# Patient Record
Sex: MALE | Race: OTHER | NOT HISPANIC OR LATINO | URBAN - METROPOLITAN AREA
[De-identification: names, ages, dates, MRNs, and addresses within clinical notes are randomized per-mention and may not be internally consistent; named-entity substitution may affect disease eponyms.]

---

## 2021-06-23 ENCOUNTER — OUTPATIENT (OUTPATIENT)
Dept: OUTPATIENT SERVICES | Facility: HOSPITAL | Age: 61
LOS: 1 days | Discharge: HOME | End: 2021-06-23
Payer: MEDICAID

## 2021-06-23 DIAGNOSIS — F17.200 NICOTINE DEPENDENCE, UNSPECIFIED, UNCOMPLICATED: ICD-10-CM

## 2021-06-23 PROCEDURE — 71271 CT THORAX LUNG CANCER SCR C-: CPT | Mod: 26

## 2022-07-09 ENCOUNTER — OUTPATIENT (OUTPATIENT)
Dept: OUTPATIENT SERVICES | Facility: HOSPITAL | Age: 62
LOS: 1 days | Discharge: HOME | End: 2022-07-09

## 2022-07-09 DIAGNOSIS — F17.211 NICOTINE DEPENDENCE, CIGARETTES, IN REMISSION: ICD-10-CM

## 2022-07-09 PROCEDURE — 71271 CT THORAX LUNG CANCER SCR C-: CPT | Mod: 26

## 2023-05-31 ENCOUNTER — OUTPATIENT (OUTPATIENT)
Dept: OUTPATIENT SERVICES | Facility: HOSPITAL | Age: 63
LOS: 1 days | End: 2023-05-31
Payer: MEDICAID

## 2023-05-31 DIAGNOSIS — R05.9 COUGH, UNSPECIFIED: ICD-10-CM

## 2023-05-31 PROCEDURE — 71046 X-RAY EXAM CHEST 2 VIEWS: CPT | Mod: 26

## 2023-05-31 PROCEDURE — 71046 X-RAY EXAM CHEST 2 VIEWS: CPT

## 2023-06-01 DIAGNOSIS — R05.9 COUGH, UNSPECIFIED: ICD-10-CM

## 2023-08-17 ENCOUNTER — OUTPATIENT (OUTPATIENT)
Dept: OUTPATIENT SERVICES | Facility: HOSPITAL | Age: 63
LOS: 1 days | End: 2023-08-17
Payer: MEDICAID

## 2023-08-17 DIAGNOSIS — Z00.8 ENCOUNTER FOR OTHER GENERAL EXAMINATION: ICD-10-CM

## 2023-08-17 DIAGNOSIS — F17.211 NICOTINE DEPENDENCE, CIGARETTES, IN REMISSION: ICD-10-CM

## 2023-08-17 PROCEDURE — 71271 CT THORAX LUNG CANCER SCR C-: CPT | Mod: 26

## 2023-08-17 PROCEDURE — 71271 CT THORAX LUNG CANCER SCR C-: CPT

## 2023-08-18 DIAGNOSIS — F17.211 NICOTINE DEPENDENCE, CIGARETTES, IN REMISSION: ICD-10-CM

## 2023-09-11 ENCOUNTER — APPOINTMENT (OUTPATIENT)
Dept: ORTHOPEDIC SURGERY | Facility: CLINIC | Age: 63
End: 2023-09-11
Payer: MEDICAID

## 2023-09-11 VITALS — BODY MASS INDEX: 23.78 KG/M2 | HEIGHT: 66 IN | WEIGHT: 148 LBS

## 2023-09-11 PROBLEM — Z00.00 ENCOUNTER FOR PREVENTIVE HEALTH EXAMINATION: Status: ACTIVE | Noted: 2023-09-11

## 2023-09-11 PROCEDURE — 73630 X-RAY EXAM OF FOOT: CPT | Mod: LT

## 2023-09-11 PROCEDURE — 73610 X-RAY EXAM OF ANKLE: CPT | Mod: LT

## 2023-09-11 PROCEDURE — 99203 OFFICE O/P NEW LOW 30 MIN: CPT

## 2023-09-21 ENCOUNTER — APPOINTMENT (OUTPATIENT)
Dept: MRI IMAGING | Facility: CLINIC | Age: 63
End: 2023-09-21

## 2023-09-25 ENCOUNTER — NON-APPOINTMENT (OUTPATIENT)
Age: 63
End: 2023-09-25

## 2023-09-25 ENCOUNTER — APPOINTMENT (OUTPATIENT)
Dept: ORTHOPEDIC SURGERY | Facility: CLINIC | Age: 63
End: 2023-09-25
Payer: MEDICAID

## 2023-09-25 PROCEDURE — 99213 OFFICE O/P EST LOW 20 MIN: CPT

## 2023-10-08 ENCOUNTER — RX RENEWAL (OUTPATIENT)
Age: 63
End: 2023-10-08

## 2023-10-08 RX ORDER — MELOXICAM 15 MG/1
15 TABLET ORAL
Qty: 30 | Refills: 0 | Status: ACTIVE | COMMUNITY
Start: 2023-09-11 | End: 1900-01-01

## 2023-10-20 ENCOUNTER — APPOINTMENT (OUTPATIENT)
Dept: ORTHOPEDIC SURGERY | Facility: CLINIC | Age: 63
End: 2023-10-20
Payer: MEDICAID

## 2023-10-20 PROCEDURE — 99213 OFFICE O/P EST LOW 20 MIN: CPT

## 2023-11-08 ENCOUNTER — APPOINTMENT (OUTPATIENT)
Dept: ORTHOPEDIC SURGERY | Facility: CLINIC | Age: 63
End: 2023-11-08
Payer: MEDICAID

## 2023-11-08 PROCEDURE — 99213 OFFICE O/P EST LOW 20 MIN: CPT

## 2023-11-09 ENCOUNTER — APPOINTMENT (OUTPATIENT)
Dept: MRI IMAGING | Facility: CLINIC | Age: 63
End: 2023-11-09

## 2023-11-20 ENCOUNTER — APPOINTMENT (OUTPATIENT)
Dept: MRI IMAGING | Facility: CLINIC | Age: 63
End: 2023-11-20
Payer: MEDICAID

## 2023-11-20 PROCEDURE — 73721 MRI JNT OF LWR EXTRE W/O DYE: CPT | Mod: LT

## 2023-11-27 ENCOUNTER — APPOINTMENT (OUTPATIENT)
Dept: ORTHOPEDIC SURGERY | Facility: CLINIC | Age: 63
End: 2023-11-27
Payer: MEDICAID

## 2023-11-27 PROCEDURE — 99214 OFFICE O/P EST MOD 30 MIN: CPT

## 2023-12-04 ENCOUNTER — APPOINTMENT (OUTPATIENT)
Dept: ORTHOPEDIC SURGERY | Facility: CLINIC | Age: 63
End: 2023-12-04
Payer: MEDICAID

## 2023-12-04 DIAGNOSIS — M65.9 SYNOVITIS AND TENOSYNOVITIS, UNSPECIFIED: ICD-10-CM

## 2023-12-04 PROCEDURE — 20605 DRAIN/INJ JOINT/BURSA W/O US: CPT | Mod: LT

## 2023-12-04 PROCEDURE — 99213 OFFICE O/P EST LOW 20 MIN: CPT | Mod: 25

## 2024-02-28 ENCOUNTER — INPATIENT (INPATIENT)
Facility: HOSPITAL | Age: 64
LOS: 14 days | Discharge: HOME | DRG: 951 | End: 2024-03-14
Attending: INTERNAL MEDICINE | Admitting: STUDENT IN AN ORGANIZED HEALTH CARE EDUCATION/TRAINING PROGRAM
Payer: MEDICAID

## 2024-02-28 VITALS
TEMPERATURE: 98 F | OXYGEN SATURATION: 94 % | SYSTOLIC BLOOD PRESSURE: 145 MMHG | RESPIRATION RATE: 24 BRPM | DIASTOLIC BLOOD PRESSURE: 90 MMHG | HEART RATE: 138 BPM

## 2024-02-28 LAB
ALBUMIN SERPL ELPH-MCNC: 3.5 G/DL — SIGNIFICANT CHANGE UP (ref 3.5–5.2)
ALP SERPL-CCNC: 144 U/L — HIGH (ref 30–115)
ALT FLD-CCNC: 85 U/L — HIGH (ref 0–41)
ANION GAP SERPL CALC-SCNC: 18 MMOL/L — HIGH (ref 7–14)
AST SERPL-CCNC: 195 U/L — HIGH (ref 0–41)
B-OH-BUTYR SERPL-SCNC: 0.2 MMOL/L — SIGNIFICANT CHANGE UP
BASE EXCESS BLDV CALC-SCNC: 1.1 MMOL/L — SIGNIFICANT CHANGE UP (ref -2–3)
BASOPHILS # BLD AUTO: 0.03 K/UL — SIGNIFICANT CHANGE UP (ref 0–0.2)
BASOPHILS NFR BLD AUTO: 0.5 % — SIGNIFICANT CHANGE UP (ref 0–1)
BILIRUB SERPL-MCNC: 1.2 MG/DL — SIGNIFICANT CHANGE UP (ref 0.2–1.2)
BUN SERPL-MCNC: 7 MG/DL — LOW (ref 10–20)
CA-I SERPL-SCNC: 1.07 MMOL/L — LOW (ref 1.15–1.33)
CALCIUM SERPL-MCNC: 8.3 MG/DL — LOW (ref 8.4–10.5)
CHLORIDE SERPL-SCNC: 100 MMOL/L — SIGNIFICANT CHANGE UP (ref 98–110)
CO2 SERPL-SCNC: 22 MMOL/L — SIGNIFICANT CHANGE UP (ref 17–32)
CREAT SERPL-MCNC: 0.7 MG/DL — SIGNIFICANT CHANGE UP (ref 0.7–1.5)
EGFR: 104 ML/MIN/1.73M2 — SIGNIFICANT CHANGE UP
EOSINOPHIL # BLD AUTO: 0.05 K/UL — SIGNIFICANT CHANGE UP (ref 0–0.7)
EOSINOPHIL NFR BLD AUTO: 0.8 % — SIGNIFICANT CHANGE UP (ref 0–8)
ETHANOL SERPL-MCNC: 202 MG/DL — HIGH
FLUAV AG NPH QL: SIGNIFICANT CHANGE UP
FLUBV AG NPH QL: SIGNIFICANT CHANGE UP
GAS PNL BLDV: 136 MMOL/L — SIGNIFICANT CHANGE UP (ref 136–145)
GAS PNL BLDV: SIGNIFICANT CHANGE UP
GAS PNL BLDV: SIGNIFICANT CHANGE UP
GLUCOSE SERPL-MCNC: 207 MG/DL — HIGH (ref 70–99)
HCO3 BLDV-SCNC: 24 MMOL/L — SIGNIFICANT CHANGE UP (ref 22–29)
HCT VFR BLD CALC: 45.5 % — SIGNIFICANT CHANGE UP (ref 42–52)
HCT VFR BLDA CALC: 50 % — SIGNIFICANT CHANGE UP (ref 39–51)
HGB BLD CALC-MCNC: 16.5 G/DL — SIGNIFICANT CHANGE UP (ref 12.6–17.4)
HGB BLD-MCNC: 16.3 G/DL — SIGNIFICANT CHANGE UP (ref 14–18)
IMM GRANULOCYTES NFR BLD AUTO: 0.6 % — HIGH (ref 0.1–0.3)
LACTATE BLDV-MCNC: 4 MMOL/L — CRITICAL HIGH (ref 0.5–2)
LYMPHOCYTES # BLD AUTO: 1.58 K/UL — SIGNIFICANT CHANGE UP (ref 1.2–3.4)
LYMPHOCYTES # BLD AUTO: 25 % — SIGNIFICANT CHANGE UP (ref 20.5–51.1)
MAGNESIUM SERPL-MCNC: 1.5 MG/DL — LOW (ref 1.8–2.4)
MCHC RBC-ENTMCNC: 35.1 PG — HIGH (ref 27–31)
MCHC RBC-ENTMCNC: 35.8 G/DL — SIGNIFICANT CHANGE UP (ref 32–37)
MCV RBC AUTO: 97.8 FL — HIGH (ref 80–94)
MONOCYTES # BLD AUTO: 0.55 K/UL — SIGNIFICANT CHANGE UP (ref 0.1–0.6)
MONOCYTES NFR BLD AUTO: 8.7 % — SIGNIFICANT CHANGE UP (ref 1.7–9.3)
NEUTROPHILS # BLD AUTO: 4.06 K/UL — SIGNIFICANT CHANGE UP (ref 1.4–6.5)
NEUTROPHILS NFR BLD AUTO: 64.4 % — SIGNIFICANT CHANGE UP (ref 42.2–75.2)
NRBC # BLD: 0 /100 WBCS — SIGNIFICANT CHANGE UP (ref 0–0)
NT-PROBNP SERPL-SCNC: 46 PG/ML — SIGNIFICANT CHANGE UP (ref 0–300)
PCO2 BLDV: 33 MMHG — LOW (ref 42–55)
PH BLDV: 7.47 — HIGH (ref 7.32–7.43)
PLATELET # BLD AUTO: 178 K/UL — SIGNIFICANT CHANGE UP (ref 130–400)
PMV BLD: 10.2 FL — SIGNIFICANT CHANGE UP (ref 7.4–10.4)
PO2 BLDV: 53 MMHG — HIGH (ref 25–45)
POTASSIUM BLDV-SCNC: 3.1 MMOL/L — LOW (ref 3.5–5.1)
POTASSIUM SERPL-MCNC: 3.5 MMOL/L — SIGNIFICANT CHANGE UP (ref 3.5–5)
POTASSIUM SERPL-SCNC: 3.5 MMOL/L — SIGNIFICANT CHANGE UP (ref 3.5–5)
PROT SERPL-MCNC: 6.4 G/DL — SIGNIFICANT CHANGE UP (ref 6–8)
RBC # BLD: 4.65 M/UL — LOW (ref 4.7–6.1)
RBC # FLD: 11.6 % — SIGNIFICANT CHANGE UP (ref 11.5–14.5)
RSV RNA NPH QL NAA+NON-PROBE: SIGNIFICANT CHANGE UP
SAO2 % BLDV: 86.6 % — SIGNIFICANT CHANGE UP (ref 67–88)
SARS-COV-2 RNA SPEC QL NAA+PROBE: SIGNIFICANT CHANGE UP
SODIUM SERPL-SCNC: 140 MMOL/L — SIGNIFICANT CHANGE UP (ref 135–146)
TROPONIN T, HIGH SENSITIVITY RESULT: 11 NG/L — SIGNIFICANT CHANGE UP (ref 6–21)
WBC # BLD: 6.31 K/UL — SIGNIFICANT CHANGE UP (ref 4.8–10.8)
WBC # FLD AUTO: 6.31 K/UL — SIGNIFICANT CHANGE UP (ref 4.8–10.8)

## 2024-02-28 PROCEDURE — 71045 X-RAY EXAM CHEST 1 VIEW: CPT | Mod: 26

## 2024-02-28 PROCEDURE — 93010 ELECTROCARDIOGRAM REPORT: CPT

## 2024-02-28 PROCEDURE — 99291 CRITICAL CARE FIRST HOUR: CPT

## 2024-02-28 RX ORDER — FAMOTIDINE 10 MG/ML
20 INJECTION INTRAVENOUS ONCE
Refills: 0 | Status: COMPLETED | OUTPATIENT
Start: 2024-02-28 | End: 2024-02-28

## 2024-02-28 RX ORDER — DIAZEPAM 5 MG
10 TABLET ORAL ONCE
Refills: 0 | Status: DISCONTINUED | OUTPATIENT
Start: 2024-02-28 | End: 2024-02-28

## 2024-02-28 RX ORDER — SODIUM CHLORIDE 9 MG/ML
1000 INJECTION INTRAMUSCULAR; INTRAVENOUS; SUBCUTANEOUS ONCE
Refills: 0 | Status: COMPLETED | OUTPATIENT
Start: 2024-02-28 | End: 2024-02-28

## 2024-02-28 RX ORDER — THIAMINE MONONITRATE (VIT B1) 100 MG
100 TABLET ORAL ONCE
Refills: 0 | Status: COMPLETED | OUTPATIENT
Start: 2024-02-28 | End: 2024-02-28

## 2024-02-28 RX ORDER — MAGNESIUM SULFATE 500 MG/ML
2 VIAL (ML) INJECTION ONCE
Refills: 0 | Status: COMPLETED | OUTPATIENT
Start: 2024-02-28 | End: 2024-02-28

## 2024-02-28 RX ORDER — ONDANSETRON 8 MG/1
4 TABLET, FILM COATED ORAL ONCE
Refills: 0 | Status: COMPLETED | OUTPATIENT
Start: 2024-02-28 | End: 2024-02-28

## 2024-02-28 RX ORDER — POTASSIUM CHLORIDE 20 MEQ
40 PACKET (EA) ORAL ONCE
Refills: 0 | Status: COMPLETED | OUTPATIENT
Start: 2024-02-28 | End: 2024-02-28

## 2024-02-28 RX ORDER — DIAZEPAM 5 MG
20 TABLET ORAL ONCE
Refills: 0 | Status: DISCONTINUED | OUTPATIENT
Start: 2024-02-28 | End: 2024-02-28

## 2024-02-28 RX ORDER — NICOTINE POLACRILEX 2 MG
1 GUM BUCCAL ONCE
Refills: 0 | Status: COMPLETED | OUTPATIENT
Start: 2024-02-28 | End: 2024-02-28

## 2024-02-28 RX ORDER — SODIUM CHLORIDE 9 MG/ML
1000 INJECTION, SOLUTION INTRAVENOUS
Refills: 0 | Status: DISCONTINUED | OUTPATIENT
Start: 2024-02-28 | End: 2024-03-01

## 2024-02-28 RX ADMIN — FAMOTIDINE 20 MILLIGRAM(S): 10 INJECTION INTRAVENOUS at 21:33

## 2024-02-28 RX ADMIN — Medication 2 MILLIGRAM(S): at 23:09

## 2024-02-28 RX ADMIN — Medication 1 PATCH: at 23:59

## 2024-02-28 RX ADMIN — SODIUM CHLORIDE 150 MILLILITER(S): 9 INJECTION, SOLUTION INTRAVENOUS at 23:09

## 2024-02-28 RX ADMIN — Medication 100 GRAM(S): at 23:17

## 2024-02-28 RX ADMIN — Medication 20 MILLIGRAM(S): at 23:47

## 2024-02-28 RX ADMIN — Medication 40 MILLIEQUIVALENT(S): at 23:09

## 2024-02-28 RX ADMIN — Medication 100 MILLIGRAM(S): at 23:09

## 2024-02-28 RX ADMIN — SODIUM CHLORIDE 1000 MILLILITER(S): 9 INJECTION INTRAMUSCULAR; INTRAVENOUS; SUBCUTANEOUS at 21:34

## 2024-02-28 RX ADMIN — Medication 10 MILLIGRAM(S): at 21:33

## 2024-02-28 NOTE — ED PROVIDER NOTE - ATTENDING APP SHARED VISIT CONTRIBUTION OF CARE
63-year-old male with past medical history of frequent alcohol use for years, drinks daily, last drink around 5 PM, recently diagnosed possible cardiomyopathy due to hyperthyroidism presents with worsening shortness of breath and dyspnea on exertion over the last month that is progressive and occasional chest tightness with exertion.  No fever.  Has been following with cardiology as an outpatient who told him to go to the ED today.  On exam slightly tremulous but overall nontoxic, normal work of breathing, lungs clear bilaterally, no wheezes, rales or rhonchi, heart tachycardic and regular but no murmurs.  Abdomen benign, no peripheral edema.  Labs notable for mild hyperglycemia and transaminitis, lactic acidosis.  Initially tachycardic but improving with IV fluids and benzos.  Clinically has mild alcohol withdrawal and also consider hypothyroidism causing cardiomyopathy.  Chest x-ray with no focal consolidation on my interpretation.

## 2024-02-28 NOTE — ED ADULT NURSE NOTE - CHIEF COMPLAINT QUOTE
shortness of breath, chest pain heart palpitations started today. pt. pcp increased pt. metoprolol to 75 mg 2times a day with no improvement. pt. drinks on a  daily basis last drink was 4 hours ago

## 2024-02-28 NOTE — ED PROVIDER NOTE - OBJECTIVE STATEMENT
63 years old male history of alcohol abuse, hypertension, recently diagnosed with hyperthyroidism few weeks ago and started on methimazole 5 mg (TSH - 0.1 Free T4 1.4) by PMD presents with family complaint of palpitations, fast heart rate and exertional shortness of breath over the past few weeks.  As per family, patient was seen by his cardiologist Dr. Johnson who increased that his metoprolol to 75 mg twice a day which did not improve his heart rate.  Patient had outpatient echocardiogram and only a 2-day and noted to be tachycardic so he was recommended to come to ED for evaluation.  Patient admits to daily alcohol use (15-20 drinks of liquor daily).  Last drink was earlier this evening.  Patient also reports he does want to stop drinking and try to cut down his alcohol use.  Also reports dry heaving, nausea, tremors and anxiety.  Otherwise denies fever, chills, recent illness, coughing, chest pain, abdominal pain, diarrhea or urinary symptoms.  Denies drug use.

## 2024-02-28 NOTE — ED PROVIDER NOTE - NS ED ATTENDING STATEMENT MOD
This was a shared visit with the HITESH. I reviewed and verified the documentation. I have personally provided the amount of critical care time documented below excluding time spent on separate procedures.

## 2024-02-28 NOTE — ED PROVIDER NOTE - CRITICAL CARE ATTENDING CONTRIBUTION TO CARE
This was a shared visit with the ACP and I contributed to the medical decision making.    Attending Statement: I have personally provided the amount of critical care time documented below excluding time spent on separate procedures.     Critical Care Time Spent (min) Must be 30 or more minutes to qualify: 35.

## 2024-02-28 NOTE — ED PROVIDER NOTE - CLINICAL SUMMARY MEDICAL DECISION MAKING FREE TEXT BOX
63-year-old male with past medical history of frequent alcohol use for years, drinks daily, last drink around 5 PM, recently diagnosed possible cardiomyopathy due to hyperthyroidism presents with worsening shortness of breath and dyspnea on exertion over the last month that is progressive and occasional chest tightness with exertion.  No fever.  Has been following with cardiology as an outpatient who told him to go to the ED today.  On exam slightly tremulous but overall nontoxic, normal work of breathing, lungs clear bilaterally, no wheezes, rales or rhonchi, heart tachycardic and regular but no murmurs.  Abdomen benign, no peripheral edema.  Labs notable for mild hyperglycemia and transaminitis, lactic acidosis.  Initially tachycardic but improving with IV fluids and benzos.  Clinically has mild alcohol withdrawal and also consider hypothyroidism causing cardiomyopathy.  Chest x-ray with no focal consolidation on my interpretation. admitted to telemetry after discussion with ICU fellow.

## 2024-02-28 NOTE — ED PROVIDER NOTE - PROGRESS NOTE DETAILS
spoke with ICU fellow Dr Lancaster and stable for the floor sxs and HR improved after IV 10mg Valium but still Tremulous and anxious.  Will treat with higher dose of  Valium and admit for further management.

## 2024-02-28 NOTE — ED ADULT NURSE NOTE - OBJECTIVE STATEMENT
62 y/o male presented to ed c/o chest pain and palpitations associated with cough and SOB. Pt endorses drinks daily bottle of scotch. Last drink around 5 pm.

## 2024-02-28 NOTE — ED PROVIDER NOTE - PHYSICAL EXAMINATION
CONSTITUTIONAL: Well-appearing; in no apparent distress.   EYES: PERRL; EOM intact.   CARDIOVASCULAR: + tachycardia. Normal S1, S2; no murmurs, rubs, or gallops.   RESPIRATORY: Normal chest excursion with respiration; breath sounds clear and equal bilaterally; no wheezes, rhonchi, or rales.  GI/: Normal bowel sounds; non-distended; non-tender; no palpable organomegaly.   MS: No calf swelling and tenderness.  SKIN: Normal for age and race; warm; dry; good turgor; no apparent lesions or exudate.   NEURO/PSYCH: A & O x 4; grossly unremarkable. + anxious mood + tremors + tongue Fasciculation CIWA ~ 17

## 2024-02-29 DIAGNOSIS — F10.939 ALCOHOL USE, UNSPECIFIED WITH WITHDRAWAL, UNSPECIFIED: ICD-10-CM

## 2024-02-29 LAB
ALBUMIN SERPL ELPH-MCNC: 3.4 G/DL — LOW (ref 3.5–5.2)
ALP SERPL-CCNC: 136 U/L — HIGH (ref 30–115)
ALT FLD-CCNC: 79 U/L — HIGH (ref 0–41)
ANION GAP SERPL CALC-SCNC: 11 MMOL/L — SIGNIFICANT CHANGE UP (ref 7–14)
AST SERPL-CCNC: 179 U/L — HIGH (ref 0–41)
BILIRUB SERPL-MCNC: 2.3 MG/DL — HIGH (ref 0.2–1.2)
BUN SERPL-MCNC: 8 MG/DL — LOW (ref 10–20)
CALCIUM SERPL-MCNC: 7.9 MG/DL — LOW (ref 8.4–10.5)
CHLORIDE SERPL-SCNC: 104 MMOL/L — SIGNIFICANT CHANGE UP (ref 98–110)
CO2 SERPL-SCNC: 24 MMOL/L — SIGNIFICANT CHANGE UP (ref 17–32)
CREAT SERPL-MCNC: 0.7 MG/DL — SIGNIFICANT CHANGE UP (ref 0.7–1.5)
EGFR: 104 ML/MIN/1.73M2 — SIGNIFICANT CHANGE UP
GLUCOSE BLDC GLUCOMTR-MCNC: 134 MG/DL — HIGH (ref 70–99)
GLUCOSE SERPL-MCNC: 166 MG/DL — HIGH (ref 70–99)
HCV AB S/CO SERPL IA: 0.04 COI — SIGNIFICANT CHANGE UP
HCV AB SERPL-IMP: SIGNIFICANT CHANGE UP
LACTATE SERPL-SCNC: 1.4 MMOL/L — SIGNIFICANT CHANGE UP (ref 0.7–2)
MAGNESIUM SERPL-MCNC: 1.7 MG/DL — LOW (ref 1.8–2.4)
POTASSIUM SERPL-MCNC: 4 MMOL/L — SIGNIFICANT CHANGE UP (ref 3.5–5)
POTASSIUM SERPL-SCNC: 4 MMOL/L — SIGNIFICANT CHANGE UP (ref 3.5–5)
PROT SERPL-MCNC: 5.8 G/DL — LOW (ref 6–8)
SODIUM SERPL-SCNC: 139 MMOL/L — SIGNIFICANT CHANGE UP (ref 135–146)
T4 FREE SERPL-MCNC: 1.8 NG/DL — SIGNIFICANT CHANGE UP (ref 0.9–1.8)
TSH SERPL-MCNC: 0.01 UIU/ML — LOW (ref 0.27–4.2)

## 2024-02-29 PROCEDURE — 84100 ASSAY OF PHOSPHORUS: CPT

## 2024-02-29 PROCEDURE — 83735 ASSAY OF MAGNESIUM: CPT

## 2024-02-29 PROCEDURE — 85025 COMPLETE CBC W/AUTO DIFF WBC: CPT

## 2024-02-29 PROCEDURE — 93306 TTE W/DOPPLER COMPLETE: CPT

## 2024-02-29 PROCEDURE — 97116 GAIT TRAINING THERAPY: CPT | Mod: GP

## 2024-02-29 PROCEDURE — 82140 ASSAY OF AMMONIA: CPT

## 2024-02-29 PROCEDURE — 94002 VENT MGMT INPAT INIT DAY: CPT

## 2024-02-29 PROCEDURE — 83605 ASSAY OF LACTIC ACID: CPT

## 2024-02-29 PROCEDURE — 92612 ENDOSCOPY SWALLOW (FEES) VID: CPT | Mod: GN

## 2024-02-29 PROCEDURE — 82330 ASSAY OF CALCIUM: CPT

## 2024-02-29 PROCEDURE — 94660 CPAP INITIATION&MGMT: CPT

## 2024-02-29 PROCEDURE — 87040 BLOOD CULTURE FOR BACTERIA: CPT

## 2024-02-29 PROCEDURE — 36415 COLL VENOUS BLD VENIPUNCTURE: CPT

## 2024-02-29 PROCEDURE — 84443 ASSAY THYROID STIM HORMONE: CPT

## 2024-02-29 PROCEDURE — 82746 ASSAY OF FOLIC ACID SERUM: CPT

## 2024-02-29 PROCEDURE — 74018 RADEX ABDOMEN 1 VIEW: CPT

## 2024-02-29 PROCEDURE — 80048 BASIC METABOLIC PNL TOTAL CA: CPT

## 2024-02-29 PROCEDURE — 92610 EVALUATE SWALLOWING FUNCTION: CPT | Mod: GN

## 2024-02-29 PROCEDURE — 71045 X-RAY EXAM CHEST 1 VIEW: CPT

## 2024-02-29 PROCEDURE — 84145 PROCALCITONIN (PCT): CPT

## 2024-02-29 PROCEDURE — 85018 HEMOGLOBIN: CPT

## 2024-02-29 PROCEDURE — 93010 ELECTROCARDIOGRAM REPORT: CPT

## 2024-02-29 PROCEDURE — 81003 URINALYSIS AUTO W/O SCOPE: CPT

## 2024-02-29 PROCEDURE — 86803 HEPATITIS C AB TEST: CPT

## 2024-02-29 PROCEDURE — 82803 BLOOD GASES ANY COMBINATION: CPT

## 2024-02-29 PROCEDURE — 0225U NFCT DS DNA&RNA 21 SARSCOV2: CPT

## 2024-02-29 PROCEDURE — 71275 CT ANGIOGRAPHY CHEST: CPT | Mod: MC

## 2024-02-29 PROCEDURE — 94003 VENT MGMT INPAT SUBQ DAY: CPT

## 2024-02-29 PROCEDURE — 82962 GLUCOSE BLOOD TEST: CPT

## 2024-02-29 PROCEDURE — 85730 THROMBOPLASTIN TIME PARTIAL: CPT

## 2024-02-29 PROCEDURE — 87640 STAPH A DNA AMP PROBE: CPT

## 2024-02-29 PROCEDURE — 82607 VITAMIN B-12: CPT

## 2024-02-29 PROCEDURE — 70450 CT HEAD/BRAIN W/O DYE: CPT | Mod: MC

## 2024-02-29 PROCEDURE — 84439 ASSAY OF FREE THYROXINE: CPT

## 2024-02-29 PROCEDURE — 85014 HEMATOCRIT: CPT

## 2024-02-29 PROCEDURE — 85379 FIBRIN DEGRADATION QUANT: CPT

## 2024-02-29 PROCEDURE — 82248 BILIRUBIN DIRECT: CPT

## 2024-02-29 PROCEDURE — 87070 CULTURE OTHR SPECIMN AEROBIC: CPT

## 2024-02-29 PROCEDURE — 97162 PT EVAL MOD COMPLEX 30 MIN: CPT | Mod: GP

## 2024-02-29 PROCEDURE — 93005 ELECTROCARDIOGRAM TRACING: CPT

## 2024-02-29 PROCEDURE — 99223 1ST HOSP IP/OBS HIGH 75: CPT

## 2024-02-29 PROCEDURE — 80076 HEPATIC FUNCTION PANEL: CPT

## 2024-02-29 PROCEDURE — 94640 AIRWAY INHALATION TREATMENT: CPT

## 2024-02-29 PROCEDURE — 80053 COMPREHEN METABOLIC PANEL: CPT

## 2024-02-29 PROCEDURE — 85027 COMPLETE CBC AUTOMATED: CPT

## 2024-02-29 PROCEDURE — 81001 URINALYSIS AUTO W/SCOPE: CPT

## 2024-02-29 PROCEDURE — 84445 ASSAY OF TSI GLOBULIN: CPT

## 2024-02-29 PROCEDURE — 95819 EEG AWAKE AND ASLEEP: CPT

## 2024-02-29 PROCEDURE — 84480 ASSAY TRIIODOTHYRONINE (T3): CPT

## 2024-02-29 PROCEDURE — 84132 ASSAY OF SERUM POTASSIUM: CPT

## 2024-02-29 PROCEDURE — 84295 ASSAY OF SERUM SODIUM: CPT

## 2024-02-29 PROCEDURE — 74176 CT ABD & PELVIS W/O CONTRAST: CPT | Mod: MC

## 2024-02-29 PROCEDURE — 87641 MR-STAPH DNA AMP PROBE: CPT

## 2024-02-29 RX ORDER — MAGNESIUM SULFATE 500 MG/ML
2 VIAL (ML) INJECTION ONCE
Refills: 0 | Status: COMPLETED | OUTPATIENT
Start: 2024-02-29 | End: 2024-02-29

## 2024-02-29 RX ORDER — HALOPERIDOL DECANOATE 100 MG/ML
2.5 INJECTION INTRAMUSCULAR ONCE
Refills: 0 | Status: COMPLETED | OUTPATIENT
Start: 2024-02-29 | End: 2024-02-29

## 2024-02-29 RX ORDER — METOPROLOL TARTRATE 50 MG
75 TABLET ORAL
Refills: 0 | Status: DISCONTINUED | OUTPATIENT
Start: 2024-02-29 | End: 2024-03-04

## 2024-02-29 RX ORDER — FOLIC ACID 0.8 MG
1 TABLET ORAL DAILY
Refills: 0 | Status: DISCONTINUED | OUTPATIENT
Start: 2024-02-29 | End: 2024-03-14

## 2024-02-29 RX ORDER — ENOXAPARIN SODIUM 100 MG/ML
40 INJECTION SUBCUTANEOUS EVERY 24 HOURS
Refills: 0 | Status: DISCONTINUED | OUTPATIENT
Start: 2024-02-29 | End: 2024-03-01

## 2024-02-29 RX ORDER — IPRATROPIUM/ALBUTEROL SULFATE 18-103MCG
3 AEROSOL WITH ADAPTER (GRAM) INHALATION EVERY 6 HOURS
Refills: 0 | Status: DISCONTINUED | OUTPATIENT
Start: 2024-02-29 | End: 2024-03-08

## 2024-02-29 RX ORDER — THIAMINE MONONITRATE (VIT B1) 100 MG
100 TABLET ORAL DAILY
Refills: 0 | Status: DISCONTINUED | OUTPATIENT
Start: 2024-02-29 | End: 2024-03-01

## 2024-02-29 RX ORDER — DEXMEDETOMIDINE HYDROCHLORIDE IN 0.9% SODIUM CHLORIDE 4 UG/ML
0.05 INJECTION INTRAVENOUS
Qty: 200 | Refills: 0 | Status: DISCONTINUED | OUTPATIENT
Start: 2024-02-29 | End: 2024-03-01

## 2024-02-29 RX ADMIN — Medication 1 MILLIGRAM(S): at 11:46

## 2024-02-29 RX ADMIN — Medication 1 TABLET(S): at 11:46

## 2024-02-29 RX ADMIN — SODIUM CHLORIDE 150 MILLILITER(S): 9 INJECTION, SOLUTION INTRAVENOUS at 12:53

## 2024-02-29 RX ADMIN — Medication 2 MILLIGRAM(S): at 09:36

## 2024-02-29 RX ADMIN — Medication 2 MILLIGRAM(S): at 20:14

## 2024-02-29 RX ADMIN — Medication 3 MILLILITER(S): at 12:00

## 2024-02-29 RX ADMIN — Medication 40 MILLIGRAM(S): at 06:03

## 2024-02-29 RX ADMIN — Medication 100 MILLIGRAM(S): at 11:46

## 2024-02-29 RX ADMIN — Medication 2 MILLIGRAM(S): at 11:57

## 2024-02-29 RX ADMIN — Medication 75 MILLIGRAM(S): at 17:52

## 2024-02-29 RX ADMIN — DEXMEDETOMIDINE HYDROCHLORIDE IN 0.9% SODIUM CHLORIDE 0.99 MICROGRAM(S)/KG/HR: 4 INJECTION INTRAVENOUS at 22:43

## 2024-02-29 RX ADMIN — Medication 2 MILLIGRAM(S): at 23:14

## 2024-02-29 RX ADMIN — Medication 2 MILLIGRAM(S): at 18:58

## 2024-02-29 RX ADMIN — Medication 3 MILLILITER(S): at 19:19

## 2024-02-29 RX ADMIN — Medication 1 MILLIGRAM(S): at 08:38

## 2024-02-29 RX ADMIN — Medication 2 MILLIGRAM(S): at 21:20

## 2024-02-29 RX ADMIN — HALOPERIDOL DECANOATE 2.5 MILLIGRAM(S): 100 INJECTION INTRAMUSCULAR at 23:37

## 2024-02-29 RX ADMIN — Medication 25 GRAM(S): at 17:50

## 2024-02-29 RX ADMIN — Medication 2 MILLIGRAM(S): at 04:35

## 2024-02-29 RX ADMIN — Medication 4 MILLIGRAM(S): at 23:39

## 2024-02-29 NOTE — ED ADULT NURSE REASSESSMENT NOTE - NS ED NURSE REASSESS COMMENT FT1
Received pt from previous RN, pt assessed, pt restless-pt medicated from previous RN,  V/S stable at this time   family by bedside

## 2024-02-29 NOTE — H&P ADULT - NSHPPHYSICALEXAM_GEN_ALL_CORE
LOS: 1d    VITALS:   T(C): 36.8 (02-28-24 @ 20:33), Max: 36.8 (02-28-24 @ 20:33)  HR: 91 (02-28-24 @ 21:59) (91 - 138)  BP: 145/90 (02-28-24 @ 20:33) (145/90 - 145/90)  RR: 24 (02-28-24 @ 20:33) (24 - 24)  SpO2: 94% (02-28-24 @ 20:33) (94% - 94%)    GENERAL: NAD, lying in bed comfortably  HEAD:  Atraumatic, Normocephalic  EYES: EOMI, PERRLA, conjunctiva and sclera clear  ENT: Moist mucous membranes  NECK: Supple, No JVD  CHEST/LUNG: Clear to auscultation bilaterally; No rales, rhonchi, wheezing, or rubs. Unlabored respirations  HEART: Regular rate and rhythm; No murmurs, rubs, or gallops  ABDOMEN: BSx4; Soft, nontender, nondistended  EXTREMITIES:  2+ Peripheral Pulses, brisk capillary refill. No clubbing, cyanosis, or edema  NERVOUS SYSTEM:  A&Ox3, 5/5 motor bilateral upper and lower extremity, no sensory deficit   SKIN: No rashes or lesions

## 2024-02-29 NOTE — ED ADULT NURSE REASSESSMENT NOTE - NS ED NURSE REASSESS COMMENT FT1
Pt received as upgrade from ED hold at 23:30. Pt agitated, pulling lines, aggressive. Pt is confused at this time, unable to answer questions. Pt on level 1 restraints as per MD order. Precedex drip initiated as per MD order. RASS +3 at this time. No s/s of respiratory distress. VSS. Pt is admitted to ICU service. Pending bed status. Safety and Fall precautions in place as per hospital policy. Care ongoing.

## 2024-02-29 NOTE — H&P ADULT - ATTENDING COMMENTS
63 years old male history of alcohol abuse, hypertension, COPD? ( heavy smoker. acc to pt had PFTs done and uses inhalers at home ), recently diagnosed with hyperthyroidism few weeks ago and started on methimazole 5 mg  by PMD presents complaining of palpitations and exertional shortness of breath over the past few weeks.     #suspected alcohol withdrawal with tactile disturbances   #hx of hyperthyroids  #Lactic acidosis   #alcoholic liver injury   #toxic metabolic encephalopathy   - CIWA on my exam 25   - increase ativan to standing q2 and prn  - spoke with pulm fellow- if not improving upgrade to ICU   - continue bb and steroids   - IV fluids   - CIWA protocol with ativan taper   - CATCH team eval   - thiamine and folate   - replete Mg and K as needed        #DVT ppx: lovenox   #GI ppx: protonix   #Diet: DASH   #Activity: as tolerated   #Dispo: possible upgrade to ICU

## 2024-02-29 NOTE — H&P ADULT - ASSESSMENT
63 years old male history of alcohol abuse, hypertension, COPD? ( heavy smoker. acc to pt had PFTs done and uses inhalers at home ), recently diagnosed with hyperthyroidism few weeks ago and started on methimazole 5 mg  by PMD presents complaining of palpitations and exertional shortness of breath over the past few weeks.     #Palpitations   #Alcohol intoxication   #Recently diagnose hyperthyroidism?   - Vitals: T 98.2, , /90, spo2 94% on RA   - K 3.5, Mg 1.5  - Lactate 4 on VBGs   - trop 11, pro-BNP 46  - Alcohol level 202   - CXR clear   - EKG showed sinus tachycardia   - s/p valium and ativan in ED  - s/p 1 L NS, magnesium   - HR improved from 130 to 90s   - sxs may be due to alcohol intoxication vs hyperthyroidism   - monitor on tele for now   - check TSH, fT4   - pt had TTE done as OP yesterday, please get records from Dr Johnson   - will hold on BB for now given improved HR   - CIWA protocol with ativan taper   - CATCH team eval, pt willing to stop   - thiamine and folate   - replete Mg and K as needed     #Transaminitis   - , ALT 85,   - 2/2 to alcohol use     #HTN  - not on meds   - monitor BP     #COPD?   - on RA   - wheezing on PE  - duonebs PRN   - prednisone 40 mg OD for 5 days     #DVT ppx: lovenox   #GI ppx: protonix   #Diet: DASH   #Activity: as tolerated   #Dispo: tele   Please confirm medrec with family in AM.    63 years old male history of alcohol abuse, hypertension, COPD? ( heavy smoker. acc to pt had PFTs done and uses inhalers at home ), recently diagnosed with hyperthyroidism few weeks ago and started on methimazole 5 mg  by PMD presents complaining of palpitations and exertional shortness of breath over the past few weeks.     #Palpitations   #Alcohol intoxication   #Recently diagnose hyperthyroidism?   - Vitals: T 98.2, , /90, spo2 94% on RA   - K 3.5, Mg 1.5  - Lactate 4 on VBGs   - trop 11, pro-BNP 46  - Alcohol level 202   - CXR clear   - EKG showed sinus tachycardia   - s/p valium and ativan in ED  - s/p 1 L NS, magnesium   - HR improved from 130 to 90s   - sxs may be due to alcohol intoxication vs hyperthyroidism   - monitor on tele for now   - check TSH, fT4   - pt had TTE done as OP yesterday, please get records from Dr Johnson   - will hold on BB for now given improved HR   - CIWA protocol with ativan taper   - CATCH team eval, pt willing to stop   - thiamine and folate   - replete Mg and K as needed     #Lactic acidosis   - no hypotension or hypoxia   - c/w LR 75 cc/hr   - check repeat     #Transaminitis   - , ALT 85,   - 2/2 to alcohol use     #HTN  - not on meds   - monitor BP     #COPD?   - on RA   - wheezing on PE  - duonebs PRN   - prednisone 40 mg OD for 5 days     #DVT ppx: lovenox   #GI ppx: protonix   #Diet: DASH   #Activity: as tolerated   #Dispo: tele   Please confirm medrec with family in AM.

## 2024-02-29 NOTE — H&P ADULT - HISTORY OF PRESENT ILLNESS
63 years old male history of alcohol abuse, hypertension, recently diagnosed with hyperthyroidism few weeks ago and started on methimazole 5 mg (TSH - 0.1 Free T4 1.4) by PMD presents with family complaint of palpitations, fast heart rate and exertional shortness of breath over the past few weeks.  As per family, patient was seen by his cardiologist Dr. Johnson who increased that his metoprolol to 75 mg twice a day which did not improve his heart rate.  Patient had outpatient echocardiogram and only a 2-day and noted to be tachycardic so he was recommended to come to ED for evaluation.  Patient admits to daily alcohol use (15-20 drinks of liquor daily).  Last drink was earlier this evening.  Patient also reports he does want to stop drinking and try to cut down his alcohol use.  Also reports dry heaving, nausea, tremors and anxiety.  Otherwise denies fever, chills, recent illness, coughing, chest pain, abdominal pain, diarrhea or urinary symptoms.  Denies drug use. 63 years old male history of alcohol abuse, hypertension, COPD? ( heavy smoker. acc to pt had PFTs done and uses inhalers at home ), recently diagnosed with hyperthyroidism few weeks ago and started on methimazole 5 mg by PMD presents complaining of palpitations and exertional shortness of breath over the past few weeks.   At my encounter family not at bedside, acc to pt his only complaint is palpitaiosn, His HR was ranging 120-130 at home. No chest pain. He has cough and mild SOB which he attributes to smoking 1 pack daily. Otherwise he describes himself as very healthy. Pt is tremelous and restless. He admitted to drinking alcohol  (15-20 drinks of liquor daily). Last drink was earlier this evening.  Patient also reports he does want to stop drinking and try to cut down his alcohol use.    As per family, patient was seen by his cardiologist Dr. Johnson who increased that his metoprolol to 75 mg twice a day which did not improve his heart rate.  Patient had outpatient echocardiogram yesterday and noted to be tachycardic so he was recommended to come to ED for evaluation.  Otherwise denies fever, chills, recent illness, coughing, chest pain, abdominal pain, diarrhea or urinary symptoms.  Denies drug use.    Vitals: T 98.2, , /90, spo2 94% on RA   Labs remarkable for elevated ALT AST, Mg 1.5  Lactate 4 on VBGs   Alcohol level 202   CXR clear   EKG showed sinus tachycardia     s/p valium and ativan in ED  s/p 1 L NS, magnesium   HR improved from 130 to 90s

## 2024-03-01 LAB
ALBUMIN SERPL ELPH-MCNC: 3.1 G/DL — LOW (ref 3.5–5.2)
ALBUMIN SERPL ELPH-MCNC: 3.3 G/DL — LOW (ref 3.5–5.2)
ALP SERPL-CCNC: 114 U/L — SIGNIFICANT CHANGE UP (ref 30–115)
ALP SERPL-CCNC: 119 U/L — HIGH (ref 30–115)
ALT FLD-CCNC: 63 U/L — HIGH (ref 0–41)
ALT FLD-CCNC: 65 U/L — HIGH (ref 0–41)
ANION GAP SERPL CALC-SCNC: 14 MMOL/L — SIGNIFICANT CHANGE UP (ref 7–14)
ANION GAP SERPL CALC-SCNC: 16 MMOL/L — HIGH (ref 7–14)
APTT BLD: 49.4 SEC — HIGH (ref 27–39.2)
AST SERPL-CCNC: 112 U/L — HIGH (ref 0–41)
AST SERPL-CCNC: 99 U/L — HIGH (ref 0–41)
BASOPHILS # BLD AUTO: 0.03 K/UL — SIGNIFICANT CHANGE UP (ref 0–0.2)
BASOPHILS NFR BLD AUTO: 0.4 % — SIGNIFICANT CHANGE UP (ref 0–1)
BILIRUB DIRECT SERPL-MCNC: 1.8 MG/DL — HIGH (ref 0–0.3)
BILIRUB INDIRECT FLD-MCNC: 1 MG/DL — SIGNIFICANT CHANGE UP (ref 0.2–1.2)
BILIRUB SERPL-MCNC: 2.7 MG/DL — HIGH (ref 0.2–1.2)
BILIRUB SERPL-MCNC: 2.8 MG/DL — HIGH (ref 0.2–1.2)
BUN SERPL-MCNC: 10 MG/DL — SIGNIFICANT CHANGE UP (ref 10–20)
BUN SERPL-MCNC: 9 MG/DL — LOW (ref 10–20)
CALCIUM SERPL-MCNC: 7.9 MG/DL — LOW (ref 8.4–10.5)
CALCIUM SERPL-MCNC: 8.1 MG/DL — LOW (ref 8.4–10.5)
CHLORIDE SERPL-SCNC: 107 MMOL/L — SIGNIFICANT CHANGE UP (ref 98–110)
CHLORIDE SERPL-SCNC: 108 MMOL/L — SIGNIFICANT CHANGE UP (ref 98–110)
CO2 SERPL-SCNC: 18 MMOL/L — SIGNIFICANT CHANGE UP (ref 17–32)
CO2 SERPL-SCNC: 21 MMOL/L — SIGNIFICANT CHANGE UP (ref 17–32)
CREAT SERPL-MCNC: 0.6 MG/DL — LOW (ref 0.7–1.5)
CREAT SERPL-MCNC: 0.7 MG/DL — SIGNIFICANT CHANGE UP (ref 0.7–1.5)
EGFR: 104 ML/MIN/1.73M2 — SIGNIFICANT CHANGE UP
EGFR: 108 ML/MIN/1.73M2 — SIGNIFICANT CHANGE UP
EOSINOPHIL # BLD AUTO: 0.14 K/UL — SIGNIFICANT CHANGE UP (ref 0–0.7)
EOSINOPHIL NFR BLD AUTO: 2 % — SIGNIFICANT CHANGE UP (ref 0–8)
GAS PNL BLDA: SIGNIFICANT CHANGE UP
GAS PNL BLDA: SIGNIFICANT CHANGE UP
GLUCOSE BLDC GLUCOMTR-MCNC: 146 MG/DL — HIGH (ref 70–99)
GLUCOSE SERPL-MCNC: 148 MG/DL — HIGH (ref 70–99)
GLUCOSE SERPL-MCNC: 150 MG/DL — HIGH (ref 70–99)
HCT VFR BLD CALC: 42.7 % — SIGNIFICANT CHANGE UP (ref 42–52)
HGB BLD-MCNC: 15.5 G/DL — SIGNIFICANT CHANGE UP (ref 14–18)
IMM GRANULOCYTES NFR BLD AUTO: 0.4 % — HIGH (ref 0.1–0.3)
LYMPHOCYTES # BLD AUTO: 1.56 K/UL — SIGNIFICANT CHANGE UP (ref 1.2–3.4)
LYMPHOCYTES # BLD AUTO: 22.7 % — SIGNIFICANT CHANGE UP (ref 20.5–51.1)
MAGNESIUM SERPL-MCNC: 1.7 MG/DL — LOW (ref 1.8–2.4)
MAGNESIUM SERPL-MCNC: 2 MG/DL — SIGNIFICANT CHANGE UP (ref 1.8–2.4)
MCHC RBC-ENTMCNC: 36.3 G/DL — SIGNIFICANT CHANGE UP (ref 32–37)
MCHC RBC-ENTMCNC: 36.5 PG — HIGH (ref 27–31)
MCV RBC AUTO: 100.5 FL — HIGH (ref 80–94)
MONOCYTES # BLD AUTO: 0.49 K/UL — SIGNIFICANT CHANGE UP (ref 0.1–0.6)
MONOCYTES NFR BLD AUTO: 7.1 % — SIGNIFICANT CHANGE UP (ref 1.7–9.3)
NEUTROPHILS # BLD AUTO: 4.62 K/UL — SIGNIFICANT CHANGE UP (ref 1.4–6.5)
NEUTROPHILS NFR BLD AUTO: 67.4 % — SIGNIFICANT CHANGE UP (ref 42.2–75.2)
NRBC # BLD: 0 /100 WBCS — SIGNIFICANT CHANGE UP (ref 0–0)
PHOSPHATE SERPL-MCNC: 2.3 MG/DL — SIGNIFICANT CHANGE UP (ref 2.1–4.9)
PHOSPHATE SERPL-MCNC: 2.6 MG/DL — SIGNIFICANT CHANGE UP (ref 2.1–4.9)
PLATELET # BLD AUTO: 114 K/UL — LOW (ref 130–400)
PMV BLD: 11.5 FL — HIGH (ref 7.4–10.4)
POTASSIUM SERPL-MCNC: 3.7 MMOL/L — SIGNIFICANT CHANGE UP (ref 3.5–5)
POTASSIUM SERPL-MCNC: 4.8 MMOL/L — SIGNIFICANT CHANGE UP (ref 3.5–5)
POTASSIUM SERPL-SCNC: 3.7 MMOL/L — SIGNIFICANT CHANGE UP (ref 3.5–5)
POTASSIUM SERPL-SCNC: 4.8 MMOL/L — SIGNIFICANT CHANGE UP (ref 3.5–5)
PROT SERPL-MCNC: 5.6 G/DL — LOW (ref 6–8)
PROT SERPL-MCNC: 5.7 G/DL — LOW (ref 6–8)
RBC # BLD: 4.25 M/UL — LOW (ref 4.7–6.1)
RBC # FLD: 11.4 % — LOW (ref 11.5–14.5)
SODIUM SERPL-SCNC: 142 MMOL/L — SIGNIFICANT CHANGE UP (ref 135–146)
SODIUM SERPL-SCNC: 142 MMOL/L — SIGNIFICANT CHANGE UP (ref 135–146)
WBC # BLD: 6.87 K/UL — SIGNIFICANT CHANGE UP (ref 4.8–10.8)
WBC # FLD AUTO: 6.87 K/UL — SIGNIFICANT CHANGE UP (ref 4.8–10.8)

## 2024-03-01 PROCEDURE — 71045 X-RAY EXAM CHEST 1 VIEW: CPT | Mod: 26,77

## 2024-03-01 PROCEDURE — 93010 ELECTROCARDIOGRAM REPORT: CPT

## 2024-03-01 PROCEDURE — 71045 X-RAY EXAM CHEST 1 VIEW: CPT | Mod: 26

## 2024-03-01 PROCEDURE — 99291 CRITICAL CARE FIRST HOUR: CPT | Mod: GC

## 2024-03-01 PROCEDURE — 71275 CT ANGIOGRAPHY CHEST: CPT | Mod: 26

## 2024-03-01 RX ORDER — DEXMEDETOMIDINE HYDROCHLORIDE IN 0.9% SODIUM CHLORIDE 4 UG/ML
0.05 INJECTION INTRAVENOUS
Qty: 400 | Refills: 0 | Status: DISCONTINUED | OUTPATIENT
Start: 2024-03-01 | End: 2024-03-02

## 2024-03-01 RX ORDER — MAGNESIUM SULFATE 500 MG/ML
2 VIAL (ML) INJECTION ONCE
Refills: 0 | Status: COMPLETED | OUTPATIENT
Start: 2024-03-01 | End: 2024-03-01

## 2024-03-01 RX ORDER — THIAMINE MONONITRATE (VIT B1) 100 MG
500 TABLET ORAL EVERY 8 HOURS
Refills: 0 | Status: COMPLETED | OUTPATIENT
Start: 2024-03-01 | End: 2024-03-04

## 2024-03-01 RX ORDER — DEXMEDETOMIDINE HYDROCHLORIDE IN 0.9% SODIUM CHLORIDE 4 UG/ML
0.2 INJECTION INTRAVENOUS
Qty: 200 | Refills: 0 | Status: DISCONTINUED | OUTPATIENT
Start: 2024-03-01 | End: 2024-03-01

## 2024-03-01 RX ORDER — FENTANYL CITRATE 50 UG/ML
0.5 INJECTION INTRAVENOUS
Qty: 5000 | Refills: 0 | Status: DISCONTINUED | OUTPATIENT
Start: 2024-03-01 | End: 2024-03-01

## 2024-03-01 RX ORDER — HEPARIN SODIUM 5000 [USP'U]/ML
INJECTION INTRAVENOUS; SUBCUTANEOUS
Qty: 25000 | Refills: 0 | Status: DISCONTINUED | OUTPATIENT
Start: 2024-03-01 | End: 2024-03-02

## 2024-03-01 RX ORDER — DILTIAZEM HCL 120 MG
10 CAPSULE, EXT RELEASE 24 HR ORAL ONCE
Refills: 0 | Status: COMPLETED | OUTPATIENT
Start: 2024-03-01 | End: 2024-03-01

## 2024-03-01 RX ORDER — HALOPERIDOL DECANOATE 100 MG/ML
5 INJECTION INTRAMUSCULAR ONCE
Refills: 0 | Status: COMPLETED | OUTPATIENT
Start: 2024-03-01 | End: 2024-03-01

## 2024-03-01 RX ORDER — HEPARIN SODIUM 5000 [USP'U]/ML
4700 INJECTION INTRAVENOUS; SUBCUTANEOUS EVERY 6 HOURS
Refills: 0 | Status: DISCONTINUED | OUTPATIENT
Start: 2024-03-01 | End: 2024-03-04

## 2024-03-01 RX ORDER — PROPOFOL 10 MG/ML
10 INJECTION, EMULSION INTRAVENOUS
Qty: 1000 | Refills: 0 | Status: DISCONTINUED | OUTPATIENT
Start: 2024-03-01 | End: 2024-03-02

## 2024-03-01 RX ORDER — HALOPERIDOL DECANOATE 100 MG/ML
5 INJECTION INTRAMUSCULAR EVERY 6 HOURS
Refills: 0 | Status: DISCONTINUED | OUTPATIENT
Start: 2024-03-01 | End: 2024-03-07

## 2024-03-01 RX ORDER — DILTIAZEM HCL 120 MG
5 CAPSULE, EXT RELEASE 24 HR ORAL
Qty: 125 | Refills: 0 | Status: DISCONTINUED | OUTPATIENT
Start: 2024-03-01 | End: 2024-03-01

## 2024-03-01 RX ORDER — SODIUM CHLORIDE 9 MG/ML
1000 INJECTION, SOLUTION INTRAVENOUS
Refills: 0 | Status: DISCONTINUED | OUTPATIENT
Start: 2024-03-01 | End: 2024-03-04

## 2024-03-01 RX ORDER — FENTANYL CITRATE 50 UG/ML
0.5 INJECTION INTRAVENOUS
Qty: 2500 | Refills: 0 | Status: DISCONTINUED | OUTPATIENT
Start: 2024-03-01 | End: 2024-03-02

## 2024-03-01 RX ORDER — DILTIAZEM HCL 120 MG
10 CAPSULE, EXT RELEASE 24 HR ORAL ONCE
Refills: 0 | Status: DISCONTINUED | OUTPATIENT
Start: 2024-03-01 | End: 2024-03-01

## 2024-03-01 RX ADMIN — Medication 2 MILLIGRAM(S): at 12:55

## 2024-03-01 RX ADMIN — HEPARIN SODIUM 1100 UNIT(S)/HR: 5000 INJECTION INTRAVENOUS; SUBCUTANEOUS at 20:57

## 2024-03-01 RX ADMIN — Medication 75 MILLIGRAM(S): at 21:46

## 2024-03-01 RX ADMIN — HALOPERIDOL DECANOATE 5 MILLIGRAM(S): 100 INJECTION INTRAMUSCULAR at 13:58

## 2024-03-01 RX ADMIN — Medication 3 MILLILITER(S): at 12:55

## 2024-03-01 RX ADMIN — SODIUM CHLORIDE 75 MILLILITER(S): 9 INJECTION, SOLUTION INTRAVENOUS at 19:09

## 2024-03-01 RX ADMIN — Medication 25 GRAM(S): at 20:02

## 2024-03-01 RX ADMIN — DEXMEDETOMIDINE HYDROCHLORIDE IN 0.9% SODIUM CHLORIDE 3.94 MICROGRAM(S)/KG/HR: 4 INJECTION INTRAVENOUS at 19:09

## 2024-03-01 RX ADMIN — Medication 5 MG/HR: at 15:08

## 2024-03-01 RX ADMIN — PROPOFOL 4.73 MICROGRAM(S)/KG/MIN: 10 INJECTION, EMULSION INTRAVENOUS at 19:09

## 2024-03-01 RX ADMIN — HALOPERIDOL DECANOATE 5 MILLIGRAM(S): 100 INJECTION INTRAMUSCULAR at 16:20

## 2024-03-01 RX ADMIN — FENTANYL CITRATE 3.94 MICROGRAM(S)/KG/HR: 50 INJECTION INTRAVENOUS at 19:09

## 2024-03-01 RX ADMIN — Medication 2 MILLIGRAM(S): at 21:47

## 2024-03-01 RX ADMIN — Medication 10 MILLIGRAM(S): at 15:01

## 2024-03-01 RX ADMIN — Medication 105 MILLIGRAM(S): at 18:36

## 2024-03-01 RX ADMIN — Medication 3 MILLILITER(S): at 21:01

## 2024-03-01 RX ADMIN — Medication 3 MILLILITER(S): at 09:21

## 2024-03-01 RX ADMIN — SODIUM CHLORIDE 75 MILLILITER(S): 9 INJECTION, SOLUTION INTRAVENOUS at 01:48

## 2024-03-01 RX ADMIN — HEPARIN SODIUM 950 UNIT(S)/HR: 5000 INJECTION INTRAVENOUS; SUBCUTANEOUS at 15:10

## 2024-03-01 RX ADMIN — Medication 2 MILLIGRAM(S): at 18:11

## 2024-03-01 RX ADMIN — Medication 3 MILLILITER(S): at 03:07

## 2024-03-01 RX ADMIN — Medication 105 MILLIGRAM(S): at 11:13

## 2024-03-01 RX ADMIN — DEXMEDETOMIDINE HYDROCHLORIDE IN 0.9% SODIUM CHLORIDE 0.99 MICROGRAM(S)/KG/HR: 4 INJECTION INTRAVENOUS at 10:35

## 2024-03-01 RX ADMIN — Medication 2 MILLIGRAM(S): at 10:35

## 2024-03-01 NOTE — CONSULT NOTE ADULT - SUBJECTIVE AND OBJECTIVE BOX
Patient is a 63y old  Male who presents with a chief complaint of chest pain and SOB (01 Mar 2024 12:36)      HPI:  63 years old male history of alcohol abuse, hypertension, COPD? ( heavy smoker. acc to pt had PFTs done and uses inhalers at home ), recently diagnosed with hyperthyroidism few weeks ago and started on methimazole 5 mg by PMD presents complaining of palpitations and exertional shortness of breath over the past few weeks.   At my encounter family not at bedside, acc to pt his only complaint is palpitaiosn, His HR was ranging 120-130 at home. No chest pain. He has cough and mild SOB which he attributes to smoking 1 pack daily. Otherwise he describes himself as very healthy. Pt is tremelous and restless. He admitted to drinking alcohol  (15-20 drinks of liquor daily). Last drink was earlier this evening.  Patient also reports he does want to stop drinking and try to cut down his alcohol use.    As per family, patient was seen by his cardiologist Dr. Johnson who increased that his metoprolol to 75 mg twice a day which did not improve his heart rate.  Patient had outpatient echocardiogram yesterday and noted to be tachycardic so he was recommended to come to ED for evaluation.  Otherwise denies fever, chills, recent illness, coughing, chest pain, abdominal pain, diarrhea or urinary symptoms.  Denies drug use.    Vitals: T 98.2, , /90, spo2 94% on RA   Labs remarkable for elevated ALT AST, Mg 1.5  Lactate 4 on VBGs   Alcohol level 202   CXR clear   EKG showed sinus tachycardia     s/p valium and ativan in ED  s/p 1 L NS, magnesium   HR improved from 130 to 90s  (29 Feb 2024 01:57)      PAST MEDICAL & SURGICAL HISTORY:  HTN (hypertension)      Alcohol abuse      Hyperthyroidism                          PREVIOUS DIAGNOSTIC TESTING:      ECHO  FINDINGS:    STRESS  FINDINGS:    CATHETERIZATION  FINDINGS:    MEDICATIONS  (STANDING):  albuterol/ipratropium for Nebulization 3 milliLiter(s) Nebulizer every 6 hours  dexMEDEtomidine Infusion 0.05 MICROgram(s)/kG/Hr (0.99 mL/Hr) IV Continuous <Continuous>  fentaNYL   Infusion. 0.5 MICROgram(s)/kG/Hr (3.94 mL/Hr) IV Continuous <Continuous>  folic acid 1 milliGRAM(s) Oral daily  heparin  Infusion.  Unit(s)/Hr (9.5 mL/Hr) IV Continuous <Continuous>  lactated ringers. 1000 milliLiter(s) (75 mL/Hr) IV Continuous <Continuous>  LORazepam   Injectable 2 milliGRAM(s) IV Push every 4 hours  LORazepam   Injectable   IV Push   methimazole 5 milliGRAM(s) Oral daily  metoprolol tartrate 75 milliGRAM(s) Oral two times a day  multivitamin 1 Tablet(s) Oral daily  predniSONE   Tablet 40 milliGRAM(s) Oral daily  propofol Infusion 10 MICROgram(s)/kG/Min (4.73 mL/Hr) IV Continuous <Continuous>  thiamine IVPB 500 milliGRAM(s) IV Intermittent every 8 hours    MEDICATIONS  (PRN):  haloperidol    Injectable 5 milliGRAM(s) IntraMuscular every 6 hours PRN Agitation  heparin   Injectable 4700 Unit(s) IV Push every 6 hours PRN For aPTT less than 40  LORazepam   Injectable 2 milliGRAM(s) IV Push every 2 hours PRN Symptom-triggered: each CIWA -Ar score 8 or GREATER      FAMILY HISTORY:      SOCIAL HISTORY:    CIGARETTES:    ALCOHOL:        Vital Signs Last 24 Hrs  T(C): 37.4 (01 Mar 2024 20:00), Max: 37.4 (01 Mar 2024 20:00)  T(F): 99.3 (01 Mar 2024 20:00), Max: 99.3 (01 Mar 2024 20:00)  HR: 63 (01 Mar 2024 22:00) (50 - 153)  BP: 94/58 (01 Mar 2024 22:00) (94/58 - 159/90)  BP(mean): 70 (01 Mar 2024 22:00) (70 - 87)  RR: 18 (01 Mar 2024 22:00) (14 - 40)  SpO2: 100% (01 Mar 2024 22:00) (92% - 100%)    Parameters below as of 01 Mar 2024 22:00  Patient On (Oxygen Delivery Method): ventilator  O2 Flow (L/min): 80              INTERPRETATION OF TELEMETRY:    ECG:    I&O's Detail    01 Mar 2024 07:01  -  01 Mar 2024 22:28  --------------------------------------------------------  IN:    Dexmedetomidine: 54.9 mL    FentaNYL: 76.8 mL    Heparin Infusion: 28.5 mL    IV PiggyBack: 100 mL    Lactated Ringers: 225 mL    Propofol: 52.5 mL  Total IN: 537.7 mL    OUT:    Indwelling Catheter - Urethral (mL): 115 mL  Total OUT: 115 mL    Total NET: 422.7 mL          LABS:                        15.5   6.87  )-----------( 114      ( 01 Mar 2024 06:19 )             42.7     03-01    142  |  108  |  10  ----------------------------<  150<H>  4.8   |  18  |  0.7    Ca    8.1<L>      01 Mar 2024 16:31  Phos  2.6     03-01  Mg     1.7     03-01    TPro  5.7<L>  /  Alb  3.1<L>  /  TBili  2.8<H>  /  DBili  1.8<H>  /  AST  99<H>  /  ALT  63<H>  /  AlkPhos  114  03-01        PTT - ( 01 Mar 2024 20:20 )  PTT:49.4 sec  Urinalysis Basic - ( 01 Mar 2024 16:31 )    Color: x / Appearance: x / SG: x / pH: x  Gluc: 150 mg/dL / Ketone: x  / Bili: x / Urobili: x   Blood: x / Protein: x / Nitrite: x   Leuk Esterase: x / RBC: x / WBC x   Sq Epi: x / Non Sq Epi: x / Bacteria: x      I&O's Summary    01 Mar 2024 07:01  -  01 Mar 2024 22:28  --------------------------------------------------------  IN: 537.7 mL / OUT: 115 mL / NET: 422.7 mL      BNP  RADIOLOGY & ADDITIONAL STUDIES:

## 2024-03-01 NOTE — CHART NOTE - NSCHARTNOTEFT_GEN_A_CORE
patient with labored breathing agitated, ABG hypoxic. Anesthesia called to intubate. ICU fellow aware. family agreeable for intubation. patient with labored breathing agitated, ABG hypoxic. Anesthesia called to intubate. ICU fellow aware. family agreeable for intubation.    patient is in sinus tach with PACS, on cardizem and heparin drip for an episode of afib with RVR? needs to be confirmed    post propofol patient's BP became soft. HR 70s. --> cardizem held and levophed low dose started.

## 2024-03-01 NOTE — CHART NOTE - NSCHARTNOTEFT_GEN_A_CORE
TRANSFER FROM FLOOR (ED) TO ICU FOR ALCOHOL WITHDRAWAL ON PRECEDEX  ------------------------------------------------------------------------------------------------    Patient is having severe alcohol withdrawal.   He was receiving lorazepam every hour without improvement in the agitation so he was started on precedex drip.         63 years old male history of alcohol abuse, hypertension, COPD? ( heavy smoker. acc to pt had PFTs done and uses inhalers at home ), recently diagnosed with hyperthyroidism few weeks ago and started on methimazole 5 mg by PMD presents complaining of palpitations and exertional shortness of breath over the past few weeks.   At my encounter family not at bedside, acc to pt his only complaint is palpitaiosn, His HR was ranging 120-130 at home. No chest pain. He has cough and mild SOB which he attributes to smoking 1 pack daily. Otherwise he describes himself as very healthy. Pt is tremelous and restless. He admitted to drinking alcohol  (15-20 drinks of liquor daily). Last drink was earlier this evening.  Patient also reports he does want to stop drinking and try to cut down his alcohol use.    As per family, patient was seen by his cardiologist Dr. Johnson who increased that his metoprolol to 75 mg twice a day which did not improve his heart rate.  Patient had outpatient echocardiogram yesterday and noted to be tachycardic so he was recommended to come to ED for evaluation.  Otherwise denies fever, chills, recent illness, coughing, chest pain, abdominal pain, diarrhea or urinary symptoms.  Denies drug use.    Vitals: T 98.2, , /90, spo2 94% on RA   Labs remarkable for elevated ALT AST, Mg 1.5  Lactate 4 on VBGs   Alcohol level 202   CXR clear   EKG showed sinus tachycardia     s/p valium and ativan in ED  s/p 1 L NS, magnesium   HR improved from 130 to 90s         #Alcohol withdrawal   #Alcohol intoxication (alcohol level in blood >200)   #Recently diagnose hyperthyroidism?   - K 3.5, Mg 1.5  - Lactate 4 on VBGs -> repeat 1.4 (after fluids)  - trop 11, pro-BNP 46  - Alcohol level 202   - CXR clear   - EKG showed sinus tachycardia   - s/p valium and ativan in ED  - s/p 1 L NS, magnesium   - HR improved from 130 to 90s   - sxs may be due to alcohol intoxication vs hyperthyroidism   - monitor on tele for now   - check TSH, fT4   - pt had TTE done as OP yesterday, please get records from Dr Johnson   - CATCH team eval, pt willing to stop   - thiamine and folate   - replete Mg and K as needed   - now on precedex drip and lorazepam as needed    #Lactic acidosis -> resolved  - no hypotension or hypoxia   - c/w LR 75 cc/hr   - check repeat     #Transaminitis   - , ALT 85,   - 2/2 to alcohol use     #HTN  - not on meds   -now on metoprolol  - monitor BP     #COPD?   - on RA   - wheezing on PE  - duonebs PRN   - prednisone 40 mg OD for 5 days     #DVT ppx: lovenox   #GI ppx: protonix   #Diet: DASH   #Activity: as tolerated       Please confirm medrec with family in AM.

## 2024-03-01 NOTE — CONSULT NOTE ADULT - ASSESSMENT
IMPRESSION:    Severe alcohol withdrawal   Alcohol abuse  palpitations  HO COPD  Hyperthyroidism     PLAN:    CNS:  Thiamine  x3days, folate, CIWA, CATCH team, wean precedex, ativan taper.    HEENT: Oral care    PULMONARY:  HOB @ 45 degrees. Prednisone x5 days PO, Nebs q4hrs and PRN, O2 supplemental target Spo2 88-92%.    CARDIOVASCULAR: LR at 75 cc/hr. cw BB, f/u with cardiologist    GI: GI prophylaxis.  Feeding as tolerated.  Bowel regimen     RENAL:  Follow up lytes.  Correct as needed. Trend lactate.    INFECTIOUS DISEASE: Follow up cultures. monitor off abx    HEMATOLOGICAL:  DVT prophylaxis.    ENDOCRINE:  Follow up FS.  Insulin protocol if needed. CW methimazole    MUSCULOSKELETAL: Bed in chair position    MICU for now         IMPRESSION:    Severe alcohol withdrawal   Alcohol abuse  COPD excarbation  Hyperthyroidism     PLAN:    CNS:  Thiamine IV [id number] then switch to PO, folate, CIWA, CATCH team, wean precedex, ativan taper.    HEENT: Oral care    PULMONARY:  HOB @ 45 degrees. Prednisone x5 days PO, Nebs q4hrs and PRN, O2 supplemental target Spo2 88-92%. RVP negative.     CARDIOVASCULAR: LR at 75 cc/hr. cw BB, f/u with cardiologist    GI: GI prophylaxis.  Feeding as tolerated.  Bowel regimen     RENAL:  Follow up lytes.  Correct as needed. Trend lactate.    INFECTIOUS DISEASE: Follow up cultures. Give azithromycin x 5 days.    HEMATOLOGICAL:  DVT prophylaxis. Dimer and LE duplex    ENDOCRINE:  Follow up FS.  Insulin protocol if needed. CW methimazole.    MUSCULOSKELETAL: Bed in chair position    MICU for now

## 2024-03-01 NOTE — CONSULT NOTE ADULT - ASSESSMENT
pt w/ hx of rapid hr. pt w/ abnl thyroid. pt was placed on metoprolol and thyroid meds with initial improved hr. pt w/ echo w/nl lv fn. pt w/ tremulousness and elevated hr and sent to er. pt w/ etoh abuse with possible dts.pt placed on precedex with sedation and improved hr. continue metoprolol as needed for hr control. plans as per icu.

## 2024-03-01 NOTE — CONSULT NOTE ADULT - SUBJECTIVE AND OBJECTIVE BOX
Patient is a 63y old  Male who presents with a chief complaint of chest pain and SOB (29 Feb 2024 01:57)      HPI:  63 years old male history of alcohol abuse, hypertension, COPD? ( heavy smoker. acc to pt had PFTs done and uses inhalers at home ), recently diagnosed with hyperthyroidism few weeks ago and started on methimazole 5 mg by PMD presents complaining of palpitations and exertional shortness of breath over the past few weeks.   At my encounter family not at bedside, acc to pt his only complaint is palpitaiosn, His HR was ranging 120-130 at home. No chest pain. He has cough and mild SOB which he attributes to smoking 1 pack daily. Otherwise he describes himself as very healthy. Pt is tremelous and restless. He admitted to drinking alcohol  (15-20 drinks of liquor daily). Last drink was earlier this evening.  Patient also reports he does want to stop drinking and try to cut down his alcohol use.    As per family, patient was seen by his cardiologist Dr. Johnson who increased that his metoprolol to 75 mg twice a day which did not improve his heart rate.  Patient had outpatient echocardiogram yesterday and noted to be tachycardic so he was recommended to come to ED for evaluation.  Otherwise denies fever, chills, recent illness, coughing, chest pain, abdominal pain, diarrhea or urinary symptoms.  Denies drug use.    Vitals: T 98.2, , /90, spo2 94% on RA   Labs remarkable for elevated ALT AST, Mg 1.5  Lactate 4 on VBGs   Alcohol level 202   CXR clear   EKG showed sinus tachycardia     s/p valium and ativan in ED  s/p 1 L NS, magnesium   HR improved from 130 to 90s  (29 Feb 2024 01:57)      Patient then exhibited high CIWA, auditory hallucinations, CIWA was 25 and patient was requiring q1 hrs ativan so ICU was consulted for evaluating and managing severe alcohol withdrawal.    PAST MEDICAL & SURGICAL HISTORY:  HTN (hypertension)      Alcohol abuse      Hyperthyroidism          SOCIAL HX:   Smoking                         ETOH                            Other    FAMILY HISTORY:  :  No known cardiovacular family hisotry     Review Of Systems:     All ROS are negative except per HPI       Allergies    No Known Allergies    Intolerances          PHYSICAL EXAM    ICU Vital Signs Last 24 Hrs  T(C): 36.7 (29 Feb 2024 23:30), Max: 37 (29 Feb 2024 16:41)  T(F): 98 (29 Feb 2024 23:30), Max: 98.6 (29 Feb 2024 16:41)  HR: 68 (01 Mar 2024 11:00) (50 - 100)  BP: 134/86 (01 Mar 2024 11:00) (113/70 - 165/84)  BP(mean): --  ABP: --  ABP(mean): --  RR: 16 (01 Mar 2024 11:00) (14 - 24)  SpO2: 97% (01 Mar 2024 11:00) (92% - 98%)    O2 Parameters below as of 01 Mar 2024 11:00  Patient On (Oxygen Delivery Method): nasal cannula  O2 Flow (L/min): 2          CONSTITUTIONAL:  In NAD. Ill appearing    ENT:   Airway patent,   Mouth with normal mucosa.   No thrush      CARDIAC:   Normal rate,   Regular rhythm.    No edema      Vascular:   normal systolic impulse  no bruits    RESPIRATORY:   No wheezing  Bilateral BS   Not tachypneic,  No use of accessory muscles    GASTROINTESTINAL:  Abdomen soft,   Non-tender,   No guarding,   + BS      NEUROLOGICAL:   Lethargic. Arousable          LABS:                          15.5   6.87  )-----------( 114      ( 01 Mar 2024 06:19 )             42.7                                               03-01    142  |  107  |  9<L>  ----------------------------<  148<H>  3.7   |  21  |  0.6<L>    Ca    7.9<L>      01 Mar 2024 06:19  Phos  2.3     03-01  Mg     2.0     03-01    TPro  5.6<L>  /  Alb  3.3<L>  /  TBili  2.7<H>  /  DBili  x   /  AST  112<H>  /  ALT  65<H>  /  AlkPhos  119<H>  03-01                                             Urinalysis Basic - ( 01 Mar 2024 06:19 )    Color: x / Appearance: x / SG: x / pH: x  Gluc: 148 mg/dL / Ketone: x  / Bili: x / Urobili: x   Blood: x / Protein: x / Nitrite: x   Leuk Esterase: x / RBC: x / WBC x   Sq Epi: x / Non Sq Epi: x / Bacteria: x                                                  LIVER FUNCTIONS - ( 01 Mar 2024 06:19 )  Alb: 3.3 g/dL / Pro: 5.6 g/dL / ALK PHOS: 119 U/L / ALT: 65 U/L / AST: 112 U/L / GGT: x                                                                                                                                       X-Rays reviewed                                                                                     ECHO    CXR interpreted by me     MEDICATIONS  (STANDING):  albuterol/ipratropium for Nebulization 3 milliLiter(s) Nebulizer every 6 hours  dexMEDEtomidine Infusion 0.05 MICROgram(s)/kG/Hr (0.99 mL/Hr) IV Continuous <Continuous>  enoxaparin Injectable 40 milliGRAM(s) SubCutaneous every 24 hours  folic acid 1 milliGRAM(s) Oral daily  lactated ringers. 1000 milliLiter(s) (75 mL/Hr) IV Continuous <Continuous>  LORazepam   Injectable 2 milliGRAM(s) IV Push every 4 hours  LORazepam   Injectable   IV Push   methimazole 5 milliGRAM(s) Oral daily  metoprolol tartrate 75 milliGRAM(s) Oral two times a day  multivitamin 1 Tablet(s) Oral daily  predniSONE   Tablet 40 milliGRAM(s) Oral daily  thiamine IVPB 500 milliGRAM(s) IV Intermittent every 8 hours    MEDICATIONS  (PRN):  LORazepam   Injectable 2 milliGRAM(s) IV Push every 2 hours PRN Symptom-triggered: each CIWA -Ar score 8 or GREATER

## 2024-03-01 NOTE — ED ADULT NURSE REASSESSMENT NOTE - NS ED NURSE REASSESS COMMENT FT1
pt very agitated despite medications given.  pt upgraded to ICU and moved to crit @2245 to maintain pts safety.  endorsed to LUCIANA Cedillo

## 2024-03-01 NOTE — PATIENT PROFILE ADULT - FALL HARM RISK - UNIVERSAL INTERVENTIONS
Bed in lowest position, wheels locked, appropriate side rails in place/Call bell, personal items and telephone in reach/Instruct patient to call for assistance before getting out of bed or chair/Non-slip footwear when patient is out of bed/[employer] to call system/Physically safe environment - no spills, clutter or unnecessary equipment/Purposeful Proactive Rounding/Room/bathroom lighting operational, light cord in reach

## 2024-03-02 LAB
ALBUMIN SERPL ELPH-MCNC: 2.6 G/DL — LOW (ref 3.5–5.2)
ALP SERPL-CCNC: 90 U/L — SIGNIFICANT CHANGE UP (ref 30–115)
ALT FLD-CCNC: 42 U/L — HIGH (ref 0–41)
ANION GAP SERPL CALC-SCNC: 13 MMOL/L — SIGNIFICANT CHANGE UP (ref 7–14)
APTT BLD: 100.9 SEC — CRITICAL HIGH (ref 27–39.2)
APTT BLD: 82.7 SEC — CRITICAL HIGH (ref 27–39.2)
APTT BLD: 89.9 SEC — CRITICAL HIGH (ref 27–39.2)
AST SERPL-CCNC: 62 U/L — HIGH (ref 0–41)
BASE EXCESS BLDA CALC-SCNC: -0.2 MMOL/L — SIGNIFICANT CHANGE UP (ref -2–3)
BASOPHILS # BLD AUTO: 0.03 K/UL — SIGNIFICANT CHANGE UP (ref 0–0.2)
BASOPHILS NFR BLD AUTO: 0.2 % — SIGNIFICANT CHANGE UP (ref 0–1)
BILIRUB DIRECT SERPL-MCNC: 1.4 MG/DL — HIGH (ref 0–0.3)
BILIRUB SERPL-MCNC: 2.1 MG/DL — HIGH (ref 0.2–1.2)
BUN SERPL-MCNC: 13 MG/DL — SIGNIFICANT CHANGE UP (ref 10–20)
CALCIUM SERPL-MCNC: 7.5 MG/DL — LOW (ref 8.4–10.5)
CHLORIDE SERPL-SCNC: 104 MMOL/L — SIGNIFICANT CHANGE UP (ref 98–110)
CO2 SERPL-SCNC: 21 MMOL/L — SIGNIFICANT CHANGE UP (ref 17–32)
CREAT SERPL-MCNC: 0.8 MG/DL — SIGNIFICANT CHANGE UP (ref 0.7–1.5)
EGFR: 99 ML/MIN/1.73M2 — SIGNIFICANT CHANGE UP
EOSINOPHIL # BLD AUTO: 0.03 K/UL — SIGNIFICANT CHANGE UP (ref 0–0.7)
EOSINOPHIL NFR BLD AUTO: 0.2 % — SIGNIFICANT CHANGE UP (ref 0–8)
GLUCOSE SERPL-MCNC: 210 MG/DL — HIGH (ref 70–99)
HCO3 BLDA-SCNC: 24 MMOL/L — SIGNIFICANT CHANGE UP (ref 21–28)
HCT VFR BLD CALC: 40.8 % — LOW (ref 42–52)
HGB BLD-MCNC: 14.1 G/DL — SIGNIFICANT CHANGE UP (ref 14–18)
IMM GRANULOCYTES NFR BLD AUTO: 0.8 % — HIGH (ref 0.1–0.3)
LYMPHOCYTES # BLD AUTO: 1.42 K/UL — SIGNIFICANT CHANGE UP (ref 1.2–3.4)
LYMPHOCYTES # BLD AUTO: 9 % — LOW (ref 20.5–51.1)
MAGNESIUM SERPL-MCNC: 2.3 MG/DL — SIGNIFICANT CHANGE UP (ref 1.8–2.4)
MAGNESIUM SERPL-MCNC: 2.5 MG/DL — HIGH (ref 1.8–2.4)
MCHC RBC-ENTMCNC: 34.6 G/DL — SIGNIFICANT CHANGE UP (ref 32–37)
MCHC RBC-ENTMCNC: 35.1 PG — HIGH (ref 27–31)
MCV RBC AUTO: 101.5 FL — HIGH (ref 80–94)
MONOCYTES # BLD AUTO: 0.66 K/UL — HIGH (ref 0.1–0.6)
MONOCYTES NFR BLD AUTO: 4.2 % — SIGNIFICANT CHANGE UP (ref 1.7–9.3)
MRSA PCR RESULT.: NEGATIVE — SIGNIFICANT CHANGE UP
NEUTROPHILS # BLD AUTO: 13.46 K/UL — HIGH (ref 1.4–6.5)
NEUTROPHILS NFR BLD AUTO: 85.6 % — HIGH (ref 42.2–75.2)
NRBC # BLD: 0 /100 WBCS — SIGNIFICANT CHANGE UP (ref 0–0)
PCO2 BLDA: 37 MMHG — SIGNIFICANT CHANGE UP (ref 35–48)
PH BLDA: 7.42 — SIGNIFICANT CHANGE UP (ref 7.35–7.45)
PLATELET # BLD AUTO: 122 K/UL — LOW (ref 130–400)
PMV BLD: 11.6 FL — HIGH (ref 7.4–10.4)
PO2 BLDA: 98 MMHG — SIGNIFICANT CHANGE UP (ref 83–108)
POTASSIUM SERPL-MCNC: 4.3 MMOL/L — SIGNIFICANT CHANGE UP (ref 3.5–5)
POTASSIUM SERPL-SCNC: 4.3 MMOL/L — SIGNIFICANT CHANGE UP (ref 3.5–5)
PROT SERPL-MCNC: 4.9 G/DL — LOW (ref 6–8)
RBC # BLD: 4.02 M/UL — LOW (ref 4.7–6.1)
RBC # FLD: 11.7 % — SIGNIFICANT CHANGE UP (ref 11.5–14.5)
SAO2 % BLDA: 99.3 % — HIGH (ref 94–98)
SODIUM SERPL-SCNC: 138 MMOL/L — SIGNIFICANT CHANGE UP (ref 135–146)
WBC # BLD: 15.73 K/UL — HIGH (ref 4.8–10.8)
WBC # FLD AUTO: 15.73 K/UL — HIGH (ref 4.8–10.8)

## 2024-03-02 PROCEDURE — 71045 X-RAY EXAM CHEST 1 VIEW: CPT | Mod: 26

## 2024-03-02 PROCEDURE — 93010 ELECTROCARDIOGRAM REPORT: CPT

## 2024-03-02 PROCEDURE — 99291 CRITICAL CARE FIRST HOUR: CPT

## 2024-03-02 RX ORDER — INFLUENZA VIRUS VACCINE 15; 15; 15; 15 UG/.5ML; UG/.5ML; UG/.5ML; UG/.5ML
0.5 SUSPENSION INTRAMUSCULAR ONCE
Refills: 0 | Status: DISCONTINUED | OUTPATIENT
Start: 2024-03-02 | End: 2024-03-14

## 2024-03-02 RX ORDER — CHLORHEXIDINE GLUCONATE 213 G/1000ML
15 SOLUTION TOPICAL
Refills: 0 | Status: DISCONTINUED | OUTPATIENT
Start: 2024-03-02 | End: 2024-03-07

## 2024-03-02 RX ORDER — DEXMEDETOMIDINE HYDROCHLORIDE IN 0.9% SODIUM CHLORIDE 4 UG/ML
0.05 INJECTION INTRAVENOUS
Qty: 400 | Refills: 0 | Status: DISCONTINUED | OUTPATIENT
Start: 2024-03-02 | End: 2024-03-07

## 2024-03-02 RX ORDER — HEPARIN SODIUM 5000 [USP'U]/ML
1000 INJECTION INTRAVENOUS; SUBCUTANEOUS
Qty: 25000 | Refills: 0 | Status: DISCONTINUED | OUTPATIENT
Start: 2024-03-02 | End: 2024-03-03

## 2024-03-02 RX ORDER — CEFTRIAXONE 500 MG/1
1000 INJECTION, POWDER, FOR SOLUTION INTRAMUSCULAR; INTRAVENOUS EVERY 24 HOURS
Refills: 0 | Status: DISCONTINUED | OUTPATIENT
Start: 2024-03-03 | End: 2024-03-05

## 2024-03-02 RX ORDER — NYSTATIN 500MM UNIT
500000 POWDER (EA) MISCELLANEOUS
Refills: 0 | Status: DISCONTINUED | OUTPATIENT
Start: 2024-03-02 | End: 2024-03-14

## 2024-03-02 RX ORDER — SODIUM CHLORIDE 9 MG/ML
1000 INJECTION, SOLUTION INTRAVENOUS ONCE
Refills: 0 | Status: COMPLETED | OUTPATIENT
Start: 2024-03-02 | End: 2024-03-02

## 2024-03-02 RX ORDER — CHLORHEXIDINE GLUCONATE 213 G/1000ML
1 SOLUTION TOPICAL DAILY
Refills: 0 | Status: DISCONTINUED | OUTPATIENT
Start: 2024-03-02 | End: 2024-03-14

## 2024-03-02 RX ORDER — SODIUM CHLORIDE 9 MG/ML
500 INJECTION, SOLUTION INTRAVENOUS ONCE
Refills: 0 | Status: COMPLETED | OUTPATIENT
Start: 2024-03-02 | End: 2024-03-02

## 2024-03-02 RX ORDER — PROPOFOL 10 MG/ML
10.78 INJECTION, EMULSION INTRAVENOUS
Qty: 1000 | Refills: 0 | Status: DISCONTINUED | OUTPATIENT
Start: 2024-03-02 | End: 2024-03-04

## 2024-03-02 RX ORDER — SODIUM CHLORIDE 9 MG/ML
1000 INJECTION, SOLUTION INTRAVENOUS ONCE
Refills: 0 | Status: DISCONTINUED | OUTPATIENT
Start: 2024-03-02 | End: 2024-03-04

## 2024-03-02 RX ORDER — AZITHROMYCIN 500 MG/1
TABLET, FILM COATED ORAL
Refills: 0 | Status: DISCONTINUED | OUTPATIENT
Start: 2024-03-02 | End: 2024-03-02

## 2024-03-02 RX ORDER — CEFTRIAXONE 500 MG/1
1000 INJECTION, POWDER, FOR SOLUTION INTRAMUSCULAR; INTRAVENOUS ONCE
Refills: 0 | Status: COMPLETED | OUTPATIENT
Start: 2024-03-02 | End: 2024-03-02

## 2024-03-02 RX ORDER — AZITHROMYCIN 500 MG/1
500 TABLET, FILM COATED ORAL ONCE
Refills: 0 | Status: COMPLETED | OUTPATIENT
Start: 2024-03-02 | End: 2024-03-02

## 2024-03-02 RX ORDER — SENNA PLUS 8.6 MG/1
2 TABLET ORAL AT BEDTIME
Refills: 0 | Status: DISCONTINUED | OUTPATIENT
Start: 2024-03-02 | End: 2024-03-14

## 2024-03-02 RX ORDER — CHLORHEXIDINE GLUCONATE 213 G/1000ML
5 SOLUTION TOPICAL EVERY 12 HOURS
Refills: 0 | Status: DISCONTINUED | OUTPATIENT
Start: 2024-03-02 | End: 2024-03-02

## 2024-03-02 RX ORDER — CEFTRIAXONE 500 MG/1
INJECTION, POWDER, FOR SOLUTION INTRAMUSCULAR; INTRAVENOUS
Refills: 0 | Status: DISCONTINUED | OUTPATIENT
Start: 2024-03-02 | End: 2024-03-05

## 2024-03-02 RX ORDER — FENTANYL CITRATE 50 UG/ML
0.54 INJECTION INTRAVENOUS
Qty: 2500 | Refills: 0 | Status: DISCONTINUED | OUTPATIENT
Start: 2024-03-02 | End: 2024-03-06

## 2024-03-02 RX ADMIN — HEPARIN SODIUM 1000 UNIT(S)/HR: 5000 INJECTION INTRAVENOUS; SUBCUTANEOUS at 04:13

## 2024-03-02 RX ADMIN — AZITHROMYCIN 255 MILLIGRAM(S): 500 TABLET, FILM COATED ORAL at 06:23

## 2024-03-02 RX ADMIN — Medication 40 MILLIGRAM(S): at 05:32

## 2024-03-02 RX ADMIN — PROPOFOL 4.73 MICROGRAM(S)/KG/MIN: 10 INJECTION, EMULSION INTRAVENOUS at 05:33

## 2024-03-02 RX ADMIN — SODIUM CHLORIDE 1000 MILLILITER(S): 9 INJECTION, SOLUTION INTRAVENOUS at 04:40

## 2024-03-02 RX ADMIN — Medication 75 MILLIGRAM(S): at 17:52

## 2024-03-02 RX ADMIN — HEPARIN SODIUM 800 UNIT(S)/HR: 5000 INJECTION INTRAVENOUS; SUBCUTANEOUS at 19:14

## 2024-03-02 RX ADMIN — Medication 1.5 MILLIGRAM(S): at 11:54

## 2024-03-02 RX ADMIN — Medication 1.5 MILLIGRAM(S): at 13:55

## 2024-03-02 RX ADMIN — Medication 105 MILLIGRAM(S): at 05:32

## 2024-03-02 RX ADMIN — Medication 1.5 MILLIGRAM(S): at 17:52

## 2024-03-02 RX ADMIN — Medication 2 MILLIGRAM(S): at 20:05

## 2024-03-02 RX ADMIN — Medication 105 MILLIGRAM(S): at 13:55

## 2024-03-02 RX ADMIN — Medication 1 MILLIGRAM(S): at 11:55

## 2024-03-02 RX ADMIN — FENTANYL CITRATE 3.94 MICROGRAM(S)/KG/HR: 50 INJECTION INTRAVENOUS at 22:04

## 2024-03-02 RX ADMIN — CHLORHEXIDINE GLUCONATE 1 APPLICATION(S): 213 SOLUTION TOPICAL at 11:55

## 2024-03-02 RX ADMIN — SODIUM CHLORIDE 75 MILLILITER(S): 9 INJECTION, SOLUTION INTRAVENOUS at 05:32

## 2024-03-02 RX ADMIN — DEXMEDETOMIDINE HYDROCHLORIDE IN 0.9% SODIUM CHLORIDE 0.99 MICROGRAM(S)/KG/HR: 4 INJECTION INTRAVENOUS at 05:33

## 2024-03-02 RX ADMIN — DEXMEDETOMIDINE HYDROCHLORIDE IN 0.9% SODIUM CHLORIDE 0.99 MICROGRAM(S)/KG/HR: 4 INJECTION INTRAVENOUS at 18:19

## 2024-03-02 RX ADMIN — HEPARIN SODIUM 1000 UNIT(S)/HR: 5000 INJECTION INTRAVENOUS; SUBCUTANEOUS at 12:01

## 2024-03-02 RX ADMIN — Medication 1 TABLET(S): at 11:55

## 2024-03-02 RX ADMIN — Medication 2 MILLIGRAM(S): at 23:43

## 2024-03-02 RX ADMIN — Medication 500000 UNIT(S): at 17:49

## 2024-03-02 RX ADMIN — SODIUM CHLORIDE 1000 MILLILITER(S): 9 INJECTION, SOLUTION INTRAVENOUS at 06:17

## 2024-03-02 RX ADMIN — Medication 1.5 MILLIGRAM(S): at 21:30

## 2024-03-02 RX ADMIN — Medication 105 MILLIGRAM(S): at 00:39

## 2024-03-02 RX ADMIN — FENTANYL CITRATE 3.94 MICROGRAM(S)/KG/HR: 50 INJECTION INTRAVENOUS at 05:32

## 2024-03-02 RX ADMIN — SENNA PLUS 2 TABLET(S): 8.6 TABLET ORAL at 21:31

## 2024-03-02 RX ADMIN — CEFTRIAXONE 100 MILLIGRAM(S): 500 INJECTION, POWDER, FOR SOLUTION INTRAMUSCULAR; INTRAVENOUS at 11:55

## 2024-03-02 RX ADMIN — CHLORHEXIDINE GLUCONATE 15 MILLILITER(S): 213 SOLUTION TOPICAL at 17:52

## 2024-03-02 RX ADMIN — SODIUM CHLORIDE 75 MILLILITER(S): 9 INJECTION, SOLUTION INTRAVENOUS at 21:31

## 2024-03-02 RX ADMIN — Medication 105 MILLIGRAM(S): at 21:30

## 2024-03-02 NOTE — PROGRESS NOTE ADULT - ASSESSMENT
IMPRESSION:  Acute respiratory failure  Possible aspiration right lower lobe  Severe alcohol withdrawal   Alcohol abuse  COPD exacerbation  Hyperthyroidism     PLAN:    CNS:  Thiamine IV [id number] then switch to PO, folate, CIWA, CATCH team, wean precedex, ativan taper.  Keep sedated    HEENT: Oral care    PULMONARY:  HOB @ 45 degrees. No change in vent  Prednisone x5 days PO, Nebs q4hrs and PRN,   O2 supplemental target Spo2 88-92%.     CARDIOVASCULAR: LR at 75 cc/hr. cw BB, f/u with cardiologist    GI: GI prophylaxis.  Feeding as tolerated.  Bowel regimen     RENAL:  Follow up lytes.  Correct as needed. Trend lactate.    INFECTIOUS DISEASE: Follow up cultures. Give Ceftriaxone x 5 days.    HEMATOLOGICAL:  DVT prophylaxis. Dimer and LE duplex    ENDOCRINE:  Follow up FS.  Insulin protocol if needed. CW methimazole.    MUSCULOSKELETAL: Bed in chair position    MICU

## 2024-03-02 NOTE — DIETITIAN INITIAL EVALUATION ADULT - OTHER CALCULATIONS
Energy: 1825-2190kcal/day (using 25-30kcal/day as no ht data available)   Protein: 88-110g/day (1.2-1.5g/kg)   Fluid: per CCU team

## 2024-03-02 NOTE — DIETITIAN INITIAL EVALUATION ADULT - ADD RECOMMEND
1. When ready to initiate EN, please start continuous feeds of Vital 1.2 @20cc/h, increase rate by 20cc q4h until goal rate of 80cc over 18h is reached -- provides: 1728kcal, 106.8g PRO, 1166.4mL free H2O.   2. Monitor GI s/s and bowel movement   3. maintain all aspiration precautions   4. hold feeds of MAP <65

## 2024-03-02 NOTE — DIETITIAN INITIAL EVALUATION ADULT - ENERGY INTAKE
Pt is currently NPO. No active TF order. Currently on Propofol running at 4.4mL/h -- Provides: 105.6kcal/day.   Ve: 7.0  Tmax: 36.1  MAP: >65

## 2024-03-02 NOTE — PROGRESS NOTE ADULT - SUBJECTIVE AND OBJECTIVE BOX
Patient is a 63y old  Male who presents with a chief complaint of chest pain and SOB (02 Mar 2024 05:06)    HPI:  63 years old male history of alcohol abuse, hypertension, COPD? ( heavy smoker. acc to pt had PFTs done and uses inhalers at home ), recently diagnosed with hyperthyroidism few weeks ago and started on methimazole 5 mg by PMD presents complaining of palpitations and exertional shortness of breath over the past few weeks.   At my encounter family not at bedside, acc to pt his only complaint is palpitaiosn, His HR was ranging 120-130 at home. No chest pain. He has cough and mild SOB which he attributes to smoking 1 pack daily. Otherwise he describes himself as very healthy. Pt is tremelous and restless. He admitted to drinking alcohol  (15-20 drinks of liquor daily). Last drink was earlier this evening.  Patient also reports he does want to stop drinking and try to cut down his alcohol use.    As per family, patient was seen by his cardiologist Dr. Johnson who increased that his metoprolol to 75 mg twice a day which did not improve his heart rate.  Patient had outpatient echocardiogram yesterday and noted to be tachycardic so he was recommended to come to ED for evaluation.  Otherwise denies fever, chills, recent illness, coughing, chest pain, abdominal pain, diarrhea or urinary symptoms.  Denies drug use.    Vitals: T 98.2, , /90, spo2 94% on RA   Labs remarkable for elevated ALT AST, Mg 1.5  Lactate 4 on VBGs   Alcohol level 202   CXR clear   EKG showed sinus tachycardia     s/p valium and ativan in ED  s/p 1 L NS, magnesium   HR improved from 130 to 90s  (29 Feb 2024 01:57)       INTERVAL HPI/OVERNIGHT EVENTS:   No overnight events   Afebrile, hemodynamically stable     Subjective:    ICU Vital Signs Last 24 Hrs  T(C): 36.7 (02 Mar 2024 00:00), Max: 37.4 (01 Mar 2024 20:00)  T(F): 98 (02 Mar 2024 00:00), Max: 99.3 (01 Mar 2024 20:00)  HR: 63 (02 Mar 2024 04:00) (50 - 153)  BP: 86/59 (02 Mar 2024 04:00) (73/53 - 142/88)  BP(mean): 68 (02 Mar 2024 04:00) (58 - 87)  ABP: --  ABP(mean): --  RR: 16 (02 Mar 2024 04:00) (14 - 40)  SpO2: 99% (02 Mar 2024 04:00) (92% - 100%)    O2 Parameters below as of 02 Mar 2024 05:00  Patient On (Oxygen Delivery Method): ventilator  O2 Flow (L/min): 80        I&O's Summary    01 Mar 2024 07:01  -  02 Mar 2024 05:40  --------------------------------------------------------  IN: 1309.5 mL / OUT: 390 mL / NET: 919.5 mL      Mode: AC/ CMV (Assist Control/ Continuous Mandatory Ventilation)  RR (machine): 16  TV (machine): 16  FiO2: 80  PEEP: 8  ITime: 1  MAP: 10  PIP: 15      Daily     Daily     Adult Advanced Hemodynamics Last 24 Hrs  CVP(mm Hg): --  CVP(cm H2O): --  CO: --  CI: --  PA: --  PA(mean): --  PCWP: --  SVR: --  SVRI: --  PVR: --  PVRI: --    EKG/Telemetry Events:    MEDICATIONS  (STANDING):  albuterol/ipratropium for Nebulization 3 milliLiter(s) Nebulizer every 6 hours  chlorhexidine 2% Cloths 1 Application(s) Topical daily  dexMEDEtomidine Infusion 0.05 MICROgram(s)/kG/Hr (0.99 mL/Hr) IV Continuous <Continuous>  fentaNYL   Infusion. 0.5 MICROgram(s)/kG/Hr (3.94 mL/Hr) IV Continuous <Continuous>  folic acid 1 milliGRAM(s) Oral daily  heparin  Infusion. 1000 Unit(s)/Hr (10 mL/Hr) IV Continuous <Continuous>  lactated ringers. 1000 milliLiter(s) (75 mL/Hr) IV Continuous <Continuous>  LORazepam   Injectable   IV Push   LORazepam   Injectable 1.5 milliGRAM(s) IV Push every 4 hours  methimazole 5 milliGRAM(s) Oral daily  metoprolol tartrate 75 milliGRAM(s) Oral two times a day  multivitamin 1 Tablet(s) Oral daily  predniSONE   Tablet 40 milliGRAM(s) Oral daily  propofol Infusion 10 MICROgram(s)/kG/Min (4.73 mL/Hr) IV Continuous <Continuous>  thiamine IVPB 500 milliGRAM(s) IV Intermittent every 8 hours    MEDICATIONS  (PRN):  haloperidol    Injectable 5 milliGRAM(s) IntraMuscular every 6 hours PRN Agitation  heparin   Injectable 4700 Unit(s) IV Push every 6 hours PRN For aPTT less than 40  LORazepam   Injectable 2 milliGRAM(s) IV Push every 2 hours PRN Symptom-triggered: each CIWA -Ar score 8 or GREATER      PHYSICAL EXAM:  GENERAL:   HEAD:  Atraumatic, Normocephalic  EYES: EOMI, PERRLA, conjunctiva and sclera clear  NECK: Supple, No JVD, Normal thyroid, no enlarged nodes  NERVOUS SYSTEM:  Alert & Awake.   CHEST/LUNG: B/L good air entry; No rales, rhonchi, or wheezing  HEART: S1S2 normal, no S3, Regular rate and rhythm; No murmurs  ABDOMEN: Soft, Nontender, Nondistended; Bowel sounds present  EXTREMITIES:  2+ Peripheral Pulses, No clubbing, cyanosis, or edema  LYMPH: No lymphadenopathy noted  SKIN: No rashes or lesions    LABS:                        15.5   6.87  )-----------( 114      ( 01 Mar 2024 06:19 )             42.7     03-01    142  |  108  |  10  ----------------------------<  150<H>  4.8   |  18  |  0.7    Ca    8.1<L>      01 Mar 2024 16:31  Phos  2.6     03-01  Mg     1.7     03-01    TPro  5.7<L>  /  Alb  3.1<L>  /  TBili  2.8<H>  /  DBili  1.8<H>  /  AST  99<H>  /  ALT  63<H>  /  AlkPhos  114  03-01    LIVER FUNCTIONS - ( 01 Mar 2024 16:31 )  Alb: 3.1 g/dL / Pro: 5.7 g/dL / ALK PHOS: 114 U/L / ALT: 63 U/L / AST: 99 U/L / GGT: x           PTT - ( 02 Mar 2024 02:30 )  PTT:82.7 sec  CAPILLARY BLOOD GLUCOSE      POCT Blood Glucose.: 146 mg/dL (01 Mar 2024 17:34)    ABG - ( 02 Mar 2024 03:42 )  pH, Arterial: 7.42  pH, Blood: x     /  pCO2: 37    /  pO2: 98    / HCO3: 24    / Base Excess: -0.2  /  SaO2: 99.3                    Urinalysis Basic - ( 01 Mar 2024 16:31 )    Color: x / Appearance: x / SG: x / pH: x  Gluc: 150 mg/dL / Ketone: x  / Bili: x / Urobili: x   Blood: x / Protein: x / Nitrite: x   Leuk Esterase: x / RBC: x / WBC x   Sq Epi: x / Non Sq Epi: x / Bacteria: x          RADIOLOGY & ADDITIONAL TESTS:  CXR:        Care Discussed with Consultants/Other Providers [ x] YES  [ ] NO

## 2024-03-02 NOTE — PROGRESS NOTE ADULT - SUBJECTIVE AND OBJECTIVE BOX
Patient is a 63y old  Male who presents with a chief complaint of chest pain and SOB (01 Mar 2024 12:36)    HPI:  63 years old male history of alcohol abuse, hypertension, COPD? ( heavy smoker. acc to pt had PFTs done and uses inhalers at home ), recently diagnosed with hyperthyroidism few weeks ago and started on methimazole 5 mg by PMD presents complaining of palpitations and exertional shortness of breath over the past few weeks.   At my encounter family not at bedside, acc to pt his only complaint is palpitaiosn, His HR was ranging 120-130 at home. No chest pain. He has cough and mild SOB which he attributes to smoking 1 pack daily. Otherwise he describes himself as very healthy. Pt is tremelous and restless. He admitted to drinking alcohol  (15-20 drinks of liquor daily). Last drink was earlier this evening.  Patient also reports he does want to stop drinking and try to cut down his alcohol use.    As per family, patient was seen by his cardiologist Dr. Johnson who increased that his metoprolol to 75 mg twice a day which did not improve his heart rate.  Patient had outpatient echocardiogram yesterday and noted to be tachycardic so he was recommended to come to ED for evaluation.  Otherwise denies fever, chills, recent illness, coughing, chest pain, abdominal pain, diarrhea or urinary symptoms.  Denies drug use.    Vitals: T 98.2, , /90, spo2 94% on RA   Labs remarkable for elevated ALT AST, Mg 1.5  Lactate 4 on VBGs   Alcohol level 202   CXR clear   EKG showed sinus tachycardia     s/p valium and ativan in ED  s/p 1 L NS, magnesium   HR improved from 130 to 90s  (29 Feb 2024 01:57)       INTERVAL HPI/OVERNIGHT EVENTS:   No overnight events: hypotensive with MAP of 61. gave 500cc bolus  Afebrile,     Subjective:    ICU Vital Signs Last 24 Hrs  T(C): 36.7 (02 Mar 2024 00:00), Max: 37.4 (01 Mar 2024 20:00)  T(F): 98 (02 Mar 2024 00:00), Max: 99.3 (01 Mar 2024 20:00)  HR: 63 (02 Mar 2024 04:00) (50 - 153)  BP: 86/59 (02 Mar 2024 04:00) (73/53 - 156/87)  BP(mean): 68 (02 Mar 2024 04:00) (58 - 87)  ABP: --  ABP(mean): --  RR: 16 (02 Mar 2024 04:00) (14 - 40)  SpO2: 99% (02 Mar 2024 04:00) (92% - 100%)    O2 Parameters below as of 02 Mar 2024 05:00  Patient On (Oxygen Delivery Method): ventilator  O2 Flow (L/min): 80        I&O's Summary    01 Mar 2024 07:01  -  02 Mar 2024 05:07  --------------------------------------------------------  IN: 1309.5 mL / OUT: 390 mL / NET: 919.5 mL      Mode: AC/ CMV (Assist Control/ Continuous Mandatory Ventilation)  RR (machine): 16  TV (machine): 16  FiO2: 80  PEEP: 8  ITime: 1  MAP: 10  PIP: 15      Daily     Daily     Adult Advanced Hemodynamics Last 24 Hrs  CVP(mm Hg): --  CVP(cm H2O): --  CO: --  CI: --  PA: --  PA(mean): --  PCWP: --  SVR: --  SVRI: --  PVR: --  PVRI: --    EKG/Telemetry Events:    MEDICATIONS  (STANDING):  albuterol/ipratropium for Nebulization 3 milliLiter(s) Nebulizer every 6 hours  chlorhexidine 2% Cloths 1 Application(s) Topical daily  dexMEDEtomidine Infusion 0.05 MICROgram(s)/kG/Hr (0.99 mL/Hr) IV Continuous <Continuous>  fentaNYL   Infusion. 0.5 MICROgram(s)/kG/Hr (3.94 mL/Hr) IV Continuous <Continuous>  folic acid 1 milliGRAM(s) Oral daily  heparin  Infusion. 1000 Unit(s)/Hr (10 mL/Hr) IV Continuous <Continuous>  lactated ringers Bolus 500 milliLiter(s) IV Bolus once  lactated ringers. 1000 milliLiter(s) (75 mL/Hr) IV Continuous <Continuous>  LORazepam   Injectable 1.5 milliGRAM(s) IV Push every 4 hours  LORazepam   Injectable 2 milliGRAM(s) IV Push every 4 hours  LORazepam   Injectable   IV Push   methimazole 5 milliGRAM(s) Oral daily  metoprolol tartrate 75 milliGRAM(s) Oral two times a day  multivitamin 1 Tablet(s) Oral daily  predniSONE   Tablet 40 milliGRAM(s) Oral daily  propofol Infusion 10 MICROgram(s)/kG/Min (4.73 mL/Hr) IV Continuous <Continuous>  thiamine IVPB 500 milliGRAM(s) IV Intermittent every 8 hours    MEDICATIONS  (PRN):  haloperidol    Injectable 5 milliGRAM(s) IntraMuscular every 6 hours PRN Agitation  heparin   Injectable 4700 Unit(s) IV Push every 6 hours PRN For aPTT less than 40  LORazepam   Injectable 2 milliGRAM(s) IV Push every 2 hours PRN Symptom-triggered: each CIWA -Ar score 8 or GREATER      PHYSICAL EXAM:  GENERAL:   HEAD:  Atraumatic, Normocephalic  EYES: EOMI, PERRLA, conjunctiva and sclera clear  NECK: Supple, No JVD, Normal thyroid, no enlarged nodes  NERVOUS SYSTEM:  Alert & Awake.   CHEST/LUNG: B/L good air entry; No rales, rhonchi, or wheezing  HEART: S1S2 normal, no S3, Regular rate and rhythm; No murmurs  ABDOMEN: Soft, Nontender, Nondistended; Bowel sounds present  EXTREMITIES:  2+ Peripheral Pulses, No clubbing, cyanosis, or edema  LYMPH: No lymphadenopathy noted  SKIN: No rashes or lesions    LABS:                        15.5   6.87  )-----------( 114      ( 01 Mar 2024 06:19 )             42.7     03-01    142  |  108  |  10  ----------------------------<  150<H>  4.8   |  18  |  0.7    Ca    8.1<L>      01 Mar 2024 16:31  Phos  2.6     03-01  Mg     1.7     03-01    TPro  5.7<L>  /  Alb  3.1<L>  /  TBili  2.8<H>  /  DBili  1.8<H>  /  AST  99<H>  /  ALT  63<H>  /  AlkPhos  114  03-01    LIVER FUNCTIONS - ( 01 Mar 2024 16:31 )  Alb: 3.1 g/dL / Pro: 5.7 g/dL / ALK PHOS: 114 U/L / ALT: 63 U/L / AST: 99 U/L / GGT: x           PTT - ( 02 Mar 2024 02:30 )  PTT:82.7 sec  CAPILLARY BLOOD GLUCOSE      POCT Blood Glucose.: 146 mg/dL (01 Mar 2024 17:34)    ABG - ( 02 Mar 2024 03:42 )  pH, Arterial: 7.42  pH, Blood: x     /  pCO2: 37    /  pO2: 98    / HCO3: 24    / Base Excess: -0.2  /  SaO2: 99.3                    Urinalysis Basic - ( 01 Mar 2024 16:31 )    Color: x / Appearance: x / SG: x / pH: x  Gluc: 150 mg/dL / Ketone: x  / Bili: x / Urobili: x   Blood: x / Protein: x / Nitrite: x   Leuk Esterase: x / RBC: x / WBC x   Sq Epi: x / Non Sq Epi: x / Bacteria: x          RADIOLOGY & ADDITIONAL TESTS:  CXR:        Care Discussed with Consultants/Other Providers [ x] YES  [ ] NO

## 2024-03-02 NOTE — PROGRESS NOTE ADULT - ASSESSMENT
Severe alcohol withdrawal   Alcohol abuse  COPD excarbation  Hyperthyroidism ?    PLAN:    CNS:  Thiamine IV [id number] then switch to PO, folate, CIWA, CATCH team, wean precedex, ativan taper.    HEENT: Oral care    PULMONARY:  HOB @ 45 degrees. Prednisone x5 days PO, Nebs q4hrs and PRN, O2 supplemental target Spo2 88-92%. RVP negative.     CARDIOVASCULAR: LR at 75 cc/hr. BB as needed per cardiology, pt's HR currently 60's    GI: GI prophylaxis.  Feeding as tolerated.  Bowel regimen     RENAL:  Follow up lytes.  Correct as needed. Trend lactate.    INFECTIOUS DISEASE: Follow up cultures. Give azithromycin x 5 days.    HEMATOLOGICAL:  DVT prophylaxis. Dimer and LE duplex    ENDOCRINE:  Follow up FS.  Insulin protocol if needed. CW methimazole.    MUSCULOSKELETAL: Bed in chair position

## 2024-03-02 NOTE — CHART NOTE - NSCHARTNOTEFT_GEN_A_CORE
Patient is started on heparin drip for possible afib episode. Chart reviewed. EKG and tele reviewed. Patient was not found to have any afib rhythm. Spoke to Dr Johnson via phone call , recommends EP consult.

## 2024-03-02 NOTE — DIETITIAN INITIAL EVALUATION ADULT - PERTINENT LABORATORY DATA
03-02    138  |  104  |  13  ----------------------------<  210<H>  4.3   |  21  |  0.8    Ca    7.5<L>      02 Mar 2024 05:13  Phos  2.6     03-01  Mg     2.3     03-02    TPro  4.9<L>  /  Alb  2.6<L>  /  TBili  2.1<H>  /  DBili  x   /  AST  62<H>  /  ALT  42<H>  /  AlkPhos  90  03-02  POCT Blood Glucose.: 146 mg/dL (03-01-24 @ 17:34)

## 2024-03-02 NOTE — PROGRESS NOTE ADULT - ATTENDING COMMENTS
Mr. Nelson was seen and examined at his stretcher in the emergency department with multiple family members present.  Patient has a history of alcohol abuse and presented with palpitations and shortness of breath.  He was being treated for alcohol withdrawal and hypothyroidism when his symptoms began to worsen, and critical care was engaged earlier today due to severe agitation and worsening alcohol withdrawal requiring high doses of benzodiazepines and initiation of Precedex infusion.  Recommend increasing dose of taper of Ativan, therapeutic dose IV thiamine, and initiating IM Zyprexa as needed for severe agitation.  Patient will be admitted to MICU for close monitoring of ongoing Precedex drip as needed for agitation.  I agree with the fellow note, with the exceptions listed in my attestation above.  The remainder of impression and plan per fellow note.

## 2024-03-02 NOTE — DIETITIAN INITIAL EVALUATION ADULT - OTHER INFO
--63 years old male presents complaining of palpitations and exertional shortness of breath over the past few weeks.   #Alcohol withdrawal   #Alcohol intoxication (alcohol level in blood >200)   - thiamine and folate   - replete Mg and K as needed

## 2024-03-02 NOTE — PROGRESS NOTE ADULT - ASSESSMENT
Impression  #Alcohol withdrawal   #Alcohol intoxication (alcohol level in blood >200)   - K 3.5, Mg 1.5  - Lactate 4 on VBGs -> repeat 1.4 (after fluids)  - trop 11, pro-BNP 46  - Alcohol level 202   - CXR clear   - EKG showed sinus tachycardia   - s/p valium and ativan in ED  - s/p 1 L NS, magnesium   - HR improved from 130 to 90s   - sxs may be due to alcohol intoxication  - monitor on tele for now   - TSH 0.01 low, fT4 1.8  - pt had TTE done as OP yesterday, please get records from Dr Johnson   - CATCH team eval, pt willing to stop   - thiamine and folate   - replete Mg and K as needed   - now on precedex drip, prop, fentanyl and lorazepam as needed    #Lactic acidosis -> resolved  - no hypotension or hypoxia   - c/w LR 75 cc/hr   -lactate normalized aftr fluids    #Transaminitis   - , ALT 85,   - 2/2 to alcohol use     #HTN  - not on meds   -now on metoprolol  - monitor BP     #COPD?   - on RA   - wheezing on PE  - duonebs PRN   - prednisone 40 mg OD for 5 days     #DVT ppx: lovenox   #GI ppx: protonix   #Diet: DASH   #Activity: as tolerated      Impression  #Alcohol withdrawal   #Alcohol intoxication (alcohol level in blood >200)   - K 3.5, Mg 1.5  - Lactate 4 on VBGs -> repeat 1.4 (after fluids)  - trop 11, pro-BNP 46  - Alcohol level 202   - CXR clear   - EKG showed sinus tachycardia   - s/p valium and ativan in ED  - s/p 1 L NS, magnesium   - HR improved from 130 to 90s   - sxs may be due to alcohol intoxication  - monitor on tele for now   - TSH 0.01 low, fT4 1.8  - pt had TTE done as OP yesterday, please get records from Dr Johnson   - CATCH team eval, pt willing to stop   - thiamine and folate   - replete Mg and K as needed   - now on precedex drip, prop, fentanyl and lorazepam as needed    # New onset Afib   -on  heparin  -HR controlled    #Transaminitis   - , ALT 85,   - 2/2 to alcohol use     #HTN  - not on meds   -c/w bb prn per cardiology, HR curently in 60s  - monitor BP     #COPD?   - on RA   - wheezing on PE  - duonebs PRN   - prednisone 40 mg OD for 5 days     #DVT ppx: lovenox   #GI ppx: protonix   #Diet: DASH   #Activity: as tolerated      Impression  #Alcohol withdrawal   #Alcohol intoxication (alcohol level in blood >200)   - K 3.5, Mg 1.5  - Lactate 4 on VBGs -> repeat 1.4 (after fluids)  - trop 11, pro-BNP 46   - EKG showed sinus tachycardia   - s/p valium and ativan in ED  - s/p 1 L NS, magnesium   - HR improved from 130 to 90s   - sxs may be due to alcohol intoxication  - monitor on tele for now   - TSH 0.01 low, fT4 1.8  - pt had TTE done as OP yesterday, please get records from Dr Johnson   - CATCH team eval, pt willing to stop   - thiamine and folate   - replete Mg and K as needed   - now on precedex drip, prop, fentanyl and lorazepam as needed    Pna  -CXR right LLO  -start rocephin,     # New onset Afib?  -on heparin drip  -f/u cardio, check chadsvasc    #Transaminitis   - , ALT 85,   - 2/2 to alcohol use     #HTN  - not on meds   -hold bb for now, HR curently in 60s  - monitor BP     #COPD?   - on RA   - wheezing on PE  - duonebs PRN   - prednisone 40 mg OD for 5 days     #DVT ppx: lovenox   #GI ppx: protonix   #Diet: feeds via OGT  #Activity: as tolerated     Pending: cardio f/u, check IVC

## 2024-03-02 NOTE — PROGRESS NOTE ADULT - SUBJECTIVE AND OBJECTIVE BOX
Patient is a 63y old  Male who presents with a chief complaint of chest pain and SOB (02 Mar 2024 05:06)        HPI:  63 years old male history of alcohol abuse, hypertension, COPD? ( heavy smoker. acc to pt had PFTs done and uses inhalers at home ), recently diagnosed with hyperthyroidism few weeks ago and started on methimazole 5 mg by PMD presents complaining of palpitations and exertional shortness of breath over the past few weeks.   At my encounter family not at bedside, acc to pt his only complaint is palpitaiosn, His HR was ranging 120-130 at home. No chest pain. He has cough and mild SOB which he attributes to smoking 1 pack daily. Otherwise he describes himself as very healthy. Pt is tremelous and restless. He admitted to drinking alcohol  (15-20 drinks of liquor daily). Last drink was earlier this evening.  Patient also reports he does want to stop drinking and try to cut down his alcohol use.    As per family, patient was seen by his cardiologist Dr. Johnson who increased that his metoprolol to 75 mg twice a day which did not improve his heart rate.  Patient had outpatient echocardiogram yesterday and noted to be tachycardic so he was recommended to come to ED for evaluation.  Otherwise denies fever, chills, recent illness, coughing, chest pain, abdominal pain, diarrhea or urinary symptoms.  Denies drug use.    s/p valium and ativan in ED  s/p 1 L NS, magnesium   HR improved from 130 to 90s  (29 Feb 2024 01:57)      Pt evaluated on rounds.  I reviewed the radiology tests and hospital record.    I reviewed previous notes on this patient.    Interval Events: No overnight events.      CAM:+    SAT:y    SBT:n      REVIEW OF SYSTEMS:   see HPI      OBJECTIVE:  ICU Vital Signs Last 24 Hrs  T(C): 36.1 (02 Mar 2024 04:00), Max: 37.4 (01 Mar 2024 20:00)  T(F): 97 (02 Mar 2024 04:00), Max: 99.3 (01 Mar 2024 20:00)  HR: 63 (02 Mar 2024 10:00) (61 - 153)  BP: 109/57 (02 Mar 2024 10:00) (73/53 - 136/87)  BP(mean): 77 (02 Mar 2024 10:00) (58 - 87)  RR: 17 (02 Mar 2024 10:00) (15 - 40)  SpO2: 100% (02 Mar 2024 10:00) (93% - 100%)    O2 Parameters below as of 02 Mar 2024 10:00  Patient On (Oxygen Delivery Method): ventilator    O2 Concentration (%): 80      Mode: AC/ CMV (Assist Control/ Continuous Mandatory Ventilation), RR (machine): 16, TV (machine): 370, FiO2: 80, PEEP: 8, ITime: 1, MAP: 12, PIP: 21    03-01 @ 07:01 - 03-02 @ 07:00  --------------------------------------------------------  IN: 2550.2 mL / OUT: 490 mL / NET: 2060.2 mL    03-02 @ 07:01 - 03-02 @ 10:27  --------------------------------------------------------  IN: 333.9 mL / OUT: 75 mL / NET: 258.9 mL      CAPILLARY BLOOD GLUCOSE      POCT Blood Glucose.: 146 mg/dL (01 Mar 2024 17:34)        PHYSICAL EXAM:       · ENMT:   Airway patent,   Nasal mucosa clear.  Mouth with normal mucosa.   No thrush    · EYES:   Clear bilaterally,   pupils equal,   round and reactive to light.    · CARDIAC:   Normal rate,   regular rhythm.    Heart sounds S1, S2.   No murmurs, no rubs or gallops on auscultation  no edema        CAROTID:   normal systolic impulse  no bruits    · RESPIRATORY:   rales  normal chest expansion  no retractions or use of accessory muscles  percussion of chest demonstrates no hyperresonance or dullness    · GASTROINTESTINAL:  Abdomen soft,   non-tender,   + BS  liver/spleen not palpable    · MUSCULOSKELETAL:   no clubbing, cyanosis      · SKIN:   Skin normal color for race,   warm, dry   No evidence of rash.        · HEME LYMPH:   no splenomegaly.  No cervical  lymphadenopathy.  no inguinal lymphadenopathy    HOSPITAL MEDICATIONS:  MEDICATIONS  (STANDING):  albuterol/ipratropium for Nebulization 3 milliLiter(s) Nebulizer every 6 hours  cefTRIAXone   IVPB      cefTRIAXone   IVPB 1000 milliGRAM(s) IV Intermittent once  chlorhexidine 0.12% Liquid 15 milliLiter(s) Oral Mucosa two times a day  chlorhexidine 2% Cloths 1 Application(s) Topical daily  dexMEDEtomidine Infusion 0.05 MICROgram(s)/kG/Hr (0.99 mL/Hr) IV Continuous <Continuous>  fentaNYL   Infusion. 0.5 MICROgram(s)/kG/Hr (3.94 mL/Hr) IV Continuous <Continuous>  folic acid 1 milliGRAM(s) Oral daily  heparin  Infusion. 1000 Unit(s)/Hr (10 mL/Hr) IV Continuous <Continuous>  lactated ringers. 1000 milliLiter(s) (75 mL/Hr) IV Continuous <Continuous>  LORazepam   Injectable   IV Push   LORazepam   Injectable 1.5 milliGRAM(s) IV Push every 4 hours  methimazole 5 milliGRAM(s) Oral daily  metoprolol tartrate 75 milliGRAM(s) Oral two times a day  multivitamin 1 Tablet(s) Oral daily  predniSONE   Tablet 40 milliGRAM(s) Oral daily  propofol Infusion 10 MICROgram(s)/kG/Min (4.73 mL/Hr) IV Continuous <Continuous>  senna 2 Tablet(s) Oral at bedtime  thiamine IVPB 500 milliGRAM(s) IV Intermittent every 8 hours    MEDICATIONS  (PRN):  haloperidol    Injectable 5 milliGRAM(s) IntraMuscular every 6 hours PRN Agitation  heparin   Injectable 4700 Unit(s) IV Push every 6 hours PRN For aPTT less than 40  LORazepam   Injectable 2 milliGRAM(s) IV Push every 2 hours PRN Symptom-triggered: each CIWA -Ar score 8 or GREATER    lactated ringers Bolus:   1000 milliLiter(s), IV Bolus, once, infuse over 60 Minute(s), Stop After 1 Doses  lactated ringers Bolus:   500 milliLiter(s), IV Bolus, once, infuse over 30 Minute(s), Stop After 1 Doses  lactated ringers.: Solution, 1000 milliLiter(s) infuse at 75 mL/Hr  lactated ringers.: Solution, 1000 milliLiter(s) infuse at 150 mL/Hr  Provider's Contact #: [phone]  sodium chloride 0.9% Bolus:   1000 milliLiter(s), IV Bolus, once, infuse over 60 Minute(s), Stop After 1 Doses  Provider's Contact #: [phone]      LABS:                        14.1   15.73 )-----------( 122      ( 02 Mar 2024 05:13 )             40.8     03-02    138  |  104  |  13  ----------------------------<  210<H>  4.3   |  21  |  0.8    Ca    7.5<L>      02 Mar 2024 05:13  Phos  2.6     03-01  Mg     2.3     03-02    TPro  4.9<L>  /  Alb  2.6<L>  /  TBili  2.1<H>  /  DBili  x   /  AST  62<H>  /  ALT  42<H>  /  AlkPhos  90  03-02    PTT - ( 02 Mar 2024 02:30 )  PTT:82.7 sec  Urinalysis Basic - ( 02 Mar 2024 05:13 )    Color: x / Appearance: x / SG: x / pH: x  Gluc: 210 mg/dL / Ketone: x  / Bili: x / Urobili: x   Blood: x / Protein: x / Nitrite: x   Leuk Esterase: x / RBC: x / WBC x   Sq Epi: x / Non Sq Epi: x / Bacteria: x      Arterial Blood Gas:  03-02 @ 03:42  7.42/37/98/24/99.3/-0.2  ABG lactate: --  Arterial Blood Gas:  03-01 @ 15:38  7.48/29/57/22/91.5/-0.6  ABG lactate: --      Mode: AC/ CMV (Assist Control/ Continuous Mandatory Ventilation), RR (machine): 16, TV (machine): 370, FiO2: 80, PEEP: 8, ITime: 1, MAP: 12, PIP: 21        Mode: AC/ CMV (Assist Control/ Continuous Mandatory Ventilation)  RR (machine): 16  TV (machine): 370  FiO2: 80  PEEP: 8  ITime: 1  MAP: 12  PIP: 21      ABG - ( 02 Mar 2024 03:42 )  pH, Arterial: 7.42  pH, Blood: x     /  pCO2: 37    /  pO2: 98    / HCO3: 24    / Base Excess: -0.2  /  SaO2: 99.3        RADIOLOGY: Today I personally interpreted the latest CXR and other pertinent films.

## 2024-03-02 NOTE — DIETITIAN INITIAL EVALUATION ADULT - ENTERAL
Nutrition Intervention: EN   Nutrition Monitoring: body composition, energy intake, diet order, NFPF, glucose profile

## 2024-03-03 ENCOUNTER — RESULT REVIEW (OUTPATIENT)
Age: 64
End: 2024-03-03

## 2024-03-03 LAB
ALBUMIN SERPL ELPH-MCNC: 2.7 G/DL — LOW (ref 3.5–5.2)
ALBUMIN SERPL ELPH-MCNC: 2.8 G/DL — LOW (ref 3.5–5.2)
ALP SERPL-CCNC: 91 U/L — SIGNIFICANT CHANGE UP (ref 30–115)
ALP SERPL-CCNC: 97 U/L — SIGNIFICANT CHANGE UP (ref 30–115)
ALT FLD-CCNC: 39 U/L — SIGNIFICANT CHANGE UP (ref 0–41)
ALT FLD-CCNC: 40 U/L — SIGNIFICANT CHANGE UP (ref 0–41)
AMMONIA BLD-MCNC: 42 UMOL/L — SIGNIFICANT CHANGE UP (ref 11–55)
ANION GAP SERPL CALC-SCNC: 8 MMOL/L — SIGNIFICANT CHANGE UP (ref 7–14)
ANION GAP SERPL CALC-SCNC: 8 MMOL/L — SIGNIFICANT CHANGE UP (ref 7–14)
APTT BLD: >200 SEC — CRITICAL HIGH (ref 27–39.2)
AST SERPL-CCNC: 57 U/L — HIGH (ref 0–41)
AST SERPL-CCNC: 59 U/L — HIGH (ref 0–41)
BASOPHILS # BLD AUTO: 0.03 K/UL — SIGNIFICANT CHANGE UP (ref 0–0.2)
BASOPHILS NFR BLD AUTO: 0.2 % — SIGNIFICANT CHANGE UP (ref 0–1)
BILIRUB SERPL-MCNC: 1.8 MG/DL — HIGH (ref 0.2–1.2)
BILIRUB SERPL-MCNC: 1.8 MG/DL — HIGH (ref 0.2–1.2)
BUN SERPL-MCNC: 18 MG/DL — SIGNIFICANT CHANGE UP (ref 10–20)
BUN SERPL-MCNC: 19 MG/DL — SIGNIFICANT CHANGE UP (ref 10–20)
CALCIUM SERPL-MCNC: 8.1 MG/DL — LOW (ref 8.4–10.5)
CALCIUM SERPL-MCNC: 8.1 MG/DL — LOW (ref 8.4–10.5)
CHLORIDE SERPL-SCNC: 104 MMOL/L — SIGNIFICANT CHANGE UP (ref 98–110)
CHLORIDE SERPL-SCNC: 108 MMOL/L — SIGNIFICANT CHANGE UP (ref 98–110)
CO2 SERPL-SCNC: 24 MMOL/L — SIGNIFICANT CHANGE UP (ref 17–32)
CO2 SERPL-SCNC: 24 MMOL/L — SIGNIFICANT CHANGE UP (ref 17–32)
CREAT SERPL-MCNC: 0.7 MG/DL — SIGNIFICANT CHANGE UP (ref 0.7–1.5)
CREAT SERPL-MCNC: 0.7 MG/DL — SIGNIFICANT CHANGE UP (ref 0.7–1.5)
EGFR: 104 ML/MIN/1.73M2 — SIGNIFICANT CHANGE UP
EGFR: 104 ML/MIN/1.73M2 — SIGNIFICANT CHANGE UP
EOSINOPHIL # BLD AUTO: 0.08 K/UL — SIGNIFICANT CHANGE UP (ref 0–0.7)
EOSINOPHIL NFR BLD AUTO: 0.5 % — SIGNIFICANT CHANGE UP (ref 0–8)
GAS PNL BLDA: SIGNIFICANT CHANGE UP
GAS PNL BLDA: SIGNIFICANT CHANGE UP
GLUCOSE SERPL-MCNC: 176 MG/DL — HIGH (ref 70–99)
GLUCOSE SERPL-MCNC: 97 MG/DL — SIGNIFICANT CHANGE UP (ref 70–99)
HCT VFR BLD CALC: 38.2 % — LOW (ref 42–52)
HCT VFR BLD CALC: 40.7 % — LOW (ref 42–52)
HGB BLD-MCNC: 13.3 G/DL — LOW (ref 14–18)
HGB BLD-MCNC: 13.8 G/DL — LOW (ref 14–18)
IMM GRANULOCYTES NFR BLD AUTO: 0.8 % — HIGH (ref 0.1–0.3)
LACTATE SERPL-SCNC: 1.6 MMOL/L — SIGNIFICANT CHANGE UP (ref 0.7–2)
LYMPHOCYTES # BLD AUTO: 1.76 K/UL — SIGNIFICANT CHANGE UP (ref 1.2–3.4)
LYMPHOCYTES # BLD AUTO: 11.6 % — LOW (ref 20.5–51.1)
MAGNESIUM SERPL-MCNC: 2.2 MG/DL — SIGNIFICANT CHANGE UP (ref 1.8–2.4)
MAGNESIUM SERPL-MCNC: 2.3 MG/DL — SIGNIFICANT CHANGE UP (ref 1.8–2.4)
MCHC RBC-ENTMCNC: 33.9 G/DL — SIGNIFICANT CHANGE UP (ref 32–37)
MCHC RBC-ENTMCNC: 34.8 G/DL — SIGNIFICANT CHANGE UP (ref 32–37)
MCHC RBC-ENTMCNC: 35.1 PG — HIGH (ref 27–31)
MCHC RBC-ENTMCNC: 36.1 PG — HIGH (ref 27–31)
MCV RBC AUTO: 103.6 FL — HIGH (ref 80–94)
MCV RBC AUTO: 103.8 FL — HIGH (ref 80–94)
MONOCYTES # BLD AUTO: 0.97 K/UL — HIGH (ref 0.1–0.6)
MONOCYTES NFR BLD AUTO: 6.4 % — SIGNIFICANT CHANGE UP (ref 1.7–9.3)
NEUTROPHILS # BLD AUTO: 12.17 K/UL — HIGH (ref 1.4–6.5)
NEUTROPHILS NFR BLD AUTO: 80.5 % — HIGH (ref 42.2–75.2)
NRBC # BLD: 0 /100 WBCS — SIGNIFICANT CHANGE UP (ref 0–0)
NRBC # BLD: 0 /100 WBCS — SIGNIFICANT CHANGE UP (ref 0–0)
PLATELET # BLD AUTO: 120 K/UL — LOW (ref 130–400)
PLATELET # BLD AUTO: 131 K/UL — SIGNIFICANT CHANGE UP (ref 130–400)
PMV BLD: 11.9 FL — HIGH (ref 7.4–10.4)
PMV BLD: 12.5 FL — HIGH (ref 7.4–10.4)
POTASSIUM SERPL-MCNC: 4.2 MMOL/L — SIGNIFICANT CHANGE UP (ref 3.5–5)
POTASSIUM SERPL-MCNC: 4.3 MMOL/L — SIGNIFICANT CHANGE UP (ref 3.5–5)
POTASSIUM SERPL-SCNC: 4.2 MMOL/L — SIGNIFICANT CHANGE UP (ref 3.5–5)
POTASSIUM SERPL-SCNC: 4.3 MMOL/L — SIGNIFICANT CHANGE UP (ref 3.5–5)
PROT SERPL-MCNC: 5.3 G/DL — LOW (ref 6–8)
PROT SERPL-MCNC: 5.8 G/DL — LOW (ref 6–8)
RBC # BLD: 3.68 M/UL — LOW (ref 4.7–6.1)
RBC # BLD: 3.93 M/UL — LOW (ref 4.7–6.1)
RBC # FLD: 11.9 % — SIGNIFICANT CHANGE UP (ref 11.5–14.5)
RBC # FLD: 11.9 % — SIGNIFICANT CHANGE UP (ref 11.5–14.5)
SODIUM SERPL-SCNC: 136 MMOL/L — SIGNIFICANT CHANGE UP (ref 135–146)
SODIUM SERPL-SCNC: 140 MMOL/L — SIGNIFICANT CHANGE UP (ref 135–146)
WBC # BLD: 15.13 K/UL — HIGH (ref 4.8–10.8)
WBC # BLD: 15.22 K/UL — HIGH (ref 4.8–10.8)
WBC # FLD AUTO: 15.13 K/UL — HIGH (ref 4.8–10.8)
WBC # FLD AUTO: 15.22 K/UL — HIGH (ref 4.8–10.8)

## 2024-03-03 PROCEDURE — 95816 EEG AWAKE AND DROWSY: CPT | Mod: 26,1L

## 2024-03-03 PROCEDURE — 70450 CT HEAD/BRAIN W/O DYE: CPT | Mod: 26

## 2024-03-03 PROCEDURE — 71045 X-RAY EXAM CHEST 1 VIEW: CPT | Mod: 26

## 2024-03-03 PROCEDURE — 99222 1ST HOSP IP/OBS MODERATE 55: CPT

## 2024-03-03 PROCEDURE — 99291 CRITICAL CARE FIRST HOUR: CPT

## 2024-03-03 PROCEDURE — 99223 1ST HOSP IP/OBS HIGH 75: CPT

## 2024-03-03 PROCEDURE — 93306 TTE W/DOPPLER COMPLETE: CPT | Mod: 26

## 2024-03-03 RX ORDER — POLYETHYLENE GLYCOL 3350 17 G/17G
17 POWDER, FOR SOLUTION ORAL DAILY
Refills: 0 | Status: DISCONTINUED | OUTPATIENT
Start: 2024-03-03 | End: 2024-03-05

## 2024-03-03 RX ORDER — HEPARIN SODIUM 5000 [USP'U]/ML
5000 INJECTION INTRAVENOUS; SUBCUTANEOUS EVERY 12 HOURS
Refills: 0 | Status: DISCONTINUED | OUTPATIENT
Start: 2024-03-03 | End: 2024-03-08

## 2024-03-03 RX ORDER — ACETAMINOPHEN 500 MG
650 TABLET ORAL EVERY 6 HOURS
Refills: 0 | Status: DISCONTINUED | OUTPATIENT
Start: 2024-03-03 | End: 2024-03-04

## 2024-03-03 RX ADMIN — CHLORHEXIDINE GLUCONATE 15 MILLILITER(S): 213 SOLUTION TOPICAL at 05:06

## 2024-03-03 RX ADMIN — Medication 650 MILLIGRAM(S): at 12:29

## 2024-03-03 RX ADMIN — CEFTRIAXONE 100 MILLIGRAM(S): 500 INJECTION, POWDER, FOR SOLUTION INTRAMUSCULAR; INTRAVENOUS at 12:08

## 2024-03-03 RX ADMIN — Medication 650 MILLIGRAM(S): at 23:45

## 2024-03-03 RX ADMIN — Medication 500000 UNIT(S): at 18:09

## 2024-03-03 RX ADMIN — Medication 105 MILLIGRAM(S): at 18:11

## 2024-03-03 RX ADMIN — Medication 500000 UNIT(S): at 23:07

## 2024-03-03 RX ADMIN — Medication 105 MILLIGRAM(S): at 21:04

## 2024-03-03 RX ADMIN — CHLORHEXIDINE GLUCONATE 1 APPLICATION(S): 213 SOLUTION TOPICAL at 18:10

## 2024-03-03 RX ADMIN — Medication 1 MILLIGRAM(S): at 12:09

## 2024-03-03 RX ADMIN — Medication 1 MILLIGRAM(S): at 21:03

## 2024-03-03 RX ADMIN — Medication 1 MILLIGRAM(S): at 10:20

## 2024-03-03 RX ADMIN — HEPARIN SODIUM 600 UNIT(S)/HR: 5000 INJECTION INTRAVENOUS; SUBCUTANEOUS at 03:02

## 2024-03-03 RX ADMIN — Medication 75 MILLIGRAM(S): at 05:01

## 2024-03-03 RX ADMIN — Medication 1.5 MILLIGRAM(S): at 01:19

## 2024-03-03 RX ADMIN — Medication 500000 UNIT(S): at 05:01

## 2024-03-03 RX ADMIN — HEPARIN SODIUM 5000 UNIT(S): 5000 INJECTION INTRAVENOUS; SUBCUTANEOUS at 18:06

## 2024-03-03 RX ADMIN — Medication 500000 UNIT(S): at 00:48

## 2024-03-03 RX ADMIN — Medication 75 MILLIGRAM(S): at 18:09

## 2024-03-03 RX ADMIN — POLYETHYLENE GLYCOL 3350 17 GRAM(S): 17 POWDER, FOR SOLUTION ORAL at 12:11

## 2024-03-03 RX ADMIN — Medication 1 TABLET(S): at 12:09

## 2024-03-03 RX ADMIN — Medication 650 MILLIGRAM(S): at 00:00

## 2024-03-03 RX ADMIN — CHLORHEXIDINE GLUCONATE 15 MILLILITER(S): 213 SOLUTION TOPICAL at 18:07

## 2024-03-03 RX ADMIN — Medication 1.5 MILLIGRAM(S): at 05:01

## 2024-03-03 RX ADMIN — Medication 105 MILLIGRAM(S): at 05:02

## 2024-03-03 RX ADMIN — Medication 650 MILLIGRAM(S): at 06:19

## 2024-03-03 RX ADMIN — Medication 500000 UNIT(S): at 12:12

## 2024-03-03 RX ADMIN — Medication 650 MILLIGRAM(S): at 18:41

## 2024-03-03 RX ADMIN — Medication 40 MILLIGRAM(S): at 05:01

## 2024-03-03 RX ADMIN — Medication 1 MILLIGRAM(S): at 14:30

## 2024-03-03 RX ADMIN — Medication 1 MILLIGRAM(S): at 18:06

## 2024-03-03 RX ADMIN — SENNA PLUS 2 TABLET(S): 8.6 TABLET ORAL at 21:03

## 2024-03-03 RX ADMIN — Medication 650 MILLIGRAM(S): at 06:45

## 2024-03-03 RX ADMIN — Medication 650 MILLIGRAM(S): at 18:08

## 2024-03-03 RX ADMIN — HEPARIN SODIUM 0 UNIT(S)/HR: 5000 INJECTION INTRAVENOUS; SUBCUTANEOUS at 01:58

## 2024-03-03 NOTE — EEG REPORT - NS EEG TEXT BOX
[facility]   COMPREHENSIVE EPILEPSY CENTER   REPORT OF ROUTINE VIDEO EEG     [facility]: [address] [place] [address] , [address], [place] [address], Ph#: [phone]  [facility]: [id number] [place] [address], Ph#: [phone]  Office: [address] [place] [address], [facility] [address], [place] [address] Ph#: [phone]    Patient Name: MATT MACDONALD  Age and : 63y (60)  MRN #: [id number]  Location: [address]  Referring Physician: Andrea Britton    Study Date: 24    _____________________________________________________________  TECHNICAL INFORMATION    Placement and Labeling of Electrodes:  The EEG was performed utilizing 20 channels referential EEG connections (coronal over temporal over parasagittal montage) using all standard 10-20 electrode placements with EKG.  Recording was at a sampling rate of 256 samples per second per channel.  Time synchronized digital video recording was done simultaneously with EEG recording.  A low light infrared camera was used for low light recording.  Tomasz and seizure detection algorithms were utilized.    _____________________________________________________________  HISTORY    Patient is a 63y old  Male who presents with a chief complaint of chest pain and SOB (03 Mar 2024 10:49)      PERTINENT MEDICATION:  MEDICATIONS  (STANDING):  albuterol/ipratropium for Nebulization 3 milliLiter(s) Nebulizer every 6 hours  cefTRIAXone   IVPB      cefTRIAXone   IVPB 1000 milliGRAM(s) IV Intermittent every 24 hours  chlorhexidine 0.12% Liquid 15 milliLiter(s) Oral Mucosa two times a day  chlorhexidine 2% Cloths 1 Application(s) Topical daily  dexMEDEtomidine Infusion 0.054 MICROgram(s)/kG/Hr (0.99 mL/Hr) IV Continuous <Continuous>  fentaNYL   Infusion. 0.539 MICROgram(s)/kG/Hr (3.94 mL/Hr) IV Continuous <Continuous>  folic acid 1 milliGRAM(s) Oral daily  heparin   Injectable 5000 Unit(s) SubCutaneous every 12 hours  influenza   Vaccine 0.5 milliLiter(s) IntraMuscular once  lactated ringers Bolus 1000 milliLiter(s) IV Bolus once  lactated ringers. 1000 milliLiter(s) (75 mL/Hr) IV Continuous <Continuous>  LORazepam   Injectable 1 milliGRAM(s) IV Push every 4 hours  LORazepam   Injectable   IV Push   methimazole 5 milliGRAM(s) Oral daily  metoprolol tartrate 75 milliGRAM(s) Oral two times a day  multivitamin 1 Tablet(s) Oral daily  nystatin    Suspension 826256 Unit(s) Oral four times a day  polyethylene glycol 3350 17 Gram(s) Oral daily  predniSONE   Tablet 40 milliGRAM(s) Oral daily  propofol Infusion 10.784 MICROgram(s)/kG/Min (4.73 mL/Hr) IV Continuous <Continuous>  senna 2 Tablet(s) Oral at bedtime  thiamine IVPB 500 milliGRAM(s) IV Intermittent every 8 hours    _____________________________________________________________  STUDY INTERPRETATION    Findings: The background was continuous, spontaneously variable and non-reactive. The posterior dominant rhythm was not seen.      Background Slowing:  -Continuous diffuse polymorphic delta slowing.    Focal Slowing:   None was present.    Sleep Background:  Stage II sleep transients were not recorded.  Drowsiness and stage II sleep transients were not recorded.    Other Non-Epileptiform Findings:  None were present.    Interictal Epileptiform Activity:   None were present.    Events:  Clinical events: None recorded.  Seizures: None recorded.    Artifacts:  Intermittent myogenic and movement artifacts were noted.    ECG:  The heart rate on single channel ECG was predominantly between 60-80 BPM.    _____________________________________________________________  EEG SUMMARY/CLASSIFICATION    Abnormal EEG in an encephalopathic patient.  -Continuous slowing, generalized, severe    _____________________________________________________________  EEG IMPRESSION/CLINICAL CORRELATE    Abnormal EEG study.  1. Severe nonspecific diffuse or multifocal cerebral dysfunction.   2. No epileptiform abnormalities were recorded.    Tony Franco MD  Neurology Attending Physician

## 2024-03-03 NOTE — PROGRESS NOTE ADULT - SUBJECTIVE AND OBJECTIVE BOX
Patient is a 63y old  Male who presents with a chief complaint of Alcohol use with withdrawal     (02 Mar 2024 11:31)    HPI:  63 years old male history of alcohol abuse, hypertension, COPD? ( heavy smoker. acc to pt had PFTs done and uses inhalers at home ), recently diagnosed with hyperthyroidism few weeks ago and started on methimazole 5 mg by PMD presents complaining of palpitations and exertional shortness of breath over the past few weeks.   At my encounter family not at bedside, acc to pt his only complaint is palpitaiosn, His HR was ranging 120-130 at home. No chest pain. He has cough and mild SOB which he attributes to smoking 1 pack daily. Otherwise he describes himself as very healthy. Pt is tremelous and restless. He admitted to drinking alcohol  (15-20 drinks of liquor daily). Last drink was earlier this evening.  Patient also reports he does want to stop drinking and try to cut down his alcohol use.    As per family, patient was seen by his cardiologist Dr. Johnson who increased that his metoprolol to 75 mg twice a day which did not improve his heart rate.  Patient had outpatient echocardiogram yesterday and noted to be tachycardic so he was recommended to come to ED for evaluation.  Otherwise denies fever, chills, recent illness, coughing, chest pain, abdominal pain, diarrhea or urinary symptoms.  Denies drug use.    Vitals: T 98.2, , /90, spo2 94% on RA   Labs remarkable for elevated ALT AST, Mg 1.5  Lactate 4 on VBGs   Alcohol level 202   CXR clear   EKG showed sinus tachycardia     s/p valium and ativan in ED  s/p 1 L NS, magnesium   HR improved from 130 to 90s  (2024 01:57)       INTERVAL HPI/OVERNIGHT EVENTS:   Patient with seizure? overnight, ordered EEG, consulted neuro, stat labs   Afebrile, hemodynamically stable     ICU Vital Signs Last 24 Hrs  T(C): 37.1 (02 Mar 2024 20:00), Max: 37.1 (02 Mar 2024 20:00)  T(F): 98.7 (02 Mar 2024 20:00), Max: 98.7 (02 Mar 2024 20:00)  HR: 58 (02 Mar 2024 22:00) (58 - 74)  BP: 104/57 (02 Mar 2024 22:00) (73/53 - 115/65)  BP(mean): 75 (02 Mar 2024 22:00) (58 - 84)  ABP: --  ABP(mean): --  RR: 16 (02 Mar 2024 22:00) (15 - 34)  SpO2: 99% (02 Mar 2024 22:00) (98% - 100%)    O2 Parameters below as of 02 Mar 2024 23:00  Patient On (Oxygen Delivery Method): ventilator    O2 Concentration (%): 80      I&O's Summary    01 Mar 2024 07:01  -  02 Mar 2024 07:00  --------------------------------------------------------  IN: 2550.2 mL / OUT: 490 mL / NET: 2060.2 mL    02 Mar 2024 07:01  -  03 Mar 2024 00:34  --------------------------------------------------------  IN: 3126 mL / OUT: 575 mL / NET: 2551 mL      Mode: AC/ CMV (Assist Control/ Continuous Mandatory Ventilation)  RR (machine): 16  TV (machine): 370  FiO2: 80  PEEP: 8  ITime: 1  MAP: 14  PIP: 35      Daily     Daily Weight in k (02 Mar 2024 06:00)    Adult Advanced Hemodynamics Last 24 Hrs  CVP(mm Hg): --  CVP(cm H2O): --  CO: --  CI: --  PA: --  PA(mean): --  PCWP: --  SVR: --  SVRI: --  PVR: --  PVRI: --    EKG/Telemetry Events:    MEDICATIONS  (STANDING):  albuterol/ipratropium for Nebulization 3 milliLiter(s) Nebulizer every 6 hours  cefTRIAXone   IVPB      cefTRIAXone   IVPB 1000 milliGRAM(s) IV Intermittent every 24 hours  chlorhexidine 0.12% Liquid 15 milliLiter(s) Oral Mucosa two times a day  chlorhexidine 2% Cloths 1 Application(s) Topical daily  dexMEDEtomidine Infusion 0.054 MICROgram(s)/kG/Hr (0.99 mL/Hr) IV Continuous <Continuous>  fentaNYL   Infusion. 0.539 MICROgram(s)/kG/Hr (3.94 mL/Hr) IV Continuous <Continuous>  folic acid 1 milliGRAM(s) Oral daily  heparin  Infusion. 1000 Unit(s)/Hr (10 mL/Hr) IV Continuous <Continuous>  influenza   Vaccine 0.5 milliLiter(s) IntraMuscular once  lactated ringers Bolus 1000 milliLiter(s) IV Bolus once  lactated ringers. 1000 milliLiter(s) (75 mL/Hr) IV Continuous <Continuous>  LORazepam   Injectable 1.5 milliGRAM(s) IV Push every 4 hours  LORazepam   Injectable 1 milliGRAM(s) IV Push every 4 hours  LORazepam   Injectable   IV Push   methimazole 5 milliGRAM(s) Oral daily  metoprolol tartrate 75 milliGRAM(s) Oral two times a day  multivitamin 1 Tablet(s) Oral daily  nystatin    Suspension 690358 Unit(s) Oral four times a day  predniSONE   Tablet 40 milliGRAM(s) Oral daily  propofol Infusion 10.784 MICROgram(s)/kG/Min (4.73 mL/Hr) IV Continuous <Continuous>  senna 2 Tablet(s) Oral at bedtime  thiamine IVPB 500 milliGRAM(s) IV Intermittent every 8 hours    MEDICATIONS  (PRN):  acetaminophen     Tablet .. 650 milliGRAM(s) Oral every 6 hours PRN Temp greater or equal to 38C (100.4F), Mild Pain (1 - 3)  haloperidol    Injectable 5 milliGRAM(s) IntraMuscular every 6 hours PRN Agitation  heparin   Injectable 4700 Unit(s) IV Push every 6 hours PRN For aPTT less than 40  LORazepam   Injectable 2 milliGRAM(s) IV Push every 2 hours PRN Symptom-triggered: each CIWA -Ar score 8 or GREATER      PHYSICAL EXAM:  GENERAL: no acute distress, sedated  HEAD:  Atraumatic, Normocephalic  NECK: Supple, No JVD, Normal thyroid, no enlarged nodes  NERVOUS SYSTEM:  sedated  CHEST/LUNG: mechanical breathing sounds  HEART: S1S2 normal, no S3, Regular rate and rhythm; No murmurs  ABDOMEN: Soft, Nontender, Nondistended  EXTREMITIES:  No clubbing, cyanosis, or edema  LYMPH: No lymphadenopathy noted  SKIN: No rashes or lesions    LABS:                        14.1   15.73 )-----------( 122      ( 02 Mar 2024 05:13 )             40.8     03-02    138  |  104  |  13  ----------------------------<  210<H>  4.3   |  21  |  0.8    Ca    7.5<L>      02 Mar 2024 05:13  Phos  2.6     03-  Mg     2.3     03-    TPro  4.9<L>  /  Alb  2.6<L>  /  TBili  2.1<H>  /  DBili  x   /  AST  62<H>  /  ALT  42<H>  /  AlkPhos  90  03-02    LIVER FUNCTIONS - ( 02 Mar 2024 05:13 )  Alb: 2.6 g/dL / Pro: 4.9 g/dL / ALK PHOS: 90 U/L / ALT: 42 U/L / AST: 62 U/L / GGT: x           PTT - ( 02 Mar 2024 18:10 )  PTT:100.9 sec  CAPILLARY BLOOD GLUCOSE        ABG - ( 02 Mar 2024 03:42 )  pH, Arterial: 7.42  pH, Blood: x     /  pCO2: 37    /  pO2: 98    / HCO3: 24    / Base Excess: -0.2  /  SaO2: 99.3                    Urinalysis Basic - ( 02 Mar 2024 05:13 )    Color: x / Appearance: x / SG: x / pH: x  Gluc: 210 mg/dL / Ketone: x  / Bili: x / Urobili: x   Blood: x / Protein: x / Nitrite: x   Leuk Esterase: x / RBC: x / WBC x   Sq Epi: x / Non Sq Epi: x / Bacteria: x          RADIOLOGY & ADDITIONAL TESTS:  CXR:        Care Discussed with Consultants/Other Providers [ x] YES  [ ] NO

## 2024-03-03 NOTE — PROGRESS NOTE ADULT - ASSESSMENT
IMPRESSION:  Acute respiratory failure  Possible aspiration right lower lobe  possible seizures   Severe alcohol withdrawal   Alcohol abuse  COPD exacerbation  Hyperthyroidism     PLAN:    CNS:  Thiamine IV [id number] then switch to PO, folate, CIWA, CATCH team, wean precedex, ativan taper.  Keep sedated  f/u EEG    HEENT: Oral care    PULMONARY:  HOB @ 45 degrees.   No change in vent   Nebs q4hrs and PRN,   O2 supplemental target Spo2 88-92%.     CARDIOVASCULAR: LR at 75 cc/hr. cw BB,     GI: GI prophylaxis.  Feeding as tolerated.  Bowel regimen     RENAL:  Follow up lytes.  Correct as needed. Trend lactate.    INFECTIOUS DISEASE: Follow up cultures. Give Ceftriaxone x 5 days.  check procal    HEMATOLOGICAL:  DVT prophylaxis. Dimer and LE duplex    ENDOCRINE:  Follow up FS.  Insulin protocol if needed. CW methimazole.    MUSCULOSKELETAL: Bed in chair position    MICU     The patient is critically ill.    I spoke with the patient's son over the phone this a.m.

## 2024-03-03 NOTE — CONSULT NOTE ADULT - ASSESSMENT
This is a 62yo M w/ Alcohol use/abuse and hyperthyroidism admitted for Sinus Tachycardia now intubated and upgraded to CCU for labored breathing and hypoxia on ABG. Neurology consulted for seizure like activity x2 s/p Lorazepam 2mg IVP while Pt was intubated, sedated w/ Fentanyl and Precedex in addition to standing Lorazepam for CIWA. On exam Pt is comatose, not following commands or moving extremity. During examination he had right sided trigger tremulous activity including head w/out electrographic correlation. No signs of negative myoclonus.     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

## 2024-03-03 NOTE — PROGRESS NOTE ADULT - ASSESSMENT
Impression  #Alcohol withdrawal   #Alcohol intoxication (alcohol level in blood >200)   #Seizure?  - K 3.5, Mg 1.5  - Lactate 4 on VBGs -> repeat 1.4 (after fluids)  - trop 11, pro-BNP 46   - EKG showed sinus tachycardia   - s/p valium and ativan in ED  - s/p 1 L NS, magnesium   - HR improved from 130 to 90s   - sxs may be due to alcohol intoxication  - monitor on tele for now   - TSH 0.01 low, fT4 1.8  - pt had TTE done as OP yesterday, please get records from Dr Johnson   - CATCH team eval, pt willing to stop   - thiamine and folate   - replete Mg and K as needed   - now on precedex drip, prop, fentanyl and lorazepam as needed  - possible seizure overnight, given ativan  - f/u neuro consult  - f/u EEG  - F/u CTH report    Pna  -CXR right LLO  -start rocephin,     # New onset Afib?  -on heparin drip  -f/u cardio, check chadsvasc    #Transaminitis   - , ALT 85,   - 2/2 to alcohol use     #HTN  - not on meds   -hold bb for now, HR curently in 60s  - monitor BP     #COPD?   - on RA   - wheezing on PE  - duonebs PRN   - prednisone 40 mg OD for 5 days     #DVT ppx: lovenox   #GI ppx: protonix   #Diet: feeds via OGT  #Activity: as tolerated

## 2024-03-03 NOTE — CONSULT NOTE ADULT - NS ATTEND AMEND GEN_ALL_CORE FT
Cardiologist: Dr. Johnson    64 yo M with h/o alcohol abuse, hypertension, COPD? ( heavy smoker. acc to pt had PFTs done and uses inhalers at home ), recently diagnosed with hyperthyroidism (2 weeks ago started on methimazole 5 mg by PMD) presenting with palpitations and dyspnea admitted for ETOH withdrawal.  In ED concern for narrow complex tach (differential SVT vs AF)     TSH: 0.01          Rec  - Monitor on tele. No recurrent arrhythmias seen on tele in CCU  - Hold off on OAC at this time as it is difficult to say this is definitively AF. CHADS VASc 1 (HTN)  - Okay to cont BB to mitigate tach from hyperthyroidism  - Check 2D echo  - Long term monitoring with loop implant prior to discharge if no recurrent arrhythmias seen in house.   - Maintain k>4 and mag >2    Will follow

## 2024-03-03 NOTE — CONSULT NOTE ADULT - ASSESSMENT
Cardiology: Alex    63 years old male history of alcohol abuse, hypertension, COPD? ( heavy smoker. acc to pt had PFTs done and uses inhalers at home ), recently diagnosed with hyperthyroidism few weeks ago and started on methimazole 5 mg by PMD presents complaining of palpitations and exertional shortness of breath over the past few weeks. Admitted for ETOH withdrawal.      TSH: 0.01          Impression:  ETOH withdrawal  Narrow complex tachycardia: differential AF vs salvos SVT  Transaminitis, improving  Hx HTN  Hx ?COPD    Plan:  - Difficult to discern rhythm, at that time pt in ED, only 1 lead available to review  - Would hold off on AC for now given CHADSVASC of 1  - Cont to monitor while admitted  - If no further episodes, recommend loop implant prior to discharge  - TTE  - Care per primary team  - Cont metoprolol for rate control   - Maintain k>4 and mag >2  - Re-call EPS if further events noted or prior to discharge if loop is required    Discussed with son at bedside Cardiology: Alex    63 years old male history of alcohol abuse, hypertension, COPD? ( heavy smoker. acc to pt had PFTs done and uses inhalers at home ), recently diagnosed with hyperthyroidism few weeks ago and started on methimazole 5 mg by PMD presents complaining of palpitations and exertional shortness of breath over the past few weeks. Admitted for ETOH withdrawal.      TSH: 0.01          Impression:  ETOH withdrawal  Narrow complex tachycardia: differential AF vs salvos SVT  Transaminitis, improving  Hx HTN  Hx ?COPD    Plan:  - Difficult to discern rhythm, at that time pt in ED, only 1 lead available to review  - Would hold off on AC for now given CHADSVASC of 1  - Cont to monitor while admitted  - If no further episodes, recommend loop implant prior to discharge  - TTE  - Care per primary team  - Cont metoprolol for rate control   - Maintain k>4 and mag >2       Discussed with son at bedside

## 2024-03-03 NOTE — PROGRESS NOTE ADULT - SUBJECTIVE AND OBJECTIVE BOX
Patient is a 63y old  Male who presents with a chief complaint of chest pain and SOB (03 Mar 2024 02:53)        HPI:  63 years old male history of alcohol abuse, hypertension, COPD? ( heavy smoker. acc to pt had PFTs done and uses inhalers at home ), recently diagnosed with hyperthyroidism few weeks ago and started on methimazole 5 mg by PMD presents complaining of palpitations and exertional shortness of breath over the past few weeks.   At my encounter family not at bedside, acc to pt his only complaint is palpitaiosn, His HR was ranging 120-130 at home. No chest pain. He has cough and mild SOB which he attributes to smoking 1 pack daily. Otherwise he describes himself as very healthy. Pt is tremelous and restless. He admitted to drinking alcohol  (15-20 drinks of liquor daily). Last drink was earlier this evening.  Patient also reports he does want to stop drinking and try to cut down his alcohol use.    As per family, patient was seen by his cardiologist Dr. Johnson who increased that his metoprolol to 75 mg twice a day which did not improve his heart rate.  Patient had outpatient echocardiogram yesterday and noted to be tachycardic so he was recommended to come to ED for evaluation.  Otherwise denies fever, chills, recent illness, coughing, chest pain, abdominal pain, diarrhea or urinary symptoms.  Denies drug use.    Vitals: T 98.2, , /90, spo2 94% on RA   Labs remarkable for elevated ALT AST, Mg 1.5  Lactate 4 on VBGs   Alcohol level 202   CXR clear   EKG showed sinus tachycardia     s/p valium and ativan in ED  s/p 1 L NS, magnesium   HR improved from 130 to 90s  (29 Feb 2024 01:57)      Pt evaluated on rounds.  I reviewed the radiology tests and hospital record.    I reviewed previous notes on this patient.    Interval Events: No overnight events.      CAM:    SAT:    SBT:      REVIEW OF SYSTEMS:   see HPI      OBJECTIVE:  ICU Vital Signs Last 24 Hrs  T(C): 38.1 (03 Mar 2024 06:00), Max: 38.1 (03 Mar 2024 06:00)  T(F): 100.6 (03 Mar 2024 06:00), Max: 100.6 (03 Mar 2024 06:00)  HR: 61 (03 Mar 2024 08:37) (57 - 74)  BP: 124/71 (03 Mar 2024 08:00) (96/54 - 124/71)  BP(mean): 91 (03 Mar 2024 08:00) (70 - 91)  ABP: --  ABP(mean): --  RR: 15 (03 Mar 2024 08:00) (15 - 54)  SpO2: 99% (03 Mar 2024 08:37) (96% - 100%)    O2 Parameters below as of 03 Mar 2024 08:00  Patient On (Oxygen Delivery Method): ventilator    O2 Concentration (%): 80      Mode: AC/ CMV (Assist Control/ Continuous Mandatory Ventilation), RR (machine): 16, TV (machine): 400, FiO2: 80, PEEP: 8, ITime: 1, MAP: 13, PIP: 33    03-02 @ 07:01  -  03-03 @ 07:00  --------------------------------------------------------  IN: 4035.2 mL / OUT: 875 mL / NET: 3160.2 mL    03-03 @ 07:01 - 03-03 @ 10:02  --------------------------------------------------------  IN: 113.9 mL / OUT: 0 mL / NET: 113.9 mL      CAPILLARY BLOOD GLUCOSE      POCT Blood Glucose.: 146 mg/dL (01 Mar 2024 17:34)        PHYSICAL EXAM:       · ENMT:   Airway patent,   Nasal mucosa clear.  Mouth with normal mucosa.   No thrush    · EYES:   Clear bilaterally,   pupils equal,   round and reactive to light.    · CARDIAC:   Normal rate,   regular rhythm.    Heart sounds S1, S2.   No murmurs, no rubs or gallops on auscultation  no edema        CAROTID:   normal systolic impulse  no bruits    · RESPIRATORY:   rales  normal chest expansion  no retractions or use of accessory muscles  percussion of chest demonstrates no hyperresonance or dullness    · GASTROINTESTINAL:  Abdomen soft,   non-tender,   + BS  liver/spleen not palpable    · MUSCULOSKELETAL:   no clubbing, cyanosis      · SKIN:   Skin normal color for race,   warm, dry   No evidence of rash.        · HEME LYMPH:   no splenomegaly.  No cervical  lymphadenopathy.  no inguinal lymphadenopathy    HOSPITAL MEDICATIONS:  MEDICATIONS  (STANDING):  albuterol/ipratropium for Nebulization 3 milliLiter(s) Nebulizer every 6 hours  cefTRIAXone   IVPB      cefTRIAXone   IVPB 1000 milliGRAM(s) IV Intermittent every 24 hours  chlorhexidine 0.12% Liquid 15 milliLiter(s) Oral Mucosa two times a day  chlorhexidine 2% Cloths 1 Application(s) Topical daily  dexMEDEtomidine Infusion 0.054 MICROgram(s)/kG/Hr (0.99 mL/Hr) IV Continuous <Continuous>  fentaNYL   Infusion. 0.539 MICROgram(s)/kG/Hr (3.94 mL/Hr) IV Continuous <Continuous>  folic acid 1 milliGRAM(s) Oral daily  heparin  Infusion. 1000 Unit(s)/Hr (10 mL/Hr) IV Continuous <Continuous>  influenza   Vaccine 0.5 milliLiter(s) IntraMuscular once  lactated ringers Bolus 1000 milliLiter(s) IV Bolus once  lactated ringers. 1000 milliLiter(s) (75 mL/Hr) IV Continuous <Continuous>  LORazepam   Injectable 1 milliGRAM(s) IV Push every 4 hours  LORazepam   Injectable   IV Push   methimazole 5 milliGRAM(s) Oral daily  metoprolol tartrate 75 milliGRAM(s) Oral two times a day  multivitamin 1 Tablet(s) Oral daily  nystatin    Suspension 497262 Unit(s) Oral four times a day  polyethylene glycol 3350 17 Gram(s) Oral daily  predniSONE   Tablet 40 milliGRAM(s) Oral daily  propofol Infusion 10.784 MICROgram(s)/kG/Min (4.73 mL/Hr) IV Continuous <Continuous>  senna 2 Tablet(s) Oral at bedtime  thiamine IVPB 500 milliGRAM(s) IV Intermittent every 8 hours    MEDICATIONS  (PRN):  acetaminophen     Tablet .. 650 milliGRAM(s) Oral every 6 hours PRN Temp greater or equal to 38C (100.4F), Mild Pain (1 - 3)  haloperidol    Injectable 5 milliGRAM(s) IntraMuscular every 6 hours PRN Agitation  heparin   Injectable 4700 Unit(s) IV Push every 6 hours PRN For aPTT less than 40  LORazepam   Injectable 2 milliGRAM(s) IV Push every 2 hours PRN Symptom-triggered: each CIWA -Ar score 8 or GREATER    lactated ringers Bolus:   1000 milliLiter(s), IV Bolus, once, infuse over 60 Minute(s), Stop After 1 Doses  lactated ringers Bolus:   1000 milliLiter(s), IV Bolus, once, infuse over 60 Minute(s), Stop After 1 Doses  lactated ringers Bolus:   500 milliLiter(s), IV Bolus, once, infuse over 30 Minute(s), Stop After 1 Doses  lactated ringers.: Solution, 1000 milliLiter(s) infuse at 75 mL/Hr  lactated ringers.: Solution, 1000 milliLiter(s) infuse at 150 mL/Hr  Provider's Contact #: [phone]  sodium chloride 0.9% Bolus:   1000 milliLiter(s), IV Bolus, once, infuse over 60 Minute(s), Stop After 1 Doses  Provider's Contact #: [phone]      LABS:                        13.8   15.13 )-----------( 120      ( 03 Mar 2024 04:41 )             40.7     03-03    140  |  108  |  19  ----------------------------<  97  4.3   |  24  |  0.7    Ca    8.1<L>      03 Mar 2024 04:41  Phos  2.6     03-01  Mg     2.3     03-03    TPro  5.3<L>  /  Alb  2.7<L>  /  TBili  1.8<H>  /  DBili  x   /  AST  57<H>  /  ALT  40  /  AlkPhos  97  03-03    PTT - ( 03 Mar 2024 00:45 )  PTT:>200.0 sec  Urinalysis Basic - ( 03 Mar 2024 04:41 )    Color: x / Appearance: x / SG: x / pH: x  Gluc: 97 mg/dL / Ketone: x  / Bili: x / Urobili: x   Blood: x / Protein: x / Nitrite: x   Leuk Esterase: x / RBC: x / WBC x   Sq Epi: x / Non Sq Epi: x / Bacteria: x      Arterial Blood Gas:  03-03 @ 03:55  7.35/45/88/25/98.5/-1.1  ABG lactate: --  Arterial Blood Gas:  03-02 @ 03:42  7.42/37/98/24/99.3/-0.2  ABG lactate: --  Arterial Blood Gas:  03-01 @ 15:38  7.48/29/57/22/91.5/-0.6  ABG lactate: --      Mode: AC/ CMV (Assist Control/ Continuous Mandatory Ventilation), RR (machine): 16, TV (machine): 400, FiO2: 80, PEEP: 8, ITime: 1, MAP: 13, PIP: 33        Mode: AC/ CMV (Assist Control/ Continuous Mandatory Ventilation)  RR (machine): 16  TV (machine): 400  FiO2: 80  PEEP: 8  ITime: 1  MAP: 13  PIP: 33      ABG - ( 03 Mar 2024 03:55 )  pH, Arterial: 7.35  pH, Blood: x     /  pCO2: 45    /  pO2: 88    / HCO3: 25    / Base Excess: -1.1  /  SaO2: 98.5                RADIOLOGY: Today I personally interpreted the latest CXR and other pertinent films.

## 2024-03-04 LAB
ALBUMIN SERPL ELPH-MCNC: 2.7 G/DL — LOW (ref 3.5–5.2)
ALP SERPL-CCNC: 125 U/L — HIGH (ref 30–115)
ALT FLD-CCNC: 47 U/L — HIGH (ref 0–41)
ANION GAP SERPL CALC-SCNC: 11 MMOL/L — SIGNIFICANT CHANGE UP (ref 7–14)
APPEARANCE UR: ABNORMAL
AST SERPL-CCNC: 77 U/L — HIGH (ref 0–41)
BASOPHILS # BLD AUTO: 0.03 K/UL — SIGNIFICANT CHANGE UP (ref 0–0.2)
BASOPHILS NFR BLD AUTO: 0.2 % — SIGNIFICANT CHANGE UP (ref 0–1)
BILIRUB SERPL-MCNC: 1.1 MG/DL — SIGNIFICANT CHANGE UP (ref 0.2–1.2)
BILIRUB UR-MCNC: ABNORMAL
BUN SERPL-MCNC: 18 MG/DL — SIGNIFICANT CHANGE UP (ref 10–20)
CALCIUM SERPL-MCNC: 8.5 MG/DL — SIGNIFICANT CHANGE UP (ref 8.4–10.4)
CHLORIDE SERPL-SCNC: 106 MMOL/L — SIGNIFICANT CHANGE UP (ref 98–110)
CO2 SERPL-SCNC: 23 MMOL/L — SIGNIFICANT CHANGE UP (ref 17–32)
COLOR SPEC: SIGNIFICANT CHANGE UP
CREAT SERPL-MCNC: 0.6 MG/DL — LOW (ref 0.7–1.5)
DIFF PNL FLD: ABNORMAL
EGFR: 108 ML/MIN/1.73M2 — SIGNIFICANT CHANGE UP
EOSINOPHIL # BLD AUTO: 0.1 K/UL — SIGNIFICANT CHANGE UP (ref 0–0.7)
EOSINOPHIL NFR BLD AUTO: 0.7 % — SIGNIFICANT CHANGE UP (ref 0–8)
GLUCOSE SERPL-MCNC: 167 MG/DL — HIGH (ref 70–99)
GLUCOSE UR QL: NEGATIVE MG/DL — SIGNIFICANT CHANGE UP
GRAM STN FLD: ABNORMAL
HCT VFR BLD CALC: 41.5 % — LOW (ref 42–52)
HGB BLD-MCNC: 14.1 G/DL — SIGNIFICANT CHANGE UP (ref 14–18)
IMM GRANULOCYTES NFR BLD AUTO: 0.7 % — HIGH (ref 0.1–0.3)
KETONES UR-MCNC: NEGATIVE MG/DL — SIGNIFICANT CHANGE UP
LEUKOCYTE ESTERASE UR-ACNC: NEGATIVE — SIGNIFICANT CHANGE UP
LYMPHOCYTES # BLD AUTO: 1.64 K/UL — SIGNIFICANT CHANGE UP (ref 1.2–3.4)
LYMPHOCYTES # BLD AUTO: 11.6 % — LOW (ref 20.5–51.1)
MAGNESIUM SERPL-MCNC: 2 MG/DL — SIGNIFICANT CHANGE UP (ref 1.8–2.4)
MCHC RBC-ENTMCNC: 34 G/DL — SIGNIFICANT CHANGE UP (ref 32–37)
MCHC RBC-ENTMCNC: 35.8 PG — HIGH (ref 27–31)
MCV RBC AUTO: 105.3 FL — HIGH (ref 80–94)
MONOCYTES # BLD AUTO: 1.19 K/UL — HIGH (ref 0.1–0.6)
MONOCYTES NFR BLD AUTO: 8.4 % — SIGNIFICANT CHANGE UP (ref 1.7–9.3)
NEUTROPHILS # BLD AUTO: 11.1 K/UL — HIGH (ref 1.4–6.5)
NEUTROPHILS NFR BLD AUTO: 78.4 % — HIGH (ref 42.2–75.2)
NITRITE UR-MCNC: NEGATIVE — SIGNIFICANT CHANGE UP
NRBC # BLD: 0 /100 WBCS — SIGNIFICANT CHANGE UP (ref 0–0)
PH UR: 6 — SIGNIFICANT CHANGE UP (ref 5–8)
PLATELET # BLD AUTO: 129 K/UL — LOW (ref 130–400)
PMV BLD: 11.9 FL — HIGH (ref 7.4–10.4)
POTASSIUM SERPL-MCNC: 4.5 MMOL/L — SIGNIFICANT CHANGE UP (ref 3.5–5)
POTASSIUM SERPL-SCNC: 4.5 MMOL/L — SIGNIFICANT CHANGE UP (ref 3.5–5)
PROCALCITONIN SERPL-MCNC: 0.18 NG/ML — HIGH (ref 0.02–0.1)
PROT SERPL-MCNC: 5.3 G/DL — LOW (ref 6–8)
PROT UR-MCNC: SIGNIFICANT CHANGE UP MG/DL
RBC # BLD: 3.94 M/UL — LOW (ref 4.7–6.1)
RBC # FLD: 11.8 % — SIGNIFICANT CHANGE UP (ref 11.5–14.5)
SODIUM SERPL-SCNC: 140 MMOL/L — SIGNIFICANT CHANGE UP (ref 135–146)
SP GR SPEC: >1.03 — HIGH (ref 1–1.03)
SPECIMEN SOURCE: SIGNIFICANT CHANGE UP
UROBILINOGEN FLD QL: 2 MG/DL (ref 0.2–1)
WBC # BLD: 14.16 K/UL — HIGH (ref 4.8–10.8)
WBC # FLD AUTO: 14.16 K/UL — HIGH (ref 4.8–10.8)

## 2024-03-04 PROCEDURE — 99291 CRITICAL CARE FIRST HOUR: CPT | Mod: GC

## 2024-03-04 PROCEDURE — 71045 X-RAY EXAM CHEST 1 VIEW: CPT | Mod: 26

## 2024-03-04 RX ORDER — PANTOPRAZOLE SODIUM 20 MG/1
40 TABLET, DELAYED RELEASE ORAL
Refills: 0 | Status: DISCONTINUED | OUTPATIENT
Start: 2024-03-04 | End: 2024-03-05

## 2024-03-04 RX ORDER — ASCORBIC ACID 60 MG
500 TABLET,CHEWABLE ORAL DAILY
Refills: 0 | Status: DISCONTINUED | OUTPATIENT
Start: 2024-03-04 | End: 2024-03-14

## 2024-03-04 RX ORDER — METOPROLOL TARTRATE 50 MG
50 TABLET ORAL
Refills: 0 | Status: DISCONTINUED | OUTPATIENT
Start: 2024-03-04 | End: 2024-03-05

## 2024-03-04 RX ORDER — LACTULOSE 10 G/15ML
10 SOLUTION ORAL
Refills: 0 | Status: DISCONTINUED | OUTPATIENT
Start: 2024-03-04 | End: 2024-03-05

## 2024-03-04 RX ORDER — QUETIAPINE FUMARATE 200 MG/1
25 TABLET, FILM COATED ORAL AT BEDTIME
Refills: 0 | Status: DISCONTINUED | OUTPATIENT
Start: 2024-03-04 | End: 2024-03-08

## 2024-03-04 RX ADMIN — Medication 500 MILLIGRAM(S): at 13:02

## 2024-03-04 RX ADMIN — Medication 500000 UNIT(S): at 18:14

## 2024-03-04 RX ADMIN — CHLORHEXIDINE GLUCONATE 15 MILLILITER(S): 213 SOLUTION TOPICAL at 18:13

## 2024-03-04 RX ADMIN — SENNA PLUS 2 TABLET(S): 8.6 TABLET ORAL at 21:39

## 2024-03-04 RX ADMIN — Medication 105 MILLIGRAM(S): at 05:18

## 2024-03-04 RX ADMIN — POLYETHYLENE GLYCOL 3350 17 GRAM(S): 17 POWDER, FOR SOLUTION ORAL at 13:01

## 2024-03-04 RX ADMIN — LACTULOSE 10 GRAM(S): 10 SOLUTION ORAL at 18:13

## 2024-03-04 RX ADMIN — Medication 1 TABLET(S): at 13:02

## 2024-03-04 RX ADMIN — Medication 40 MILLIGRAM(S): at 05:16

## 2024-03-04 RX ADMIN — CHLORHEXIDINE GLUCONATE 1 APPLICATION(S): 213 SOLUTION TOPICAL at 13:02

## 2024-03-04 RX ADMIN — Medication 650 MILLIGRAM(S): at 00:00

## 2024-03-04 RX ADMIN — QUETIAPINE FUMARATE 25 MILLIGRAM(S): 200 TABLET, FILM COATED ORAL at 21:39

## 2024-03-04 RX ADMIN — Medication 1 MILLIGRAM(S): at 13:01

## 2024-03-04 RX ADMIN — Medication 0.5 MILLIGRAM(S): at 10:37

## 2024-03-04 RX ADMIN — CEFTRIAXONE 100 MILLIGRAM(S): 500 INJECTION, POWDER, FOR SOLUTION INTRAMUSCULAR; INTRAVENOUS at 13:01

## 2024-03-04 RX ADMIN — Medication 0.5 MILLIGRAM(S): at 18:13

## 2024-03-04 RX ADMIN — Medication 75 MILLIGRAM(S): at 05:17

## 2024-03-04 RX ADMIN — Medication 2 MILLIGRAM(S): at 20:42

## 2024-03-04 RX ADMIN — Medication 1 MILLIGRAM(S): at 02:07

## 2024-03-04 RX ADMIN — HEPARIN SODIUM 5000 UNIT(S): 5000 INJECTION INTRAVENOUS; SUBCUTANEOUS at 18:14

## 2024-03-04 RX ADMIN — HEPARIN SODIUM 5000 UNIT(S): 5000 INJECTION INTRAVENOUS; SUBCUTANEOUS at 05:16

## 2024-03-04 RX ADMIN — Medication 0.5 MILLIGRAM(S): at 14:30

## 2024-03-04 RX ADMIN — Medication 500000 UNIT(S): at 05:17

## 2024-03-04 RX ADMIN — Medication 500000 UNIT(S): at 13:01

## 2024-03-04 RX ADMIN — CHLORHEXIDINE GLUCONATE 15 MILLILITER(S): 213 SOLUTION TOPICAL at 05:17

## 2024-03-04 RX ADMIN — SODIUM CHLORIDE 75 MILLILITER(S): 9 INJECTION, SOLUTION INTRAVENOUS at 00:16

## 2024-03-04 RX ADMIN — Medication 1 MILLIGRAM(S): at 05:15

## 2024-03-04 RX ADMIN — Medication 0.5 MILLIGRAM(S): at 21:38

## 2024-03-04 NOTE — PROGRESS NOTE ADULT - SUBJECTIVE AND OBJECTIVE BOX
SUBJECTIVE / OVERNIGHT EVENTS  Patient was seen and examined. Patient intubated, has brambila. On fentanyl and precedex sedation. Day 4 of intubation    MEDICATIONS  albuterol/ipratropium for Nebulization 3 milliLiter(s) Nebulizer every 6 hours  ascorbic acid 500 milliGRAM(s) Oral daily  cefTRIAXone   IVPB 1000 milliGRAM(s) IV Intermittent every 24 hours  cefTRIAXone   IVPB      chlorhexidine 0.12% Liquid 15 milliLiter(s) Oral Mucosa two times a day  chlorhexidine 2% Cloths 1 Application(s) Topical daily  dexMEDEtomidine Infusion 0.054 MICROgram(s)/kG/Hr IV Continuous <Continuous>  fentaNYL   Infusion. 0.539 MICROgram(s)/kG/Hr IV Continuous <Continuous>  folic acid 1 milliGRAM(s) Oral daily  heparin   Injectable 5000 Unit(s) SubCutaneous every 12 hours  influenza   Vaccine 0.5 milliLiter(s) IntraMuscular once  LORazepam   Injectable   IV Push   LORazepam   Injectable 0.5 milliGRAM(s) IV Push every 4 hours  methimazole 5 milliGRAM(s) Oral daily  metoprolol tartrate 75 milliGRAM(s) Oral two times a day  multivitamin 1 Tablet(s) Oral daily  nystatin    Suspension 569057 Unit(s) Oral four times a day  pantoprazole    Tablet 40 milliGRAM(s) Oral before breakfast  polyethylene glycol 3350 17 Gram(s) Oral daily  senna 2 Tablet(s) Oral at bedtime    haloperidol    Injectable 5 milliGRAM(s) IntraMuscular every 6 hours PRN Agitation  LORazepam   Injectable 2 milliGRAM(s) IV Push every 2 hours PRN Symptom-triggered: each CIWA -Ar score 8 or GREATER    VITALS /  EXAM    T(C): 37.7 (03-04-24 @ 06:00), Max: 38.4 (03-03-24 @ 18:00)  HR: 125 (03-04-24 @ 10:40) (52 - 125)  BP: 122/69 (03-04-24 @ 07:00) (122/69 - 143/77)  RR: 27 (03-04-24 @ 07:00) (14 - 27)  SpO2: 95% (03-04-24 @ 10:40) (94% - 100%)    GENERAL: no acute distress, sedated  HEAD:  Atraumatic, Normocephalic  NECK: Supple, No JVD, Normal thyroid, no enlarged nodes  NERVOUS SYSTEM:  sedated  CHEST/LUNG: mechanical breathing sounds  HEART: S1S2 normal, no S3, Regular rate and rhythm; No murmurs  ABDOMEN: Soft, Nontender, Nondistended  EXTREMITIES:  No clubbing, cyanosis, or edema  LYMPH: No lymphadenopathy noted  SKIN: No rashes or lesions    I's & O's     03-03-24 @ 07:01  -  03-04-24 @ 07:00  --------------------------------------------------------  IN:    Dexmedetomidine: 439.2 mL    Enteral Tube Flush: 240 mL    FentaNYL: 350.4 mL    Heparin Infusion: 42 mL    IV PiggyBack: 150 mL    Jevity 1.2: 1040 mL    Lactated Ringers: 1800 mL  Total IN: 4061.6 mL    OUT:    Indwelling Catheter - Urethral (mL): 1065 mL    Propofol: 0 mL  Total OUT: 1065 mL    Total NET: 2996.6 mL      03-04-24 @ 07:01  -  03-04-24 @ 12:20  --------------------------------------------------------  IN:    Dexmedetomidine: 18.3 mL    FentaNYL: 14.6 mL    Lactated Ringers: 75 mL  Total IN: 107.9 mL    OUT:    Indwelling Catheter - Urethral (mL): 50 mL    Jevity 1.2: 0 mL  Total OUT: 50 mL    Total NET: 57.9 mL        LABS             14.1   14.16 )-----------( 129      ( 03-04-24 @ 04:55 )             41.5     140  |  106  |  18  -------------------------<  167   03-04-24 @ 04:55  4.5  |  23  |  0.6    Ca      8.5     03-04-24 @ 04:55  Mg     2.0     03-04-24 @ 04:55    TPro  5.3  /  Alb  2.7  /  TBili  1.1  /  DBili  x   /  AST  77  /  ALT  47  /  AlkPhos  125  /  GGT  x     03-04-24 @ 04:55    PTT - ( 03-03-24 @ 00:45 )  PTT:>200.0 sec                  Urinalysis Basic - ( 04 Mar 2024 04:55 )    Color: x / Appearance: x / SG: x / pH: x  Gluc: 167 mg/dL / Ketone: x  / Bili: x / Urobili: x   Blood: x / Protein: x / Nitrite: x   Leuk Esterase: x / RBC: x / WBC x   Sq Epi: x / Non Sq Epi: x / Bacteria: x      MICRO / IMAGING / CARDIOLOGY  Telemetry: Reviewed   EKG: Reviewed    CULTURES    Culture - Sputum (collected 03-03-24 @ 15:05)  Source: .Sputum Sputum  Gram Stain:    Numerous polymorphonuclear leukocytes per low power field    Rare Squamous epithelial cells per low power field    Rare Gram positive cocci in pairs per oil power field    Culture - Blood (collected 03-02-24 @ 12:14)  Source: .Blood None  Preliminary Report:    No growth at 24 hours      IMAGING  PACS Image:  (03-03-24 @ 06:20)  PACS Image:  (03-03-24 @ 00:43)    CARDIOLOGY

## 2024-03-04 NOTE — PROGRESS NOTE ADULT - ASSESSMENT
IMPRESSION:  Acute respiratory failure  Possible aspiration right lower lobe  possible seizures   Severe alcohol withdrawal   Alcohol abuse  COPD exacerbation  Hyperthyroidism     PLAN:    CNS:  Thiamine PO, folate, CIWA-driven , CATCH team, wean precedex, ativan taper.  EEG w/o s/s of seizure  Sedation: Fentanyl & Precedex, haldol PRN agitation  SAT daily    HEENT: Oral care.  ETT care.  ETT day #4    PULMONARY:  HOB @ 45 degrees.   No change in vent   Nebs q4hrs and PRN,   O2 supplemental target Spo2 88-92%.   SBT daily.    CARDIOVASCULAR: patient very net (+), d/c IVF. cw BB,   Appreciate EP recommendations    GI: GI prophylaxis.  Feeding as tolerated.  Bowel regimen to increase today.     RENAL:  Follow up lytes.  Correct as needed.    INFECTIOUS DISEASE: Follow up cultures. Give Ceftriaxone x 5 days.  check procal  Repeat cultures PRN fever    HEMATOLOGICAL:  DVT prophylaxis w/ heparin SQ.     ENDOCRINE:  Follow up FS.  Insulin protocol if needed. CW methimazole.    MUSCULOSKELETAL: Bed in chair position, PT/OT    DISPO: MICU   LINES: PIV  CODE: FULL

## 2024-03-04 NOTE — PROGRESS NOTE ADULT - ASSESSMENT
63 year old man with a pmh of COPD, alcohol use disorder and hyperthyroidism presented for palpitation. Upgraded for hypoxemic respiratory failure    #Possible Alcohol withdrawal   #Alcohol intoxication (alcohol level in blood >200)   - K 3.5, Mg 1.5  - Lactate 4 on VBGs -> repeat 1.4 (after fluids)  - trop 11, pro-BNP 46   - EKG showed sinus tachycardia   - s/p valium and ativan in ED  - s/p 1 L NS, magnesium  - On 70 cc/hour LR  - HR improved from 130 to 90s   - sxs may be due to alcohol intoxication  - monitor on tele for now   - TSH 0.01 low, fT4 1.8  - CATCH team eval, pt willing to stop   - On thiamine and folate   - now on Precedex drip,fentanyl and lorazepam as needed  -  CT head : No evidence of acute transcortical infarct, hydrocephalus, acute   intracranial hemorrhage, or mass effect.  -EEG: No epileptiform abnormalities were recorded.  -- replete Mg and K as needed   - Stop fluids as patient has net positive balance. Continue only with feeds  - AM folate and Vitamin B12  - SAT today    # Right lower lobe consolidation- possible pneumonia  -CXR right LLO  - On Rocephin 1 g q 24  - c.w abx and monitor daily cbcs    # New onset Afib?  - CHADSVASC : 1 . Hold AC for now as Rythem might be SVT  - EP following    #Transaminitis   - , ALT 85,   - Secondary to alcoholoe use    #HTN  - not on meds   -hold bb for now  - monitor BP     #COPD?   - on RA   - wheezing on PE  - duonebs PRN   - prednisone 40 mg OD for 5 days     #DVT ppx: lovenox   #GI ppx: protonix   #Diet: feeds via OGT  #Activity: as tolerated   #Full code   63 year old man with a pmh of COPD, alcohol use disorder and hyperthyroidism presented for palpitation. Upgraded for hypoxemic respiratory failure    #Possible Alcohol withdrawal   #Alcohol intoxication (alcohol level in blood >200)   - K 3.5, Mg 1.5  - Lactate 4 on VBGs -> repeat 1.4 (after fluids)  - trop 11, pro-BNP 46   - EKG showed sinus tachycardia   - s/p valium and ativan in ED  - s/p 1 L NS, magnesium  - On 70 cc/hour LR  - HR improved from 130 to 90s   - sxs may be due to alcohol intoxication  - monitor on tele for now   - TSH 0.01 low, fT4 1.8  - CATCH team eval, pt willing to stop   - On thiamine and folate   - now on Precedex drip,fentanyl and lorazepam as needed  -  CT head : No evidence of acute transcortical infarct, hydrocephalus, acute   intracranial hemorrhage, or mass effect.  -EEG: No epileptiform abnormalities were recorded.  -- replete Mg and K as needed   - Stop fluids as patient has net positive balance. Continue only with feeds  - AM folate and Vitamin B12  - SAT today    # Right lower lobe consolidation- possible pneumonia  -CXR right LLO  - On Rocephin 1 g q 24  - c.w abx and monitor daily cbcs  - Patient had fever yesterday, blood cultures taken  - RVPs taken  - IF patient becomes febrile again, Take trach cultures and broaden abx    # New onset Afib?  - CHADSVASC : 1 . Hold AC for now as Rythem might be SVT  - EP following    #Transaminitis   - , ALT 85,   - Secondary to alcoholoe use    #HTN  - not on meds   -hold bb for now  - monitor BP     #COPD?   - on RA   - wheezing on PE  - duonebs PRN   - prednisone 40 mg OD for 5 days     #DVT ppx: lovenox   #GI ppx: protonix   #Diet: feeds via OGT  #Activity: as tolerated   #Full code   63 year old man with a pmh of COPD, alcohol use disorder and hyperthyroidism presented for palpitation. Upgraded for hypoxemic respiratory failure    #Possible Alcohol withdrawal   #Alcohol intoxication (alcohol level in blood >200)   - K 3.5, Mg 1.5  - Lactate 4 on VBGs -> repeat 1.4 (after fluids)  - trop 11, pro-BNP 46   - EKG showed sinus tachycardia   - s/p valium and ativan in ED  - s/p 1 L NS, magnesium  - On 70 cc/hour LR  - HR improved from 130 to 90s   - sxs may be due to alcohol intoxication  - monitor on tele for now   - TSH 0.01 low, fT4 1.8  - CATCH team eval, pt willing to stop   - On thiamine and folate   - now on Precedex drip,fentanyl and lorazepam as needed  -  CT head : No evidence of acute transcortical infarct, hydrocephalus, acute   intracranial hemorrhage, or mass effect.  -EEG: No epileptiform abnormalities were recorded.  -- replete Mg and K as needed   - Stop fluids as patient has net positive balance. Continue only with feeds  - AM folate and Vitamin B12  - SAT today    # Right lower lobe consolidation- possible pneumonia  -CXR right LLO  - On Rocephin 1 g q 24  - c.w abx and monitor daily cbcs  - Patient had fever yesterday, blood cultures taken  - RVPs taken  - IF patient becomes febrile again, Take trach cultures and broaden abx    # New onset Afib?  - CHADSVASC : 1 . Hold AC for now as Rythem might be SVT  - EP following    #Transaminitis   - , ALT 85,   - Secondary to alcoholoe use    #HTN  - not on meds   -hold bb for now  - monitor BP       #DVT ppx: lovenox   #GI ppx: protonix   #Diet: feeds via OGT  #Activity: as tolerated   #Full code

## 2024-03-04 NOTE — PROGRESS NOTE ADULT - SUBJECTIVE AND OBJECTIVE BOX
Patient is a 63y old  Male who presents with a chief complaint of chest pain and SOB (03 Mar 2024 10:49)        Over Night Events:    febrile yesterday  Net positive 3L yesterday    ROS:       Unable to assess ROS: patient intubated.        PHYSICAL EXAM    ICU Vital Signs Last 24 Hrs  T(C): 37.7 (04 Mar 2024 06:00), Max: 38.4 (03 Mar 2024 12:00)  T(F): 99.8 (04 Mar 2024 06:00), Max: 101.1 (03 Mar 2024 12:00)  HR: 52 (04 Mar 2024 07:00) (52 - 62)  BP: 122/69 (04 Mar 2024 07:00) (122/69 - 143/77)  BP(mean): 87 (04 Mar 2024 07:00) (87 - 104)  ABP: --  ABP(mean): --  RR: 27 (04 Mar 2024 07:00) (11 - 27)  SpO2: 100% (04 Mar 2024 07:00) (96% - 100%)    O2 Parameters below as of 04 Mar 2024 08:00  Patient On (Oxygen Delivery Method): ventilator    O2 Concentration (%): 60    Constitutional: no acute distress, well nourished well developed  Neuro: moving all 4 limbs spontaneously.  Sedated.  HEENT: NCAT, anicteric, ETT in place  Neck: no visible lymphadenopathy or goiter  Pulm: synchronous with ventilator, coarse bilateral breath sounds anteriorly  Cardiac: extremities appear pink and well-perfused.  regular rhythm and rate, no murmur detected  Abdomen: non-distended  Extremities: trace dependent edema  Skin: no visible rashes or lesions        03-03-24 @ 07:01  -  03-04-24 @ 07:00  --------------------------------------------------------  IN:    Dexmedetomidine: 439.2 mL    Enteral Tube Flush: 240 mL    FentaNYL: 350.4 mL    Heparin Infusion: 42 mL    IV PiggyBack: 150 mL    Jevity 1.2: 1040 mL    Lactated Ringers: 1800 mL  Total IN: 4061.6 mL    OUT:    Indwelling Catheter - Urethral (mL): 1065 mL    Propofol: 0 mL  Total OUT: 1065 mL    Total NET: 2996.6 mL      03-04-24 @ 07:01  -  03-04-24 @ 09:32  --------------------------------------------------------  IN:    Dexmedetomidine: 18.3 mL    FentaNYL: 14.6 mL    Lactated Ringers: 75 mL  Total IN: 107.9 mL    OUT:    Indwelling Catheter - Urethral (mL): 50 mL    Jevity 1.2: 0 mL  Total OUT: 50 mL    Total NET: 57.9 mL          LABS:                            14.1   14.16 )-----------( 129      ( 04 Mar 2024 04:55 )             41.5                                               03-04    140  |  106  |  18  ----------------------------<  167<H>  4.5   |  23  |  0.6<L>    Ca    8.5      04 Mar 2024 04:55  Mg     2.0     03-04    TPro  5.3<L>  /  Alb  2.7<L>  /  TBili  1.1  /  DBili  x   /  AST  77<H>  /  ALT  47<H>  /  AlkPhos  125<H>  03-04      PTT - ( 03 Mar 2024 00:45 )  PTT:>200.0 sec                                       Urinalysis Basic - ( 04 Mar 2024 04:55 )    Color: x / Appearance: x / SG: x / pH: x  Gluc: 167 mg/dL / Ketone: x  / Bili: x / Urobili: x   Blood: x / Protein: x / Nitrite: x   Leuk Esterase: x / RBC: x / WBC x   Sq Epi: x / Non Sq Epi: x / Bacteria: x                                                  LIVER FUNCTIONS - ( 04 Mar 2024 04:55 )  Alb: 2.7 g/dL / Pro: 5.3 g/dL / ALK PHOS: 125 U/L / ALT: 47 U/L / AST: 77 U/L / GGT: x                                                  Culture - Sputum (collected 03 Mar 2024 15:05)  Source: .Sputum Sputum  Gram Stain (04 Mar 2024 05:45):    Numerous polymorphonuclear leukocytes per low power field    Rare Squamous epithelial cells per low power field    Rare Gram positive cocci in pairs per oil power field    Culture - Blood (collected 02 Mar 2024 12:14)  Source: .Blood None  Preliminary Report (03 Mar 2024 21:01):    No growth at 24 hours                                                   Mode: AC/ CMV (Assist Control/ Continuous Mandatory Ventilation)  RR (machine): 16  TV (machine): 400  FiO2: 60  PEEP: 8  ITime: 1  MAP: 13  PIP: 32                                      ABG - ( 03 Mar 2024 16:35 )  pH, Arterial: 7.45  pH, Blood: x     /  pCO2: 35    /  pO2: 145   / HCO3: 24    / Base Excess: 0.7   /  SaO2: 100.0               MEDICATIONS  (STANDING):  albuterol/ipratropium for Nebulization 3 milliLiter(s) Nebulizer every 6 hours  cefTRIAXone   IVPB      cefTRIAXone   IVPB 1000 milliGRAM(s) IV Intermittent every 24 hours  chlorhexidine 0.12% Liquid 15 milliLiter(s) Oral Mucosa two times a day  chlorhexidine 2% Cloths 1 Application(s) Topical daily  dexMEDEtomidine Infusion 0.054 MICROgram(s)/kG/Hr (0.99 mL/Hr) IV Continuous <Continuous>  fentaNYL   Infusion. 0.539 MICROgram(s)/kG/Hr (3.94 mL/Hr) IV Continuous <Continuous>  folic acid 1 milliGRAM(s) Oral daily  heparin   Injectable 5000 Unit(s) SubCutaneous every 12 hours  influenza   Vaccine 0.5 milliLiter(s) IntraMuscular once  lactated ringers Bolus 1000 milliLiter(s) IV Bolus once  lactated ringers. 1000 milliLiter(s) (75 mL/Hr) IV Continuous <Continuous>  LORazepam   Injectable   IV Push   LORazepam   Injectable 0.5 milliGRAM(s) IV Push every 4 hours  methimazole 5 milliGRAM(s) Oral daily  metoprolol tartrate 75 milliGRAM(s) Oral two times a day  multivitamin 1 Tablet(s) Oral daily  nystatin    Suspension 131390 Unit(s) Oral four times a day  polyethylene glycol 3350 17 Gram(s) Oral daily  propofol Infusion 10.784 MICROgram(s)/kG/Min (4.73 mL/Hr) IV Continuous <Continuous>  senna 2 Tablet(s) Oral at bedtime    MEDICATIONS  (PRN):  acetaminophen     Tablet .. 650 milliGRAM(s) Oral every 6 hours PRN Temp greater or equal to 38C (100.4F), Mild Pain (1 - 3)  haloperidol    Injectable 5 milliGRAM(s) IntraMuscular every 6 hours PRN Agitation  heparin   Injectable 4700 Unit(s) IV Push every 6 hours PRN For aPTT less than 40  LORazepam   Injectable 2 milliGRAM(s) IV Push every 2 hours PRN Symptom-triggered: each CIWA -Ar score 8 or GREATER      New X-rays reviewed:       ECHO reviewed    CXR interpreted by me:    3/4  images and radiologist read reviewed, by my read demonstrating stable LLL opacity

## 2024-03-04 NOTE — PROGRESS NOTE ADULT - SUBJECTIVE AND OBJECTIVE BOX
SUBJ: Pt intubated.      MEDICATIONS  (STANDING):  albuterol/ipratropium for Nebulization 3 milliLiter(s) Nebulizer every 6 hours  ascorbic acid 500 milliGRAM(s) Oral daily  cefTRIAXone   IVPB      cefTRIAXone   IVPB 1000 milliGRAM(s) IV Intermittent every 24 hours  chlorhexidine 0.12% Liquid 15 milliLiter(s) Oral Mucosa two times a day  chlorhexidine 2% Cloths 1 Application(s) Topical daily  dexMEDEtomidine Infusion 0.054 MICROgram(s)/kG/Hr (0.99 mL/Hr) IV Continuous <Continuous>  fentaNYL   Infusion. 0.539 MICROgram(s)/kG/Hr (3.94 mL/Hr) IV Continuous <Continuous>  folic acid 1 milliGRAM(s) Oral daily  heparin   Injectable 5000 Unit(s) SubCutaneous every 12 hours  influenza   Vaccine 0.5 milliLiter(s) IntraMuscular once  lactulose Syrup 10 Gram(s) Oral two times a day  LORazepam   Injectable   IV Push   LORazepam   Injectable 0.5 milliGRAM(s) IV Push every 4 hours  methimazole 5 milliGRAM(s) Oral daily  metoprolol tartrate 50 milliGRAM(s) Oral two times a day  multivitamin 1 Tablet(s) Oral daily  nystatin    Suspension 998119 Unit(s) Oral four times a day  pantoprazole    Tablet 40 milliGRAM(s) Oral before breakfast  polyethylene glycol 3350 17 Gram(s) Oral daily  QUEtiapine 25 milliGRAM(s) Oral at bedtime  senna 2 Tablet(s) Oral at bedtime    MEDICATIONS  (PRN):  haloperidol    Injectable 5 milliGRAM(s) IntraMuscular every 6 hours PRN Agitation  LORazepam   Injectable 2 milliGRAM(s) IV Push every 2 hours PRN Symptom-triggered: each CIWA -Ar score 8 or GREATER            Vital Signs Last 24 Hrs  T(C): 36.5 (04 Mar 2024 16:00), Max: 38.3 (03 Mar 2024 21:00)  T(F): 97.7 (04 Mar 2024 16:00), Max: 101 (03 Mar 2024 23:00)  HR: 53 (04 Mar 2024 19:00) (49 - 125)  BP: 101/63 (04 Mar 2024 19:00) (99/63 - 147/78)  BP(mean): 77 (04 Mar 2024 19:00) (76 - 107)  RR: 16 (04 Mar 2024 19:00) (13 - 29)  SpO2: 99% (04 Mar 2024 19:00) (94% - 100%)    Parameters below as of 04 Mar 2024 19:00  Patient On (Oxygen Delivery Method): ventilator    O2 Concentration (%): 40      ECG:NML    TTE:    LABS:                        14.1   14.16 )-----------( 129      ( 04 Mar 2024 04:55 )             41.5     03-04    140  |  106  |  18  ----------------------------<  167<H>  4.5   |  23  |  0.6<L>    Ca    8.5      04 Mar 2024 04:55  Mg     2.0     03-04    TPro  5.3<L>  /  Alb  2.7<L>  /  TBili  1.1  /  DBili  x   /  AST  77<H>  /  ALT  47<H>  /  AlkPhos  125<H>  03-04        PTT - ( 03 Mar 2024 00:45 )  PTT:>200.0 sec    I&O's Summary    03 Mar 2024 07:01  -  04 Mar 2024 07:00  --------------------------------------------------------  IN: 4061.6 mL / OUT: 1065 mL / NET: 2996.6 mL    04 Mar 2024 07:01  -  04 Mar 2024 20:05  --------------------------------------------------------  IN: 1438.7 mL / OUT: 620 mL / NET: 818.7 mL      BNP

## 2024-03-05 LAB
ALBUMIN SERPL ELPH-MCNC: 2.6 G/DL — LOW (ref 3.5–5.2)
ALP SERPL-CCNC: 123 U/L — HIGH (ref 30–115)
ALT FLD-CCNC: 56 U/L — HIGH (ref 0–41)
ANION GAP SERPL CALC-SCNC: 10 MMOL/L — SIGNIFICANT CHANGE UP (ref 7–14)
APPEARANCE UR: ABNORMAL
AST SERPL-CCNC: 78 U/L — HIGH (ref 0–41)
BACTERIA # UR AUTO: NEGATIVE /HPF — SIGNIFICANT CHANGE UP
BASOPHILS # BLD AUTO: 0.03 K/UL — SIGNIFICANT CHANGE UP (ref 0–0.2)
BASOPHILS NFR BLD AUTO: 0.3 % — SIGNIFICANT CHANGE UP (ref 0–1)
BILIRUB SERPL-MCNC: 0.9 MG/DL — SIGNIFICANT CHANGE UP (ref 0.2–1.2)
BILIRUB UR-MCNC: ABNORMAL
BUN SERPL-MCNC: 18 MG/DL — SIGNIFICANT CHANGE UP (ref 10–20)
CALCIUM SERPL-MCNC: 8.5 MG/DL — SIGNIFICANT CHANGE UP (ref 8.4–10.4)
CAST: 30 /LPF — HIGH (ref 0–4)
CHLORIDE SERPL-SCNC: 104 MMOL/L — SIGNIFICANT CHANGE UP (ref 98–110)
CO2 SERPL-SCNC: 26 MMOL/L — SIGNIFICANT CHANGE UP (ref 17–32)
COLOR SPEC: ABNORMAL
CREAT SERPL-MCNC: 0.5 MG/DL — LOW (ref 0.7–1.5)
CULTURE RESULTS: NO GROWTH
DIFF PNL FLD: ABNORMAL
EGFR: 115 ML/MIN/1.73M2 — SIGNIFICANT CHANGE UP
EOSINOPHIL # BLD AUTO: 0.15 K/UL — SIGNIFICANT CHANGE UP (ref 0–0.7)
EOSINOPHIL NFR BLD AUTO: 1.4 % — SIGNIFICANT CHANGE UP (ref 0–8)
FOLATE SERPL-MCNC: 14.2 NG/ML — SIGNIFICANT CHANGE UP
GAS PNL BLDA: SIGNIFICANT CHANGE UP
GAS PNL BLDA: SIGNIFICANT CHANGE UP
GLUCOSE SERPL-MCNC: 117 MG/DL — HIGH (ref 70–99)
GLUCOSE UR QL: NEGATIVE MG/DL — SIGNIFICANT CHANGE UP
HCT VFR BLD CALC: 40 % — LOW (ref 42–52)
HGB BLD-MCNC: 13.6 G/DL — LOW (ref 14–18)
IMM GRANULOCYTES NFR BLD AUTO: 1.7 % — HIGH (ref 0.1–0.3)
KETONES UR-MCNC: ABNORMAL MG/DL
LEUKOCYTE ESTERASE UR-ACNC: ABNORMAL
LYMPHOCYTES # BLD AUTO: 1.58 K/UL — SIGNIFICANT CHANGE UP (ref 1.2–3.4)
LYMPHOCYTES # BLD AUTO: 14.9 % — LOW (ref 20.5–51.1)
MAGNESIUM SERPL-MCNC: 1.7 MG/DL — LOW (ref 1.8–2.4)
MCHC RBC-ENTMCNC: 34 G/DL — SIGNIFICANT CHANGE UP (ref 32–37)
MCHC RBC-ENTMCNC: 35.6 PG — HIGH (ref 27–31)
MCV RBC AUTO: 104.7 FL — HIGH (ref 80–94)
MONOCYTES # BLD AUTO: 1.32 K/UL — HIGH (ref 0.1–0.6)
MONOCYTES NFR BLD AUTO: 12.5 % — HIGH (ref 1.7–9.3)
NEUTROPHILS # BLD AUTO: 7.32 K/UL — HIGH (ref 1.4–6.5)
NEUTROPHILS NFR BLD AUTO: 69.2 % — SIGNIFICANT CHANGE UP (ref 42.2–75.2)
NITRITE UR-MCNC: POSITIVE
NRBC # BLD: 0 /100 WBCS — SIGNIFICANT CHANGE UP (ref 0–0)
PH UR: 6 — SIGNIFICANT CHANGE UP (ref 5–8)
PLATELET # BLD AUTO: 135 K/UL — SIGNIFICANT CHANGE UP (ref 130–400)
PMV BLD: 11.8 FL — HIGH (ref 7.4–10.4)
POTASSIUM SERPL-MCNC: 4.2 MMOL/L — SIGNIFICANT CHANGE UP (ref 3.5–5)
POTASSIUM SERPL-SCNC: 4.2 MMOL/L — SIGNIFICANT CHANGE UP (ref 3.5–5)
PROT SERPL-MCNC: 5 G/DL — LOW (ref 6–8)
PROT UR-MCNC: 30 MG/DL
RBC # BLD: 3.82 M/UL — LOW (ref 4.7–6.1)
RBC # FLD: 11.4 % — LOW (ref 11.5–14.5)
RBC CASTS # UR COMP ASSIST: 102 /HPF — HIGH (ref 0–4)
SODIUM SERPL-SCNC: 140 MMOL/L — SIGNIFICANT CHANGE UP (ref 135–146)
SP GR SPEC: >1.03 — HIGH (ref 1–1.03)
SPECIMEN SOURCE: SIGNIFICANT CHANGE UP
SQUAMOUS # UR AUTO: 3 /HPF — SIGNIFICANT CHANGE UP (ref 0–5)
UROBILINOGEN FLD QL: 1 MG/DL — SIGNIFICANT CHANGE UP (ref 0.2–1)
VIT B12 SERPL-MCNC: 1281 PG/ML — HIGH (ref 232–1245)
WBC # BLD: 10.58 K/UL — SIGNIFICANT CHANGE UP (ref 4.8–10.8)
WBC # FLD AUTO: 10.58 K/UL — SIGNIFICANT CHANGE UP (ref 4.8–10.8)
WBC UR QL: 2 /HPF — SIGNIFICANT CHANGE UP (ref 0–5)

## 2024-03-05 PROCEDURE — 74018 RADEX ABDOMEN 1 VIEW: CPT | Mod: 26

## 2024-03-05 PROCEDURE — 71045 X-RAY EXAM CHEST 1 VIEW: CPT | Mod: 26

## 2024-03-05 PROCEDURE — 99291 CRITICAL CARE FIRST HOUR: CPT | Mod: GC

## 2024-03-05 PROCEDURE — 74176 CT ABD & PELVIS W/O CONTRAST: CPT | Mod: 26

## 2024-03-05 RX ORDER — METOPROLOL TARTRATE 50 MG
25 TABLET ORAL
Refills: 0 | Status: DISCONTINUED | OUTPATIENT
Start: 2024-03-05 | End: 2024-03-06

## 2024-03-05 RX ORDER — VANCOMYCIN HCL 1 G
1000 VIAL (EA) INTRAVENOUS ONCE
Refills: 0 | Status: COMPLETED | OUTPATIENT
Start: 2024-03-05 | End: 2024-03-05

## 2024-03-05 RX ORDER — ACETAMINOPHEN 500 MG
650 TABLET ORAL ONCE
Refills: 0 | Status: COMPLETED | OUTPATIENT
Start: 2024-03-05 | End: 2024-03-05

## 2024-03-05 RX ORDER — METOPROLOL TARTRATE 50 MG
25 TABLET ORAL
Refills: 0 | Status: DISCONTINUED | OUTPATIENT
Start: 2024-03-05 | End: 2024-03-05

## 2024-03-05 RX ORDER — PROPOFOL 10 MG/ML
5 INJECTION, EMULSION INTRAVENOUS
Qty: 1000 | Refills: 0 | Status: DISCONTINUED | OUTPATIENT
Start: 2024-03-05 | End: 2024-03-05

## 2024-03-05 RX ORDER — SODIUM CHLORIDE 9 MG/ML
1000 INJECTION, SOLUTION INTRAVENOUS
Refills: 0 | Status: DISCONTINUED | OUTPATIENT
Start: 2024-03-05 | End: 2024-03-06

## 2024-03-05 RX ORDER — PROPOFOL 10 MG/ML
20 INJECTION, EMULSION INTRAVENOUS ONCE
Refills: 0 | Status: COMPLETED | OUTPATIENT
Start: 2024-03-05 | End: 2024-03-05

## 2024-03-05 RX ORDER — PANTOPRAZOLE SODIUM 20 MG/1
40 TABLET, DELAYED RELEASE ORAL
Refills: 0 | Status: DISCONTINUED | OUTPATIENT
Start: 2024-03-05 | End: 2024-03-07

## 2024-03-05 RX ORDER — IOHEXOL 300 MG/ML
30 INJECTION, SOLUTION INTRAVENOUS ONCE
Refills: 0 | Status: COMPLETED | OUTPATIENT
Start: 2024-03-05 | End: 2024-03-05

## 2024-03-05 RX ORDER — MAGNESIUM SULFATE 500 MG/ML
2 VIAL (ML) INJECTION ONCE
Refills: 0 | Status: COMPLETED | OUTPATIENT
Start: 2024-03-05 | End: 2024-03-05

## 2024-03-05 RX ORDER — METOCLOPRAMIDE HCL 10 MG
10 TABLET ORAL ONCE
Refills: 0 | Status: COMPLETED | OUTPATIENT
Start: 2024-03-05 | End: 2024-03-05

## 2024-03-05 RX ORDER — POLYETHYLENE GLYCOL 3350 17 G/17G
17 POWDER, FOR SOLUTION ORAL
Refills: 0 | Status: DISCONTINUED | OUTPATIENT
Start: 2024-03-05 | End: 2024-03-14

## 2024-03-05 RX ORDER — PROPOFOL 10 MG/ML
5 INJECTION, EMULSION INTRAVENOUS
Qty: 500 | Refills: 0 | Status: DISCONTINUED | OUTPATIENT
Start: 2024-03-05 | End: 2024-03-05

## 2024-03-05 RX ORDER — LACTULOSE 10 G/15ML
10 SOLUTION ORAL
Refills: 0 | Status: DISCONTINUED | OUTPATIENT
Start: 2024-03-05 | End: 2024-03-07

## 2024-03-05 RX ORDER — SODIUM CHLORIDE 9 MG/ML
500 INJECTION, SOLUTION INTRAVENOUS ONCE
Refills: 0 | Status: COMPLETED | OUTPATIENT
Start: 2024-03-05 | End: 2024-03-05

## 2024-03-05 RX ORDER — CEFEPIME 1 G/1
2000 INJECTION, POWDER, FOR SOLUTION INTRAMUSCULAR; INTRAVENOUS EVERY 8 HOURS
Refills: 0 | Status: DISCONTINUED | OUTPATIENT
Start: 2024-03-05 | End: 2024-03-13

## 2024-03-05 RX ORDER — PROPOFOL 10 MG/ML
10 INJECTION, EMULSION INTRAVENOUS
Qty: 1000 | Refills: 0 | Status: DISCONTINUED | OUTPATIENT
Start: 2024-03-05 | End: 2024-03-06

## 2024-03-05 RX ADMIN — PROPOFOL 20 MILLIGRAM(S): 10 INJECTION, EMULSION INTRAVENOUS at 12:11

## 2024-03-05 RX ADMIN — LACTULOSE 10 GRAM(S): 10 SOLUTION ORAL at 16:00

## 2024-03-05 RX ADMIN — LACTULOSE 10 GRAM(S): 10 SOLUTION ORAL at 14:36

## 2024-03-05 RX ADMIN — Medication 500000 UNIT(S): at 00:16

## 2024-03-05 RX ADMIN — Medication 250 MILLIGRAM(S): at 17:01

## 2024-03-05 RX ADMIN — DEXMEDETOMIDINE HYDROCHLORIDE IN 0.9% SODIUM CHLORIDE 0.99 MICROGRAM(S)/KG/HR: 4 INJECTION INTRAVENOUS at 10:28

## 2024-03-05 RX ADMIN — Medication 500000 UNIT(S): at 19:06

## 2024-03-05 RX ADMIN — HEPARIN SODIUM 5000 UNIT(S): 5000 INJECTION INTRAVENOUS; SUBCUTANEOUS at 06:12

## 2024-03-05 RX ADMIN — PANTOPRAZOLE SODIUM 40 MILLIGRAM(S): 20 TABLET, DELAYED RELEASE ORAL at 06:12

## 2024-03-05 RX ADMIN — Medication 500000 UNIT(S): at 06:12

## 2024-03-05 RX ADMIN — LACTULOSE 10 GRAM(S): 10 SOLUTION ORAL at 17:59

## 2024-03-05 RX ADMIN — CHLORHEXIDINE GLUCONATE 1 APPLICATION(S): 213 SOLUTION TOPICAL at 12:06

## 2024-03-05 RX ADMIN — Medication 500000 UNIT(S): at 12:05

## 2024-03-05 RX ADMIN — CEFTRIAXONE 100 MILLIGRAM(S): 500 INJECTION, POWDER, FOR SOLUTION INTRAMUSCULAR; INTRAVENOUS at 10:27

## 2024-03-05 RX ADMIN — Medication 650 MILLIGRAM(S): at 16:21

## 2024-03-05 RX ADMIN — Medication 0.5 MILLIGRAM(S): at 06:11

## 2024-03-05 RX ADMIN — Medication 1 MILLIGRAM(S): at 12:05

## 2024-03-05 RX ADMIN — Medication 500 MILLIGRAM(S): at 12:06

## 2024-03-05 RX ADMIN — LACTULOSE 10 GRAM(S): 10 SOLUTION ORAL at 06:12

## 2024-03-05 RX ADMIN — HALOPERIDOL DECANOATE 5 MILLIGRAM(S): 100 INJECTION INTRAMUSCULAR at 04:56

## 2024-03-05 RX ADMIN — CHLORHEXIDINE GLUCONATE 15 MILLILITER(S): 213 SOLUTION TOPICAL at 17:58

## 2024-03-05 RX ADMIN — Medication 25 MILLIGRAM(S): at 10:58

## 2024-03-05 RX ADMIN — Medication 650 MILLIGRAM(S): at 14:35

## 2024-03-05 RX ADMIN — POLYETHYLENE GLYCOL 3350 17 GRAM(S): 17 POWDER, FOR SOLUTION ORAL at 12:11

## 2024-03-05 RX ADMIN — Medication 25 GRAM(S): at 07:26

## 2024-03-05 RX ADMIN — Medication 2 MILLIGRAM(S): at 04:15

## 2024-03-05 RX ADMIN — Medication 1 TABLET(S): at 12:05

## 2024-03-05 RX ADMIN — Medication 10 MILLIGRAM(S): at 14:35

## 2024-03-05 RX ADMIN — PROPOFOL 4.39 MICROGRAM(S)/KG/MIN: 10 INJECTION, EMULSION INTRAVENOUS at 19:23

## 2024-03-05 RX ADMIN — HEPARIN SODIUM 5000 UNIT(S): 5000 INJECTION INTRAVENOUS; SUBCUTANEOUS at 17:58

## 2024-03-05 RX ADMIN — LACTULOSE 10 GRAM(S): 10 SOLUTION ORAL at 19:06

## 2024-03-05 RX ADMIN — IOHEXOL 30 MILLILITER(S): 300 INJECTION, SOLUTION INTRAVENOUS at 18:00

## 2024-03-05 RX ADMIN — CEFEPIME 100 MILLIGRAM(S): 1 INJECTION, POWDER, FOR SOLUTION INTRAMUSCULAR; INTRAVENOUS at 17:02

## 2024-03-05 RX ADMIN — Medication 25 MILLIGRAM(S): at 17:58

## 2024-03-05 RX ADMIN — Medication 0.5 MILLIGRAM(S): at 02:43

## 2024-03-05 RX ADMIN — DEXMEDETOMIDINE HYDROCHLORIDE IN 0.9% SODIUM CHLORIDE 0.99 MICROGRAM(S)/KG/HR: 4 INJECTION INTRAVENOUS at 00:22

## 2024-03-05 RX ADMIN — CHLORHEXIDINE GLUCONATE 15 MILLILITER(S): 213 SOLUTION TOPICAL at 06:12

## 2024-03-05 RX ADMIN — Medication 10 MILLIGRAM(S): at 17:58

## 2024-03-05 RX ADMIN — SODIUM CHLORIDE 500 MILLILITER(S): 9 INJECTION, SOLUTION INTRAVENOUS at 17:30

## 2024-03-05 RX ADMIN — Medication 1 MILLIGRAM(S): at 12:28

## 2024-03-05 NOTE — PROGRESS NOTE ADULT - SUBJECTIVE AND OBJECTIVE BOX
SUBJECTIVE / OVERNIGHT EVENTS  Patient was seen and examined. Patient is intubated and sedated. On Precedex and fentanyl sedation.  Has a Maria in place. No bowel movements overnight    MEDICATIONS  albuterol/ipratropium for Nebulization 3 milliLiter(s) Nebulizer every 6 hours  ascorbic acid 500 milliGRAM(s) Oral daily  cefTRIAXone   IVPB      cefTRIAXone   IVPB 1000 milliGRAM(s) IV Intermittent every 24 hours  chlorhexidine 0.12% Liquid 15 milliLiter(s) Oral Mucosa two times a day  chlorhexidine 2% Cloths 1 Application(s) Topical daily  dexMEDEtomidine Infusion 0.054 MICROgram(s)/kG/Hr IV Continuous <Continuous>  fentaNYL   Infusion. 0.539 MICROgram(s)/kG/Hr IV Continuous <Continuous>  folic acid 1 milliGRAM(s) Oral daily  heparin   Injectable 5000 Unit(s) SubCutaneous every 12 hours  influenza   Vaccine 0.5 milliLiter(s) IntraMuscular once  lactulose Syrup 10 Gram(s) Oral two times a day  LORazepam   Injectable 0.5 milliGRAM(s) IV Push every 12 hours  LORazepam   Injectable   IV Push   methimazole 5 milliGRAM(s) Oral daily  metoprolol tartrate 25 milliGRAM(s) Oral two times a day  multivitamin 1 Tablet(s) Oral daily  nystatin    Suspension 267270 Unit(s) Oral four times a day  pantoprazole   Suspension 40 milliGRAM(s) Oral before breakfast  polyethylene glycol 3350 17 Gram(s) Oral daily  QUEtiapine 25 milliGRAM(s) Oral at bedtime  senna 2 Tablet(s) Oral at bedtime    haloperidol    Injectable 5 milliGRAM(s) IntraMuscular every 6 hours PRN Agitation  LORazepam   Injectable 1 milliGRAM(s) IV Push every 2 hours PRN Symptom-triggered: each CIWA -Ar score 8 or GREATER    VITALS /  EXAM    T(C): 37.5 (03-05-24 @ 08:00), Max: 37.5 (03-05-24 @ 08:00)  HR: 119 (03-05-24 @ 11:00) (49 - 130)  BP: 91/57 (03-05-24 @ 08:00) (88/55 - 205/117)  RR: 16 (03-05-24 @ 11:00) (15 - 28)  SpO2: 99% (03-05-24 @ 11:00) (96% - 100%)    GENERAL: no acute distress, sedated  HEAD:  Atraumatic, Normocephalic  NECK: Supple, No JVD, Normal thyroid, no enlarged nodes  NERVOUS SYSTEM:  sedated  CHEST/LUNG: mechanical breathing sounds  HEART: S1S2 normal, no S3, Regular rate and rhythm; No murmurs  ABDOMEN: Soft, Nontender, Nondistended  EXTREMITIES:  No clubbing, cyanosis, or edema  LYMPH: No lymphadenopathy noted  SKIN: No rashes or lesions    I's & O's     03-04-24 @ 07:01  -  03-05-24 @ 07:00  --------------------------------------------------------  IN:    Dexmedetomidine: 283.6 mL    Enteral Tube Flush: 340 mL    FentaNYL: 306.3 mL    IV PiggyBack: 150 mL    Jevity 1.2: 1120 mL    Lactated Ringers: 150 mL    Vital High Protein: 400 mL  Total IN: 2749.9 mL    OUT:    Indwelling Catheter - Urethral (mL): 1190 mL    Propofol: 0 mL  Total OUT: 1190 mL    Total NET: 1559.9 mL      03-05-24 @ 07:01  -  03-05-24 @ 11:25  --------------------------------------------------------  IN:    Dexmedetomidine: 19.4 mL    FentaNYL: 3 mL    IV PiggyBack: 100 mL  Total IN: 122.4 mL    OUT:    Indwelling Catheter - Urethral (mL): 180 mL  Total OUT: 180 mL    Total NET: -57.6 mL        LABS             13.6   10.58 )-----------( 135      ( 03-05-24 @ 05:30 )             40.0     140  |  104  |  18  -------------------------<  117   03-05-24 @ 05:30  4.2  |  26  |  0.5    Ca      8.5     03-05-24 @ 05:30  Mg     1.7     03-05-24 @ 05:30    TPro  5.0  /  Alb  2.6  /  TBili  0.9  /  DBili  x   /  AST  78  /  ALT  56  /  AlkPhos  123  /  GGT  x     03-05-24 @ 05:30                    Urinalysis Basic - ( 05 Mar 2024 05:30 )    Color: x / Appearance: x / SG: x / pH: x  Gluc: 117 mg/dL / Ketone: x  / Bili: x / Urobili: x   Blood: x / Protein: x / Nitrite: x   Leuk Esterase: x / RBC: x / WBC x   Sq Epi: x / Non Sq Epi: x / Bacteria: x      MICRO / IMAGING / CARDIOLOGY  Telemetry: Reviewed   EKG: Reviewed    CULTURES    Culture - Blood (collected 03-04-24 @ 01:30)  Source: .Blood Blood  Preliminary Report:    No growth at 24 hours    Culture - Blood (collected 03-04-24 @ 01:17)  Source: .Blood Blood  Preliminary Report:    No growth at 24 hours    Culture - Sputum (collected 03-03-24 @ 15:05)  Source: .Sputum Sputum  Gram Stain:    Numerous polymorphonuclear leukocytes per low power field    Rare Squamous epithelial cells per low power field    Rare Gram positive cocci in pairs per oil power field  Final Report:    No growth    Culture - Blood (collected 03-02-24 @ 12:14)  Source: .Blood None  Preliminary Report:    No growth at 48 Hours      IMAGING  PACS Image:  (03-04-24 @ 09:15)    CARDIOLOGY

## 2024-03-05 NOTE — PROCEDURE NOTE - NSASSISTBY_GEN_A_CORE
Encounter for insurance to follow a visit from today 9/30/21.    ED Diagnosis   1. Syncope and collapse  ELECTROCARDIOGRAM 12-LEAD     P:  Orders Placed This Encounter   • Electrocardiogram 12-Lead     Please see visit through corporate account for today (9/30/21) for Plan.    MIGUEL Cantrell   Myself

## 2024-03-05 NOTE — PROGRESS NOTE ADULT - ASSESSMENT
IMPRESSION:  Acute respiratory failure  Possible aspiration right lower lobe  possible seizures   Severe alcohol withdrawal   Alcohol abuse  COPD exacerbation  Hyperthyroidism     PLAN:    CNS:  Thiamine, folate, CIWA-driven , CATCH team, wean precedex, ativan taper.  EEG w/o s/s of seizure  Sedation: Fentanyl & Precedex, haldol PRN agitation  SAT daily    HEENT: Oral care.  ETT care.  ETT day #5    PULMONARY:  HOB @ 45 degrees.   No change in vent   Nebs q4hrs and PRN,   O2 supplemental target Spo2 88-92%.   SBT daily.    CARDIOVASCULAR: patient very net (+), d/c IVF.   decrease BB for now, may need to increase again after d/c precedex,   Appreciate EP recommendations    GI: GI prophylaxis.  Feeding as tolerated.  Bowel regimen to increase today. KUB today    RENAL:  Follow up lytes.  Correct as needed.  TOV today.    INFECTIOUS DISEASE: Follow up cultures. Give Ceftriaxone x 5 days.  check procal  Repeat cultures PRN fever    HEMATOLOGICAL:  DVT prophylaxis w/ heparin SQ.     ENDOCRINE:  Follow up FS.  Insulin protocol if needed. CW methimazole.    MUSCULOSKELETAL: Bed in chair position, PT/OT    DISPO: MICU   LINES: PIV, brambila.  CODE: FULL

## 2024-03-05 NOTE — PROGRESS NOTE ADULT - SUBJECTIVE AND OBJECTIVE BOX
Patient is a 63y old  Male who presents with a chief complaint of chest pain and SOB (04 Mar 2024 20:03)        Over Night Events:    Required 2x doses of PRN ativan yesterday  Net positive 1.5L    ROS:     Unable to assess ROS: patient intubated.        PHYSICAL EXAM    ICU Vital Signs Last 24 Hrs  T(C): 36.4 (05 Mar 2024 04:00), Max: 37.1 (04 Mar 2024 12:00)  T(F): 97.5 (05 Mar 2024 04:00), Max: 98.8 (04 Mar 2024 12:00)  HR: 55 (05 Mar 2024 07:00) (49 - 130)  BP: 88/55 (05 Mar 2024 07:00) (88/55 - 205/117)  BP(mean): 65 (05 Mar 2024 07:00) (65 - 150)  ABP: --  ABP(mean): --  RR: 17 (05 Mar 2024 07:00) (13 - 29)  SpO2: 98% (05 Mar 2024 07:00) (94% - 100%)    O2 Parameters below as of 05 Mar 2024 07:00  Patient On (Oxygen Delivery Method): ventilator    O2 Concentration (%): 40      Constitutional: no acute distress, well nourished well developed  Neuro: moving all 4 limbs spontaneously.  Sedated.  HEENT: NCAT, anicteric, ETT in place  Neck: no visible lymphadenopathy or goiter  Pulm: synchronous with ventilator, coarse bilateral breath sounds anteriorly  Cardiac: extremities appear pink and well-perfused.  regular rhythm and rate, no murmur detected  Abdomen: non-distended  Extremities: trace dependent edema  Skin: no visible rashes or lesions      03-04-24 @ 07:01  -  03-05-24 @ 07:00  --------------------------------------------------------  IN:    Dexmedetomidine: 283.6 mL    Enteral Tube Flush: 340 mL    FentaNYL: 306.3 mL    IV PiggyBack: 150 mL    Jevity 1.2: 1120 mL    Lactated Ringers: 150 mL    Vital High Protein: 400 mL  Total IN: 2749.9 mL    OUT:    Indwelling Catheter - Urethral (mL): 1190 mL    Propofol: 0 mL  Total OUT: 1190 mL    Total NET: 1559.9 mL          LABS:                            13.6   10.58 )-----------( 135      ( 05 Mar 2024 05:30 )             40.0                                               03-05    140  |  104  |  18  ----------------------------<  117<H>  4.2   |  26  |  0.5<L>    Ca    8.5      05 Mar 2024 05:30  Mg     1.7     03-05    TPro  5.0<L>  /  Alb  2.6<L>  /  TBili  0.9  /  DBili  x   /  AST  78<H>  /  ALT  56<H>  /  AlkPhos  123<H>  03-05                                             Urinalysis Basic - ( 05 Mar 2024 05:30 )    Color: x / Appearance: x / SG: x / pH: x  Gluc: 117 mg/dL / Ketone: x  / Bili: x / Urobili: x   Blood: x / Protein: x / Nitrite: x   Leuk Esterase: x / RBC: x / WBC x   Sq Epi: x / Non Sq Epi: x / Bacteria: x                                                  LIVER FUNCTIONS - ( 05 Mar 2024 05:30 )  Alb: 2.6 g/dL / Pro: 5.0 g/dL / ALK PHOS: 123 U/L / ALT: 56 U/L / AST: 78 U/L / GGT: x                                                  Culture - Blood (collected 04 Mar 2024 01:30)  Source: .Blood Blood  Preliminary Report (05 Mar 2024 07:02):    No growth at 24 hours    Culture - Blood (collected 04 Mar 2024 01:17)  Source: .Blood Blood  Preliminary Report (05 Mar 2024 07:01):    No growth at 24 hours    Culture - Sputum (collected 03 Mar 2024 15:05)  Source: .Sputum Sputum  Gram Stain (04 Mar 2024 05:45):    Numerous polymorphonuclear leukocytes per low power field    Rare Squamous epithelial cells per low power field    Rare Gram positive cocci in pairs per oil power field  Preliminary Report (04 Mar 2024 16:48):    No growth to date.    Culture - Blood (collected 02 Mar 2024 12:14)  Source: .Blood None  Preliminary Report (04 Mar 2024 21:01):    No growth at 48 Hours                                                   Mode: AC/ CMV (Assist Control/ Continuous Mandatory Ventilation)  RR (machine): 16  TV (machine): 400  FiO2: 40  PEEP: 8  ITime: 1  MAP: 13  PIP: 32                                      ABG - ( 05 Mar 2024 04:08 )  pH, Arterial: 7.44  pH, Blood: x     /  pCO2: 43    /  pO2: 69    / HCO3: 29    / Base Excess: 4.4   /  SaO2: 96.3                MEDICATIONS  (STANDING):  albuterol/ipratropium for Nebulization 3 milliLiter(s) Nebulizer every 6 hours  ascorbic acid 500 milliGRAM(s) Oral daily  cefTRIAXone   IVPB      cefTRIAXone   IVPB 1000 milliGRAM(s) IV Intermittent every 24 hours  chlorhexidine 0.12% Liquid 15 milliLiter(s) Oral Mucosa two times a day  chlorhexidine 2% Cloths 1 Application(s) Topical daily  dexMEDEtomidine Infusion 0.054 MICROgram(s)/kG/Hr (0.99 mL/Hr) IV Continuous <Continuous>  fentaNYL   Infusion. 0.539 MICROgram(s)/kG/Hr (3.94 mL/Hr) IV Continuous <Continuous>  folic acid 1 milliGRAM(s) Oral daily  heparin   Injectable 5000 Unit(s) SubCutaneous every 12 hours  influenza   Vaccine 0.5 milliLiter(s) IntraMuscular once  lactulose Syrup 10 Gram(s) Oral two times a day  LORazepam   Injectable 0.5 milliGRAM(s) IV Push every 12 hours  LORazepam   Injectable   IV Push   methimazole 5 milliGRAM(s) Oral daily  metoprolol tartrate 50 milliGRAM(s) Oral two times a day  multivitamin 1 Tablet(s) Oral daily  nystatin    Suspension 484690 Unit(s) Oral four times a day  pantoprazole   Suspension 40 milliGRAM(s) Oral before breakfast  polyethylene glycol 3350 17 Gram(s) Oral daily  QUEtiapine 25 milliGRAM(s) Oral at bedtime  senna 2 Tablet(s) Oral at bedtime    MEDICATIONS  (PRN):  haloperidol    Injectable 5 milliGRAM(s) IntraMuscular every 6 hours PRN Agitation  LORazepam   Injectable 2 milliGRAM(s) IV Push every 2 hours PRN Symptom-triggered: each CIWA -Ar score 8 or GREATER      New X-rays reviewed:       ECHO reviewed    CXR interpreted by me:    3/5  images and radiologist read reviewed, by my read demonstrating interval resolution of LLL opacity

## 2024-03-05 NOTE — PROGRESS NOTE ADULT - ASSESSMENT
63 year old man with a pmh of COPD, alcohol use disorder and hyperthyroidism presented for palpitation. Upgraded for hypoxemic respiratory failure    #Possible Alcohol withdrawal   #Alcohol intoxication (alcohol level in blood >200)   - Lactate 4 on VBGs -> repeat 1.4 (after fluids)  - trop 11, pro-BNP 46   - EKG showed sinus tachycardia   - s/p valium and ativan in ED  - s/p 1 L NS, magnesium  - On 70 cc/hour LR  - HR improved from 130 to 90s   - sxs may be due to alcohol intoxication  - monitor on tele for now   - TSH 0.01 low, fT4 1.8  - CATCH team eval, pt willing to stop   - On thiamine and folate   - now on Precedex drip,fentanyl and lorazepam as needed  -  CT head : No evidence of acute transcortical infarct, hydrocephalus, acute   intracranial hemorrhage, or mass effect.  -EEG: No epileptiform abnormalities were recorded.  -- replete Mg and K as needed   - Stop fluids as patient has net positive balance. Continue only with feeds  - f/u AM folate and Vitamin B12  - SBT today    #Constipation  - Patient is not having bowel movement  - Was placed on Miralax BID, Senna and Lactulose 10 mg twice daily  - KUB showed possible ileus. Pending official read  - Give Metoclopramide once Ileus is confirmed. Do not give if patient has SOB    # Questionable A-fib. After review most likely sinus tachycardia  - Dr Johnson saw the patient. Continue rate control with metoprolol as per EP. Highly unlikely that patient has A-fib  - Patient takes metoprolol 75 mg as an outpatient  - Decrease metoprolol tartrate to 25 mg BID    # Right lower lobe consolidation- possible pneumonia  -CXR right LLO  - On Rocephin 1 g q 24. Today day 3  - c.w abx and monitor daily cbcs  - Patient had one episode of  fever on 03/04/2024 , blood cultures taken. f/u results  - RVPs ordered but still not taken. Collect and follow up results  - IF patient becomes febrile again, Take trach cultures and broaden abx    #Transaminitis   - , ALT 85,   - Secondary to alcoholoe use    #HTN  - not on meds   -hold bb for now  - monitor BP     #COPD  - duonebs PRN   - Received prednisone 40 mg OD for 5 days     #DVT ppx: lovenox   #GI ppx: protonix   #Diet: feeds via OGT  #Activity: as tolerated   #Full code     63 year old man with a pmh of COPD, alcohol use disorder and hyperthyroidism presented for palpitation. Upgraded for hypoxemic respiratory failure    #Possible Alcohol withdrawal   #Alcohol intoxication (alcohol level in blood >200)   - Lactate 4 on VBGs -> repeat 1.4 (after fluids)  - trop 11, pro-BNP 46   - EKG showed sinus tachycardia   - s/p valium and ativan in ED  - s/p 1 L NS, magnesium  - On 70 cc/hour LR  - HR improved from 130 to 90s   - sxs may be due to alcohol intoxication  - monitor on tele for now   - TSH 0.01 low, fT4 1.8  - CATCH team eval, pt willing to stop   - On thiamine and folate   - now on Precedex drip,fentanyl and lorazepam as needed  -  CT head : No evidence of acute transcortical infarct, hydrocephalus, acute   intracranial hemorrhage, or mass effect.  -EEG: No epileptiform abnormalities were recorded.  -- replete Mg and K as needed   - Stop fluids as patient has net positive balance. Continue only with feeds  - f/u AM folate and Vitamin B12  - SBT today    #Constipation  - Patient is not having bowel movement  - Was placed on Miralax BID, Senna and Lactulose 10 mg twice daily  - KUB showed possible ileus. Pending official read  - Give Metoclopramide once Ileus is confirmed. Do not give if patient has SOB    # Questionable A-fib. After review most likely sinus tachycardia  - Dr Johnson saw the patient. Continue rate control with metoprolol as per EP. Highly unlikely that patient has A-fib  - Patient takes metoprolol 75 mg as an outpatient  - Decrease metoprolol tartrate to 25 mg BID    # Right lower lobe consolidation- possible pneumonia  -CXR right LLO  - On Rocephin 1 g q 24. Today day 3  - c.w abx and monitor daily cbcs  - Patient had one episode of  fever on 03/04/2024 , blood cultures taken. f/u results  - RVPs ordered but still not taken. Collect and follow up results  - IF patient becomes febrile again, Take trach cultures and broaden abx    #Transaminitis   - , ALT 85,   - Secondary to alcoholoe use    #HTN  - not on meds   -hold bb for now  - monitor BP     #DVT ppx: lovenox   #GI ppx: protonix   #Diet: feeds via OGT  #Activity: as tolerated   #Full code     63 year old man with a pmh of COPD, alcohol use disorder and hyperthyroidism presented for palpitation. Upgraded for hypoxemic respiratory failure    #Possible Alcohol withdrawal   #Alcohol intoxication (alcohol level in blood >200)   - Lactate 4 on VBGs -> repeat 1.4 (after fluids)  - trop 11, pro-BNP 46   - EKG showed sinus tachycardia   -Patient is on Ativan 0.5 mg IV taper q12 and Ativan 2 mg q 2 Hours PRN (got 2 doses yesterday)  - On Seroquel 25 mg during the day and 50 mg during the night    - s/p 1 L NS, magnesium  - was on 70 cc/hour LR. Discontinued  - HR improved from 130 to 90s   - sxs may be due to alcohol intoxication  - TSH 0.01 low, fT4 1.8. Repeat TSH in 6 weeks   - CATCH team danyel pt willing to stop   - On thiamine and folate   - now on Precedex drip, fentanyl and lorazepam as needed  -  CT head : No evidence of acute transcortical infarct, hydrocephalus, acute   intracranial hemorrhage, or mass effect.  -EEG: No epileptiform abnormalities were recorded.  --replete Mg and K as needed   - Stop fluids as patient has net positive balance. Continue only with feeds  - f/u AM folate and Vitamin B12  - SBT today    #Constipation  - Patient is not having bowel movement  - Was placed on Miralax BID, Senna and Lactulose 10 mg twice daily  - KUB showed possible ileus. Pending official read  - Give Metoclopramide once Ileus is confirmed. Do not give if patient has SOB    # Questionable A-fib. After review most likely sinus tachycardia  - Dr Johnson saw the patient. Continue rate control with metoprolol as per EP. Highly unlikely that patient has A-fib  - Patient takes metoprolol 75 mg as an outpatient  - Decrease metoprolol tartrate to 25 mg BID    # Right lower lobe consolidation- possible pneumonia  -CXR right LLO  - On Rocephin 1 g q 24. Today day 3  - c.w abx and monitor daily cbcs  - Patient had one episode of  fever on 03/04/2024 , blood cultures taken. f/u results  - RVPs ordered but still not taken. Collect and follow up results  - IF patient becomes febrile again, Take trach cultures and broaden abx    #Transaminitis   - , ALT 85,   - Secondary to alcoholoe use    #HTN  - not on meds   -hold bb for now  - monitor BP     #DVT ppx: lovenox   #GI ppx: protonix   #Diet: feeds via OGT  #Activity: as tolerated   #Full code     63 year old man with a pmh of COPD, alcohol use disorder and hyperthyroidism presented for palpitation. Upgraded for hypoxemic respiratory failure    #Possible Alcohol withdrawal   #Alcohol intoxication (alcohol level in blood >200)   - Lactate 4 on VBGs -> repeat 1.4 (after fluids)  - trop 11, pro-BNP 46   - EKG showed sinus tachycardia   -Patient is on Ativan 0.5 mg IV taper q12 and Ativan 2 mg q 2 Hours PRN (got 2 doses yesterday)  - On Seroquel 25 mg during the day and 50 mg during the night for agitation  - On fentanyl and precedex for sedation  - was on 70 cc/hour LR. Discontinued on 03/04/2024  - TSH 0.01 low, fT4 1.8. Repeat TSH in 6 weeks   - CATCH team danyel pt willing to stop   - On thiamine and folate   -  CT head : No evidence of acute transcortical infarct, hydrocephalus, acute   intracranial hemorrhage, or mass effect.  -EEG: No epileptiform abnormalities were recorded.  --replete Mg and K as needed  - Reduce PRN Ativan to 1mg q2  - Wean off sedatives  - f/u AM folate and Vitamin B12  - SBT today    #Constipation  - Patient is not having bowel movement  - Was placed on Miralax BID, Senna and Lactulose 10 mg twice daily  - KUB showed possible ileus. Pending official read  - Consider Metoclopramide once Ileus confirmed and wean down on fentanyl    # Questionable A-fib. After review most likely sinus tachycardia  - Dr Johnson saw the patient. Continue rate control with metoprolol as per EP. Highly unlikely that patient has A-fib  - Patient takes metoprolol 75 mg as an outpatient  - Decrease metoprolol tartrate to 25 mg BID    # Right lower lobe consolidation- possible pneumonia  -CXR right LLO  - On Rocephin 1 g q 24. Today day 3  - c.w abx and monitor daily cbcs  - Patient had one episode of  fever on 03/04/2024 , blood cultures taken. f/u results  - RVPs ordered but still not taken. Collect and follow up results  - IF patient becomes febrile again, Take trach cultures and broaden abx    #Transaminitis   - , ALT 85,   - Secondary to alcoholoe use    #HTN  - not on meds   -hold bb for now  - monitor BP     #DVT ppx: lovenox   #GI ppx: protonix   #Diet: feeds via OGT  #Activity: as tolerated   #Full code

## 2024-03-06 LAB
ANION GAP SERPL CALC-SCNC: 8 MMOL/L — SIGNIFICANT CHANGE UP (ref 7–14)
BASOPHILS # BLD AUTO: 0.04 K/UL — SIGNIFICANT CHANGE UP (ref 0–0.2)
BASOPHILS NFR BLD AUTO: 0.4 % — SIGNIFICANT CHANGE UP (ref 0–1)
BUN SERPL-MCNC: 14 MG/DL — SIGNIFICANT CHANGE UP (ref 10–20)
CALCIUM SERPL-MCNC: 8 MG/DL — LOW (ref 8.4–10.5)
CHLORIDE SERPL-SCNC: 102 MMOL/L — SIGNIFICANT CHANGE UP (ref 98–110)
CO2 SERPL-SCNC: 29 MMOL/L — SIGNIFICANT CHANGE UP (ref 17–32)
CREAT SERPL-MCNC: 0.5 MG/DL — LOW (ref 0.7–1.5)
EGFR: 115 ML/MIN/1.73M2 — SIGNIFICANT CHANGE UP
EOSINOPHIL # BLD AUTO: 0.26 K/UL — SIGNIFICANT CHANGE UP (ref 0–0.7)
EOSINOPHIL NFR BLD AUTO: 2.4 % — SIGNIFICANT CHANGE UP (ref 0–8)
GAS PNL BLDA: SIGNIFICANT CHANGE UP
GAS PNL BLDA: SIGNIFICANT CHANGE UP
GLUCOSE SERPL-MCNC: 92 MG/DL — SIGNIFICANT CHANGE UP (ref 70–99)
HCT VFR BLD CALC: 39.4 % — LOW (ref 42–52)
HGB BLD-MCNC: 13.3 G/DL — LOW (ref 14–18)
IMM GRANULOCYTES NFR BLD AUTO: 1.2 % — HIGH (ref 0.1–0.3)
LYMPHOCYTES # BLD AUTO: 1.46 K/UL — SIGNIFICANT CHANGE UP (ref 1.2–3.4)
LYMPHOCYTES # BLD AUTO: 13.4 % — LOW (ref 20.5–51.1)
MAGNESIUM SERPL-MCNC: 1.7 MG/DL — LOW (ref 1.8–2.4)
MCHC RBC-ENTMCNC: 33.8 G/DL — SIGNIFICANT CHANGE UP (ref 32–37)
MCHC RBC-ENTMCNC: 35.2 PG — HIGH (ref 27–31)
MCV RBC AUTO: 104.2 FL — HIGH (ref 80–94)
MONOCYTES # BLD AUTO: 1.07 K/UL — HIGH (ref 0.1–0.6)
MONOCYTES NFR BLD AUTO: 9.8 % — HIGH (ref 1.7–9.3)
NEUTROPHILS # BLD AUTO: 7.95 K/UL — HIGH (ref 1.4–6.5)
NEUTROPHILS NFR BLD AUTO: 72.8 % — SIGNIFICANT CHANGE UP (ref 42.2–75.2)
NRBC # BLD: 0 /100 WBCS — SIGNIFICANT CHANGE UP (ref 0–0)
PHOSPHATE SERPL-MCNC: 2.9 MG/DL — SIGNIFICANT CHANGE UP (ref 2.1–4.9)
PLATELET # BLD AUTO: 108 K/UL — LOW (ref 130–400)
PMV BLD: 12.2 FL — HIGH (ref 7.4–10.4)
POTASSIUM SERPL-MCNC: 3.5 MMOL/L — SIGNIFICANT CHANGE UP (ref 3.5–5)
POTASSIUM SERPL-SCNC: 3.5 MMOL/L — SIGNIFICANT CHANGE UP (ref 3.5–5)
RAPID RVP RESULT: SIGNIFICANT CHANGE UP
RBC # BLD: 3.78 M/UL — LOW (ref 4.7–6.1)
RBC # FLD: 11.6 % — SIGNIFICANT CHANGE UP (ref 11.5–14.5)
SARS-COV-2 RNA SPEC QL NAA+PROBE: SIGNIFICANT CHANGE UP
SODIUM SERPL-SCNC: 139 MMOL/L — SIGNIFICANT CHANGE UP (ref 135–146)
WBC # BLD: 10.91 K/UL — HIGH (ref 4.8–10.8)
WBC # FLD AUTO: 10.91 K/UL — HIGH (ref 4.8–10.8)

## 2024-03-06 PROCEDURE — 71045 X-RAY EXAM CHEST 1 VIEW: CPT | Mod: 26

## 2024-03-06 PROCEDURE — 71045 X-RAY EXAM CHEST 1 VIEW: CPT | Mod: 26,77

## 2024-03-06 PROCEDURE — 99291 CRITICAL CARE FIRST HOUR: CPT | Mod: GC

## 2024-03-06 PROCEDURE — 74018 RADEX ABDOMEN 1 VIEW: CPT | Mod: 26

## 2024-03-06 RX ORDER — ACETAMINOPHEN 500 MG
1000 TABLET ORAL ONCE
Refills: 0 | Status: COMPLETED | OUTPATIENT
Start: 2024-03-06 | End: 2024-03-06

## 2024-03-06 RX ORDER — ACETAMINOPHEN 500 MG
650 TABLET ORAL ONCE
Refills: 0 | Status: COMPLETED | OUTPATIENT
Start: 2024-03-06 | End: 2024-03-06

## 2024-03-06 RX ORDER — ACETAMINOPHEN 500 MG
650 TABLET ORAL ONCE
Refills: 0 | Status: DISCONTINUED | OUTPATIENT
Start: 2024-03-06 | End: 2024-03-06

## 2024-03-06 RX ORDER — METOPROLOL TARTRATE 50 MG
5 TABLET ORAL ONCE
Refills: 0 | Status: DISCONTINUED | OUTPATIENT
Start: 2024-03-06 | End: 2024-03-06

## 2024-03-06 RX ORDER — METOPROLOL TARTRATE 50 MG
5 TABLET ORAL EVERY 6 HOURS
Refills: 0 | Status: DISCONTINUED | OUTPATIENT
Start: 2024-03-06 | End: 2024-03-07

## 2024-03-06 RX ORDER — METOPROLOL TARTRATE 50 MG
25 TABLET ORAL ONCE
Refills: 0 | Status: COMPLETED | OUTPATIENT
Start: 2024-03-06 | End: 2024-03-06

## 2024-03-06 RX ORDER — METOPROLOL TARTRATE 50 MG
75 TABLET ORAL
Refills: 0 | Status: DISCONTINUED | OUTPATIENT
Start: 2024-03-06 | End: 2024-03-07

## 2024-03-06 RX ORDER — METRONIDAZOLE 500 MG
500 TABLET ORAL EVERY 8 HOURS
Refills: 0 | Status: DISCONTINUED | OUTPATIENT
Start: 2024-03-06 | End: 2024-03-07

## 2024-03-06 RX ORDER — OLANZAPINE 15 MG/1
5 TABLET, FILM COATED ORAL ONCE
Refills: 0 | Status: COMPLETED | OUTPATIENT
Start: 2024-03-06 | End: 2024-03-06

## 2024-03-06 RX ORDER — MAGNESIUM SULFATE 500 MG/ML
2 VIAL (ML) INJECTION ONCE
Refills: 0 | Status: COMPLETED | OUTPATIENT
Start: 2024-03-06 | End: 2024-03-06

## 2024-03-06 RX ORDER — METOPROLOL TARTRATE 50 MG
50 TABLET ORAL
Refills: 0 | Status: DISCONTINUED | OUTPATIENT
Start: 2024-03-06 | End: 2024-03-06

## 2024-03-06 RX ORDER — DEXMEDETOMIDINE HYDROCHLORIDE IN 0.9% SODIUM CHLORIDE 4 UG/ML
0.5 INJECTION INTRAVENOUS
Qty: 400 | Refills: 0 | Status: DISCONTINUED | OUTPATIENT
Start: 2024-03-06 | End: 2024-03-08

## 2024-03-06 RX ADMIN — Medication 500000 UNIT(S): at 00:33

## 2024-03-06 RX ADMIN — SENNA PLUS 2 TABLET(S): 8.6 TABLET ORAL at 00:33

## 2024-03-06 RX ADMIN — Medication 3 MILLILITER(S): at 23:49

## 2024-03-06 RX ADMIN — HEPARIN SODIUM 5000 UNIT(S): 5000 INJECTION INTRAVENOUS; SUBCUTANEOUS at 05:52

## 2024-03-06 RX ADMIN — CEFEPIME 100 MILLIGRAM(S): 1 INJECTION, POWDER, FOR SOLUTION INTRAMUSCULAR; INTRAVENOUS at 22:25

## 2024-03-06 RX ADMIN — PROPOFOL 4.39 MICROGRAM(S)/KG/MIN: 10 INJECTION, EMULSION INTRAVENOUS at 04:00

## 2024-03-06 RX ADMIN — Medication 400 MILLIGRAM(S): at 14:02

## 2024-03-06 RX ADMIN — QUETIAPINE FUMARATE 25 MILLIGRAM(S): 200 TABLET, FILM COATED ORAL at 00:33

## 2024-03-06 RX ADMIN — CEFEPIME 100 MILLIGRAM(S): 1 INJECTION, POWDER, FOR SOLUTION INTRAMUSCULAR; INTRAVENOUS at 00:33

## 2024-03-06 RX ADMIN — Medication 500000 UNIT(S): at 17:12

## 2024-03-06 RX ADMIN — CHLORHEXIDINE GLUCONATE 15 MILLILITER(S): 213 SOLUTION TOPICAL at 17:13

## 2024-03-06 RX ADMIN — PROPOFOL 4.39 MICROGRAM(S)/KG/MIN: 10 INJECTION, EMULSION INTRAVENOUS at 00:34

## 2024-03-06 RX ADMIN — Medication 5 MILLIGRAM(S): at 16:30

## 2024-03-06 RX ADMIN — CEFEPIME 100 MILLIGRAM(S): 1 INJECTION, POWDER, FOR SOLUTION INTRAMUSCULAR; INTRAVENOUS at 14:02

## 2024-03-06 RX ADMIN — CEFEPIME 100 MILLIGRAM(S): 1 INJECTION, POWDER, FOR SOLUTION INTRAMUSCULAR; INTRAVENOUS at 05:52

## 2024-03-06 RX ADMIN — Medication 25 GRAM(S): at 08:24

## 2024-03-06 RX ADMIN — DEXMEDETOMIDINE HYDROCHLORIDE IN 0.9% SODIUM CHLORIDE 9.14 MICROGRAM(S)/KG/HR: 4 INJECTION INTRAVENOUS at 23:59

## 2024-03-06 RX ADMIN — Medication 100 MILLIGRAM(S): at 22:25

## 2024-03-06 RX ADMIN — Medication 500000 UNIT(S): at 12:00

## 2024-03-06 RX ADMIN — Medication 5 MILLIGRAM(S): at 23:32

## 2024-03-06 RX ADMIN — HEPARIN SODIUM 5000 UNIT(S): 5000 INJECTION INTRAVENOUS; SUBCUTANEOUS at 17:07

## 2024-03-06 RX ADMIN — QUETIAPINE FUMARATE 25 MILLIGRAM(S): 200 TABLET, FILM COATED ORAL at 23:59

## 2024-03-06 RX ADMIN — Medication 25 MILLIGRAM(S): at 05:50

## 2024-03-06 RX ADMIN — LACTULOSE 10 GRAM(S): 10 SOLUTION ORAL at 10:00

## 2024-03-06 RX ADMIN — Medication 650 MILLIGRAM(S): at 10:00

## 2024-03-06 RX ADMIN — Medication 400 MILLIGRAM(S): at 23:59

## 2024-03-06 RX ADMIN — DEXMEDETOMIDINE HYDROCHLORIDE IN 0.9% SODIUM CHLORIDE 0.99 MICROGRAM(S)/KG/HR: 4 INJECTION INTRAVENOUS at 14:02

## 2024-03-06 RX ADMIN — LACTULOSE 10 GRAM(S): 10 SOLUTION ORAL at 08:24

## 2024-03-06 RX ADMIN — LACTULOSE 10 GRAM(S): 10 SOLUTION ORAL at 12:00

## 2024-03-06 RX ADMIN — PANTOPRAZOLE SODIUM 40 MILLIGRAM(S): 20 TABLET, DELAYED RELEASE ORAL at 05:50

## 2024-03-06 RX ADMIN — Medication 100 MILLIGRAM(S): at 06:06

## 2024-03-06 RX ADMIN — CHLORHEXIDINE GLUCONATE 15 MILLILITER(S): 213 SOLUTION TOPICAL at 05:51

## 2024-03-06 RX ADMIN — Medication 100 MILLIGRAM(S): at 14:02

## 2024-03-06 RX ADMIN — Medication 500000 UNIT(S): at 05:51

## 2024-03-06 RX ADMIN — POLYETHYLENE GLYCOL 3350 17 GRAM(S): 17 POWDER, FOR SOLUTION ORAL at 06:06

## 2024-03-06 RX ADMIN — CHLORHEXIDINE GLUCONATE 1 APPLICATION(S): 213 SOLUTION TOPICAL at 12:00

## 2024-03-06 RX ADMIN — OLANZAPINE 5 MILLIGRAM(S): 15 TABLET, FILM COATED ORAL at 10:15

## 2024-03-06 NOTE — PROGRESS NOTE ADULT - SUBJECTIVE AND OBJECTIVE BOX
SUBJECTIVE / OVERNIGHT EVENTS  Patient was seen and examined. Patient is intubated and sedated on propofol. Had fever with a decrease in BP yesterday for which he received multiple boluses of fluids. Currently on 75cc/hr LR. Had 2 large bowel movements yesterday     MEDICATIONS  albuterol/ipratropium for Nebulization 3 milliLiter(s) Nebulizer every 6 hours  ascorbic acid 500 milliGRAM(s) Oral daily  cefepime   IVPB 2000 milliGRAM(s) IV Intermittent every 8 hours  chlorhexidine 0.12% Liquid 15 milliLiter(s) Oral Mucosa two times a day  chlorhexidine 2% Cloths 1 Application(s) Topical daily  dexMEDEtomidine Infusion 0.054 MICROgram(s)/kG/Hr IV Continuous <Continuous>  dextrose 5% + lactated ringers. 1000 milliLiter(s) IV Continuous <Continuous>  folic acid 1 milliGRAM(s) Oral daily  heparin   Injectable 5000 Unit(s) SubCutaneous every 12 hours  influenza   Vaccine 0.5 milliLiter(s) IntraMuscular once  lactulose Syrup 10 Gram(s) Oral every 2 hours  methimazole 5 milliGRAM(s) Oral daily  metoprolol tartrate 50 milliGRAM(s) Oral two times a day  metroNIDAZOLE  IVPB 500 milliGRAM(s) IV Intermittent every 8 hours  multivitamin 1 Tablet(s) Oral daily  nystatin    Suspension 688041 Unit(s) Oral four times a day  OLANZapine Injectable 5 milliGRAM(s) IntraMuscular once  pantoprazole   Suspension 40 milliGRAM(s) Oral before breakfast  polyethylene glycol 3350 17 Gram(s) Oral two times a day  propofol Infusion 10 MICROgram(s)/kG/Min IV Continuous <Continuous>  QUEtiapine 25 milliGRAM(s) Oral at bedtime  senna 2 Tablet(s) Oral at bedtime    haloperidol    Injectable 5 milliGRAM(s) IntraMuscular every 6 hours PRN Agitation  LORazepam   Injectable 1 milliGRAM(s) IV Push every 2 hours PRN Symptom-triggered: each CIWA -Ar score 8 or GREATER    VITALS /  EXAM    T(C): 37.9 (03-06-24 @ 04:00), Max: 38.7 (03-05-24 @ 14:00)  HR: 101 (03-06-24 @ 07:00) (56 - 164)  BP: 149/94 (03-06-24 @ 07:00) (78/53 - 187/113)  RR: 28 (03-06-24 @ 07:00) (16 - 37)  SpO2: 100% (03-06-24 @ 07:00) (91% - 100%)    Constitutional: no acute distress, well nourished well developed  Neuro: moving all 4 limbs spontaneously.  Sedated.  HEENT: NCAT, anicteric, ETT in place  Neck: no visible lymphadenopathy or goiter  Pulm: synchronous with ventilator, coarse bilateral breath sounds anteriorly  Cardiac: extremities appear pink and well-perfused.  regular rhythm and rate, no murmur detected  Abdomen: non-distended  Extremities: trace dependent edema  Skin: no visible rashes or lesions    I's & O's     03-05-24 @ 07:01  -  03-06-24 @ 07:00  --------------------------------------------------------  IN:    Dexmedetomidine: 123.3 mL    dextrose 5% + lactated ringers: 1050 mL    Enteral Tube Flush: 125 mL    FentaNYL: 17.6 mL    IV PiggyBack: 650 mL    Lactated Ringers Bolus: 500 mL    Propofol: 16 mL    Propofol: 240.9 mL  Total IN: 2722.8 mL    OUT:    Indwelling Catheter - Urethral (mL): 825 mL    Nasogastric/Oral tube (mL): 450 mL  Total OUT: 1275 mL    Total NET: 1447.8 mL        LABS             13.3   10.91 )-----------( 108      ( 03-06-24 @ 05:45 )             39.4     139  |  102  |  14  -------------------------<  92   03-06-24 @ 05:45  3.5  |  29  |  0.5    Ca      8.0     03-06-24 @ 05:45  Phos   2.9     03-06-24 @ 05:45  Mg     1.7     03-06-24 @ 05:45    TPro  5.0  /  Alb  2.6  /  TBili  0.9  /  DBili  x   /  AST  78  /  ALT  56  /  AlkPhos  123  /  GGT  x     03-05-24 @ 05:30                    Urinalysis Basic - ( 06 Mar 2024 05:45 )    Color: x / Appearance: x / SG: x / pH: x  Gluc: 92 mg/dL / Ketone: x  / Bili: x / Urobili: x   Blood: x / Protein: x / Nitrite: x   Leuk Esterase: x / RBC: x / WBC x   Sq Epi: x / Non Sq Epi: x / Bacteria: x      MICRO / IMAGING / CARDIOLOGY  Telemetry: Reviewed   EKG: Reviewed    CULTURES    Culture - Blood (collected 03-04-24 @ 01:30)  Source: .Blood Blood  Preliminary Report:    No growth at 48 Hours    Culture - Blood (collected 03-04-24 @ 01:17)  Source: .Blood Blood  Preliminary Report:    No growth at 48 Hours    Culture - Sputum (collected 03-03-24 @ 15:05)  Source: .Sputum Sputum  Gram Stain:    Numerous polymorphonuclear leukocytes per low power field    Rare Squamous epithelial cells per low power field    Rare Gram positive cocci in pairs per oil power field  Final Report:    No growth    Culture - Blood (collected 03-02-24 @ 12:14)  Source: .Blood None  Preliminary Report:    No growth at 72 Hours      IMAGING  PACS Image:  (03-06-24 @ 06:08)  PACS Image:  (03-05-24 @ 22:55)  PACS Image:  (03-05-24 @ 09:19)  PACS Image:  (03-05-24 @ 06:10)    CARDIOLOGY

## 2024-03-06 NOTE — PROGRESS NOTE ADULT - SUBJECTIVE AND OBJECTIVE BOX
Patient is a 63y old  Male who presents with a chief complaint of chest pain and SOB (05 Mar 2024 11:25)        Over Night Events:    CT a/p w/ distended colon and possible colitis  SAT failed yesterday for agitation  transitioned to propofol from precedex for possible drug fever  2x large BM    ROS:     Unable to assess ROS: patient intubated.        PHYSICAL EXAM    ICU Vital Signs Last 24 Hrs  T(C): 37.9 (06 Mar 2024 04:00), Max: 38.7 (05 Mar 2024 14:00)  T(F): 100.3 (06 Mar 2024 04:00), Max: 101.6 (05 Mar 2024 14:00)  HR: 101 (06 Mar 2024 07:00) (56 - 164)  BP: 149/94 (06 Mar 2024 07:00) (78/53 - 187/113)  BP(mean): 115 (06 Mar 2024 06:00) (61 - 140)  ABP: --  ABP(mean): --  RR: 28 (06 Mar 2024 07:00) (16 - 37)  SpO2: 100% (06 Mar 2024 07:00) (91% - 100%)    O2 Parameters below as of 06 Mar 2024 07:00  Patient On (Oxygen Delivery Method): ventilator    O2 Concentration (%): 40      Constitutional: no acute distress, well nourished well developed  Neuro: moving all 4 limbs spontaneously.  Sedated.  HEENT: NCAT, anicteric, ETT in place  Neck: no visible lymphadenopathy or goiter  Pulm: synchronous with ventilator, coarse bilateral breath sounds anteriorly  Cardiac: extremities appear pink and well-perfused.  regular rhythm and rate, no murmur detected  Abdomen: non-distended  Extremities: trace dependent edema  Skin: no visible rashes or lesions        03-05-24 @ 07:01  -  03-06-24 @ 07:00  --------------------------------------------------------  IN:    Dexmedetomidine: 123.3 mL    dextrose 5% + lactated ringers: 1050 mL    Enteral Tube Flush: 125 mL    FentaNYL: 17.6 mL    IV PiggyBack: 650 mL    Lactated Ringers Bolus: 500 mL    Propofol: 16 mL    Propofol: 240.9 mL  Total IN: 2722.8 mL    OUT:    Indwelling Catheter - Urethral (mL): 825 mL    Nasogastric/Oral tube (mL): 450 mL  Total OUT: 1275 mL    Total NET: 1447.8 mL          LABS:                            13.3   10.91 )-----------( 108      ( 06 Mar 2024 05:45 )             39.4                                               03-06    139  |  102  |  14  ----------------------------<  92  3.5   |  29  |  0.5<L>    Ca    8.0<L>      06 Mar 2024 05:45  Phos  2.9     03-06  Mg     1.7     03-06    TPro  5.0<L>  /  Alb  2.6<L>  /  TBili  0.9  /  DBili  x   /  AST  78<H>  /  ALT  56<H>  /  AlkPhos  123<H>  03-05                                             Urinalysis Basic - ( 06 Mar 2024 05:45 )    Color: x / Appearance: x / SG: x / pH: x  Gluc: 92 mg/dL / Ketone: x  / Bili: x / Urobili: x   Blood: x / Protein: x / Nitrite: x   Leuk Esterase: x / RBC: x / WBC x   Sq Epi: x / Non Sq Epi: x / Bacteria: x                                                  LIVER FUNCTIONS - ( 05 Mar 2024 05:30 )  Alb: 2.6 g/dL / Pro: 5.0 g/dL / ALK PHOS: 123 U/L / ALT: 56 U/L / AST: 78 U/L / GGT: x                                                  Culture - Blood (collected 04 Mar 2024 01:30)  Source: .Blood Blood  Preliminary Report (06 Mar 2024 07:02):    No growth at 48 Hours    Culture - Blood (collected 04 Mar 2024 01:17)  Source: .Blood Blood  Preliminary Report (06 Mar 2024 07:02):    No growth at 48 Hours    Culture - Sputum (collected 03 Mar 2024 15:05)  Source: .Sputum Sputum  Gram Stain (04 Mar 2024 05:45):    Numerous polymorphonuclear leukocytes per low power field    Rare Squamous epithelial cells per low power field    Rare Gram positive cocci in pairs per oil power field  Final Report (05 Mar 2024 11:06):    No growth                                                   Mode: AC/ CMV (Assist Control/ Continuous Mandatory Ventilation)  RR (machine): 16  TV (machine): 400  FiO2: 40  PEEP: 8  ITime: 1  MAP: 13  PIP: 27                                      ABG - ( 05 Mar 2024 23:32 )  pH, Arterial: 7.48  pH, Blood: x     /  pCO2: 40    /  pO2: 96    / HCO3: 30    / Base Excess: 5.8   /  SaO2: 99.0                MEDICATIONS  (STANDING):  albuterol/ipratropium for Nebulization 3 milliLiter(s) Nebulizer every 6 hours  ascorbic acid 500 milliGRAM(s) Oral daily  cefepime   IVPB 2000 milliGRAM(s) IV Intermittent every 8 hours  chlorhexidine 0.12% Liquid 15 milliLiter(s) Oral Mucosa two times a day  chlorhexidine 2% Cloths 1 Application(s) Topical daily  dexMEDEtomidine Infusion 0.054 MICROgram(s)/kG/Hr (0.99 mL/Hr) IV Continuous <Continuous>  dextrose 5% + lactated ringers. 1000 milliLiter(s) (75 mL/Hr) IV Continuous <Continuous>  fentaNYL   Infusion. 0.539 MICROgram(s)/kG/Hr (3.94 mL/Hr) IV Continuous <Continuous>  folic acid 1 milliGRAM(s) Oral daily  heparin   Injectable 5000 Unit(s) SubCutaneous every 12 hours  influenza   Vaccine 0.5 milliLiter(s) IntraMuscular once  lactulose Syrup 10 Gram(s) Oral every 2 hours  LORazepam   Injectable 0.5 milliGRAM(s) IV Push every 12 hours  LORazepam   Injectable   IV Push   methimazole 5 milliGRAM(s) Oral daily  metoprolol tartrate 25 milliGRAM(s) Oral two times a day  metroNIDAZOLE  IVPB 500 milliGRAM(s) IV Intermittent every 8 hours  multivitamin 1 Tablet(s) Oral daily  nystatin    Suspension 691372 Unit(s) Oral four times a day  pantoprazole   Suspension 40 milliGRAM(s) Oral before breakfast  polyethylene glycol 3350 17 Gram(s) Oral two times a day  propofol Infusion 10 MICROgram(s)/kG/Min (4.39 mL/Hr) IV Continuous <Continuous>  QUEtiapine 25 milliGRAM(s) Oral at bedtime  senna 2 Tablet(s) Oral at bedtime    MEDICATIONS  (PRN):  haloperidol    Injectable 5 milliGRAM(s) IntraMuscular every 6 hours PRN Agitation  LORazepam   Injectable 1 milliGRAM(s) IV Push every 2 hours PRN Symptom-triggered: each CIWA -Ar score 8 or GREATER      New X-rays reviewed:       ECHO reviewed    CXR interpreted by me:      3/6  images and radiologist read reviewed, by my read demonstrating clear lung fields bilaterally with dilated bowel loops visualized

## 2024-03-06 NOTE — PROGRESS NOTE ADULT - SUBJECTIVE AND OBJECTIVE BOX
SUBJ: pt extubated.      MEDICATIONS  (STANDING):  albuterol/ipratropium for Nebulization 3 milliLiter(s) Nebulizer every 6 hours  ascorbic acid 500 milliGRAM(s) Oral daily  cefepime   IVPB 2000 milliGRAM(s) IV Intermittent every 8 hours  chlorhexidine 0.12% Liquid 15 milliLiter(s) Oral Mucosa two times a day  chlorhexidine 2% Cloths 1 Application(s) Topical daily  dexMEDEtomidine Infusion 0.054 MICROgram(s)/kG/Hr (0.99 mL/Hr) IV Continuous <Continuous>  dexMEDEtomidine Infusion 0.5 MICROgram(s)/kG/Hr (9.14 mL/Hr) IV Continuous <Continuous>  dextrose 5% + lactated ringers. 1000 milliLiter(s) (75 mL/Hr) IV Continuous <Continuous>  folic acid 1 milliGRAM(s) Oral daily  heparin   Injectable 5000 Unit(s) SubCutaneous every 12 hours  influenza   Vaccine 0.5 milliLiter(s) IntraMuscular once  lactulose Syrup 10 Gram(s) Oral every 2 hours  methimazole 5 milliGRAM(s) Oral daily  metoprolol tartrate 75 milliGRAM(s) Oral two times a day  metroNIDAZOLE  IVPB 500 milliGRAM(s) IV Intermittent every 8 hours  multivitamin 1 Tablet(s) Oral daily  nystatin    Suspension 833195 Unit(s) Oral four times a day  pantoprazole   Suspension 40 milliGRAM(s) Oral before breakfast  polyethylene glycol 3350 17 Gram(s) Oral two times a day  propofol Infusion 10 MICROgram(s)/kG/Min (4.39 mL/Hr) IV Continuous <Continuous>  QUEtiapine 25 milliGRAM(s) Oral at bedtime  senna 2 Tablet(s) Oral at bedtime    MEDICATIONS  (PRN):  haloperidol    Injectable 5 milliGRAM(s) IntraMuscular every 6 hours PRN Agitation  LORazepam   Injectable 1 milliGRAM(s) IV Push every 2 hours PRN Symptom-triggered: each CIWA -Ar score 8 or GREATER  metoprolol tartrate Injectable 5 milliGRAM(s) IV Push every 6 hours PRN HR above 120            Vital Signs Last 24 Hrs  T(C): 37.8 (06 Mar 2024 16:00), Max: 38.6 (06 Mar 2024 08:00)  T(F): 100 (06 Mar 2024 16:00), Max: 101.5 (06 Mar 2024 12:00)  HR: 102 (06 Mar 2024 18:00) (81 - 143)  BP: 131/67 (06 Mar 2024 18:00) (96/60 - 157/94)  BP(mean): 93 (06 Mar 2024 18:00) (73 - 118)  RR: 34 (06 Mar 2024 18:00) (17 - 35)  SpO2: 99% (06 Mar 2024 18:00) (94% - 100%)    Parameters below as of 06 Mar 2024 07:00  Patient On (Oxygen Delivery Method): ventilator    O2 Concentration (%): 40      ECG:NML    TTE:    LABS:                        13.3   10.91 )-----------( 108      ( 06 Mar 2024 05:45 )             39.4     03-06    139  |  102  |  14  ----------------------------<  92  3.5   |  29  |  0.5<L>    Ca    8.0<L>      06 Mar 2024 05:45  Phos  2.9     03-06  Mg     1.7     03-06    TPro  5.0<L>  /  Alb  2.6<L>  /  TBili  0.9  /  DBili  x   /  AST  78<H>  /  ALT  56<H>  /  AlkPhos  123<H>  03-05            I&O's Summary    05 Mar 2024 07:01  -  06 Mar 2024 07:00  --------------------------------------------------------  IN: 2722.8 mL / OUT: 1325 mL / NET: 1397.8 mL    06 Mar 2024 07:01  -  06 Mar 2024 19:51  --------------------------------------------------------  IN: 817.5 mL / OUT: 60 mL / NET: 757.5 mL      BNP

## 2024-03-06 NOTE — PROGRESS NOTE ADULT - ASSESSMENT
IMPRESSION:  Acute respiratory failure  Possible aspiration right lower lobe  possible seizures   Severe alcohol withdrawal   Alcohol abuse  COPD exacerbation  Hyperthyroidism     PLAN:    CNS:  Thiamine, folate, CIWA-driven PRN benzo, CATCH team, wean precedex, ativan taper complete.  EEG w/o s/s of seizure  Sedation: Propofol, haldol PRN agitation  SAT daily, zyprexa 5mg IM prior to SAT    HEENT: Oral care.  ETT care.  ETT day #6    PULMONARY:  HOB @ 45 degrees.   No change in vent   Nebs q4hrs and PRN,   O2 supplemental target Spo2 88-92%.   SBT daily.    CARDIOVASCULAR: patient very net (+), d/c IVF.   Return to prior BB dose, after d/c precedex,   Appreciate EP recommendations    GI: GI prophylaxis.  Feeding as tolerated.  Bowel regimen to increase today.    Colitis on CT    RENAL:  Follow up lytes.  Correct as needed.  TOV today.    INFECTIOUS DISEASE: Follow up cultures. Give Cefepime/Flagyl for now.  check procal, consult ID  Repeat cultures PRN fever    HEMATOLOGICAL:  DVT prophylaxis w/ heparin SQ.     ENDOCRINE:  Follow up FS.  Insulin protocol if needed. CW methimazole.    MUSCULOSKELETAL: Bed in chair position, PT/OT    DISPO: MICU   LINES: PIV, brambila.  CODE: FULL

## 2024-03-06 NOTE — PROGRESS NOTE ADULT - ASSESSMENT
63 year old man with a pmh of COPD, alcohol use disorder and hyperthyroidism presented for palpitation. Upgraded for hypoxemic respiratory failure    #Possible Alcohol withdrawal   #Alcohol intoxication (alcohol level in blood >200)   - Lactate 4 on VBGs -> repeat 1.4 (after fluids)  - trop 11, pro-BNP 46   - EKG showed sinus tachycardia   -Patient is on Ativan 0.5 mg IV taper q12 and Ativan 2 mg q 2 Hours PRN (got 2 doses yesterday)  - On Seroquel 25 mg during the day and 50 mg during the night for agitation  - Propofol for sedation and Seroquel 25 mg for agitation    - On PRN Ativan to 1mg q2 and Ativan taper. Didn't require any yesterday. CIWA around 3  - RVP negative  - was on 70 cc/hour LR.   - TSH 0.01 low, fT4 1.8. Repeat TSH in 6 weeks   - CATCH team danyel, pt willing to stop   - On thiamine and folate   -  CT head : No evidence of acute transcortical infarct, hydrocephalus, acute   intracranial hemorrhage, or mass effect.  -EEG: No epileptiform abnormalities were recorded.  -replete Mg and K as needed  - Try to Wean off sedatives. Zyprexa 5 mg for agitation  - SAT/SBT today    #Constipation  #Fever   #Possible colitis   - Patient had fevers yesterday with a a drop in BP. Given multiple boluses of fluids  -Blood cultures taken. Patient placed on cefepime and metronidazole  - CT Abdomen/pelvis with oral contrast showed distended column with possible signs of colitis  - Patient had 2 large bowel movement  - Was placed on Miralax BID, Senna and Lactulose 10 mg twice daily for constipation. Holding  - cw Abx  - f/u cultures  - f/u ID regarding duration of Abx      # Questionable A-fib. After review most likely sinus tachycardia  - Dr Johnson saw the patient. Continue rate control with metoprolol as per EP. Highly unlikely that patient has A-fib  - Patient takes metoprolol 75 mg as an outpatient  - Decrease metoprolol tartrate to 25 mg BID    # Right lower lobe consolidation- resolved  -CXR right LLO  - Was on Rocephin 1 g q 24 for 3 days then switched to cefepime due to colitis  - RVP negative  - Sputum culture showed rare gram positive cocci in pairs  - blood cultures showed no growth so far    #Transaminitis   - , ALT 85,   - Secondary to alcohol use    #HTN  - not on meds   -hold bb for now  - monitor BP     #DVT ppx: lovenox   #GI ppx: protonix   #Diet: feeds via OGT  #Activity: as tolerated   #Full code

## 2024-03-07 LAB
ALBUMIN SERPL ELPH-MCNC: 2.4 G/DL — LOW (ref 3.5–5.2)
ALP SERPL-CCNC: 103 U/L — SIGNIFICANT CHANGE UP (ref 30–115)
ALT FLD-CCNC: 48 U/L — HIGH (ref 0–41)
ANION GAP SERPL CALC-SCNC: 9 MMOL/L — SIGNIFICANT CHANGE UP (ref 7–14)
AST SERPL-CCNC: 48 U/L — HIGH (ref 0–41)
BILIRUB SERPL-MCNC: 1.5 MG/DL — HIGH (ref 0.2–1.2)
BUN SERPL-MCNC: 9 MG/DL — LOW (ref 10–20)
CALCIUM SERPL-MCNC: 7.5 MG/DL — LOW (ref 8.4–10.5)
CHLORIDE SERPL-SCNC: 106 MMOL/L — SIGNIFICANT CHANGE UP (ref 98–110)
CO2 SERPL-SCNC: 25 MMOL/L — SIGNIFICANT CHANGE UP (ref 17–32)
CREAT SERPL-MCNC: 0.5 MG/DL — LOW (ref 0.7–1.5)
CULTURE RESULTS: SIGNIFICANT CHANGE UP
EGFR: 115 ML/MIN/1.73M2 — SIGNIFICANT CHANGE UP
GLUCOSE SERPL-MCNC: 119 MG/DL — HIGH (ref 70–99)
HCT VFR BLD CALC: 35 % — LOW (ref 42–52)
HGB BLD-MCNC: 12.3 G/DL — LOW (ref 14–18)
MAGNESIUM SERPL-MCNC: 1.8 MG/DL — SIGNIFICANT CHANGE UP (ref 1.8–2.4)
MCHC RBC-ENTMCNC: 35.1 G/DL — SIGNIFICANT CHANGE UP (ref 32–37)
MCHC RBC-ENTMCNC: 35.1 PG — HIGH (ref 27–31)
MCV RBC AUTO: 100 FL — HIGH (ref 80–94)
NRBC # BLD: 0 /100 WBCS — SIGNIFICANT CHANGE UP (ref 0–0)
PLATELET # BLD AUTO: 167 K/UL — SIGNIFICANT CHANGE UP (ref 130–400)
PMV BLD: 11.5 FL — HIGH (ref 7.4–10.4)
POTASSIUM SERPL-MCNC: 3.5 MMOL/L — SIGNIFICANT CHANGE UP (ref 3.5–5)
POTASSIUM SERPL-SCNC: 3.5 MMOL/L — SIGNIFICANT CHANGE UP (ref 3.5–5)
PROT SERPL-MCNC: 4.8 G/DL — LOW (ref 6–8)
RBC # BLD: 3.5 M/UL — LOW (ref 4.7–6.1)
RBC # FLD: 11.2 % — LOW (ref 11.5–14.5)
SODIUM SERPL-SCNC: 140 MMOL/L — SIGNIFICANT CHANGE UP (ref 135–146)
SPECIMEN SOURCE: SIGNIFICANT CHANGE UP
WBC # BLD: 9.81 K/UL — SIGNIFICANT CHANGE UP (ref 4.8–10.8)
WBC # FLD AUTO: 9.81 K/UL — SIGNIFICANT CHANGE UP (ref 4.8–10.8)

## 2024-03-07 PROCEDURE — 74018 RADEX ABDOMEN 1 VIEW: CPT | Mod: 26

## 2024-03-07 PROCEDURE — 99221 1ST HOSP IP/OBS SF/LOW 40: CPT

## 2024-03-07 PROCEDURE — 99291 CRITICAL CARE FIRST HOUR: CPT | Mod: GC

## 2024-03-07 PROCEDURE — 71045 X-RAY EXAM CHEST 1 VIEW: CPT | Mod: 26

## 2024-03-07 RX ORDER — SODIUM CHLORIDE 9 MG/ML
4 INJECTION INTRAMUSCULAR; INTRAVENOUS; SUBCUTANEOUS EVERY 12 HOURS
Refills: 0 | Status: DISCONTINUED | OUTPATIENT
Start: 2024-03-07 | End: 2024-03-14

## 2024-03-07 RX ORDER — POTASSIUM CHLORIDE 20 MEQ
20 PACKET (EA) ORAL ONCE
Refills: 0 | Status: COMPLETED | OUTPATIENT
Start: 2024-03-07 | End: 2024-03-07

## 2024-03-07 RX ORDER — METOPROLOL TARTRATE 50 MG
75 TABLET ORAL
Refills: 0 | Status: DISCONTINUED | OUTPATIENT
Start: 2024-03-07 | End: 2024-03-14

## 2024-03-07 RX ORDER — IPRATROPIUM/ALBUTEROL SULFATE 18-103MCG
3 AEROSOL WITH ADAPTER (GRAM) INHALATION EVERY 6 HOURS
Refills: 0 | Status: DISCONTINUED | OUTPATIENT
Start: 2024-03-07 | End: 2024-03-08

## 2024-03-07 RX ORDER — HALOPERIDOL DECANOATE 100 MG/ML
2.5 INJECTION INTRAMUSCULAR EVERY 8 HOURS
Refills: 0 | Status: DISCONTINUED | OUTPATIENT
Start: 2024-03-07 | End: 2024-03-08

## 2024-03-07 RX ORDER — METRONIDAZOLE 500 MG
500 TABLET ORAL EVERY 8 HOURS
Refills: 0 | Status: DISCONTINUED | OUTPATIENT
Start: 2024-03-07 | End: 2024-03-11

## 2024-03-07 RX ADMIN — Medication 600 MILLIGRAM(S): at 17:24

## 2024-03-07 RX ADMIN — Medication 100 MILLIGRAM(S): at 05:21

## 2024-03-07 RX ADMIN — Medication 500000 UNIT(S): at 13:10

## 2024-03-07 RX ADMIN — Medication 1 TABLET(S): at 13:10

## 2024-03-07 RX ADMIN — HEPARIN SODIUM 5000 UNIT(S): 5000 INJECTION INTRAVENOUS; SUBCUTANEOUS at 05:22

## 2024-03-07 RX ADMIN — Medication 100 MILLIGRAM(S): at 13:11

## 2024-03-07 RX ADMIN — HEPARIN SODIUM 5000 UNIT(S): 5000 INJECTION INTRAVENOUS; SUBCUTANEOUS at 17:23

## 2024-03-07 RX ADMIN — Medication 75 MILLIGRAM(S): at 15:01

## 2024-03-07 RX ADMIN — CEFEPIME 100 MILLIGRAM(S): 1 INJECTION, POWDER, FOR SOLUTION INTRAMUSCULAR; INTRAVENOUS at 23:02

## 2024-03-07 RX ADMIN — Medication 500 MILLIGRAM(S): at 13:11

## 2024-03-07 RX ADMIN — Medication 1000 MILLIGRAM(S): at 00:00

## 2024-03-07 RX ADMIN — HALOPERIDOL DECANOATE 2.5 MILLIGRAM(S): 100 INJECTION INTRAMUSCULAR at 13:07

## 2024-03-07 RX ADMIN — CEFEPIME 100 MILLIGRAM(S): 1 INJECTION, POWDER, FOR SOLUTION INTRAMUSCULAR; INTRAVENOUS at 13:07

## 2024-03-07 RX ADMIN — Medication 3 MILLILITER(S): at 08:22

## 2024-03-07 RX ADMIN — DEXMEDETOMIDINE HYDROCHLORIDE IN 0.9% SODIUM CHLORIDE 9.14 MICROGRAM(S)/KG/HR: 4 INJECTION INTRAVENOUS at 05:21

## 2024-03-07 RX ADMIN — Medication 1 MILLIGRAM(S): at 13:10

## 2024-03-07 RX ADMIN — Medication 3 MILLILITER(S): at 21:17

## 2024-03-07 RX ADMIN — CHLORHEXIDINE GLUCONATE 1 APPLICATION(S): 213 SOLUTION TOPICAL at 13:10

## 2024-03-07 RX ADMIN — Medication 500000 UNIT(S): at 17:25

## 2024-03-07 RX ADMIN — DEXMEDETOMIDINE HYDROCHLORIDE IN 0.9% SODIUM CHLORIDE 9.14 MICROGRAM(S)/KG/HR: 4 INJECTION INTRAVENOUS at 23:02

## 2024-03-07 RX ADMIN — PANTOPRAZOLE SODIUM 40 MILLIGRAM(S): 20 TABLET, DELAYED RELEASE ORAL at 05:23

## 2024-03-07 RX ADMIN — CEFEPIME 100 MILLIGRAM(S): 1 INJECTION, POWDER, FOR SOLUTION INTRAMUSCULAR; INTRAVENOUS at 05:21

## 2024-03-07 RX ADMIN — Medication 3 MILLILITER(S): at 13:57

## 2024-03-07 RX ADMIN — SODIUM CHLORIDE 4 MILLILITER(S): 9 INJECTION INTRAMUSCULAR; INTRAVENOUS; SUBCUTANEOUS at 22:21

## 2024-03-07 RX ADMIN — QUETIAPINE FUMARATE 25 MILLIGRAM(S): 200 TABLET, FILM COATED ORAL at 23:02

## 2024-03-07 RX ADMIN — Medication 50 MILLIEQUIVALENT(S): at 17:23

## 2024-03-07 RX ADMIN — Medication 500 MILLIGRAM(S): at 23:58

## 2024-03-07 NOTE — CONSULT NOTE ADULT - SUBJECTIVE AND OBJECTIVE BOX
MATT MACDONALD  63y, Male  Allergy: No Known Allergies      CHIEF COMPLAINT:   chest pain and SOB (07 Mar 2024 08:24)      LOS  7d    HPI  HPI:  63 years old male history of alcohol abuse, hypertension, COPD? ( heavy smoker. acc to pt had PFTs done and uses inhalers at home ), recently diagnosed with hyperthyroidism few weeks ago and started on methimazole 5 mg by PMD presents complaining of palpitations and exertional shortness of breath over the past few weeks.   At my encounter family not at bedside, acc to pt his only complaint is palpitaiosn, His HR was ranging 120-130 at home. No chest pain. He has cough and mild SOB which he attributes to smoking 1 pack daily. Otherwise he describes himself as very healthy. Pt is tremelous and restless. He admitted to drinking alcohol  (15-20 drinks of liquor daily). Last drink was earlier this evening.  Patient also reports he does want to stop drinking and try to cut down his alcohol use.    As per family, patient was seen by his cardiologist Dr. Johnson who increased that his metoprolol to 75 mg twice a day which did not improve his heart rate.  Patient had outpatient echocardiogram yesterday and noted to be tachycardic so he was recommended to come to ED for evaluation.  Otherwise denies fever, chills, recent illness, coughing, chest pain, abdominal pain, diarrhea or urinary symptoms.  Denies drug use.    Vitals: T 98.2, , /90, spo2 94% on RA   Labs remarkable for elevated ALT AST, Mg 1.5  Lactate 4 on VBGs   Alcohol level 202   CXR clear   EKG showed sinus tachycardia     s/p valium and ativan in ED  s/p 1 L NS, magnesium   HR improved from 130 to 90s  (29 Feb 2024 01:57)      INFECTIOUS DISEASE HISTORY:  Pt admitted on 2/29, prolonged, complicated hospital course. ID consulted on 3/7 for fever   Pt febrile 3/3 Tm 101.1, 3/4 Tm 100.9, Tm 3/6 101.5  Normo/Hypertensive during fevers    3/5 BCX NGTD   3/4 BCX NGTD   3/4 UA without significant pyuria 2  3/3 sputum NG  3/2 BCX NGTD   RVP NEGATIVE     Currently ordered for:  cefepime   IVPB 2000 milliGRAM(s) IV Intermittent every 8 hours  metroNIDAZOLE  IVPB 500 milliGRAM(s) IV Intermittent every 8 hours  nystatin    Suspension 209419 Unit(s) Oral four times a day      PMH  PAST MEDICAL & SURGICAL HISTORY:  HTN (hypertension)      Alcohol abuse      Hyperthyroidism          FAMILY HISTORY  unable to obtain history secondary to patient's mental status and/or sedation     SOCIAL HISTORY  Social History: smoker, ETOH         ROS  ***    VITALS:  T(F): 96.5, Max: 101.5 (03-06-24 @ 12:00)  HR: 65  BP: 158/66  RR: 31Vital Signs Last 24 Hrs  T(C): 35.8 (07 Mar 2024 08:00), Max: 38.6 (06 Mar 2024 12:00)  T(F): 96.5 (07 Mar 2024 08:00), Max: 101.5 (06 Mar 2024 12:00)  HR: 65 (07 Mar 2024 09:00) (49 - 143)  BP: 158/66 (07 Mar 2024 09:00) (96/60 - 161/85)  BP(mean): 99 (07 Mar 2024 09:00) (73 - 114)  RR: 31 (07 Mar 2024 09:00) (25 - 40)  SpO2: 97% (07 Mar 2024 09:00) (94% - 100%)    Parameters below as of 07 Mar 2024 09:00  Patient On (Oxygen Delivery Method): nasal cannula  O2 Flow (L/min): 8      PHYSICAL EXAM:  ***    TESTS & MEASUREMENTS:                        12.3   9.81  )-----------( 167      ( 07 Mar 2024 05:05 )             35.0     03-07    140  |  106  |  9<L>  ----------------------------<  119<H>  3.5   |  25  |  0.5<L>    Ca    7.5<L>      07 Mar 2024 05:05  Phos  2.9     03-06  Mg     1.8     03-07    TPro  4.8<L>  /  Alb  2.4<L>  /  TBili  1.5<H>  /  DBili  x   /  AST  48<H>  /  ALT  48<H>  /  AlkPhos  103  03-07      LIVER FUNCTIONS - ( 07 Mar 2024 05:05 )  Alb: 2.4 g/dL / Pro: 4.8 g/dL / ALK PHOS: 103 U/L / ALT: 48 U/L / AST: 48 U/L / GGT: x           Urinalysis Basic - ( 07 Mar 2024 05:05 )    Color: x / Appearance: x / SG: x / pH: x  Gluc: 119 mg/dL / Ketone: x  / Bili: x / Urobili: x   Blood: x / Protein: x / Nitrite: x   Leuk Esterase: x / RBC: x / WBC x   Sq Epi: x / Non Sq Epi: x / Bacteria: x        Culture - Blood (collected 03-05-24 @ 18:30)  Source: .Blood Blood  Preliminary Report (03-06-24 @ 23:02):    No growth at 24 hours    Culture - Blood (collected 03-05-24 @ 16:13)  Source: .Blood Blood  Preliminary Report (03-06-24 @ 23:02):    No growth at 24 hours    Culture - Blood (collected 03-04-24 @ 01:30)  Source: .Blood Blood  Preliminary Report (03-07-24 @ 07:01):    No growth at 72 Hours    Culture - Blood (collected 03-04-24 @ 01:17)  Source: .Blood Blood  Preliminary Report (03-07-24 @ 07:01):    No growth at 72 Hours    Culture - Sputum (collected 03-03-24 @ 15:05)  Source: .Sputum Sputum  Gram Stain (03-04-24 @ 05:45):    Numerous polymorphonuclear leukocytes per low power field    Rare Squamous epithelial cells per low power field    Rare Gram positive cocci in pairs per oil power field  Final Report (03-05-24 @ 11:06):    No growth    Culture - Blood (collected 03-02-24 @ 12:14)  Source: .Blood None  Preliminary Report (03-06-24 @ 21:00):    No growth at 4 days        Lactate, Blood: 1.6 mmol/L (03-03-24 @ 00:45)      INFECTIOUS DISEASES TESTING  Rapid RVP Result: NotDetec (03-05-24 @ 15:30)  Procalcitonin, Serum: 0.18 ng/mL (03-03-24 @ 10:40)  MRSA PCR Result.: Negative (03-02-24 @ 02:30)      INFLAMMATORY MARKERS      RADIOLOGY & ADDITIONAL TESTS:  I have personally reviewed the last Chest xray  CXR  Xray Chest 1 View- PORTABLE-Urgent:   ACC: [id number] EXAM:  XR CHEST PORTABLE URGENT 1V   ORDERED BY: NINA FERRER     PROCEDURE DATE:  03/06/2024          INTERPRETATION:  STUDY INDICATION: ngt    TECHNIQUE:  Portable frontal view of the chest obtained.    COMPARISON: 3/6/2024    FINDINGS/  IMPRESSION:    NG tube at level of diaphragm with coiling in the proximal esophagus.   Recommend readjustment.    No focal consolidation, pneumothorax or pleural effusion.    Stable cardiomediastinal silhouette.    Unchanged osseous structures.    --- End of Report ---            TALHA LORENZANA MD; Attending Radiologist  This document has been electronically signed. Mar  7 2024  3:23AM (03-06-24 @ 22:31)      CT      CARDIOLOGY TESTING  12 Lead ECG:   Ventricular Rate 65 BPM    Atrial Rate 65 BPM    P-R Interval 132 ms    QRS Duration 82 ms    Q-T Interval 444 ms    QTC Calculation(Bazett) 461 ms    P Axis -18 degrees    R Axis 33 degrees    T Axis 43 degrees    Diagnosis Line Normal sinus rhythm  Septal infarct , age undetermined  Possible Lateral infarct , age undetermined  Abnormal ECG    Confirmed by Mohit Estes ([id number]) on 3/2/2024 9:41:31 AM (03-02-24 @ 05:58)  12 Lead ECG:   Ventricular Rate 147 BPM    QRS Duration 76 ms    Q-T Interval 320 ms    QTC Calculation(Bazett) 500 ms    R Axis 23 degrees    T Axis 68 degrees    Diagnosis Line Atrial fibrillation with rapid ventricular response  Septal infarct , age undetermined  Abnormal ECG    Confirmed by Mohit Estes ([id number]) on 3/1/2024 5:28:07 PM (03-01-24 @ 14:20)      MEDICATIONS  albuterol/ipratropium for Nebulization 3 Nebulizer every 6 hours  ascorbic acid 500 Oral daily  cefepime   IVPB 2000 IV Intermittent every 8 hours  chlorhexidine 2% Cloths 1 Topical daily  dexMEDEtomidine Infusion 0.5 IV Continuous <Continuous>  folic acid 1 Oral daily  heparin   Injectable 5000 SubCutaneous every 12 hours  influenza   Vaccine 0.5 IntraMuscular once  methimazole 5 Oral daily  metoprolol tartrate 75 Oral two times a day  metroNIDAZOLE  IVPB 500 IV Intermittent every 8 hours  multivitamin 1 Oral daily  nystatin    Suspension 380550 Oral four times a day  polyethylene glycol 3350 17 Oral two times a day  QUEtiapine 25 Oral at bedtime  senna 2 Oral at bedtime        ANTIBIOTICS:  cefepime   IVPB 2000 milliGRAM(s) IV Intermittent every 8 hours  metroNIDAZOLE  IVPB 500 milliGRAM(s) IV Intermittent every 8 hours  nystatin    Suspension 308587 Unit(s) Oral four times a day      ALLERGIES:  No Known Allergies         MATT MACDONALD  63y, Male  Allergy: No Known Allergies      CHIEF COMPLAINT:   chest pain and SOB (07 Mar 2024 08:24)      LOS  7d    HPI  HPI:  63 years old male history of alcohol abuse, hypertension, COPD? ( heavy smoker. acc to pt had PFTs done and uses inhalers at home ), recently diagnosed with hyperthyroidism few weeks ago and started on methimazole 5 mg by PMD presents complaining of palpitations and exertional shortness of breath over the past few weeks.   At my encounter family not at bedside, acc to pt his only complaint is palpitaiosn, His HR was ranging 120-130 at home. No chest pain. He has cough and mild SOB which he attributes to smoking 1 pack daily. Otherwise he describes himself as very healthy. Pt is tremelous and restless. He admitted to drinking alcohol  (15-20 drinks of liquor daily). Last drink was earlier this evening.  Patient also reports he does want to stop drinking and try to cut down his alcohol use.    As per family, patient was seen by his cardiologist Dr. Johnson who increased that his metoprolol to 75 mg twice a day which did not improve his heart rate.  Patient had outpatient echocardiogram yesterday and noted to be tachycardic so he was recommended to come to ED for evaluation.  Otherwise denies fever, chills, recent illness, coughing, chest pain, abdominal pain, diarrhea or urinary symptoms.  Denies drug use.    Vitals: T 98.2, , /90, spo2 94% on RA   Labs remarkable for elevated ALT AST, Mg 1.5  Lactate 4 on VBGs   Alcohol level 202   CXR clear   EKG showed sinus tachycardia     s/p valium and ativan in ED  s/p 1 L NS, magnesium   HR improved from 130 to 90s  (29 Feb 2024 01:57)      INFECTIOUS DISEASE HISTORY:  Pt admitted on 2/29, prolonged, complicated hospital course. ID consulted on 3/7 for fever   [facility]  [id number]- patient could not participate  Pt febrile 3/3 Tm 101.1, 3/4 Tm 100.9, Tm 3/6 101.5  Normo/Hypertensive during fevers    3/5 BCX NGTD   3/4 BCX NGTD   3/4 UA without significant pyuria 2  3/3 sputum NG  3/2 BCX NGTD   RVP NEGATIVE     Currently ordered for:  cefepime   IVPB 2000 milliGRAM(s) IV Intermittent every 8 hours  metroNIDAZOLE  IVPB 500 milliGRAM(s) IV Intermittent every 8 hours  nystatin    Suspension 253374 Unit(s) Oral four times a day      PMH  PAST MEDICAL & SURGICAL HISTORY:  HTN (hypertension)      Alcohol abuse      Hyperthyroidism          FAMILY HISTORY  unable to obtain history secondary to patient's mental status and/or sedation     SOCIAL HISTORY  Social History: smoker, ETOH         ROS  unable to obtain history secondary to patient's mental status and/or sedation     VITALS:  T(F): 96.5, Max: 101.5 (03-06-24 @ 12:00)  HR: 65  BP: 158/66  RR: 31Vital Signs Last 24 Hrs  T(C): 35.8 (07 Mar 2024 08:00), Max: 38.6 (06 Mar 2024 12:00)  T(F): 96.5 (07 Mar 2024 08:00), Max: 101.5 (06 Mar 2024 12:00)  HR: 65 (07 Mar 2024 09:00) (49 - 143)  BP: 158/66 (07 Mar 2024 09:00) (96/60 - 161/85)  BP(mean): 99 (07 Mar 2024 09:00) (73 - 114)  RR: 31 (07 Mar 2024 09:00) (25 - 40)  SpO2: 97% (07 Mar 2024 09:00) (94% - 100%)    Parameters below as of 07 Mar 2024 09:00  Patient On (Oxygen Delivery Method): nasal cannula  O2 Flow (L/min): 8      PHYSICAL EXAM:  Gen: chronically ill appearing   HEENT: Normocephalic, atraumatic NGT  Neck: supple, no lymphadenopathy  CV: Regular rate & regular rhythm  Lungs: decreased BS at bases, no fremitus  Abdomen: Soft, BS present  Ext: Warm, well perfused  Neuro: non focal, answering some questions  Skin: no rash, no erythema  Lines: no phlebitis     TESTS & MEASUREMENTS:                        12.3   9.81  )-----------( 167      ( 07 Mar 2024 05:05 )             35.0     03-07    140  |  106  |  9<L>  ----------------------------<  119<H>  3.5   |  25  |  0.5<L>    Ca    7.5<L>      07 Mar 2024 05:05  Phos  2.9     03-06  Mg     1.8     03-07    TPro  4.8<L>  /  Alb  2.4<L>  /  TBili  1.5<H>  /  DBili  x   /  AST  48<H>  /  ALT  48<H>  /  AlkPhos  103  03-07      LIVER FUNCTIONS - ( 07 Mar 2024 05:05 )  Alb: 2.4 g/dL / Pro: 4.8 g/dL / ALK PHOS: 103 U/L / ALT: 48 U/L / AST: 48 U/L / GGT: x           Urinalysis Basic - ( 07 Mar 2024 05:05 )    Color: x / Appearance: x / SG: x / pH: x  Gluc: 119 mg/dL / Ketone: x  / Bili: x / Urobili: x   Blood: x / Protein: x / Nitrite: x   Leuk Esterase: x / RBC: x / WBC x   Sq Epi: x / Non Sq Epi: x / Bacteria: x        Culture - Blood (collected 03-05-24 @ 18:30)  Source: .Blood Blood  Preliminary Report (03-06-24 @ 23:02):    No growth at 24 hours    Culture - Blood (collected 03-05-24 @ 16:13)  Source: .Blood Blood  Preliminary Report (03-06-24 @ 23:02):    No growth at 24 hours    Culture - Blood (collected 03-04-24 @ 01:30)  Source: .Blood Blood  Preliminary Report (03-07-24 @ 07:01):    No growth at 72 Hours    Culture - Blood (collected 03-04-24 @ 01:17)  Source: .Blood Blood  Preliminary Report (03-07-24 @ 07:01):    No growth at 72 Hours    Culture - Sputum (collected 03-03-24 @ 15:05)  Source: .Sputum Sputum  Gram Stain (03-04-24 @ 05:45):    Numerous polymorphonuclear leukocytes per low power field    Rare Squamous epithelial cells per low power field    Rare Gram positive cocci in pairs per oil power field  Final Report (03-05-24 @ 11:06):    No growth    Culture - Blood (collected 03-02-24 @ 12:14)  Source: .Blood None  Preliminary Report (03-06-24 @ 21:00):    No growth at 4 days        Lactate, Blood: 1.6 mmol/L (03-03-24 @ 00:45)      INFECTIOUS DISEASES TESTING  Rapid RVP Result: NotDetec (03-05-24 @ 15:30)  Procalcitonin, Serum: 0.18 ng/mL (03-03-24 @ 10:40)  MRSA PCR Result.: Negative (03-02-24 @ 02:30)      INFLAMMATORY MARKERS      RADIOLOGY & ADDITIONAL TESTS:  I have personally reviewed the last Chest xray  CXR  Xray Chest 1 View- PORTABLE-Urgent:   ACC: [id number] EXAM:  XR CHEST PORTABLE URGENT 1V   ORDERED BY: NINA FERRER     PROCEDURE DATE:  03/06/2024          INTERPRETATION:  STUDY INDICATION: ngt    TECHNIQUE:  Portable frontal view of the chest obtained.    COMPARISON: 3/6/2024    FINDINGS/  IMPRESSION:    NG tube at level of diaphragm with coiling in the proximal esophagus.   Recommend readjustment.    No focal consolidation, pneumothorax or pleural effusion.    Stable cardiomediastinal silhouette.    Unchanged osseous structures.    --- End of Report ---            TALHA LORENZANA MD; Attending Radiologist  This document has been electronically signed. Mar  7 2024  3:23AM (03-06-24 @ 22:31)      CT      CARDIOLOGY TESTING  12 Lead ECG:   Ventricular Rate 65 BPM    Atrial Rate 65 BPM    P-R Interval 132 ms    QRS Duration 82 ms    Q-T Interval 444 ms    QTC Calculation(Bazett) 461 ms    P Axis -18 degrees    R Axis 33 degrees    T Axis 43 degrees    Diagnosis Line Normal sinus rhythm  Septal infarct , age undetermined  Possible Lateral infarct , age undetermined  Abnormal ECG    Confirmed by Mohit Estes ([id number]) on 3/2/2024 9:41:31 AM (03-02-24 @ 05:58)  12 Lead ECG:   Ventricular Rate 147 BPM    QRS Duration 76 ms    Q-T Interval 320 ms    QTC Calculation(Bazett) 500 ms    R Axis 23 degrees    T Axis 68 degrees    Diagnosis Line Atrial fibrillation with rapid ventricular response  Septal infarct , age undetermined  Abnormal ECG    Confirmed by Mohit Estes ([id number]) on 3/1/2024 5:28:07 PM (03-01-24 @ 14:20)      MEDICATIONS  albuterol/ipratropium for Nebulization 3 Nebulizer every 6 hours  ascorbic acid 500 Oral daily  cefepime   IVPB 2000 IV Intermittent every 8 hours  chlorhexidine 2% Cloths 1 Topical daily  dexMEDEtomidine Infusion 0.5 IV Continuous <Continuous>  folic acid 1 Oral daily  heparin   Injectable 5000 SubCutaneous every 12 hours  influenza   Vaccine 0.5 IntraMuscular once  methimazole 5 Oral daily  metoprolol tartrate 75 Oral two times a day  metroNIDAZOLE  IVPB 500 IV Intermittent every 8 hours  multivitamin 1 Oral daily  nystatin    Suspension 639143 Oral four times a day  polyethylene glycol 3350 17 Oral two times a day  QUEtiapine 25 Oral at bedtime  senna 2 Oral at bedtime        ANTIBIOTICS:  cefepime   IVPB 2000 milliGRAM(s) IV Intermittent every 8 hours  metroNIDAZOLE  IVPB 500 milliGRAM(s) IV Intermittent every 8 hours  nystatin    Suspension 305223 Unit(s) Oral four times a day      ALLERGIES:  No Known Allergies

## 2024-03-07 NOTE — PROGRESS NOTE ADULT - ASSESSMENT
IMPRESSION:  Acute respiratory failure  Possible aspiration right lower lobe  possible seizures   Severe alcohol withdrawal   Alcohol abuse  COPD exacerbation  Hyperthyroidism     PLAN:    CNS:  Thiamine, folate, CIWA-driven PRN benzo completed, CATCH team, d/c precedex.  EEG w/o s/o of seizure  Haldol 2.5mg IM PRN agitation    HEENT: Oral care.     PULMONARY:  HOB @ 45 degrees.    Nebs q4hrs and PRN,   O2 supplemental target Spo2 92-96%.   Wean FiO2 HFNC as tolerated, trial NC if able today    CARDIOVASCULAR: patient very net (+), d/c IVF.   Returned to prior BB dose, after d/c precedex,   Appreciate EP recommendations    GI: GI prophylaxis d/c.  Feeding as tolerated by NGT while altered.    Bowel regimen PRN.    Colitis on CT    RENAL:  Follow up lytes.  Correct as needed.  passed kosta DAVILA d/c'ed    INFECTIOUS DISEASE:   Follow up cultures. Give Cefepime/Flagyl for now.  check procal, consult ID  Repeat cultures PRN fever    HEMATOLOGICAL:  DVT prophylaxis w/ heparin SQ.     ENDOCRINE:  Follow up FS.  Insulin protocol if needed. CW methimazole.    Consult endocrine to clarify ?hyperthyroid    MUSCULOSKELETAL: Bed in chair position, PT/OT    DISPO: MICU   LINES: PIV  CODE: FULL

## 2024-03-07 NOTE — SWALLOW BEDSIDE ASSESSMENT ADULT - SWALLOW EVAL: STRUCTURAL ABNORMALITIES
Add Ability To Document Additional Intralesional Injection: No Detail Level: Detailed Number Of Freeze-Thaw Cycles: 3 freeze-thaw cycles Anesthesia Volume In Cc: 0 Render Post-Care In The Note: Yes Size Of Lesion In Cm: 1.2 Total Time In Minutes: 0.2 minutes Medication Injected: 5-Fluorouracil none present Bill As A Line Item Or As Units: Line Item Consent was obtained from the patient. The risks and benefits to therapy were discussed in detail. Specifically, the risks of infection, scarring, bleeding, prolonged wound healing, incomplete removal, allergy to anesthesia, nerve injury and recurrence were addressed. Alternatives to liquid nitrogen, such as ED&C, surgical removal, XRT were also discussed.  Prior to the procedure, the treatment site was clearly identified and confirmed by the patient. All components of Universal Protocol/PAUSE Rule completed. Total Volume (Ccs): 1 Post-Care Instructions: I reviewed with the patient in detail post-care instructions. Patient is to keep the area dry for 48 hours, and not to engage in any heavy lifting, exercise, or swimming for the next 14 days. Should the patient develop any fevers, chills, bleeding, severe pain patient will contact the office immediately. Additional Information: (Optional): The wound was cleaned, and a dressing was applied.  The patient received detailed post-op instructions.

## 2024-03-07 NOTE — PROGRESS NOTE ADULT - SUBJECTIVE AND OBJECTIVE BOX
SUBJ:No chest pain or shortness of breath      MEDICATIONS  (STANDING):  albuterol/ipratropium for Nebulization 3 milliLiter(s) Nebulizer every 6 hours  ascorbic acid 500 milliGRAM(s) Oral daily  cefepime   IVPB 2000 milliGRAM(s) IV Intermittent every 8 hours  chlorhexidine 2% Cloths 1 Application(s) Topical daily  dexMEDEtomidine Infusion 0.5 MICROgram(s)/kG/Hr (9.14 mL/Hr) IV Continuous <Continuous>  folic acid 1 milliGRAM(s) Oral daily  guaiFENesin  milliGRAM(s) Oral every 12 hours  heparin   Injectable 5000 Unit(s) SubCutaneous every 12 hours  influenza   Vaccine 0.5 milliLiter(s) IntraMuscular once  methimazole 5 milliGRAM(s) Oral daily  metoprolol tartrate 75 milliGRAM(s) Oral two times a day  metroNIDAZOLE    Tablet 500 milliGRAM(s) Oral every 8 hours  multivitamin 1 Tablet(s) Oral daily  nystatin    Suspension 563682 Unit(s) Oral four times a day  polyethylene glycol 3350 17 Gram(s) Oral two times a day  QUEtiapine 25 milliGRAM(s) Oral at bedtime  senna 2 Tablet(s) Oral at bedtime  sodium chloride 3%  Inhalation 4 milliLiter(s) Inhalation every 12 hours    MEDICATIONS  (PRN):  albuterol/ipratropium for Nebulization 3 milliLiter(s) Nebulizer every 6 hours PRN Shortness of Breath and/or Wheezing  haloperidol    Injectable 2.5 milliGRAM(s) IntraMuscular every 8 hours PRN agitation            Vital Signs Last 24 Hrs  T(C): 37.7 (07 Mar 2024 20:00), Max: 38.2 (06 Mar 2024 23:00)  T(F): 99.9 (07 Mar 2024 20:00), Max: 100.8 (06 Mar 2024 23:00)  HR: 109 (07 Mar 2024 21:00) (49 - 126)  BP: 144/72 (07 Mar 2024 21:00) (97/67 - 180/81)  BP(mean): 100 (07 Mar 2024 21:00) (78 - 121)  RR: 30 (07 Mar 2024 21:00) (25 - 45)  SpO2: 94% (07 Mar 2024 21:00) (94% - 100%)    Parameters below as of 07 Mar 2024 22:00  Patient On (Oxygen Delivery Method): room air          ECG:NML    TTE:    LABS:                        12.3   9.81  )-----------( 167      ( 07 Mar 2024 05:05 )             35.0     03-07    140  |  106  |  9<L>  ----------------------------<  119<H>  3.5   |  25  |  0.5<L>    Ca    7.5<L>      07 Mar 2024 05:05  Phos  2.9     03-06  Mg     1.8     03-07    TPro  4.8<L>  /  Alb  2.4<L>  /  TBili  1.5<H>  /  DBili  x   /  AST  48<H>  /  ALT  48<H>  /  AlkPhos  103  03-07            I&O's Summary    06 Mar 2024 07:01  -  07 Mar 2024 07:00  --------------------------------------------------------  IN: 1666.4 mL / OUT: 170 mL / NET: 1496.4 mL    07 Mar 2024 07:01  -  07 Mar 2024 21:48  --------------------------------------------------------  IN: 397 mL / OUT: 1650 mL / NET: -1253 mL      BNP

## 2024-03-07 NOTE — PROGRESS NOTE ADULT - SUBJECTIVE AND OBJECTIVE BOX
SUBJECTIVE / OVERNIGHT EVENTS  Patient was seen and examined. Extubated yesterday and on BiPAP On Precedex Developed an episode of fever overnight.    MEDICATIONS  albuterol/ipratropium for Nebulization 3 milliLiter(s) Nebulizer every 6 hours  ascorbic acid 500 milliGRAM(s) Oral daily  cefepime   IVPB 2000 milliGRAM(s) IV Intermittent every 8 hours  chlorhexidine 2% Cloths 1 Application(s) Topical daily  dexMEDEtomidine Infusion 0.5 MICROgram(s)/kG/Hr IV Continuous <Continuous>  folic acid 1 milliGRAM(s) Oral daily  heparin   Injectable 5000 Unit(s) SubCutaneous every 12 hours  influenza   Vaccine 0.5 milliLiter(s) IntraMuscular once  methimazole 5 milliGRAM(s) Oral daily  metoprolol tartrate 75 milliGRAM(s) Oral two times a day  metroNIDAZOLE  IVPB 500 milliGRAM(s) IV Intermittent every 8 hours  multivitamin 1 Tablet(s) Oral daily  nystatin    Suspension 937529 Unit(s) Oral four times a day  polyethylene glycol 3350 17 Gram(s) Oral two times a day  QUEtiapine 25 milliGRAM(s) Oral at bedtime  senna 2 Tablet(s) Oral at bedtime    haloperidol    Injectable 2.5 milliGRAM(s) IntraMuscular every 8 hours PRN agitation    VITALS /  EXAM    T(C): 35.8 (03-07-24 @ 08:00), Max: 38.2 (03-06-24 @ 23:00)  HR: 75 (03-07-24 @ 11:00) (49 - 141)  BP: 168/87 (03-07-24 @ 11:00) (96/60 - 168/87)  RR: 30 (03-07-24 @ 11:00) (25 - 40)  SpO2: 96% (03-07-24 @ 11:00) (94% - 100%)      Constitutional: no acute distress, well nourished well developed  Neuro: moving all 4 limbs spontaneously.  Sedated.  HEENT: NCAT, anicteric, ETT in place  Neck: no visible lymphadenopathy or goiter  Pulm: synchronous with ventilator, coarse bilateral breath sounds anteriorly  Cardiac: extremities appear pink and well-perfused.  regular rhythm and rate, no murmur detected  Abdomen: non-distended  Extremities: trace dependent edema  Skin: no visible rashes or lesions    I's & O's     03-06-24 @ 07:01  -  03-07-24 @ 07:00  --------------------------------------------------------  IN:    Dexmedetomidine: 84.9 mL    Dexmedetomidine: 114 mL    dextrose 5% + lactated ringers: 450 mL    Enteral Tube Flush: 250 mL    IV PiggyBack: 700 mL    Propofol: 67.5 mL  Total IN: 1666.4 mL    OUT:    Indwelling Catheter - Urethral (mL): 60 mL    Voided (mL): 110 mL  Total OUT: 170 mL    Total NET: 1496.4 mL      03-07-24 @ 07:01  -  03-07-24 @ 12:06  --------------------------------------------------------  IN:    Dexmedetomidine: 10 mL    Jevity 1.2: 40 mL  Total IN: 50 mL    OUT:    Voided (mL): 500 mL  Total OUT: 500 mL    Total NET: -450 mL        LABS             12.3   9.81  )-----------( 167      ( 03-07-24 @ 05:05 )             35.0     140  |  106  |  9   -------------------------<  119   03-07-24 @ 05:05  3.5  |  25  |  0.5    Ca      7.5     03-07-24 @ 05:05  Phos   2.9     03-06-24 @ 05:45  Mg     1.8     03-07-24 @ 05:05    TPro  4.8  /  Alb  2.4  /  TBili  1.5  /  DBili  x   /  AST  48  /  ALT  48  /  AlkPhos  103  /  GGT  x     03-07-24 @ 05:05                    Urinalysis Basic - ( 07 Mar 2024 05:05 )    Color: x / Appearance: x / SG: x / pH: x  Gluc: 119 mg/dL / Ketone: x  / Bili: x / Urobili: x   Blood: x / Protein: x / Nitrite: x   Leuk Esterase: x / RBC: x / WBC x   Sq Epi: x / Non Sq Epi: x / Bacteria: x      MICRO / IMAGING / CARDIOLOGY  Telemetry: Reviewed   EKG: Reviewed    CULTURES    Culture - Blood (collected 03-05-24 @ 18:30)  Source: .Blood Blood  Preliminary Report:    No growth at 24 hours    Culture - Blood (collected 03-05-24 @ 16:13)  Source: .Blood Blood  Preliminary Report:    No growth at 24 hours    Culture - Blood (collected 03-04-24 @ 01:30)  Source: .Blood Blood  Preliminary Report:    No growth at 72 Hours    Culture - Blood (collected 03-04-24 @ 01:17)  Source: .Blood Blood  Preliminary Report:    No growth at 72 Hours    Culture - Sputum (collected 03-03-24 @ 15:05)  Source: .Sputum Sputum  Gram Stain:    Numerous polymorphonuclear leukocytes per low power field    Rare Squamous epithelial cells per low power field    Rare Gram positive cocci in pairs per oil power field  Final Report:    No growth    Culture - Blood (collected 03-02-24 @ 12:14)  Source: .Blood None  Preliminary Report:    No growth at 4 days      IMAGING  PACS Image:  (03-07-24 @ 06:39)  PACS Image:  (03-07-24 @ 06:38)  PACS Image:  (03-06-24 @ 23:52)  PACS Image:  (03-06-24 @ 22:31)  PACS Image:  (03-06-24 @ 22:31)  PACS Image:  (03-06-24 @ 21:23)  PACS Image:  (03-06-24 @ 20:20)  PACS Image:  (03-06-24 @ 06:08)  PACS Image:  (03-06-24 @ 06:07)  PACS Image:  (03-05-24 @ 22:55)    CARDIOLOGY

## 2024-03-07 NOTE — CONSULT NOTE ADULT - SUBJECTIVE AND OBJECTIVE BOX
HPI:  63 years old male history of alcohol abuse, hypertension, COPD? ( heavy smoker. acc to pt had PFTs done and uses inhalers at home ), recently diagnosed with hyperthyroidism few weeks ago and started on methimazole 5 mg by PMD presents complaining of palpitations and exertional shortness of breath over the past few weeks.   At my encounter family not at bedside, acc to pt his only complaint is palpitaiosn, His HR was ranging 120-130 at home. No chest pain. He has cough and mild SOB which he attributes to smoking 1 pack daily. Otherwise he describes himself as very healthy. Pt is tremelous and restless. He admitted to drinking alcohol  (15-20 drinks of liquor daily). Last drink was earlier this evening.  Patient also reports he does want to stop drinking and try to cut down his alcohol use.    As per family, patient was seen by his cardiologist Dr. Johnson who increased that his metoprolol to 75 mg twice a day which did not improve his heart rate.  Patient had outpatient echocardiogram yesterday and noted to be tachycardic so he was recommended to come to ED for evaluation.  Otherwise denies fever, chills, recent illness, coughing, chest pain, abdominal pain, diarrhea or urinary symptoms.  Denies drug use.    Vitals: T 98.2, , /90, spo2 94% on RA   Labs remarkable for elevated ALT AST, Mg 1.5  Lactate 4 on VBGs   Alcohol level 202   CXR clear   EKG showed sinus tachycardia     s/p valium and ativan in ED  s/p 1 L NS, magnesium   HR improved from 130 to 90s  (29 Feb 2024 01:57)    =======  Endocrine consulted for hyperthyroidism. Currently on methimazole 5mg QD and metoprolol 75mg BID. Transferred to ICU service for acute respiratory failure (was intubated), and [facility]  TSH- 0.01. Free T4- 1.8      PAST MEDICAL & SURGICAL HISTORY  HTN (hypertension)    Alcohol abuse    Hyperthyroidism        FAMILY HISTORY:  FAMILY HISTORY:      SOCIAL HISTORY:  []smoker  []Alcohol  []Drug    ALLERGIES:  No Known Allergies      MEDICATIONS:  MEDICATIONS  (STANDING):  albuterol/ipratropium for Nebulization 3 milliLiter(s) Nebulizer every 6 hours  ascorbic acid 500 milliGRAM(s) Oral daily  cefepime   IVPB 2000 milliGRAM(s) IV Intermittent every 8 hours  chlorhexidine 2% Cloths 1 Application(s) Topical daily  dexMEDEtomidine Infusion 0.5 MICROgram(s)/kG/Hr (9.14 mL/Hr) IV Continuous <Continuous>  folic acid 1 milliGRAM(s) Oral daily  heparin   Injectable 5000 Unit(s) SubCutaneous every 12 hours  influenza   Vaccine 0.5 milliLiter(s) IntraMuscular once  methimazole 5 milliGRAM(s) Oral daily  metoprolol tartrate 75 milliGRAM(s) Oral two times a day  metroNIDAZOLE  IVPB 500 milliGRAM(s) IV Intermittent every 8 hours  multivitamin 1 Tablet(s) Oral daily  nystatin    Suspension 760163 Unit(s) Oral four times a day  polyethylene glycol 3350 17 Gram(s) Oral two times a day  QUEtiapine 25 milliGRAM(s) Oral at bedtime  senna 2 Tablet(s) Oral at bedtime    MEDICATIONS  (PRN):  haloperidol    Injectable 2.5 milliGRAM(s) IntraMuscular every 8 hours PRN agitation      HOME MEDICATIONS:  Home Medications:  methIMAzole 5 mg oral tablet: 1 tab(s) orally once a day (05 Mar 2024 10:05)  metoprolol tartrate 25 mg oral tablet: 1 tab(s) orally once a day (05 Mar 2024 10:05)  metoprolol tartrate 50 mg oral tablet: 1 tab(s) orally once a day (at bedtime) (05 Mar 2024 10:05)      VITALS:   T(F): 96.5 (03-07 @ 08:00), Max: 101.6 (03-05 @ 14:00)  HR: 65 (03-07 @ 09:00) (49 - 164)  BP: 158/66 (03-07 @ 09:00) (78/53 - 205/117)  BP(mean): 99 (03-07 @ 09:00) (61 - 150)  RR: 31 (03-07 @ 09:00) (13 - 40)  SpO2: 97% (03-07 @ 09:00) (91% - 100%)    I&O's Summary    06 Mar 2024 07:01  -  07 Mar 2024 07:00  --------------------------------------------------------  IN: 1666.4 mL / OUT: 170 mL / NET: 1496.4 mL    07 Mar 2024 07:01  -  07 Mar 2024 11:00  --------------------------------------------------------  IN: 10 mL / OUT: 0 mL / NET: 10 mL        REVIEW OF SYSTEMS:  CONSTITUTIONAL: No weakness, fevers or chills  EYES: No visual changes  ENT: No vertigo or throat pain   NECK: No pain or stiffness  RESPIRATORY:+SOB  CARDIOVASCULAR: No chest pain or palpitations  GASTROINTESTINAL: No abdominal or epigastric pain. No nausea, vomiting, or hematemesis; No diarrhea or constipation. No melena or hematochezia.  GENITOURINARY: No Polyuria  NEUROLOGICAL:  No tremors, no Weakness or numbness  SKIN: No itching, no rashes  MSK: no joint pain    PHYSICAL EXAM:  GENERAL: Patient is awake , alert and oriented,  not in acute distress  EYES: No proptosis, no lid lag  NECK: No thyroid enlargement, no palpable nodules , no bruit  LUNGS: on HFNC  CARDIOVASCULAR: S1/S2 present, RRR , no murmurs or rubs  ABD: Soft, non-tender, non-distended, +BS  EXT: No GIFTY  SKIN: No abdominal striae  NEURO: No tremors, DTR 2+    LABS:                        12.3   9.81  )-----------( 167      ( 07 Mar 2024 05:05 )             35.0     03-07    140  |  106  |  9<L>  ----------------------------<  119<H>  3.5   |  25  |  0.5<L>    Ca    7.5<L>      07 Mar 2024 05:05  Phos  2.9     03-06  Mg     1.8     03-07    TPro  4.8<L>  /  Alb  2.4<L>  /  TBili  1.5<H>  /  DBili  x   /  AST  48<H>  /  ALT  48<H>  /  AlkPhos  103  03-07                TSH 0.01; Total T3 --; Free T4 --   HPI:  63 years old male history of alcohol abuse, hypertension, COPD? ( heavy smoker. acc to pt had PFTs done and uses inhalers at home ), recently diagnosed with hyperthyroidism few weeks ago and started on methimazole 5 mg by PMD presents complaining of palpitations and exertional shortness of breath over the past few weeks.   At my encounter family not at bedside, acc to pt his only complaint is palpitaiosn, His HR was ranging 120-130 at home. No chest pain. He has cough and mild SOB which he attributes to smoking 1 pack daily. Otherwise he describes himself as very healthy. Pt is tremelous and restless. He admitted to drinking alcohol  (15-20 drinks of liquor daily). Last drink was earlier this evening.  Patient also reports he does want to stop drinking and try to cut down his alcohol use.    As per family, patient was seen by his cardiologist Dr. Johnson who increased that his metoprolol to 75 mg twice a day which did not improve his heart rate.  Patient had outpatient echocardiogram yesterday and noted to be tachycardic so he was recommended to come to ED for evaluation.  Otherwise denies fever, chills, recent illness, coughing, chest pain, abdominal pain, diarrhea or urinary symptoms.  Denies drug use.    Vitals: T 98.2, , /90, spo2 94% on RA   Labs remarkable for elevated ALT AST, Mg 1.5  Lactate 4 on VBGs   Alcohol level 202   CXR clear   EKG showed sinus tachycardia     s/p valium and ativan in ED  s/p 1 L NS, magnesium   HR improved from 130 to 90s  (29 Feb 2024 01:57)    =======  Endocrine consulted for hyperthyroidism. Currently on methimazole 5mg QD and metoprolol 75mg BID. Transferred to ICU service for acute respiratory failure (was intubated), and [facility]  TSH- 0.01. Free T4- 1.8  History is not particularly reliable. He does endorse he was diagnosed with Grave's disease 2 weeks ago. Was seeing an endocrinologist and prescribed methimazole. He has a followup appointment in April 2024 with a surgeon? vs endocrinology. No thyroidectomy performed. No fam hx of thyroid disease or autoimmune disease.       PAST MEDICAL & SURGICAL HISTORY  HTN (hypertension)    Alcohol abuse    Hyperthyroidism        FAMILY HISTORY:  FAMILY HISTORY:      SOCIAL HISTORY:  []smoker  []Alcohol  []Drug    ALLERGIES:  No Known Allergies      MEDICATIONS:  MEDICATIONS  (STANDING):  albuterol/ipratropium for Nebulization 3 milliLiter(s) Nebulizer every 6 hours  ascorbic acid 500 milliGRAM(s) Oral daily  cefepime   IVPB 2000 milliGRAM(s) IV Intermittent every 8 hours  chlorhexidine 2% Cloths 1 Application(s) Topical daily  dexMEDEtomidine Infusion 0.5 MICROgram(s)/kG/Hr (9.14 mL/Hr) IV Continuous <Continuous>  folic acid 1 milliGRAM(s) Oral daily  heparin   Injectable 5000 Unit(s) SubCutaneous every 12 hours  influenza   Vaccine 0.5 milliLiter(s) IntraMuscular once  methimazole 5 milliGRAM(s) Oral daily  metoprolol tartrate 75 milliGRAM(s) Oral two times a day  metroNIDAZOLE  IVPB 500 milliGRAM(s) IV Intermittent every 8 hours  multivitamin 1 Tablet(s) Oral daily  nystatin    Suspension 850207 Unit(s) Oral four times a day  polyethylene glycol 3350 17 Gram(s) Oral two times a day  QUEtiapine 25 milliGRAM(s) Oral at bedtime  senna 2 Tablet(s) Oral at bedtime    MEDICATIONS  (PRN):  haloperidol    Injectable 2.5 milliGRAM(s) IntraMuscular every 8 hours PRN agitation      HOME MEDICATIONS:  Home Medications:  methIMAzole 5 mg oral tablet: 1 tab(s) orally once a day (05 Mar 2024 10:05)  metoprolol tartrate 25 mg oral tablet: 1 tab(s) orally once a day (05 Mar 2024 10:05)  metoprolol tartrate 50 mg oral tablet: 1 tab(s) orally once a day (at bedtime) (05 Mar 2024 10:05)      VITALS:   T(F): 96.5 (03-07 @ 08:00), Max: 101.6 (03-05 @ 14:00)  HR: 65 (03-07 @ 09:00) (49 - 164)  BP: 158/66 (03-07 @ 09:00) (78/53 - 205/117)  BP(mean): 99 (03-07 @ 09:00) (61 - 150)  RR: 31 (03-07 @ 09:00) (13 - 40)  SpO2: 97% (03-07 @ 09:00) (91% - 100%)    I&O's Summary    06 Mar 2024 07:01  -  07 Mar 2024 07:00  --------------------------------------------------------  IN: 1666.4 mL / OUT: 170 mL / NET: 1496.4 mL    07 Mar 2024 07:01  -  07 Mar 2024 11:00  --------------------------------------------------------  IN: 10 mL / OUT: 0 mL / NET: 10 mL        REVIEW OF SYSTEMS:  CONSTITUTIONAL: No weakness, fevers or chills  EYES: No visual changes  ENT: No vertigo or throat pain   NECK: No pain or stiffness  RESPIRATORY:+SOB  CARDIOVASCULAR: No chest pain or palpitations  GASTROINTESTINAL: No abdominal or epigastric pain. No nausea, vomiting, or hematemesis; No diarrhea or constipation. No melena or hematochezia.  GENITOURINARY: No Polyuria  NEUROLOGICAL:  No tremors, no Weakness or numbness  SKIN: No itching, no rashes  MSK: no joint pain    PHYSICAL EXAM:  GENERAL: Patient is awake , alert and oriented,  not in acute distress  EYES: No proptosis, no lid lag  NECK: No thyroid enlargement, no palpable nodules , no bruit  LUNGS: on HFNC  CARDIOVASCULAR: S1/S2 present, RRR , no murmurs or rubs  ABD: Soft, non-tender, non-distended, +BS  EXT: No GIFTY  SKIN: No abdominal striae  NEURO: No tremors, DTR 2+    LABS:                        12.3   9.81  )-----------( 167      ( 07 Mar 2024 05:05 )             35.0     03-07    140  |  106  |  9<L>  ----------------------------<  119<H>  3.5   |  25  |  0.5<L>    Ca    7.5<L>      07 Mar 2024 05:05  Phos  2.9     03-06  Mg     1.8     03-07    TPro  4.8<L>  /  Alb  2.4<L>  /  TBili  1.5<H>  /  DBili  x   /  AST  48<H>  /  ALT  48<H>  /  AlkPhos  103  03-07                TSH 0.01; Total T3 --; Free T4 --   HPI:  63 years old male history of alcohol abuse, hypertension, COPD? ( heavy smoker. acc to pt had PFTs done and uses inhalers at home ), recently diagnosed with hyperthyroidism few weeks ago and started on methimazole 5 mg by PMD presents complaining of palpitations and exertional shortness of breath over the past few weeks.   At my encounter family not at bedside, acc to pt his only complaint is palpitaiosn, His HR was ranging 120-130 at home. No chest pain. He has cough and mild SOB which he attributes to smoking 1 pack daily. Otherwise he describes himself as very healthy. Pt is tremelous and restless. He admitted to drinking alcohol  (15-20 drinks of liquor daily). Last drink was earlier this evening.  Patient also reports he does want to stop drinking and try to cut down his alcohol use.    As per family, patient was seen by his cardiologist Dr. Johnson who increased that his metoprolol to 75 mg twice a day which did not improve his heart rate.  Patient had outpatient echocardiogram yesterday and noted to be tachycardic so he was recommended to come to ED for evaluation.  Otherwise denies fever, chills, recent illness, coughing, chest pain, abdominal pain, diarrhea or urinary symptoms.  Denies drug use.    Vitals: T 98.2, , /90, spo2 94% on RA   Labs remarkable for elevated ALT AST, Mg 1.5  Lactate 4 on VBGs   Alcohol level 202   CXR clear   EKG showed sinus tachycardia     s/p valium and ativan in ED  s/p 1 L NS, magnesium   HR improved from 130 to 90s  (29 Feb 2024 01:57)    =======  Endocrine consulted for hyperthyroidism. Currently on methimazole 5mg QD and metoprolol 75mg BID. Transferred to ICU service for acute respiratory failure (was intubated), and [facility]  TSH- 0.01. Free T4- 1.8  History is not particularly reliable. He does endorse he was diagnosed with Grave's disease 2 weeks ago. Was seeing his PCP and prescribed methimazole.       PAST MEDICAL & SURGICAL HISTORY  HTN (hypertension)    Alcohol abuse    Hyperthyroidism        FAMILY HISTORY:  FAMILY HISTORY:      SOCIAL HISTORY:  []smoker  []Alcohol  []Drug    ALLERGIES:  No Known Allergies      MEDICATIONS:  MEDICATIONS  (STANDING):  albuterol/ipratropium for Nebulization 3 milliLiter(s) Nebulizer every 6 hours  ascorbic acid 500 milliGRAM(s) Oral daily  cefepime   IVPB 2000 milliGRAM(s) IV Intermittent every 8 hours  chlorhexidine 2% Cloths 1 Application(s) Topical daily  dexMEDEtomidine Infusion 0.5 MICROgram(s)/kG/Hr (9.14 mL/Hr) IV Continuous <Continuous>  folic acid 1 milliGRAM(s) Oral daily  heparin   Injectable 5000 Unit(s) SubCutaneous every 12 hours  influenza   Vaccine 0.5 milliLiter(s) IntraMuscular once  methimazole 5 milliGRAM(s) Oral daily  metoprolol tartrate 75 milliGRAM(s) Oral two times a day  metroNIDAZOLE  IVPB 500 milliGRAM(s) IV Intermittent every 8 hours  multivitamin 1 Tablet(s) Oral daily  nystatin    Suspension 046277 Unit(s) Oral four times a day  polyethylene glycol 3350 17 Gram(s) Oral two times a day  QUEtiapine 25 milliGRAM(s) Oral at bedtime  senna 2 Tablet(s) Oral at bedtime    MEDICATIONS  (PRN):  haloperidol    Injectable 2.5 milliGRAM(s) IntraMuscular every 8 hours PRN agitation      HOME MEDICATIONS:  Home Medications:  methIMAzole 5 mg oral tablet: 1 tab(s) orally once a day (05 Mar 2024 10:05)  metoprolol tartrate 25 mg oral tablet: 1 tab(s) orally once a day (05 Mar 2024 10:05)  metoprolol tartrate 50 mg oral tablet: 1 tab(s) orally once a day (at bedtime) (05 Mar 2024 10:05)      VITALS:   T(F): 96.5 (03-07 @ 08:00), Max: 101.6 (03-05 @ 14:00)  HR: 65 (03-07 @ 09:00) (49 - 164)  BP: 158/66 (03-07 @ 09:00) (78/53 - 205/117)  BP(mean): 99 (03-07 @ 09:00) (61 - 150)  RR: 31 (03-07 @ 09:00) (13 - 40)  SpO2: 97% (03-07 @ 09:00) (91% - 100%)    I&O's Summary    06 Mar 2024 07:01  -  07 Mar 2024 07:00  --------------------------------------------------------  IN: 1666.4 mL / OUT: 170 mL / NET: 1496.4 mL    07 Mar 2024 07:01  -  07 Mar 2024 11:00  --------------------------------------------------------  IN: 10 mL / OUT: 0 mL / NET: 10 mL        REVIEW OF SYSTEMS:  CONSTITUTIONAL: No weakness, fevers or chills  EYES: No visual changes  ENT: No vertigo or throat pain   NECK: No pain or stiffness  RESPIRATORY:+SOB  CARDIOVASCULAR: No chest pain or palpitations  GASTROINTESTINAL: No abdominal or epigastric pain. No nausea, vomiting, or hematemesis; No diarrhea or constipation. No melena or hematochezia.  GENITOURINARY: No Polyuria  NEUROLOGICAL:  No tremors, no Weakness or numbness  SKIN: No itching, no rashes  MSK: no joint pain    PHYSICAL EXAM:  GENERAL: Patient is awake , alert and oriented,  not in acute distress  EYES: No proptosis, no lid lag  NECK: No thyroid enlargement, no palpable nodules , no bruit  LUNGS: on HFNC  CARDIOVASCULAR: S1/S2 present, RRR , no murmurs or rubs  ABD: Soft, non-tender, non-distended, +BS  EXT: No GIFTY  SKIN: No abdominal striae  NEURO: No tremors, DTR 2+    LABS:                        12.3   9.81  )-----------( 167      ( 07 Mar 2024 05:05 )             35.0     03-07    140  |  106  |  9<L>  ----------------------------<  119<H>  3.5   |  25  |  0.5<L>    Ca    7.5<L>      07 Mar 2024 05:05  Phos  2.9     03-06  Mg     1.8     03-07    TPro  4.8<L>  /  Alb  2.4<L>  /  TBili  1.5<H>  /  DBili  x   /  AST  48<H>  /  ALT  48<H>  /  AlkPhos  103  03-07                TSH 0.01; Total T3 --; Free T4 --

## 2024-03-07 NOTE — CONSULT NOTE ADULT - CRITICAL CARE ATTENDING COMMENT
I have personally seen and examined this patient.  I have reviewed all pertinent clinical information and reviewed all relevant imaging and diagnostic studies personally.   If possible, I counseled the patient about diagnostic testing and treatment plan.   I discussed my recommendations with the primary team and any family members at bedside.
This patient is critically ill due to the following:  * Multiple organ failure requiring complex decision-making, and there is a high probability of imminent or life-threatening deterioration in the patient’s condition  * The patient required frequent reassessments and monitoring to ensure response to interventions and therapies.    Critical care time includes time spent evaluating and treating the patient's acute illness as well as time spent reviewing labs, radiology,  and discussing the case with a multidisciplinary team in an effort to prevent further life threatening deterioration or end organ damage. This time is independent of any procedures performed.

## 2024-03-07 NOTE — PROGRESS NOTE ADULT - ASSESSMENT
63 year old man with a pmh of COPD, alcohol use disorder and hyperthyroidism presented for palpitation. Upgraded for hypoxemic respiratory failure    #Possible Alcohol withdrawal   #Alcohol intoxication (alcohol level in blood >200)   - Extubated on 03/06/2024. Still on Precedex. On BiPaP. On high flow nasal cannula   - Lactate 4 on VBGs -> repeat 1.4 (after fluids)  - trop 11, pro-BNP 46   - EKG showed sinus tachycardia   - On Seroquel 25 mg during the day and 50 mg during the night for agitation  - Precedex for sedation and Seroquel 25 plus Zyprexa for agitation    -Didn't require Ativan yesterday  - RVP negative  - was on 70 cc/hour LR.   - TSH 0.01 low, fT4 1.8. Repeat TSH in 6 weeks   - CATCH team danyel pt willing to stop   - On thiamine and folate   -  CT head : No evidence of acute transcortical infarct, hydrocephalus, acute   intracranial hemorrhage, or mass effect.  -EEG: No epileptiform abnormalities were recorded.  -replete Mg and K as needed  - Try to Wean off sedatives. Switch Zyprexa to Haldol 2.5 for agitation and delirium   - Monitor on BiPaP and try to wean off from high flow nasal cannula.   - Discontinue Lorazepam     #Constipation  #Fever   #Possible colitis   - Patient had fevers yesterday with a a drop in BP. Given multiple boluses of fluids  -Blood cultures taken. Patient placed on cefepime and metronidazole  - CT Abdomen/pelvis with oral contrast showed distended column with possible signs of colitis  - Patient had 2 large bowel movement  - Was placed on Miralax BID, Senna and Lactulose 10 mg twice daily for constipation. Holding  - Bcx pending  -  Abx  - f/u cultures  - f/u ID regarding duration of Abx    #Hyperthyroidism-Diagnosis made by PCP and started on methimazole. No records  - TSH 0.01 low, fT4 1.8. Repeat TSH in 6 weeks   - On Methimazole 5 mg TID  - F/u endocrinology recommendation     # Questionable A-fib. After review most likely sinus tachycardia  - Dr Johnson saw the patient. Continue rate control with metoprolol as per EP. Highly unlikely that patient has A-fib  - Patient takes metoprolol 75 mg as an outpatient  - increase metoprolol tartrate to 25 mg BID to 75 mg BID as patient has bouts of tachycardia    # Right lower lobe consolidation- resolved  -CXR right LLO  - Repeat CXR today showed no infiltrates  - Was on Rocephin 1 g q 24 for 3 days then switched to cefepime due for colitis  - RVP negative  - Sputum culture showed rare gram positive cocci in pairs  - blood cultures showed no growth so far    #Transaminitis   - , ALT 85,   - Secondary to alcohol use    #HTN  - not on meds   -hold bb for now  - monitor BP     #DVT ppx: lovenox   #GI ppx: protonix   #Diet: feeds via OGT  #Activity: as tolerated   #Full code

## 2024-03-07 NOTE — CONSULT NOTE ADULT - ATTENDING COMMENTS
recommendations as above
63 years old male history of alcohol abuse, hypertension, COPD? ( heavy smoker. acc to pt had PFTs done and uses inhalers at home ), recently diagnosed with hyperthyroidism  and started on methimazole 5 mg by PMD presents complaining of palpitations and exertional shortness of breath over the past few weeks. Upgraded to ICU for acute respiratory failure and alcohol withdrawal requiring CIWA protocol. Extubated successfully     #Hyperthyroidism  - per daughter in law at bedside patient was diagnosed with hyperthyroidism around 1.5 months ago , was given methimazole unsure about dose and was taking it regularly   - 2/28/24 : TSH -0.01, FT4- 1.8   - now on Methimazole 5 mg daily since admission , missed 2 doses total because of intubation   - continue methimazole 5 mg daily   - recheck TSH, total T3, free T4 and TSI, will adjust accordingly
Mr. Nelson was seen and examined at his stretcher in the emergency department with multiple family members present.  Patient has a history of alcohol abuse and presented with palpitations and shortness of breath.  He was being treated for alcohol withdrawal and hypothyroidism when his symptoms began to worsen, and critical care was engaged earlier today due to severe agitation and worsening alcohol withdrawal requiring high doses of benzodiazepines and initiation of Precedex infusion.  Recommend increasing dose of taper of Ativan, therapeutic dose IV thiamine, and initiating IM Zyprexa as needed for severe agitation.  Patient will be admitted to MICU for close monitoring of ongoing Precedex drip as needed for agitation.  I agree with the fellow note, with the exceptions listed in my attestation above.  The remainder of impression and plan per fellow note.

## 2024-03-07 NOTE — SWALLOW BEDSIDE ASSESSMENT ADULT - COMMENTS
pt is nimco speaking. family present at bedside and requesting to provide interpretation mild oral dysphagia; + toleration observed without overt symptoms of penetration/aspiration   chewables not trialed 2/2 AMS

## 2024-03-07 NOTE — CHART NOTE - NSCHARTNOTEFT_GEN_A_CORE
The EEG has show no signs of seizure activity. There is a new onset of hypothyroid, please follow up TSH, T4. There was suspicion for toxic metabolic encephalopathy, Ammonia: 42. If there is any change in neurologic status please reconsult. The EEG has show no signs of seizure activity. Ammonia wnl  Likely TME i/s/o recent Hyperthyroidism dx s/p Methimazole   If there is any change in neurologic status please reconsult.

## 2024-03-07 NOTE — CONSULT NOTE ADULT - ASSESSMENT
63 years old male history of alcohol abuse, hypertension, COPD? ( heavy smoker. acc to pt had PFTs done and uses inhalers at home ), recently diagnosed with hyperthyroidism few weeks ago and started on methimazole 5 mg by PMD presents complaining of palpitations and exertional shortness of breath over the past few weeks. Upgraded to ICU for acute respiratory failure and alcohol withdrawal requiring CIWA protocol. Extubated successfully yesterday    #Hyperthyroidism  - TSH -0.01, FT4- 0.18  - on methimazole and metoprolol  - note incomplete. final recs pending attending 63 years old male history of alcohol abuse, hypertension, COPD? ( heavy smoker. acc to pt had PFTs done and uses inhalers at home ), recently diagnosed with hyperthyroidism few weeks ago and started on methimazole 5 mg by PMD presents complaining of palpitations and exertional shortness of breath over the past few weeks. Upgraded to ICU for acute respiratory failure and alcohol withdrawal requiring CIWA protocol. Extubated successfully yesterday    #Hyperthyroidism  - TSH -0.01, FT4- 0.18  - on methimazole for 1.5 months. continue methimazole.  - recheck TSH, total T3, free T4 tomorrow AM to help reassess the correct dose of methimmazole.    63 years old male history of alcohol abuse, hypertension, COPD? ( heavy smoker. acc to pt had PFTs done and uses inhalers at home ), recently diagnosed with hyperthyroidism few weeks ago and started on methimazole 5 mg by PMD presents complaining of palpitations and exertional shortness of breath over the past few weeks. Upgraded to ICU for acute respiratory failure and alcohol withdrawal requiring CIWA protocol. Extubated successfully yesterday    #Hyperthyroidism  - TSH -0.01, FT4- 1.8  - on methimazole for 1.5 months. continue methimazole.  - recheck TSH, total T3, free T4 tomorrow AM to help reassess the correct dose of methimmazole.

## 2024-03-07 NOTE — PROGRESS NOTE ADULT - SUBJECTIVE AND OBJECTIVE BOX
Patient is a 63y old  Male who presents with a chief complaint of chest pain and SOB (06 Mar 2024 19:51)        Over Night Events:    ID and endocrine consulted  Febrile again  Extubated successfully yesterday, still very delirious  remains sedated on precedex gtt    ROS:     Unable to assess ROS: patient altered.        PHYSICAL EXAM    ICU Vital Signs Last 24 Hrs  T(C): 36.5 (07 Mar 2024 06:00), Max: 38.6 (06 Mar 2024 12:00)  T(F): 97.7 (07 Mar 2024 06:00), Max: 101.5 (06 Mar 2024 12:00)  HR: 52 (07 Mar 2024 07:00) (52 - 143)  BP: 130/81 (07 Mar 2024 07:00) (96/60 - 161/85)  BP(mean): 102 (07 Mar 2024 07:00) (73 - 114)  ABP: --  ABP(mean): --  RR: 28 (07 Mar 2024 07:00) (25 - 40)  SpO2: 100% (07 Mar 2024 07:00) (94% - 100%)    O2 Parameters below as of 07 Mar 2024 07:00  Patient On (Oxygen Delivery Method): nasal cannula, high flow  O2 Flow (L/min): 50  O2 Concentration (%): 40        Constitutional: no acute distress, well nourished well developed  Neuro: moving all 4 limbs spontaneously, sedated  HEENT: NCAT, anicteric  Neck: no visible lymphadenopathy or goiter  Pulm: no respiratory distress. clear to auscultation bilaterally  Cardiac: extremities appear pink and well-perfused.  regular rhythm and rate, no murmur detected  Abdomen: non-distended  Extremities: no peripheral edema      03-06-24 @ 07:01  -  03-07-24 @ 07:00  --------------------------------------------------------  IN:    Dexmedetomidine: 84.9 mL    Dexmedetomidine: 114 mL    dextrose 5% + lactated ringers: 450 mL    Enteral Tube Flush: 250 mL    IV PiggyBack: 700 mL    Propofol: 67.5 mL  Total IN: 1666.4 mL    OUT:    Indwelling Catheter - Urethral (mL): 60 mL    Voided (mL): 110 mL  Total OUT: 170 mL    Total NET: 1496.4 mL          LABS:                            12.3   9.81  )-----------( 167      ( 07 Mar 2024 05:05 )             35.0                                               03-07    140  |  106  |  9<L>  ----------------------------<  119<H>  3.5   |  25  |  0.5<L>    Ca    7.5<L>      07 Mar 2024 05:05  Phos  2.9     03-06  Mg     1.8     03-07    TPro  4.8<L>  /  Alb  2.4<L>  /  TBili  1.5<H>  /  DBili  x   /  AST  48<H>  /  ALT  48<H>  /  AlkPhos  103  03-07                                             Urinalysis Basic - ( 07 Mar 2024 05:05 )    Color: x / Appearance: x / SG: x / pH: x  Gluc: 119 mg/dL / Ketone: x  / Bili: x / Urobili: x   Blood: x / Protein: x / Nitrite: x   Leuk Esterase: x / RBC: x / WBC x   Sq Epi: x / Non Sq Epi: x / Bacteria: x                                                  LIVER FUNCTIONS - ( 07 Mar 2024 05:05 )  Alb: 2.4 g/dL / Pro: 4.8 g/dL / ALK PHOS: 103 U/L / ALT: 48 U/L / AST: 48 U/L / GGT: x                                                  Culture - Blood (collected 05 Mar 2024 18:30)  Source: .Blood Blood  Preliminary Report (06 Mar 2024 23:02):    No growth at 24 hours    Culture - Blood (collected 05 Mar 2024 16:13)  Source: .Blood Blood  Preliminary Report (06 Mar 2024 23:02):    No growth at 24 hours                                                   Mode: AC/ CMV (Assist Control/ Continuous Mandatory Ventilation)  RR (machine): 16  TV (machine): 400  FiO2: 40  PEEP: 8  ITime: 1  MAP: 14  PIP: 26                                      ABG - ( 06 Mar 2024 13:39 )  pH, Arterial: 7.53  pH, Blood: x     /  pCO2: 34    /  pO2: 81    / HCO3: 28    / Base Excess: 5.8   /  SaO2: 98.3                MEDICATIONS  (STANDING):  albuterol/ipratropium for Nebulization 3 milliLiter(s) Nebulizer every 6 hours  ascorbic acid 500 milliGRAM(s) Oral daily  cefepime   IVPB 2000 milliGRAM(s) IV Intermittent every 8 hours  chlorhexidine 2% Cloths 1 Application(s) Topical daily  dexMEDEtomidine Infusion 0.5 MICROgram(s)/kG/Hr (9.14 mL/Hr) IV Continuous <Continuous>  folic acid 1 milliGRAM(s) Oral daily  heparin   Injectable 5000 Unit(s) SubCutaneous every 12 hours  influenza   Vaccine 0.5 milliLiter(s) IntraMuscular once  methimazole 5 milliGRAM(s) Oral daily  metoprolol tartrate 75 milliGRAM(s) Oral two times a day  metroNIDAZOLE  IVPB 500 milliGRAM(s) IV Intermittent every 8 hours  multivitamin 1 Tablet(s) Oral daily  nystatin    Suspension 464582 Unit(s) Oral four times a day  pantoprazole   Suspension 40 milliGRAM(s) Oral before breakfast  polyethylene glycol 3350 17 Gram(s) Oral two times a day  QUEtiapine 25 milliGRAM(s) Oral at bedtime  senna 2 Tablet(s) Oral at bedtime    MEDICATIONS  (PRN):  haloperidol    Injectable 5 milliGRAM(s) IntraMuscular every 6 hours PRN Agitation  LORazepam   Injectable 1 milliGRAM(s) IV Push every 2 hours PRN Symptom-triggered: each CIWA -Ar score 8 or GREATER  metoprolol tartrate Injectable 5 milliGRAM(s) IV Push every 6 hours PRN HR above 120      New X-rays reviewed:       ECHO reviewed    CXR interpreted by me:    3/7  images and radiologist read reviewed, by my read demonstrating stable vague bilateral opacities

## 2024-03-07 NOTE — CHART NOTE - NSCHARTNOTEFT_GEN_A_CORE
Registered Dietitian Follow-Up     Patient Profile Reviewed                           Yes [x]   No []     Nutrition History Previously Obtained        Yes []  No [x]       Pertinent Subjective Information: Pt extubated however unable to participate in nutrition interview at this time; disoriented at time of RD visit. Unable to reach emergency contact at this time.     Pertinent Medical Interventions: Pt presented for palpitation. Upgraded for hypoxemic respiratory failure. Possible alcohol intoxication noted. Now s/p extubation as of 3/6.    Seen by SLP services this admit. 3/7 SLP eval notes "min overts noted; + toleration observed without overt symptoms of penetration/aspiration for small controlled cup sips of thins and puree consistency; chewables not trialed  AMS" Recommends puree with thin liquids via small, controlled sips, small sips/bites.    PMH includes COPD, alcohol use disorder and hyperthyroidism.     Diet order: Pureed diet initiated today.    Anthropometrics:  Height (cm): 167.6 (24 @ 20:00)  Weight (kg): 73.1 (24 @ 20:00)  BMI (kg/m2): 26 (24 @ 20:00)  IBW:   UBW:     Daily Weight in k.5 (-), Weight in k.4 (), Weight in k.9 (), Weight in k.6 (), Weight in k.1 (-), Weight in k ()    Wt gain may be related to fluid shifts.    MEDICATIONS  (STANDING):  albuterol/ipratropium for Nebulization 3 milliLiter(s) Nebulizer every 6 hours  ascorbic acid 500 milliGRAM(s) Oral daily  cefepime   IVPB 2000 milliGRAM(s) IV Intermittent every 8 hours  chlorhexidine 2% Cloths 1 Application(s) Topical daily  dexMEDEtomidine Infusion 0.5 MICROgram(s)/kG/Hr (9.14 mL/Hr) IV Continuous <Continuous>  folic acid 1 milliGRAM(s) Oral daily  guaiFENesin  milliGRAM(s) Oral every 12 hours  heparin   Injectable 5000 Unit(s) SubCutaneous every 12 hours  influenza   Vaccine 0.5 milliLiter(s) IntraMuscular once  methimazole 5 milliGRAM(s) Oral daily  metoprolol tartrate 75 milliGRAM(s) Oral two times a day  metroNIDAZOLE    Tablet 500 milliGRAM(s) Oral every 8 hours  multivitamin 1 Tablet(s) Oral daily  nystatin    Suspension 838493 Unit(s) Oral four times a day  polyethylene glycol 3350 17 Gram(s) Oral two times a day  QUEtiapine 25 milliGRAM(s) Oral at bedtime  senna 2 Tablet(s) Oral at bedtime  sodium chloride 3%  Inhalation 4 milliLiter(s) Inhalation every 12 hours    MEDICATIONS  (PRN):  albuterol/ipratropium for Nebulization 3 milliLiter(s) Nebulizer every 6 hours PRN Shortness of Breath and/or Wheezing  haloperidol    Injectable 2.5 milliGRAM(s) IntraMuscular every 8 hours PRN agitation    Pertinent Labs:  @ 05:05: Na 140, BUN 9<L>, Cr 0.5<L>, <H>, K+ 3.5, Phos --, Mg 1.8, Alk Phos 103, ALT/SGPT 48<H>, AST/SGOT 48<H>    Physical Findings:  - Appearance: confused, disoriented  - GI function: last BM 3/7; no GI complaints reported at this time  - Tubes: no feeding tubes  - Oral/Mouth cavity: diet advanced today  - Skin: ecchymosis, otherwise WDL  - Edema: 1+ edema (generalized), 2+ edema (R arm), non pitting edema (scrotum)     Nutrition Requirements:  Weight Used: 73.1 kg dosing wt     Estimated Energy Needs    Continue []  Adjust [x]  5654-8704 kcal/day (25-30 kcal/kg ABW)        Estimated Protein Needs    Continue []  Adjust [x]  73-87 g/day (1-1.2 g/kg ABW)        Estimated Fluid Needs        Continue []  Adjust [x]  1462 mL/day (20 mL/kg ABW)     Nutrient Intake: Diet advanced today; will monitor tolerance to diet.    [x] Previous Nutrition Diagnosis: Inadequate protein-energy intake            [x] Ongoing          [] Resolved    Nutrition Education: Deferred     Goal/Expected Outcome: At least 75% po intake x3-5 days. Pt at high nutrition risk; f/u in 3-5 days.     Indicator/Monitoring: Skin, diet, swallow, po intake.    Recommendation: Continue puree with thin liquids via small, controlled sips, small sips/bites as per SLP. Provide Magic Cup three times daily (290 kcal, 9 g protein per serving).

## 2024-03-07 NOTE — CONSULT NOTE ADULT - ASSESSMENT
ASSESSMENT  63 years old male history of alcohol abuse, hypertension, COPD? ( heavy smoker. acc to pt had PFTs done and uses inhalers at home ), recently diagnosed with hyperthyroidism few weeks ago and started on methimazole 5 mg by PMD presents complaining of palpitations and exertional shortness of breath   Pt admitted on 2/29, prolonged, complicated hospital course. ID consulted on 3/7 for fever   Pt febrile 3/3 Tm 101.1, 3/4 Tm 100.9, Tm 3/6 101.5    IMPRESSION  #  #Rule out Sepsis , Fever + Tachycardia No leukocytosis   Normo/Hypertensive during fevers    CXR No focal consolidation, pneumothorax or pleural effusion.    3/5 BCX NGTD     3/4 BCX NGTD     3/4 UA without significant pyuria 2    3/3 sputum NG    3/2 BCX NGTD     Procalcitonin, Serum: 0.18 ng/mL (03-03-24 @ 10:40)- unremarkable     MRSA PCR Result.: Negative (03-02-24 @ 02:30)  < from: CT Abdomen and Pelvis w/ Oral Cont (03.05.24 @ 22:55) >  1. Dilated colon up to 7 cm extending from the cecum to the splenic flexure without evidence of large bowel obstruction. Liquid stool throughout the colon. Oral contrast reaches the rectum.  2. Multiple prominent small bowel loops may be secondary to ileus or downstream slow transit. No definiteevidence of small bowel obstruction.  3. Probable colitis involving the distal descending colon to the rectum. No definite organized collection. No free air. Trace ascites.  4. Bowel containing right inguinal hernia as above.  < from: CT Angio Chest PE Protocol w/ IV Cont (03.01.24 @ 22:32) >  Limited by breathing motion and streak artifact. No central or segmental pulmonary embolus. No CT evidence of right heart strain.  Right greater than left bilateral lower lobe consolidations with obliteration of interspersed airways. Correlate for pneumonia.  #Acute respiratory failure  #Atelectasis   #ETOH with withdrawal, transaminitis   #Smoker, ?COPD  #Abx allergy: No Known Allergies    Creatinine: 0.5 (03-07-24 @ 05:05)    Height (cm): 167.6 (03-01-24 @ 20:00)  Weight (kg): 73.1 (03-01-24 @ 20:00)    RECOMMENDATIONS  This is an incomplete consult note. All final recommendations to follow after interview and examination of the patient. Please follow recommendations noted below.    If any questions, please send a message or call on [employer] Teams  Please continue to update ID with any pertinent new laboratory, radiographic findings, or change in clinical status   ASSESSMENT  63 years old male history of alcohol abuse, hypertension, COPD? ( heavy smoker. acc to pt had PFTs done and uses inhalers at home ), recently diagnosed with hyperthyroidism few weeks ago and started on methimazole 5 mg by PMD presents complaining of palpitations and exertional shortness of breath   Pt admitted on 2/29, prolonged, complicated hospital course. ID consulted on 3/7 for fever   Pt febrile 3/3 Tm 101.1, 3/4 Tm 100.9, Tm 3/6 101.5    IMPRESSION  #Rule out Sepsis , Fever + Tachycardia No leukocytosis   Normo/Hypertensive during fevers    CXR No focal consolidation, pneumothorax or pleural effusion.    3/5 BCX NGTD     3/5 RVP NEGATIVE     3/4 BCX NGTD     3/4 UA without significant pyuria 2    3/3 sputum NG    3/2 BCX NGTD     Procalcitonin, Serum: 0.18 ng/mL (03-03-24 @ 10:40)- unremarkable     MRSA PCR Result.: Negative (03-02-24 @ 02:30)  < from: CT Abdomen and Pelvis w/ Oral Cont (03.05.24 @ 22:55) >  1. Dilated colon up to 7 cm extending from the cecum to the splenic flexure without evidence of large bowel obstruction. Liquid stool throughout the colon. Oral contrast reaches the rectum.  2. Multiple prominent small bowel loops may be secondary to ileus or downstream slow transit. No definiteevidence of small bowel obstruction.  3. Probable colitis involving the distal descending colon to the rectum. No definite organized collection. No free air. Trace ascites.  4. Bowel containing right inguinal hernia as above.  < from: CT Angio Chest PE Protocol w/ IV Cont (03.01.24 @ 22:32) >  Limited by breathing motion and streak artifact. No central or segmental pulmonary embolus. No CT evidence of right heart strain.  Right greater than left bilateral lower lobe consolidations with obliteration of interspersed airways. Correlate for pneumonia.  #Acute respiratory failure  #Atelectasis   #ETOH with withdrawal, transaminitis   #Smoker, ?COPD  #Abx allergy: No Known Allergies    Creatinine: 0.5 (03-07-24 @ 05:05)    Height (cm): 167.6 (03-01-24 @ 20:00)  Weight (kg): 73.1 (03-01-24 @ 20:00)    RECOMMENDATIONS  - f/u cultures  - trend fever curve  - Continue empiric cefepime 2g q12h IV + PO flagyl 500mg TID per NGT  - procalcitonin     If any questions, please send a message or call on [employer]  Please continue to update ID with any pertinent new laboratory, radiographic findings, or change in clinical status

## 2024-03-08 LAB
ANION GAP SERPL CALC-SCNC: 10 MMOL/L — SIGNIFICANT CHANGE UP (ref 7–14)
BASOPHILS # BLD AUTO: 0.06 K/UL — SIGNIFICANT CHANGE UP (ref 0–0.2)
BASOPHILS NFR BLD AUTO: 0.6 % — SIGNIFICANT CHANGE UP (ref 0–1)
BUN SERPL-MCNC: 7 MG/DL — LOW (ref 10–20)
CALCIUM SERPL-MCNC: 7.7 MG/DL — LOW (ref 8.4–10.4)
CHLORIDE SERPL-SCNC: 109 MMOL/L — SIGNIFICANT CHANGE UP (ref 98–110)
CO2 SERPL-SCNC: 23 MMOL/L — SIGNIFICANT CHANGE UP (ref 17–32)
CREAT SERPL-MCNC: 0.5 MG/DL — LOW (ref 0.7–1.5)
EGFR: 115 ML/MIN/1.73M2 — SIGNIFICANT CHANGE UP
EOSINOPHIL # BLD AUTO: 0.24 K/UL — SIGNIFICANT CHANGE UP (ref 0–0.7)
EOSINOPHIL NFR BLD AUTO: 2.3 % — SIGNIFICANT CHANGE UP (ref 0–8)
GLUCOSE SERPL-MCNC: 132 MG/DL — HIGH (ref 70–99)
HCT VFR BLD CALC: 36.1 % — LOW (ref 42–52)
HGB BLD-MCNC: 12.6 G/DL — LOW (ref 14–18)
IMM GRANULOCYTES NFR BLD AUTO: 2.3 % — HIGH (ref 0.1–0.3)
LYMPHOCYTES # BLD AUTO: 1.43 K/UL — SIGNIFICANT CHANGE UP (ref 1.2–3.4)
LYMPHOCYTES # BLD AUTO: 13.4 % — LOW (ref 20.5–51.1)
MAGNESIUM SERPL-MCNC: 1.8 MG/DL — SIGNIFICANT CHANGE UP (ref 1.8–2.4)
MCHC RBC-ENTMCNC: 34.8 PG — HIGH (ref 27–31)
MCHC RBC-ENTMCNC: 34.9 G/DL — SIGNIFICANT CHANGE UP (ref 32–37)
MCV RBC AUTO: 99.7 FL — HIGH (ref 80–94)
MONOCYTES # BLD AUTO: 0.83 K/UL — HIGH (ref 0.1–0.6)
MONOCYTES NFR BLD AUTO: 7.8 % — SIGNIFICANT CHANGE UP (ref 1.7–9.3)
NEUTROPHILS # BLD AUTO: 7.85 K/UL — HIGH (ref 1.4–6.5)
NEUTROPHILS NFR BLD AUTO: 73.6 % — SIGNIFICANT CHANGE UP (ref 42.2–75.2)
NRBC # BLD: 0 /100 WBCS — SIGNIFICANT CHANGE UP (ref 0–0)
PLATELET # BLD AUTO: 219 K/UL — SIGNIFICANT CHANGE UP (ref 130–400)
PMV BLD: 11.1 FL — HIGH (ref 7.4–10.4)
POTASSIUM SERPL-MCNC: 3.5 MMOL/L — SIGNIFICANT CHANGE UP (ref 3.5–5)
POTASSIUM SERPL-SCNC: 3.5 MMOL/L — SIGNIFICANT CHANGE UP (ref 3.5–5)
PROCALCITONIN SERPL-MCNC: 0.28 NG/ML — HIGH (ref 0.02–0.1)
RBC # BLD: 3.62 M/UL — LOW (ref 4.7–6.1)
RBC # FLD: 11.1 % — LOW (ref 11.5–14.5)
SODIUM SERPL-SCNC: 142 MMOL/L — SIGNIFICANT CHANGE UP (ref 135–146)
T3 SERPL-MCNC: 84 NG/DL — SIGNIFICANT CHANGE UP (ref 80–200)
T4 FREE SERPL-MCNC: 0.7 NG/DL — LOW (ref 0.9–1.8)
TSH SERPL-MCNC: 0.52 UIU/ML — SIGNIFICANT CHANGE UP (ref 0.27–4.2)
WBC # BLD: 10.66 K/UL — SIGNIFICANT CHANGE UP (ref 4.8–10.8)
WBC # FLD AUTO: 10.66 K/UL — SIGNIFICANT CHANGE UP (ref 4.8–10.8)

## 2024-03-08 PROCEDURE — 99233 SBSQ HOSP IP/OBS HIGH 50: CPT | Mod: GC

## 2024-03-08 PROCEDURE — 93010 ELECTROCARDIOGRAM REPORT: CPT

## 2024-03-08 PROCEDURE — 99232 SBSQ HOSP IP/OBS MODERATE 35: CPT

## 2024-03-08 PROCEDURE — 71045 X-RAY EXAM CHEST 1 VIEW: CPT | Mod: 26

## 2024-03-08 RX ORDER — ENOXAPARIN SODIUM 100 MG/ML
40 INJECTION SUBCUTANEOUS EVERY 24 HOURS
Refills: 0 | Status: DISCONTINUED | OUTPATIENT
Start: 2024-03-09 | End: 2024-03-14

## 2024-03-08 RX ORDER — IPRATROPIUM/ALBUTEROL SULFATE 18-103MCG
3 AEROSOL WITH ADAPTER (GRAM) INHALATION EVERY 4 HOURS
Refills: 0 | Status: DISCONTINUED | OUTPATIENT
Start: 2024-03-08 | End: 2024-03-14

## 2024-03-08 RX ORDER — THIAMINE MONONITRATE (VIT B1) 100 MG
100 TABLET ORAL DAILY
Refills: 0 | Status: DISCONTINUED | OUTPATIENT
Start: 2024-03-08 | End: 2024-03-14

## 2024-03-08 RX ORDER — IPRATROPIUM/ALBUTEROL SULFATE 18-103MCG
3 AEROSOL WITH ADAPTER (GRAM) INHALATION EVERY 6 HOURS
Refills: 0 | Status: DISCONTINUED | OUTPATIENT
Start: 2024-03-08 | End: 2024-03-14

## 2024-03-08 RX ADMIN — CEFEPIME 100 MILLIGRAM(S): 1 INJECTION, POWDER, FOR SOLUTION INTRAMUSCULAR; INTRAVENOUS at 21:01

## 2024-03-08 RX ADMIN — Medication 3 MILLILITER(S): at 21:37

## 2024-03-08 RX ADMIN — Medication 500000 UNIT(S): at 17:38

## 2024-03-08 RX ADMIN — Medication 500000 UNIT(S): at 09:01

## 2024-03-08 RX ADMIN — Medication 1 TABLET(S): at 12:52

## 2024-03-08 RX ADMIN — HALOPERIDOL DECANOATE 2.5 MILLIGRAM(S): 100 INJECTION INTRAMUSCULAR at 04:03

## 2024-03-08 RX ADMIN — HEPARIN SODIUM 5000 UNIT(S): 5000 INJECTION INTRAVENOUS; SUBCUTANEOUS at 05:31

## 2024-03-08 RX ADMIN — POLYETHYLENE GLYCOL 3350 17 GRAM(S): 17 POWDER, FOR SOLUTION ORAL at 05:31

## 2024-03-08 RX ADMIN — Medication 3 MILLILITER(S): at 08:52

## 2024-03-08 RX ADMIN — Medication 75 MILLIGRAM(S): at 17:38

## 2024-03-08 RX ADMIN — DEXMEDETOMIDINE HYDROCHLORIDE IN 0.9% SODIUM CHLORIDE 9.14 MICROGRAM(S)/KG/HR: 4 INJECTION INTRAVENOUS at 05:30

## 2024-03-08 RX ADMIN — Medication 75 MILLIGRAM(S): at 09:01

## 2024-03-08 RX ADMIN — CHLORHEXIDINE GLUCONATE 1 APPLICATION(S): 213 SOLUTION TOPICAL at 12:53

## 2024-03-08 RX ADMIN — CEFEPIME 100 MILLIGRAM(S): 1 INJECTION, POWDER, FOR SOLUTION INTRAMUSCULAR; INTRAVENOUS at 17:38

## 2024-03-08 RX ADMIN — Medication 500 MILLIGRAM(S): at 05:31

## 2024-03-08 RX ADMIN — Medication 1 MILLIGRAM(S): at 12:51

## 2024-03-08 RX ADMIN — Medication 3 MILLILITER(S): at 19:39

## 2024-03-08 RX ADMIN — Medication 500 MILLIGRAM(S): at 17:38

## 2024-03-08 RX ADMIN — Medication 500 MILLIGRAM(S): at 12:56

## 2024-03-08 RX ADMIN — Medication 40 MILLIGRAM(S): at 21:38

## 2024-03-08 RX ADMIN — Medication 100 MILLIGRAM(S): at 18:19

## 2024-03-08 RX ADMIN — Medication 100 MILLIGRAM(S): at 21:37

## 2024-03-08 RX ADMIN — SODIUM CHLORIDE 4 MILLILITER(S): 9 INJECTION INTRAMUSCULAR; INTRAVENOUS; SUBCUTANEOUS at 08:52

## 2024-03-08 RX ADMIN — CEFEPIME 100 MILLIGRAM(S): 1 INJECTION, POWDER, FOR SOLUTION INTRAMUSCULAR; INTRAVENOUS at 05:30

## 2024-03-08 RX ADMIN — Medication 500000 UNIT(S): at 12:52

## 2024-03-08 RX ADMIN — Medication 500 MILLIGRAM(S): at 21:01

## 2024-03-08 NOTE — PROGRESS NOTE ADULT - ASSESSMENT
IMPRESSION:  Acute respiratory failure  Possible aspiration right lower lobe  possible seizures   Severe alcohol withdrawal   Alcohol abuse  COPD exacerbation  Hyperthyroidism     PLAN:    CNS:  Thiamine, folate, CIWA-driven PRN benzo completed, CATCH team, d/c precedex.  EEG w/o s/o of seizure  Haldol 2.5mg IM PRN agitation  Increase standing seroquel, recheck QTc tomorrow  Would benefit from 1:1 sitter for frequent reorientation    HEENT: Oral care.     PULMONARY:  HOB @ 45 degrees.    Nebs q4hrs and PRN,   O2 supplemental target Spo2 92-96%.   Wean FiO2 as tolerated, on 2L NC currently    CARDIOVASCULAR: patient very net (+), trending negative past few days  Continue home beta blocker  Appreciate EP recommendations    GI: GI prophylaxis d/c.    Advance diet as per speech  Remove NGT once taking PO reliably  Bowel regimen PRN.    Colitis on CT    RENAL:  Follow up lytes.  Correct as needed.  passed kosta DAVILA d/c'ed    INFECTIOUS DISEASE:   Follow up cultures. Give Cefepime/Flagyl for now.  check procal, ID recommendations appreciated  Repeat cultures PRN fever    HEMATOLOGICAL:  DVT prophylaxis w/ lovenox SQ.     ENDOCRINE:  Follow up FS.  Insulin protocol if needed. CW methimazole.    Consult endocrine to clarify ?hyperthyroid, repeat labs pending    MUSCULOSKELETAL: OOBAT, PT/OT    DISPO: SDU  LINES: PIV  CODE: FULL

## 2024-03-08 NOTE — PROGRESS NOTE ADULT - CRITICAL CARE ATTENDING COMMENT
This patient is critically ill due to the following:  * Respiratory instability requiring management of invasive ventilation  * Multiple organ failure requiring complex decision-making, and there is a high probability of imminent or life-threatening deterioration in the patient’s condition  * The patient required frequent reassessments and monitoring to ensure response to interventions and therapies.    Critical care time includes time spent evaluating and treating the patient's acute illness as well as time spent reviewing labs, radiology,  and discussing the case with a multidisciplinary team in an effort to prevent further life threatening deterioration or end organ damage. This time is independent of any procedures performed.
This patient is critically ill due to the following:  * Multiple organ failure requiring complex decision-making, and there is a high probability of imminent or life-threatening deterioration in the patient’s condition  * The patient required frequent reassessments and monitoring to ensure response to interventions and therapies.    Critical care time includes time spent evaluating and treating the patient's acute illness as well as time spent reviewing labs, radiology,  and discussing the case with a multidisciplinary team in an effort to prevent further life threatening deterioration or end organ damage. This time is independent of any procedures performed.
This patient is critically ill due to the following:  * Respiratory instability requiring management of invasive ventilation  * Multiple organ failure requiring complex decision-making, and there is a high probability of imminent or life-threatening deterioration in the patient’s condition  * The patient required frequent reassessments and monitoring to ensure response to interventions and therapies.    Critical care time includes time spent evaluating and treating the patient's acute illness as well as time spent reviewing labs, radiology,  and discussing the case with a multidisciplinary team in an effort to prevent further life threatening deterioration or end organ damage. This time is independent of any procedures performed.
This patient is critically ill due to the following:  * Respiratory instability requiring management of invasive ventilation  * Multiple organ failure requiring complex decision-making, and there is a high probability of imminent or life-threatening deterioration in the patient’s condition  * The patient required frequent reassessments and monitoring to ensure response to interventions and therapies.    Critical care time includes time spent evaluating and treating the patient's acute illness as well as time spent reviewing labs, radiology,  and discussing the case with a multidisciplinary team in an effort to prevent further life threatening deterioration or end organ damage. This time is independent of any procedures performed.
I have personally seen and examined this patient.  I have reviewed all pertinent clinical information and reviewed all relevant imaging and diagnostic studies personally.   If possible, I counseled the patient about diagnostic testing and treatment plan.   I discussed my recommendations with the primary team and any family members at bedside.
This patient is critically ill due to the following:  * Multiple organ failure requiring complex decision-making, and there is a high probability of imminent or life-threatening deterioration in the patient’s condition  * The patient required frequent reassessments and monitoring to ensure response to interventions and therapies.    Critical care time includes time spent evaluating and treating the patient's acute illness as well as time spent reviewing labs, radiology,  and discussing the case with a multidisciplinary team in an effort to prevent further life threatening deterioration or end organ damage. This time is independent of any procedures performed.
This patient is critically ill due to the following:  * Multiple organ failure requiring complex decision-making, and there is a high probability of imminent or life-threatening deterioration in the patient’s condition  * The patient required frequent reassessments and monitoring to ensure response to interventions and therapies.    Critical care time includes time spent evaluating and treating the patient's acute illness as well as time spent reviewing labs, radiology,  and discussing the case with a multidisciplinary team in an effort to prevent further life threatening deterioration or end organ damage. This time is independent of any procedures performed.

## 2024-03-08 NOTE — PROGRESS NOTE ADULT - SUBJECTIVE AND OBJECTIVE BOX
MACDONALDMATT  63y, Male  Allergy: No Known Allergies      LOS  8d    CHIEF COMPLAINT: chest pain and SOB (08 Mar 2024 08:35)      INTERVAL EVENTS/HPI  - No acute events overnight  - T(F): , Max: 99.9 (03-07-24 @ 20:00) Afebrile   - WBC Count: 10.66 (03-08-24 @ 04:29)  WBC Count: 9.81 (03-07-24 @ 05:05)     - Creatinine: 0.5 (03-08-24 @ 04:29)  Creatinine: 0.5 (03-07-24 @ 05:05)       ROS  ***    VITALS:  T(F): 97, Max: 99.9 (03-07-24 @ 20:00)  HR: 78  BP: 129/69  RR: 30Vital Signs Last 24 Hrs  T(C): 36.1 (08 Mar 2024 08:00), Max: 37.7 (07 Mar 2024 20:00)  T(F): 97 (08 Mar 2024 08:00), Max: 99.9 (07 Mar 2024 20:00)  HR: 78 (08 Mar 2024 09:00) (48 - 126)  BP: 129/69 (08 Mar 2024 09:00) (120/69 - 180/81)  BP(mean): 93 (08 Mar 2024 09:00) (87 - 121)  RR: 30 (08 Mar 2024 09:00) (25 - 45)  SpO2: 95% (08 Mar 2024 09:00) (94% - 99%)    Parameters below as of 08 Mar 2024 09:00  Patient On (Oxygen Delivery Method): nasal cannula  O2 Flow (L/min): 2      PHYSICAL EXAM:  ***    FH: Non-contributory  Social Hx: Non-contributory    TESTS & MEASUREMENTS:                        12.6   10.66 )-----------( 219      ( 08 Mar 2024 04:29 )             36.1     03-08    142  |  109  |  7<L>  ----------------------------<  132<H>  3.5   |  23  |  0.5<L>    Ca    7.7<L>      08 Mar 2024 04:29  Mg     1.8     03-08    TPro  4.8<L>  /  Alb  2.4<L>  /  TBili  1.5<H>  /  DBili  x   /  AST  48<H>  /  ALT  48<H>  /  AlkPhos  103  03-07      LIVER FUNCTIONS - ( 07 Mar 2024 05:05 )  Alb: 2.4 g/dL / Pro: 4.8 g/dL / ALK PHOS: 103 U/L / ALT: 48 U/L / AST: 48 U/L / GGT: x           Urinalysis Basic - ( 08 Mar 2024 04:29 )    Color: x / Appearance: x / SG: x / pH: x  Gluc: 132 mg/dL / Ketone: x  / Bili: x / Urobili: x   Blood: x / Protein: x / Nitrite: x   Leuk Esterase: x / RBC: x / WBC x   Sq Epi: x / Non Sq Epi: x / Bacteria: x        Culture - Blood (collected 03-05-24 @ 18:30)  Source: .Blood Blood  Preliminary Report (03-07-24 @ 23:02):    No growth at 48 Hours    Culture - Blood (collected 03-05-24 @ 16:13)  Source: .Blood Blood  Preliminary Report (03-07-24 @ 23:02):    No growth at 48 Hours    Culture - Blood (collected 03-04-24 @ 01:30)  Source: .Blood Blood  Preliminary Report (03-08-24 @ 07:02):    No growth at 4 days    Culture - Blood (collected 03-04-24 @ 01:17)  Source: .Blood Blood  Preliminary Report (03-08-24 @ 07:01):    No growth at 4 days    Culture - Sputum (collected 03-03-24 @ 15:05)  Source: .Sputum Sputum  Gram Stain (03-04-24 @ 05:45):    Numerous polymorphonuclear leukocytes per low power field    Rare Squamous epithelial cells per low power field    Rare Gram positive cocci in pairs per oil power field  Final Report (03-05-24 @ 11:06):    No growth    Culture - Blood (collected 03-02-24 @ 12:14)  Source: .Blood None  Final Report (03-07-24 @ 21:00):    No growth at 5 days            INFECTIOUS DISEASES TESTING  Rapid RVP Result: NotDetec (03-05-24 @ 15:30)  Procalcitonin, Serum: 0.18 (03-03-24 @ 10:40)  MRSA PCR Result.: Negative (03-02-24 @ 02:30)  strept    INFLAMMATORY MARKERS      RADIOLOGY & ADDITIONAL TESTS:  I have personally reviewed the last available Chest xray  CXR  Xray Chest 1 View- PORTABLE-Urgent:   ACC: [id number] EXAM:  XR CHEST PORTABLE URGENT 1V   ORDERED BY: NINA FERRER     PROCEDURE DATE:  03/06/2024          INTERPRETATION:  STUDY INDICATION: ngt    TECHNIQUE:  Portable frontal view of the chest obtained.    COMPARISON: 3/6/2024    FINDINGS/  IMPRESSION:    NG tube at level of diaphragm with coiling in the proximal esophagus.   Recommend readjustment.    No focal consolidation, pneumothorax or pleural effusion.    Stable cardiomediastinal silhouette.    Unchanged osseous structures.    --- End of Report ---            TALHA LORENZANA MD; Attending Radiologist  This document has been electronically signed. Mar  7 2024  3:23AM (03-06-24 @ 22:31)      CT      CARDIOLOGY TESTING  12 Lead ECG:   Ventricular Rate 65 BPM    Atrial Rate 65 BPM    P-R Interval 132 ms    QRS Duration 82 ms    Q-T Interval 444 ms    QTC Calculation(Bazett) 461 ms    P Axis -18 degrees    R Axis 33 degrees    T Axis 43 degrees    Diagnosis Line Normal sinus rhythm  Septal infarct , age undetermined  Possible Lateral infarct , age undetermined  Abnormal ECG    Confirmed by Mohit Estes ([id number]) on 3/2/2024 9:41:31 AM (03-02-24 @ 05:58)  12 Lead ECG:   Ventricular Rate 147 BPM    QRS Duration 76 ms    Q-T Interval 320 ms    QTC Calculation(Bazett) 500 ms    R Axis 23 degrees    T Axis 68 degrees    Diagnosis Line Atrial fibrillation with rapid ventricular response  Septal infarct , age undetermined  Abnormal ECG    Confirmed by Mohit Estes ([id number]) on 3/1/2024 5:28:07 PM (03-01-24 @ 14:20)      MEDICATIONS  albuterol/ipratropium for Nebulization 3 Nebulizer every 6 hours  ascorbic acid 500 Oral daily  cefepime   IVPB 2000 IV Intermittent every 8 hours  chlorhexidine 2% Cloths 1 Topical daily  folic acid 1 Oral daily  guaiFENesin Oral Liquid (Sugar-Free) 100 Oral every 12 hours  influenza   Vaccine 0.5 IntraMuscular once  methimazole 5 Oral daily  metoprolol tartrate 75 Oral two times a day  metroNIDAZOLE    Tablet 500 Oral every 8 hours  multivitamin 1 Oral daily  nystatin    Suspension 756242 Oral four times a day  polyethylene glycol 3350 17 Oral two times a day  QUEtiapine 25 Oral at bedtime  senna 2 Oral at bedtime  sodium chloride 3%  Inhalation 4 Inhalation every 12 hours      WEIGHT  Weight (kg): 73.1 (03-01-24 @ 20:00)  Creatinine: 0.5 mg/dL (03-08-24 @ 04:29)      ANTIBIOTICS:  cefepime   IVPB 2000 milliGRAM(s) IV Intermittent every 8 hours  metroNIDAZOLE    Tablet 500 milliGRAM(s) Oral every 8 hours  nystatin    Suspension 319903 Unit(s) Oral four times a day      All available historical records have been reviewed       TORRES MATT  63y, Male  Allergy: No Known Allergies      LOS  8d    CHIEF COMPLAINT: chest pain and SOB (08 Mar 2024 08:35)      INTERVAL EVENTS/HPI  - No acute events overnight, would not participate with  phone  - T(F): , Max: 99.9 (03-07-24 @ 20:00) Afebrile   - WBC Count: 10.66 (03-08-24 @ 04:29)  WBC Count: 9.81 (03-07-24 @ 05:05)     - Creatinine: 0.5 (03-08-24 @ 04:29)  Creatinine: 0.5 (03-07-24 @ 05:05)       ROS  unable to obtain history secondary to patient's mental status and/or sedation     VITALS:  T(F): 97, Max: 99.9 (03-07-24 @ 20:00)  HR: 78  BP: 129/69  RR: 30Vital Signs Last 24 Hrs  T(C): 36.1 (08 Mar 2024 08:00), Max: 37.7 (07 Mar 2024 20:00)  T(F): 97 (08 Mar 2024 08:00), Max: 99.9 (07 Mar 2024 20:00)  HR: 78 (08 Mar 2024 09:00) (48 - 126)  BP: 129/69 (08 Mar 2024 09:00) (120/69 - 180/81)  BP(mean): 93 (08 Mar 2024 09:00) (87 - 121)  RR: 30 (08 Mar 2024 09:00) (25 - 45)  SpO2: 95% (08 Mar 2024 09:00) (94% - 99%)    Parameters below as of 08 Mar 2024 09:00  Patient On (Oxygen Delivery Method): nasal cannula  O2 Flow (L/min): 2      PHYSICAL EXAM:  Gen: chronically ill appearing   HEENT: Normocephalic, atraumatic NGT  Neck: supple, no lymphadenopathy  CV: Regular rate & regular rhythm  Lungs: decreased BS at bases, no fremitus  Abdomen: Soft, BS present  Ext: Warm, well perfused  Neuro: non focal,   Skin: no rash, no erythema  Lines: no phlebitis     FH: Non-contributory  Social Hx: Non-contributory    TESTS & MEASUREMENTS:                        12.6   10.66 )-----------( 219      ( 08 Mar 2024 04:29 )             36.1     03-08    142  |  109  |  7<L>  ----------------------------<  132<H>  3.5   |  23  |  0.5<L>    Ca    7.7<L>      08 Mar 2024 04:29  Mg     1.8     03-08    TPro  4.8<L>  /  Alb  2.4<L>  /  TBili  1.5<H>  /  DBili  x   /  AST  48<H>  /  ALT  48<H>  /  AlkPhos  103  03-07      LIVER FUNCTIONS - ( 07 Mar 2024 05:05 )  Alb: 2.4 g/dL / Pro: 4.8 g/dL / ALK PHOS: 103 U/L / ALT: 48 U/L / AST: 48 U/L / GGT: x           Urinalysis Basic - ( 08 Mar 2024 04:29 )    Color: x / Appearance: x / SG: x / pH: x  Gluc: 132 mg/dL / Ketone: x  / Bili: x / Urobili: x   Blood: x / Protein: x / Nitrite: x   Leuk Esterase: x / RBC: x / WBC x   Sq Epi: x / Non Sq Epi: x / Bacteria: x        Culture - Blood (collected 03-05-24 @ 18:30)  Source: .Blood Blood  Preliminary Report (03-07-24 @ 23:02):    No growth at 48 Hours    Culture - Blood (collected 03-05-24 @ 16:13)  Source: .Blood Blood  Preliminary Report (03-07-24 @ 23:02):    No growth at 48 Hours    Culture - Blood (collected 03-04-24 @ 01:30)  Source: .Blood Blood  Preliminary Report (03-08-24 @ 07:02):    No growth at 4 days    Culture - Blood (collected 03-04-24 @ 01:17)  Source: .Blood Blood  Preliminary Report (03-08-24 @ 07:01):    No growth at 4 days    Culture - Sputum (collected 03-03-24 @ 15:05)  Source: .Sputum Sputum  Gram Stain (03-04-24 @ 05:45):    Numerous polymorphonuclear leukocytes per low power field    Rare Squamous epithelial cells per low power field    Rare Gram positive cocci in pairs per oil power field  Final Report (03-05-24 @ 11:06):    No growth    Culture - Blood (collected 03-02-24 @ 12:14)  Source: .Blood None  Final Report (03-07-24 @ 21:00):    No growth at 5 days            INFECTIOUS DISEASES TESTING  Rapid RVP Result: NotDetec (03-05-24 @ 15:30)  Procalcitonin, Serum: 0.18 (03-03-24 @ 10:40)  MRSA PCR Result.: Negative (03-02-24 @ 02:30)  strept    INFLAMMATORY MARKERS      RADIOLOGY & ADDITIONAL TESTS:  I have personally reviewed the last available Chest xray  CXR  Xray Chest 1 View- PORTABLE-Urgent:   ACC: [id number] EXAM:  XR CHEST PORTABLE URGENT 1V   ORDERED BY: NINA FERRER     PROCEDURE DATE:  03/06/2024          INTERPRETATION:  STUDY INDICATION: ngt    TECHNIQUE:  Portable frontal view of the chest obtained.    COMPARISON: 3/6/2024    FINDINGS/  IMPRESSION:    NG tube at level of diaphragm with coiling in the proximal esophagus.   Recommend readjustment.    No focal consolidation, pneumothorax or pleural effusion.    Stable cardiomediastinal silhouette.    Unchanged osseous structures.    --- End of Report ---            TALHA LORENZANA MD; Attending Radiologist  This document has been electronically signed. Mar  7 2024  3:23AM (03-06-24 @ 22:31)      CT      CARDIOLOGY TESTING  12 Lead ECG:   Ventricular Rate 65 BPM    Atrial Rate 65 BPM    P-R Interval 132 ms    QRS Duration 82 ms    Q-T Interval 444 ms    QTC Calculation(Bazett) 461 ms    P Axis -18 degrees    R Axis 33 degrees    T Axis 43 degrees    Diagnosis Line Normal sinus rhythm  Septal infarct , age undetermined  Possible Lateral infarct , age undetermined  Abnormal ECG    Confirmed by Mohit Estes ([id number]) on 3/2/2024 9:41:31 AM (03-02-24 @ 05:58)  12 Lead ECG:   Ventricular Rate 147 BPM    QRS Duration 76 ms    Q-T Interval 320 ms    QTC Calculation(Bazett) 500 ms    R Axis 23 degrees    T Axis 68 degrees    Diagnosis Line Atrial fibrillation with rapid ventricular response  Septal infarct , age undetermined  Abnormal ECG    Confirmed by Mohit Estes ([id number]) on 3/1/2024 5:28:07 PM (03-01-24 @ 14:20)      MEDICATIONS  albuterol/ipratropium for Nebulization 3 Nebulizer every 6 hours  ascorbic acid 500 Oral daily  cefepime   IVPB 2000 IV Intermittent every 8 hours  chlorhexidine 2% Cloths 1 Topical daily  folic acid 1 Oral daily  guaiFENesin Oral Liquid (Sugar-Free) 100 Oral every 12 hours  influenza   Vaccine 0.5 IntraMuscular once  methimazole 5 Oral daily  metoprolol tartrate 75 Oral two times a day  metroNIDAZOLE    Tablet 500 Oral every 8 hours  multivitamin 1 Oral daily  nystatin    Suspension 047170 Oral four times a day  polyethylene glycol 3350 17 Oral two times a day  QUEtiapine 25 Oral at bedtime  senna 2 Oral at bedtime  sodium chloride 3%  Inhalation 4 Inhalation every 12 hours      WEIGHT  Weight (kg): 73.1 (03-01-24 @ 20:00)  Creatinine: 0.5 mg/dL (03-08-24 @ 04:29)      ANTIBIOTICS:  cefepime   IVPB 2000 milliGRAM(s) IV Intermittent every 8 hours  metroNIDAZOLE    Tablet 500 milliGRAM(s) Oral every 8 hours  nystatin    Suspension 799893 Unit(s) Oral four times a day      All available historical records have been reviewed

## 2024-03-08 NOTE — SWALLOW FEES ASSESSMENT ADULT - PHARYNGEAL PHASE COMMENTS
pharyngeal swallow grossly WFL; productive cough noted during study, not c/w laryngeal penetration/aspiration +white mucus

## 2024-03-08 NOTE — SWALLOW FEES ASSESSMENT ADULT - SLP PERTINENT HISTORY OF CURRENT PROBLEM
Pt is a 64 y/o M w/ PMHx: alcohol abuse, HTN, COPD, recently dx w/ hyperthyroidism, presented for feelings of palpitations and exertional SOB. Pt admitted for treatment of alcohol withdrawal, placed on Ativan taper; course c/b O2 desaturation requiring mechanical ventilation on 3/1, s/p extubation 3/6. CATCH team c/s. +ARF, RLL consolidation, possible aspiration, fever, ?colitis, questionable afib, transaminitis. CXR-> unchanged small bibasilar opacities. EEG h(-) for signs of seizure activity. CTH (-). Pt is a 62 y/o M w/ PMHx: alcohol abuse, HTN, COPD, recently dx w/ hyperthyroidism, presented for feelings of palpitations and exertional SOB. Pt admitted for treatment of alcohol withdrawal, placed on Ativan taper; course c/b O2 desaturation requiring mechanical ventilation on 3/1, s/p extubation 3/6. CATCH team c/s. +ARF, RLL consolidation, possible aspiration, fever, ?colitis, questionable afib, transaminitis. CXR-> unchanged small bibasilar opacities. EEG (-) for signs of seizure activity. CTH (-).

## 2024-03-08 NOTE — SWALLOW FEES ASSESSMENT ADULT - PRELIMINARY ENDOSCOPIC EXAMINATIONS
+B/L intubation granulomas, discoloration of posterior pharyngeal wall, pyriform sinus tissue L>R/Vocal fold adduction/abduction

## 2024-03-08 NOTE — PROGRESS NOTE ADULT - SUBJECTIVE AND OBJECTIVE BOX
O: Vital Signs Last 24 Hrs  T(C): 36.1 (08 Mar 2024 08:00), Max: 37.7 (07 Mar 2024 20:00)  T(F): 97 (08 Mar 2024 08:00), Max: 99.9 (07 Mar 2024 20:00)  HR: 78 (08 Mar 2024 13:00) (48 - 109)  BP: 131/83 (08 Mar 2024 13:00) (100/63 - 160/77)  BP(mean): 102 (08 Mar 2024 13:00) (69 - 113)  RR: 31 (08 Mar 2024 13:00) (25 - 42)  SpO2: 99% (08 Mar 2024 13:00) (94% - 99%)    Parameters below as of 08 Mar 2024 13:00  Patient On (Oxygen Delivery Method): room air      03-08    142  |  109  |  7<L>  ----------------------------<  132<H>  3.5   |  23  |  0.5<L>    Ca    7.7<L>      08 Mar 2024 04:29  Mg     1.8     03-08    TPro  4.8<L>  /  Alb  2.4<L>  /  TBili  1.5<H>  /  DBili  x   /  AST  48<H>  /  ALT  48<H>  /  AlkPhos  103  03-07                            12.6   10.66 )-----------( 219      ( 08 Mar 2024 04:29 )             36.1       MEDICATIONS  (STANDING):  albuterol/ipratropium for Nebulization 3 milliLiter(s) Nebulizer every 6 hours  ascorbic acid 500 milliGRAM(s) Oral daily  cefepime   IVPB 2000 milliGRAM(s) IV Intermittent every 8 hours  chlorhexidine 2% Cloths 1 Application(s) Topical daily  folic acid 1 milliGRAM(s) Oral daily  guaiFENesin Oral Liquid (Sugar-Free) 100 milliGRAM(s) Oral every 12 hours  influenza   Vaccine 0.5 milliLiter(s) IntraMuscular once  methimazole 5 milliGRAM(s) Oral daily  metoprolol tartrate 75 milliGRAM(s) Oral two times a day  metroNIDAZOLE    Tablet 500 milliGRAM(s) Oral every 8 hours  multivitamin 1 Tablet(s) Oral daily  nystatin    Suspension 344550 Unit(s) Oral four times a day  polyethylene glycol 3350 17 Gram(s) Oral two times a day  senna 2 Tablet(s) Oral at bedtime  sodium chloride 3%  Inhalation 4 milliLiter(s) Inhalation every 12 hours    MEDICATIONS  (PRN):  albuterol/ipratropium for Nebulization 3 milliLiter(s) Nebulizer every 6 hours PRN Shortness of Breath and/or Wheezing  haloperidol    Injectable 2.5 milliGRAM(s) IntraMuscular every 8 hours PRN agitation

## 2024-03-08 NOTE — CHART NOTE - NSCHARTNOTEFT_GEN_A_CORE
[facility]    Transfer to: SDU    Sign out given to:     HPI / HOSPITAL COURSE:  63 years old man with a history of alcohol abuse, hypertension, COPD? ( heavy smoker. acc to pt had PFTs done and uses inhalers at home ), recently diagnosed with hyperthyroidism by his PCP a few weeks ago before presentation for which he was started on methimazole 5 mg by PMD presented for feelings of palpitations and exertional shortness of breath   As per family, His HR was ranging 120-130 at home. No chest pain. He has cough and mild SOB which he attributes to smoking 1 pack daily. Otherwise he describeed himself as very healthy. Pt was tremelous and restless in the ED. He admitted to drinking alcohol  (15-20 drinks of liquor daily). Patient also admitted to not want to stop drinking and try to cut down his alcohol use.    As per family, patient was seen by his cardiologist Dr. Jonhson who increased that his metoprolol to 75 mg twice a day which did not improve his heart rate.  Patient had outpatient echocardiogram yesterday and noted to be tachycardic so he was recommended to come to ED for evaluation.  Otherwise denies fever, chills, recent illness, coughing, chest pain, abdominal pain, diarrhea or urinary symptoms.  Denies drug use.    Vitals: T 98.2, , /90, spo2 94% on RA   Labs remarkable for elevated ALT AST, Mg 1.5  Lactate 4 on VBGs   Alcohol level 202   CXR clear   EKG showed sinus tachycardia     s/p valium and ativan in ED  s/p 1 L NS, magnesium   HR improved from 130 to 90s  (02-29-24 @ 01:57)    Patient was admitted for treatment of alcohol withdrawal and was put on Ativan taper and PRN. Patient was transferred to CCU due to severe alcohol withdrawal, requiring Ativan PRN         ASSESSMENT & PLAN:         FOR FOLLOW UP:  [ ]   [ ]   [ ] Transfer from: [facility]    Transfer to: SDU    Sign out given to:     HPI / HOSPITAL COURSE:  63 years old man with a history of alcohol abuse, hypertension, COPD? ( heavy smoker. acc to pt had PFTs done and uses inhalers at home ), recently diagnosed with hyperthyroidism by his PCP a few weeks ago before presentation for which he was started on methimazole 5 mg by PMD presented for feelings of palpitations and exertional shortness of breath   As per family, His HR was ranging 120-130 at home. No chest pain. He has cough and mild SOB which he attributes to smoking 1 pack daily. Otherwise he describeed himself as very healthy. Pt was tremelous and restless in the ED. He admitted to drinking alcohol  (15-20 drinks of liquor daily). Patient also admitted to not want to stop drinking and try to cut down his alcohol use.    As per family, patient was seen by his cardiologist Dr. Johnson who increased that his metoprolol to 75 mg twice a day which did not improve his heart rate.  Patient had outpatient echocardiogram yesterday and noted to be tachycardic so he was recommended to come to ED for evaluation.  Otherwise denies fever, chills, recent illness, coughing, chest pain, abdominal pain, diarrhea or urinary symptoms.  Denies drug use.    Vitals: T 98.2, , /90, spo2 94% on RA   Labs remarkable for elevated ALT AST, Mg 1.5  Lactate 4 on VBGs   Alcohol level 202   CXR clear   EKG showed sinus tachycardia     s/p valium and ativan in ED  s/p 1 L NS, magnesium   HR improved from 130 to 90s  (02-29-24 @ 01:57)    Patient was admitted for treatment of alcohol withdrawal and was put on Ativan taper and PRN. Patient was transferred to CCU due to severe alcohol withdrawal, requiring Ativan every 1 hour. Patient desatted, was intubated. He was started on ceftriaxone due to possible pneumonia. Due to multiple episodes of fever, and patient having no bowel movements, A CT abdomen pelvis with oral contrast was done which showed Dilated colon with signs suggestive of colitis. Patient was started on cefepime and metronidazole. ceftriaxone discontinued. blood cultures and RVPs came back negative . Patient stopped requiring Ativan and was extubated on 03/06/2024. He was able to eat and was slowly regaining mental status. Downgraded to SDU    ASSESSMENT & PLAN:     63 year old man with a pmh of COPD, alcohol use disorder and hyperthyroidism presented for palpitation. Upgraded for hypoxemic respiratory failure    Alcohol withdrawal   #Alcohol intoxication (alcohol level in blood >200)   - Extubated on 03/06/2024. Off sedation. On BiPaP.   - On 2L nasal canula  - Has an NG tube  - Lactate 4 on VBGs -> repeat 1.4 (after fluids)  - trop 11, pro-BNP 46   - EKG showed sinus tachycardia   - On Seroquel 25 mg at night standing and haldol 2.5 mg q 8 PRN for agitation  - Off Ativan prn and taper  - RVP negative  - was on LR maintenance. Discontinued. Patient eating  - TSH 0.01 low, fT4 1.8. Repeat pending  - CATCH team eval, pt willing to stop   - On thiamine and folate   -  CT head : No evidence of acute transcortical infarct, hydrocephalus, acute   intracranial hemorrhage, or mass effect.  -EEG: No epileptiform abnormalities were recorded.  -replete Mg and K as needed  - Daily EKG to monitor QTc. Discontinued Seroquel 25 mg as QTc is 508. Continue with Haldol prn  - 1:1 observation for now. Patient is eating         #Constipation  #Fever   #Possible colitis   - Patient stopped having fevers. Having bowel movements  -Blood cultures taken. Patient placed on cefepime and metronidazole  - CT Abdomen/pelvis with oral contrast showed distended column with possible signs of colitis  - Was placed on Miralax BID, Senna PRN  - Bcx pending  - RVP negative  - cw cefepime and metronidazole till 3/11  - f/u cultures  - f/u repeat procal levels    #Hyperthyroidism-Diagnosis made by PCP and started on methimazole. No records  - TSH 0.01 low, fT4 1.8.   - On Methimazole 5 mg TID  - Follow up repeat TSH, free T4 and total T3  - endocrinology following    # Questionable A-fib. After review most likely sinus tachycardia  #HTN  - Dr Johnson saw the patient. Continue rate control with metoprolol as per EP. Highly unlikely that patient has A-fib  - Patient takes metoprolol 75 mg as an outpatient  - c/w metoprolol 75 mg BID    # Right lower lobe consolidation- resolved  -CXR right LLO  - Repeat CXR today showed no infiltrates  - Was on Rocephin 1 g q 24 for 3 days then switched to cefepime plus metronidazole due for colitis  - RVP negative  - Sputum culture showed rare gram positive cocci in pairs  - blood cultures showed no growth so far    #Transaminitis   - , ALT 85,   - Secondary to alcohol use      #DVT ppx: lovenox   #GI ppx: protonix   #Diet: feeds via NG. Also able to have po intake  #Activity: as tolerated   #Full code Transfer from: [facility]    Transfer to: SDU    Sign out given to:     HPI / HOSPITAL COURSE:  63 years old man with a history of alcohol abuse, hypertension, COPD? ( heavy smoker. acc to pt had PFTs done and uses inhalers at home ), recently diagnosed with hyperthyroidism by his PCP a few weeks ago before presentation for which he was started on methimazole 5 mg by PMD presented for feelings of palpitations and exertional shortness of breath   As per family, His HR was ranging 120-130 at home. No chest pain. He has cough and mild SOB which he attributes to smoking 1 pack daily. Otherwise he describeed himself as very healthy. Pt was tremelous and restless in the ED. He admitted to drinking alcohol  (15-20 drinks of liquor daily). Patient also admitted to not want to stop drinking and try to cut down his alcohol use.    As per family, patient was seen by his cardiologist Dr. Johnson who increased that his metoprolol to 75 mg twice a day which did not improve his heart rate.  Patient had outpatient echocardiogram yesterday and noted to be tachycardic so he was recommended to come to ED for evaluation.  Otherwise denies fever, chills, recent illness, coughing, chest pain, abdominal pain, diarrhea or urinary symptoms.  Denies drug use.    Vitals: T 98.2, , /90, spo2 94% on RA   Labs remarkable for elevated ALT AST, Mg 1.5  Lactate 4 on VBGs   Alcohol level 202   CXR clear   EKG showed sinus tachycardia     s/p valium and ativan in ED  s/p 1 L NS, magnesium   HR improved from 130 to 90s  (02-29-24 @ 01:57)    Patient was admitted for treatment of alcohol withdrawal and was put on Ativan taper and PRN. Patient was transferred to CCU due to severe alcohol withdrawal, requiring Ativan every 1 hour. Patient desatted, was intubated. He was started on ceftriaxone due to possible pneumonia. Due to multiple episodes of fever, and patient having no bowel movements, A CT abdomen pelvis with oral contrast was done which showed Dilated colon with signs suggestive of colitis. Patient was started on cefepime and metronidazole. ceftriaxone discontinued. blood cultures and RVPs came back negative . Patient stopped requiring Ativan and was extubated on 03/06/2024. He was able to eat and was slowly regaining mental status. Downgraded to SDU    ASSESSMENT & PLAN:     63 year old man with a pmh of COPD, alcohol use disorder and hyperthyroidism presented for palpitation. Upgraded for hypoxemic respiratory failure    Alcohol withdrawal   #Alcohol intoxication (alcohol level in blood >200)   - Extubated on 03/06/2024. Off sedation. On BiPaP.   - On 2L nasal canula  - Has an NG tube  - Lactate 4 on VBGs -> repeat 1.4 (after fluids)  - trop 11, pro-BNP 46   - EKG showed sinus tachycardia   - On Seroquel 25 mg at night standing and haldol 2.5 mg q 8 PRN for agitation  - Off Ativan prn and taper  - RVP negative  - was on LR maintenance. Discontinued. Patient eating  - TSH 0.01 low, fT4 1.8. Repeat pending  - CATCH team eval, pt willing to stop   - On thiamine and folate   -  CT head : No evidence of acute transcortical infarct, hydrocephalus, acute   intracranial hemorrhage, or mass effect.  -EEG: No epileptiform abnormalities were recorded.  -replete Mg and K as needed  - Daily EKG to monitor QTc. Discontinued Seroquel 25 mg as QTc is 508. hold Haldol prn. Give only when necessary and other reorientation methods fail  - 1:1 observation for now. Patient is eating     #Constipation  #Fever   #Possible colitis   - Patient stopped having fevers. Having bowel movements  -Blood cultures taken. Patient placed on cefepime and metronidazole  - CT Abdomen/pelvis with oral contrast showed distended column with possible signs of colitis  - Was placed on Miralax BID, Senna PRN  - Bcx pending  - RVP negative  - cw cefepime and metronidazole till 3/11  - f/u cultures  - f/u repeat procal levels    #Hyperthyroidism-Diagnosis made by PCP and started on methimazole. No records  - TSH 0.01 low, fT4 1.8.   - On Methimazole 5 mg TID  - Follow up repeat TSH, free T4 and total T3  - endocrinology following    # Questionable A-fib. After review most likely sinus tachycardia  #HTN  - Dr Johnson saw the patient. Continue rate control with metoprolol as per EP. Highly unlikely that patient has A-fib  - Patient takes metoprolol 75 mg as an outpatient  - c/w metoprolol 75 mg BID    # Right lower lobe consolidation- resolved  -CXR right LLO  - Repeat CXR today showed no infiltrates  - Was on Rocephin 1 g q 24 for 3 days then switched to cefepime plus metronidazole due for colitis  - RVP negative  - Sputum culture showed rare gram positive cocci in pairs  - blood cultures showed no growth so far    #Transaminitis   - , ALT 85,   - Secondary to alcohol use      #DVT ppx: lovenox   #GI ppx: protonix   #Diet: feeds via NG. Also able to have po intake  #Activity: as tolerated   #Full code Transfer from: [facility]    Transfer to: SDU    Sign out given to:     HPI / HOSPITAL COURSE:  63 years old man with a history of alcohol abuse, hypertension, COPD? ( heavy smoker. acc to pt had PFTs done and uses inhalers at home ), recently diagnosed with hyperthyroidism by his PCP a few weeks ago before presentation for which he was started on methimazole 5 mg by PMD presented for feelings of palpitations and exertional shortness of breath   As per family, His HR was ranging 120-130 at home. No chest pain. He has cough and mild SOB which he attributes to smoking 1 pack daily. Otherwise he describeed himself as very healthy. Pt was tremelous and restless in the ED. He admitted to drinking alcohol  (15-20 drinks of liquor daily). Patient also admitted to not want to stop drinking and try to cut down his alcohol use.    As per family, patient was seen by his cardiologist Dr. Johnson who increased that his metoprolol to 75 mg twice a day which did not improve his heart rate.  Patient had outpatient echocardiogram yesterday and noted to be tachycardic so he was recommended to come to ED for evaluation.  Otherwise denies fever, chills, recent illness, coughing, chest pain, abdominal pain, diarrhea or urinary symptoms.  Denies drug use.    Vitals: T 98.2, , /90, spo2 94% on RA   Labs remarkable for elevated ALT AST, Mg 1.5  Lactate 4 on VBGs   Alcohol level 202   CXR clear   EKG showed sinus tachycardia     s/p valium and ativan in ED  s/p 1 L NS, magnesium   HR improved from 130 to 90s  (02-29-24 @ 01:57)    Patient was admitted for treatment of alcohol withdrawal and was put on Ativan taper and PRN. Patient was transferred to CCU due to severe alcohol withdrawal, requiring Ativan every 1 hour. Patient desatted, was intubated. He was started on ceftriaxone due to possible pneumonia. Due to multiple episodes of fever, and patient having no bowel movements, A CT abdomen pelvis with oral contrast was done which showed Dilated colon with signs suggestive of colitis. Patient was started on cefepime and metronidazole. ceftriaxone discontinued. blood cultures and RVPs came back negative . Patient stopped requiring Ativan and was extubated on 03/06/2024. He was able to eat and was slowly regaining mental status. Downgraded to SDU    ASSESSMENT & PLAN:     63 year old man with a pmh of COPD, alcohol use disorder and hyperthyroidism presented for palpitation. Upgraded for hypoxemic respiratory failure    Alcohol withdrawal   #Alcohol intoxication (alcohol level in blood >200)   - Extubated on 03/06/2024. Off sedation. On BiPaP.   - On 2L nasal canula  - Has an NG tube  - Lactate 4 on VBGs -> repeat 1.4 (after fluids)  - trop 11, pro-BNP 46   - EKG showed sinus tachycardia   - On Seroquel 25 mg at night standing and haldol 2.5 mg q 8 PRN for agitation  - Off Ativan prn and taper  - RVP negative  - was on LR maintenance. Discontinued. Patient eating  - TSH 0.01 low, fT4 1.8. Repeat pending  - CATCH team eval, pt willing to stop   - On thiamine and folate   -  CT head : No evidence of acute transcortical infarct, hydrocephalus, acute   intracranial hemorrhage, or mass effect.  -EEG: No epileptiform abnormalities were recorded.  -replete Mg and K as needed  - Daily EKG to monitor QTc. Discontinued Seroquel 25 mg as QTc is 508. hold Haldol prn. Give only when necessary and other reorientation methods fail  - 1:1 observation for now. Patient is eating     #Constipation  #Fever   #Possible colitis   - Patient stopped having fevers. Having bowel movements  -Blood cultures taken. Patient placed on cefepime and metronidazole  - CT Abdomen/pelvis with oral contrast showed distended column with possible signs of colitis  - Was placed on Miralax BID, Senna PRN  - Bcx pending  - RVP negative  - cw cefepime and metronidazole till 3/11  - f/u cultures  - f/u repeat procal levels    #Hyperthyroidism-Diagnosis made by PCP and started on methimazole. No records  - TSH 0.01 low, fT4 1.8.   - On Methimazole 5 mg TID  - Follow up repeat TSH, free T4 and total T3  - endocrinology following    # Questionable A-fib. After review most likely sinus tachycardia  #HTN  - Dr Johnson saw the patient. Continue rate control with metoprolol as per EP. Highly unlikely that patient has A-fib  - Patient takes metoprolol 75 mg as an outpatient  - c/w metoprolol 75 mg BID    # Right lower lobe consolidation- resolved  #COPD?  -CXR right LLO  - Repeat CXR today showed no infiltrates  - Was on Rocephin 1 g q 24 for 3 days then switched to cefepime plus metronidazole due for colitis  - RVP negative  - Sputum culture showed rare gram positive cocci in pairs  - blood cultures showed no growth so far  - c/w duoneb q4       #Transaminitis   - , ALT 85,   - Secondary to alcohol use      #DVT ppx: lovenox   #GI ppx: protonix   #Diet: feeds via NG. Also able to have po intake  #Activity: as tolerated   #Full code

## 2024-03-08 NOTE — PROGRESS NOTE ADULT - ASSESSMENT
63 years old male history of alcohol abuse, hypertension, COPD? ( heavy smoker. acc to pt had PFTs done and uses inhalers at home ), recently diagnosed with hyperthyroidism  and started on methimazole 5 mg by PMD presents complaining of palpitations and exertional shortness of breath over the past few weeks. Upgraded to ICU for acute respiratory failure and alcohol withdrawal requiring CIWA protocol. Extubated successfully     #Hyperthyroidism  - per daughter in law at bedside patient was diagnosed with hyperthyroidism around 1.5 months ago , was given methimazole unsure about dose and was taking it regularly   - 2/28/24 : TSH -0.01, FT4- 1.8   - 3/8/24: TSH 0.52  ft4 0.7  T3 82  TSI pending   on Methimazole 5 mg daily since admission , missed 2 doses total because of intubation   - Ft4 now lower and TSH improved to normal , lower methimazole to 2.5 mg daily   - can repeat tfts in 4-5 days  if patient remain in the hospital

## 2024-03-08 NOTE — SWALLOW FEES ASSESSMENT ADULT - SLP GENERAL OBSERVATIONS
pt received in bed awake confused w/o c/o pain. +1:1 sit at bedside +noisy respiratory quality, wheezing, RN/MD made aware

## 2024-03-08 NOTE — PROGRESS NOTE ADULT - ASSESSMENT
ASSESSMENT  63 years old male history of alcohol abuse, hypertension, COPD? ( heavy smoker. acc to pt had PFTs done and uses inhalers at home ), recently diagnosed with hyperthyroidism few weeks ago and started on methimazole 5 mg by PMD presents complaining of palpitations and exertional shortness of breath   Pt admitted on 2/29, prolonged, complicated hospital course. ID consulted on 3/7 for fever   Pt febrile 3/3 Tm 101.1, 3/4 Tm 100.9, Tm 3/6 101.5    IMPRESSION  #Rule out Sepsis , Fever + Tachycardia No leukocytosis   Normo/Hypertensive during fevers    CXR No focal consolidation, pneumothorax or pleural effusion.    3/5 BCX NGTD     3/5 RVP NEGATIVE     3/4 BCX NGTD     3/4 UA without significant pyuria 2    3/3 sputum NG    3/2 BCX NGTD     Procalcitonin, Serum: 0.18 ng/mL (03-03-24 @ 10:40)- unremarkable     MRSA PCR Result.: Negative (03-02-24 @ 02:30)  < from: CT Abdomen and Pelvis w/ Oral Cont (03.05.24 @ 22:55) >  1. Dilated colon up to 7 cm extending from the cecum to the splenic flexure without evidence of large bowel obstruction. Liquid stool throughout the colon. Oral contrast reaches the rectum.  2. Multiple prominent small bowel loops may be secondary to ileus or downstream slow transit. No definiteevidence of small bowel obstruction.  3. Probable colitis involving the distal descending colon to the rectum. No definite organized collection. No free air. Trace ascites.  4. Bowel containing right inguinal hernia as above.  < from: CT Angio Chest PE Protocol w/ IV Cont (03.01.24 @ 22:32) >  Limited by breathing motion and streak artifact. No central or segmental pulmonary embolus. No CT evidence of right heart strain.  Right greater than left bilateral lower lobe consolidations with obliteration of interspersed airways. Correlate for pneumonia.  #Acute respiratory failure  #Atelectasis   #ETOH with withdrawal, transaminitis   #Smoker, ?COPD  #Abx allergy: No Known Allergies    Creatinine: 0.5 (03-07-24 @ 05:05)    Height (cm): 167.6 (03-01-24 @ 20:00)  Weight (kg): 73.1 (03-01-24 @ 20:00)    RECOMMENDATIONS  - trend fever curve  - Continue empiric cefepime 2g q12h IV + PO flagyl 500mg TID per NGT, anticipate x 7 days end 3/11    If any questions, please send a message or call on [employer] Teams  Please continue to update ID with any pertinent new laboratory, radiographic findings, or change in clinical status

## 2024-03-08 NOTE — PROGRESS NOTE ADULT - SUBJECTIVE AND OBJECTIVE BOX
SUBJ:No chest pain or shortness of breath      MEDICATIONS  (STANDING):  albuterol/ipratropium for Nebulization 3 milliLiter(s) Nebulizer every 6 hours  ascorbic acid 500 milliGRAM(s) Oral daily  cefepime   IVPB 2000 milliGRAM(s) IV Intermittent every 8 hours  chlorhexidine 2% Cloths 1 Application(s) Topical daily  folic acid 1 milliGRAM(s) Oral daily  guaiFENesin Oral Liquid (Sugar-Free) 100 milliGRAM(s) Oral every 12 hours  influenza   Vaccine 0.5 milliLiter(s) IntraMuscular once  methimazole 5 milliGRAM(s) Oral daily  metoprolol tartrate 75 milliGRAM(s) Oral two times a day  metroNIDAZOLE    Tablet 500 milliGRAM(s) Oral every 8 hours  multivitamin 1 Tablet(s) Oral daily  nystatin    Suspension 564199 Unit(s) Oral four times a day  polyethylene glycol 3350 17 Gram(s) Oral two times a day  senna 2 Tablet(s) Oral at bedtime  sodium chloride 3%  Inhalation 4 milliLiter(s) Inhalation every 12 hours    MEDICATIONS  (PRN):  albuterol/ipratropium for Nebulization 3 milliLiter(s) Nebulizer every 6 hours PRN Shortness of Breath and/or Wheezing            Vital Signs Last 24 Hrs  T(C): 36.1 (08 Mar 2024 08:00), Max: 37.7 (07 Mar 2024 20:00)  T(F): 97 (08 Mar 2024 08:00), Max: 99.9 (07 Mar 2024 20:00)  HR: 78 (08 Mar 2024 13:00) (48 - 109)  BP: 131/83 (08 Mar 2024 13:00) (100/63 - 160/77)  BP(mean): 102 (08 Mar 2024 13:00) (69 - 113)  RR: 31 (08 Mar 2024 13:00) (25 - 42)  SpO2: 99% (08 Mar 2024 13:00) (94% - 99%)    Parameters below as of 08 Mar 2024 13:00  Patient On (Oxygen Delivery Method): room air          ECG:NML    TTE:    LABS:                        12.6   10.66 )-----------( 219      ( 08 Mar 2024 04:29 )             36.1     03-08    142  |  109  |  7<L>  ----------------------------<  132<H>  3.5   |  23  |  0.5<L>    Ca    7.7<L>      08 Mar 2024 04:29  Mg     1.8     03-08    TPro  4.8<L>  /  Alb  2.4<L>  /  TBili  1.5<H>  /  DBili  x   /  AST  48<H>  /  ALT  48<H>  /  AlkPhos  103  03-07            I&O's Summary    07 Mar 2024 07:01  -  08 Mar 2024 07:00  --------------------------------------------------------  IN: 828 mL / OUT: 1650 mL / NET: -822 mL    08 Mar 2024 07:01  -  08 Mar 2024 15:40  --------------------------------------------------------  IN: 0 mL / OUT: 300 mL / NET: -300 mL      BNP

## 2024-03-08 NOTE — PROGRESS NOTE ADULT - SUBJECTIVE AND OBJECTIVE BOX
Patient is a 63y old  Male who presents with a chief complaint of chest pain and SOB (07 Mar 2024 21:47)        Over Night Events:    remains altered, but improving mental status  needs frequent reorientation  ID agrees with current Abx regimen  Repeat thyroid labs pending    ROS:     All ROS are negative except HPI         PHYSICAL EXAM    ICU Vital Signs Last 24 Hrs  T(C): 36.1 (08 Mar 2024 08:00), Max: 37.7 (07 Mar 2024 20:00)  T(F): 97 (08 Mar 2024 08:00), Max: 99.9 (07 Mar 2024 20:00)  HR: 48 (08 Mar 2024 08:00) (48 - 126)  BP: 149/87 (08 Mar 2024 07:00) (120/69 - 180/81)  BP(mean): 112 (08 Mar 2024 07:00) (87 - 121)  ABP: --  ABP(mean): --  RR: 27 (08 Mar 2024 08:00) (25 - 45)  SpO2: 98% (08 Mar 2024 08:00) (94% - 99%)    O2 Parameters below as of 08 Mar 2024 08:00  Patient On (Oxygen Delivery Method): nasal cannula  O2 Flow (L/min): 2          Constitutional: no acute distress, well nourished well developed  Neuro: moving all 4 limbs spontaneously, no facial droop or dysarthria  HEENT: NCAT, anicteric  Neck: no visible lymphadenopathy or goiter  Pulm: no respiratory distress. clear to auscultation bilaterally  Cardiac: extremities appear pink and well-perfused.  regular rhythm and rate, no murmur detected  Abdomen: non-distended  Extremities: no peripheral edema      03-07-24 @ 07:01  -  03-08-24 @ 07:00  --------------------------------------------------------  IN:    Dexmedetomidine: 268 mL    Enteral Tube Flush: 300 mL    IV PiggyBack: 100 mL    Jevity 1.2: 160 mL  Total IN: 828 mL    OUT:    Voided (mL): 1650 mL  Total OUT: 1650 mL    Total NET: -822 mL          LABS:                            12.6   10.66 )-----------( 219      ( 08 Mar 2024 04:29 )             36.1                                               03-08    142  |  109  |  7<L>  ----------------------------<  132<H>  3.5   |  23  |  0.5<L>    Ca    7.7<L>      08 Mar 2024 04:29  Mg     1.8     03-08    TPro  4.8<L>  /  Alb  2.4<L>  /  TBili  1.5<H>  /  DBili  x   /  AST  48<H>  /  ALT  48<H>  /  AlkPhos  103  03-07                                             Urinalysis Basic - ( 08 Mar 2024 04:29 )    Color: x / Appearance: x / SG: x / pH: x  Gluc: 132 mg/dL / Ketone: x  / Bili: x / Urobili: x   Blood: x / Protein: x / Nitrite: x   Leuk Esterase: x / RBC: x / WBC x   Sq Epi: x / Non Sq Epi: x / Bacteria: x                                                  LIVER FUNCTIONS - ( 07 Mar 2024 05:05 )  Alb: 2.4 g/dL / Pro: 4.8 g/dL / ALK PHOS: 103 U/L / ALT: 48 U/L / AST: 48 U/L / GGT: x                                                  Culture - Blood (collected 05 Mar 2024 18:30)  Source: .Blood Blood  Preliminary Report (07 Mar 2024 23:02):    No growth at 48 Hours    Culture - Blood (collected 05 Mar 2024 16:13)  Source: .Blood Blood  Preliminary Report (07 Mar 2024 23:02):    No growth at 48 Hours                                                                                       ABG - ( 06 Mar 2024 13:39 )  pH, Arterial: 7.53  pH, Blood: x     /  pCO2: 34    /  pO2: 81    / HCO3: 28    / Base Excess: 5.8   /  SaO2: 98.3                MEDICATIONS  (STANDING):  albuterol/ipratropium for Nebulization 3 milliLiter(s) Nebulizer every 6 hours  ascorbic acid 500 milliGRAM(s) Oral daily  cefepime   IVPB 2000 milliGRAM(s) IV Intermittent every 8 hours  chlorhexidine 2% Cloths 1 Application(s) Topical daily  dexMEDEtomidine Infusion 0.5 MICROgram(s)/kG/Hr (9.14 mL/Hr) IV Continuous <Continuous>  folic acid 1 milliGRAM(s) Oral daily  guaiFENesin  milliGRAM(s) Oral every 12 hours  heparin   Injectable 5000 Unit(s) SubCutaneous every 12 hours  influenza   Vaccine 0.5 milliLiter(s) IntraMuscular once  methimazole 5 milliGRAM(s) Oral daily  metoprolol tartrate 75 milliGRAM(s) Oral two times a day  metroNIDAZOLE    Tablet 500 milliGRAM(s) Oral every 8 hours  multivitamin 1 Tablet(s) Oral daily  nystatin    Suspension 871895 Unit(s) Oral four times a day  polyethylene glycol 3350 17 Gram(s) Oral two times a day  QUEtiapine 25 milliGRAM(s) Oral at bedtime  senna 2 Tablet(s) Oral at bedtime  sodium chloride 3%  Inhalation 4 milliLiter(s) Inhalation every 12 hours    MEDICATIONS  (PRN):  albuterol/ipratropium for Nebulization 3 milliLiter(s) Nebulizer every 6 hours PRN Shortness of Breath and/or Wheezing  haloperidol    Injectable 2.5 milliGRAM(s) IntraMuscular every 8 hours PRN agitation      New X-rays reviewed:       ECHO reviewed    CXR interpreted by me:    3/8  images and radiologist read reviewed, by my read demonstrating elevated R hemidiaphragm, no acute infiltrate or pathology

## 2024-03-09 LAB
ANION GAP SERPL CALC-SCNC: 12 MMOL/L — SIGNIFICANT CHANGE UP (ref 7–14)
BASOPHILS # BLD AUTO: 0 K/UL — SIGNIFICANT CHANGE UP (ref 0–0.2)
BASOPHILS NFR BLD AUTO: 0 % — SIGNIFICANT CHANGE UP (ref 0–1)
BUN SERPL-MCNC: 9 MG/DL — LOW (ref 10–20)
CALCIUM SERPL-MCNC: 8 MG/DL — LOW (ref 8.4–10.5)
CHLORIDE SERPL-SCNC: 107 MMOL/L — SIGNIFICANT CHANGE UP (ref 98–110)
CO2 SERPL-SCNC: 23 MMOL/L — SIGNIFICANT CHANGE UP (ref 17–32)
CREAT SERPL-MCNC: 0.6 MG/DL — LOW (ref 0.7–1.5)
CULTURE RESULTS: SIGNIFICANT CHANGE UP
CULTURE RESULTS: SIGNIFICANT CHANGE UP
EGFR: 108 ML/MIN/1.73M2 — SIGNIFICANT CHANGE UP
EOSINOPHIL # BLD AUTO: 0 K/UL — SIGNIFICANT CHANGE UP (ref 0–0.7)
EOSINOPHIL NFR BLD AUTO: 0 % — SIGNIFICANT CHANGE UP (ref 0–8)
GLUCOSE SERPL-MCNC: 127 MG/DL — HIGH (ref 70–99)
HCT VFR BLD CALC: 36.9 % — LOW (ref 42–52)
HGB BLD-MCNC: 12.9 G/DL — LOW (ref 14–18)
LYMPHOCYTES # BLD AUTO: 0.34 K/UL — LOW (ref 1.2–3.4)
LYMPHOCYTES # BLD AUTO: 3.5 % — LOW (ref 20.5–51.1)
MAGNESIUM SERPL-MCNC: 1.9 MG/DL — SIGNIFICANT CHANGE UP (ref 1.8–2.4)
MCHC RBC-ENTMCNC: 35 G/DL — SIGNIFICANT CHANGE UP (ref 32–37)
MCHC RBC-ENTMCNC: 35.3 PG — HIGH (ref 27–31)
MCV RBC AUTO: 101.1 FL — HIGH (ref 80–94)
MONOCYTES # BLD AUTO: 0.25 K/UL — SIGNIFICANT CHANGE UP (ref 0.1–0.6)
MONOCYTES NFR BLD AUTO: 2.6 % — SIGNIFICANT CHANGE UP (ref 1.7–9.3)
NEUTROPHILS # BLD AUTO: 8.92 K/UL — HIGH (ref 1.4–6.5)
NEUTROPHILS NFR BLD AUTO: 93 % — HIGH (ref 42.2–75.2)
PLATELET # BLD AUTO: 252 K/UL — SIGNIFICANT CHANGE UP (ref 130–400)
PMV BLD: 11.6 FL — HIGH (ref 7.4–10.4)
POTASSIUM SERPL-MCNC: 4.1 MMOL/L — SIGNIFICANT CHANGE UP (ref 3.5–5)
POTASSIUM SERPL-SCNC: 4.1 MMOL/L — SIGNIFICANT CHANGE UP (ref 3.5–5)
RBC # BLD: 3.65 M/UL — LOW (ref 4.7–6.1)
RBC # FLD: 11.4 % — LOW (ref 11.5–14.5)
SODIUM SERPL-SCNC: 142 MMOL/L — SIGNIFICANT CHANGE UP (ref 135–146)
SPECIMEN SOURCE: SIGNIFICANT CHANGE UP
SPECIMEN SOURCE: SIGNIFICANT CHANGE UP
WBC # BLD: 9.59 K/UL — SIGNIFICANT CHANGE UP (ref 4.8–10.8)
WBC # FLD AUTO: 9.59 K/UL — SIGNIFICANT CHANGE UP (ref 4.8–10.8)

## 2024-03-09 PROCEDURE — 99233 SBSQ HOSP IP/OBS HIGH 50: CPT

## 2024-03-09 PROCEDURE — 71045 X-RAY EXAM CHEST 1 VIEW: CPT | Mod: 26

## 2024-03-09 RX ORDER — LANOLIN ALCOHOL/MO/W.PET/CERES
5 CREAM (GRAM) TOPICAL AT BEDTIME
Refills: 0 | Status: COMPLETED | OUTPATIENT
Start: 2024-03-09 | End: 2024-03-09

## 2024-03-09 RX ORDER — LANOLIN ALCOHOL/MO/W.PET/CERES
5 CREAM (GRAM) TOPICAL AT BEDTIME
Refills: 0 | Status: DISCONTINUED | OUTPATIENT
Start: 2024-03-09 | End: 2024-03-14

## 2024-03-09 RX ADMIN — Medication 3 MILLILITER(S): at 09:08

## 2024-03-09 RX ADMIN — Medication 500 MILLIGRAM(S): at 05:32

## 2024-03-09 RX ADMIN — Medication 3 MILLILITER(S): at 16:11

## 2024-03-09 RX ADMIN — Medication 100 MILLIGRAM(S): at 05:34

## 2024-03-09 RX ADMIN — Medication 100 MILLIGRAM(S): at 13:05

## 2024-03-09 RX ADMIN — Medication 5 MILLIGRAM(S): at 21:45

## 2024-03-09 RX ADMIN — Medication 500 MILLIGRAM(S): at 13:05

## 2024-03-09 RX ADMIN — Medication 500000 UNIT(S): at 05:33

## 2024-03-09 RX ADMIN — Medication 1 TABLET(S): at 13:05

## 2024-03-09 RX ADMIN — Medication 75 MILLIGRAM(S): at 18:32

## 2024-03-09 RX ADMIN — ENOXAPARIN SODIUM 40 MILLIGRAM(S): 100 INJECTION SUBCUTANEOUS at 05:34

## 2024-03-09 RX ADMIN — SODIUM CHLORIDE 4 MILLILITER(S): 9 INJECTION INTRAMUSCULAR; INTRAVENOUS; SUBCUTANEOUS at 09:07

## 2024-03-09 RX ADMIN — Medication 500000 UNIT(S): at 18:32

## 2024-03-09 RX ADMIN — Medication 500 MILLIGRAM(S): at 21:45

## 2024-03-09 RX ADMIN — CEFEPIME 100 MILLIGRAM(S): 1 INJECTION, POWDER, FOR SOLUTION INTRAMUSCULAR; INTRAVENOUS at 15:06

## 2024-03-09 RX ADMIN — Medication 3 MILLILITER(S): at 20:01

## 2024-03-09 RX ADMIN — Medication 500 MILLIGRAM(S): at 15:06

## 2024-03-09 RX ADMIN — CEFEPIME 100 MILLIGRAM(S): 1 INJECTION, POWDER, FOR SOLUTION INTRAMUSCULAR; INTRAVENOUS at 21:44

## 2024-03-09 RX ADMIN — CEFEPIME 100 MILLIGRAM(S): 1 INJECTION, POWDER, FOR SOLUTION INTRAMUSCULAR; INTRAVENOUS at 05:32

## 2024-03-09 RX ADMIN — Medication 1 MILLIGRAM(S): at 13:05

## 2024-03-09 RX ADMIN — CHLORHEXIDINE GLUCONATE 1 APPLICATION(S): 213 SOLUTION TOPICAL at 13:06

## 2024-03-09 RX ADMIN — Medication 1 MILLIGRAM(S): at 00:33

## 2024-03-09 RX ADMIN — SENNA PLUS 2 TABLET(S): 8.6 TABLET ORAL at 21:45

## 2024-03-09 RX ADMIN — Medication 75 MILLIGRAM(S): at 05:32

## 2024-03-09 RX ADMIN — Medication 3 MILLILITER(S): at 11:42

## 2024-03-09 RX ADMIN — Medication 500000 UNIT(S): at 23:31

## 2024-03-09 RX ADMIN — Medication 500000 UNIT(S): at 00:35

## 2024-03-09 RX ADMIN — Medication 100 MILLIGRAM(S): at 18:33

## 2024-03-09 RX ADMIN — Medication 3 MILLILITER(S): at 03:42

## 2024-03-09 RX ADMIN — Medication 500000 UNIT(S): at 13:05

## 2024-03-09 NOTE — PROGRESS NOTE ADULT - ASSESSMENT
63 year old man with a pmh of COPD, alcohol use disorder and hyperthyroidism presented for palpitation. Upgraded for hypoxemic respiratory failure    Alcohol withdrawal   #Alcohol intoxication (alcohol level in blood >200)   - Extubated on 03/06/2024. Off sedation. On BiPaP.   - On 2L nasal canula  - Has an NG tube  - Lactate 4 on VBGs -> repeat 1.4 (after fluids)  - trop 11, pro-BNP 46   - EKG showed sinus tachycardia   - On Seroquel 25 mg at night standing and haldol 2.5 mg q 8 PRN for agitation  - Off Ativan prn and taper  - RVP negative  - was on LR maintenance. Discontinued. Patient eating  - TSH 0.01 low, fT4 1.8. Repeat pending  - CATCH team eval, pt willing to stop   - On thiamine and folate   -  CT head : No evidence of acute transcortical infarct, hydrocephalus, acute   intracranial hemorrhage, or mass effect.  -EEG: No epileptiform abnormalities were recorded.  -replete Mg and K as needed  - Daily EKG to monitor QTc. Discontinued Seroquel 25 mg as QTc is 508. hold Haldol prn. Give only when necessary and other reorientation methods fail  - 1:1 observation for now. Patient is eating   - continue to monitor CIWA    #Constipation  #Fever   #Possible colitis   - Patient stopped having fevers. Having bowel movements  -Blood cultures taken. Patient placed on cefepime and metronidazole  - CT Abdomen/pelvis with oral contrast showed distended column with possible signs of colitis  - Was placed on Miralax BID, Senna PRN  - Bcx pending  - RVP negative  - cw cefepime and metronidazole till 3/11  - f/u cultures  - f/u repeat procal levels    #Hyperthyroidism-Diagnosis made by PCP and started on methimazole. No records  - TSH 0.01 low, fT4 1.8.   - On Methimazole 5 mg TID  - Follow up repeat TSH, free T4 and total T3  - endocrinology following    # sinus tachycardia  #HTN  - Dr Johnson saw the patient. Continue rate control with metoprolol as per EP. Highly unlikely that patient has A-fib  - Patient takes metoprolol 75 mg as an outpatient  - c/w metoprolol 75 mg BID    # Right lower lobe consolidation- resolved  #COPD?  -CXR right LLO  - Repeat CXR today showed no infiltrates  - Was on Rocephin 1 g q 24 for 3 days then switched to cefepime plus metronidazole due for colitis  - RVP negative  - Sputum culture showed rare gram positive cocci in pairs  - blood cultures showed no growth so far  - c/w duoneb q4       #Transaminitis   - , ALT 85,   - Secondary to alcohol use    #Progress Note Handoff  Pending (specify):  as above  Family discussion: house staff updated pt family  Disposition: dw CC, sdu   Decision to admit the pt is based on acuity as above   Pt is Dr. Tim garcia pt, Dr. Mora to continue care on 3/10, spoke to Dr. Mora

## 2024-03-09 NOTE — PROGRESS NOTE ADULT - ASSESSMENT
IMPRESSION:  Acute respiratory failure  Possible aspiration right lower lobe  possible seizures   Severe alcohol withdrawal   Alcohol abuse  COPD exacerbation  Hyperthyroidism     PLAN:    CNS:  Thiamine, folate, CIWA-driven PRN benzo completed, CATCH team, d/c precedex.  EEG w/o s/o of seizure  Haldol 2.5mg IM PRN agitation  seroquel,  check QTc   Would benefit from 1:1 sitter for frequent reorientation    HEENT: Oral care.     PULMONARY:  HOB @ 45 degrees.    Nebs q4hrs and PRN,   O2 supplemental target Spo2 92-96%.   Wean FiO2 as tolerated, on 2L NC currently    CARDIOVASCULAR: patient very net (+), trending negative past few days  Continue home beta blocker  Appreciate EP recommendations    GI: GI prophylaxis d/c.    Advance diet as per speech  Remove NGT once taking PO reliably  Bowel regimen PRN.    Colitis on CT    RENAL:  Follow up lytes.  Correct as needed.  passed kosta DAVILA d/c'ed    INFECTIOUS DISEASE:   Follow up cultures. Give Cefepime/Flagyl for now.  check procal, ID recommendations appreciated  Repeat cultures PRN fever    HEMATOLOGICAL:  DVT prophylaxis w/ lovenox SQ.     ENDOCRINE:  Follow up FS.  Insulin protocol if needed. CW methimazole.    Consult endocrine to clarify ?hyperthyroid, repeat labs pending    MUSCULOSKELETAL: OOBAT, PT/OT    DISPO: SDU  LINES: PIV  CODE: FULL

## 2024-03-09 NOTE — PROGRESS NOTE ADULT - SUBJECTIVE AND OBJECTIVE BOX
Patient is a 63y old  Male who presents with a chief complaint of chest pain and SOB (03-09-24)      Pt seen and examined at bedside. No CP or SOB.    PAST MEDICAL & SURGICAL HISTORY:  HTN (hypertension)    Alcohol abuse    Hyperthyroidism        VITAL SIGNS (Last 24 hrs):  T(C): 36.6 (03-09-24 @ 08:00), Max: 37.6 (03-08-24 @ 16:12)  HR: 80 (03-09-24 @ 08:00) (69 - 87)  BP: 149/104 (03-09-24 @ 08:00) (131/83 - 159/89)  RR: 20 (03-09-24 @ 08:00) (18 - 31)  SpO2: 96% (03-09-24 @ 08:00) (95% - 99%)    08 Mar 2024 07:01  -  09 Mar 2024 07:00  --------------------------------------------------------  IN: 170 mL / OUT: 1395 mL / NET: -1225 mL        PHYSICAL EXAM:  GENERAL: NAD, disheveled  HEAD:  Atraumatic, Normocephalic  EYES: EOMI, PERRLA, conjunctiva and sclera clear  NECK: Supple, No JVD  CHEST/LUNG: Clear to auscultation bilaterally; No wheeze  HEART: Regular rate and rhythm; No murmurs, rubs, or gallops  ABDOMEN: Soft, Nontender, Nondistended; Bowel sounds present  EXTREMITIES:  2+ Peripheral Pulses, No clubbing, cyanosis, or edema  PSYCH: AAOx1  NEUROLOGY: non-focal  SKIN: No rashes or lesions    Labs Reviewed  Spoke to patient in regards to abnormal labs.    CBC Full  -  ( 09 Mar 2024 05:26 )  WBC Count : 9.59 K/uL  Hemoglobin : 12.9 g/dL  Hematocrit : 36.9 %  Platelet Count - Automated : 252 K/uL  Mean Cell Volume : 101.1 fL  Mean Cell Hemoglobin : 35.3 pg  Mean Cell Hemoglobin Concentration : 35.0 g/dL  Auto Neutrophil # : 8.92 K/uL  Auto Lymphocyte # : 0.34 K/uL  Auto Monocyte # : 0.25 K/uL  Auto Eosinophil # : 0.00 K/uL  Auto Basophil # : 0.00 K/uL  Auto Neutrophil % : 93.0 %  Auto Lymphocyte % : 3.5 %  Auto Monocyte % : 2.6 %  Auto Eosinophil % : 0.0 %  Auto Basophil % : 0.0 %    BMP:    03-09 @ 05:26    Blood Urea Nitrogen - 9  Calcium - 8.0  Carbond Dioxide - 23  Chloride - 107  Creatinine - 0.6  Glucose - 127  Potassium - 4.1  Sodium - 142      Hemoglobin A1c -     Urine Culture:  03-07 @ 01:16 Urine culture: --    Culture Results:   No growth at 24 hours  Method Type: --  Organism: --  Organism Identification: --  Specimen Source: .Blood Blood  03-05 @ 18:30 Urine culture: --    Culture Results:   No growth at 72 Hours  Method Type: --  Organism: --  Organism Identification: --  Specimen Source: .Blood Blood  03-05 @ 16:13 Urine culture: --    Culture Results:   No growth at 72 Hours  Method Type: --  Organism: --  Organism Identification: --  Specimen Source: .Blood Blood        COVID Labs  CRP:    Procalcitonin, Serum: 0.28 ng/mL (03-07-24 @ 19:50)  Procalcitonin, Serum: 0.18 ng/mL (03-03-24 @ 10:40)    D-Dimer:            Imaging reviewed independently and reviewed read  < from: Xray Chest 1 View- PORTABLE-Routine (Xray Chest 1 View- PORTABLE-Routine in AM.) (03.08.24 @ 06:13) >  Impression:  1. Unchanged small bibasilar opacities.    < end of copied text >        MEDICATIONS  (STANDING):  albuterol/ipratropium for Nebulization 3 milliLiter(s) Nebulizer every 4 hours  ascorbic acid 500 milliGRAM(s) Oral daily  cefepime   IVPB 2000 milliGRAM(s) IV Intermittent every 8 hours  chlorhexidine 2% Cloths 1 Application(s) Topical daily  enoxaparin Injectable 40 milliGRAM(s) SubCutaneous every 24 hours  folic acid 1 milliGRAM(s) Oral daily  guaiFENesin Oral Liquid (Sugar-Free) 100 milliGRAM(s) Oral every 12 hours  influenza   Vaccine 0.5 milliLiter(s) IntraMuscular once  methimazole 2.5 milliGRAM(s) Oral daily  metoprolol tartrate 75 milliGRAM(s) Oral two times a day  metroNIDAZOLE    Tablet 500 milliGRAM(s) Oral every 8 hours  multivitamin 1 Tablet(s) Oral daily  nystatin    Suspension 833656 Unit(s) Oral four times a day  polyethylene glycol 3350 17 Gram(s) Oral two times a day  senna 2 Tablet(s) Oral at bedtime  sodium chloride 3%  Inhalation 4 milliLiter(s) Inhalation every 12 hours  thiamine 100 milliGRAM(s) Oral daily    MEDICATIONS  (PRN):  albuterol/ipratropium for Nebulization 3 milliLiter(s) Nebulizer every 6 hours PRN Shortness of Breath and/or Wheezing  LORazepam   Injectable 1 milliGRAM(s) IV Push every 2 hours PRN Symptom-triggered: 2 point increase in CIWA -Ar score and a total score of 7 or LESS

## 2024-03-09 NOTE — PROGRESS NOTE ADULT - SUBJECTIVE AND OBJECTIVE BOX
Patient is a 63y old  Male who presents with a chief complaint of chest pain and SOB (08 Mar 2024 15:39)      Over Night Events:  Patient seen and examined.   laying in bed comfortable no distress     ROS:  See HPI    PHYSICAL EXAM    ICU Vital Signs Last 24 Hrs  T(C): 36.6 (09 Mar 2024 08:00), Max: 37.6 (08 Mar 2024 16:12)  T(F): 97.9 (09 Mar 2024 08:00), Max: 99.7 (08 Mar 2024 16:12)  HR: 80 (09 Mar 2024 08:00) (69 - 87)  BP: 149/104 (09 Mar 2024 08:00) (100/63 - 159/89)  BP(mean): 120 (09 Mar 2024 08:00) (69 - 120)  ABP: --  ABP(mean): --  RR: 20 (09 Mar 2024 08:00) (18 - 31)  SpO2: 96% (09 Mar 2024 08:00) (95% - 99%)    O2 Parameters below as of 09 Mar 2024 08:00  Patient On (Oxygen Delivery Method): room air            General:awake   HEENT:                Lymph Nodes: NO cervical LN   Lungs: Bilateral BS  Cardiovascular: Regular   Abdomen: Soft, Positive BS  Extremities: No clubbing   Skin: warm   Neurological: no focal   Musculoskeletal: move all ext     I&O's Detail    08 Mar 2024 07:01  -  09 Mar 2024 07:00  --------------------------------------------------------  IN:    IV PiggyBack: 50 mL    Oral Fluid: 120 mL  Total IN: 170 mL    OUT:    Voided (mL): 1395 mL  Total OUT: 1395 mL    Total NET: -1225 mL          LABS:                          12.9   9.59  )-----------( 252      ( 09 Mar 2024 05:26 )             36.9         09 Mar 2024 05:26    142    |  107    |  9      ----------------------------<  127    4.1     |  23     |  0.6      Ca    8.0        09 Mar 2024 05:26  Mg     1.9       09 Mar 2024 05:26                                                                                      Urinalysis Basic - ( 09 Mar 2024 05:26 )    Color: x / Appearance: x / SG: x / pH: x  Gluc: 127 mg/dL / Ketone: x  / Bili: x / Urobili: x   Blood: x / Protein: x / Nitrite: x   Leuk Esterase: x / RBC: x / WBC x   Sq Epi: x / Non Sq Epi: x / Bacteria: x                                                                Culture - Blood (collected 07 Mar 2024 01:16)  Source: .Blood Blood  Preliminary Report (08 Mar 2024 14:02):    No growth at 24 hours                                                                                           MEDICATIONS  (STANDING):  albuterol/ipratropium for Nebulization 3 milliLiter(s) Nebulizer every 4 hours  ascorbic acid 500 milliGRAM(s) Oral daily  cefepime   IVPB 2000 milliGRAM(s) IV Intermittent every 8 hours  chlorhexidine 2% Cloths 1 Application(s) Topical daily  enoxaparin Injectable 40 milliGRAM(s) SubCutaneous every 24 hours  folic acid 1 milliGRAM(s) Oral daily  guaiFENesin Oral Liquid (Sugar-Free) 100 milliGRAM(s) Oral every 12 hours  influenza   Vaccine 0.5 milliLiter(s) IntraMuscular once  methimazole 2.5 milliGRAM(s) Oral daily  metoprolol tartrate 75 milliGRAM(s) Oral two times a day  metroNIDAZOLE    Tablet 500 milliGRAM(s) Oral every 8 hours  multivitamin 1 Tablet(s) Oral daily  nystatin    Suspension 091573 Unit(s) Oral four times a day  polyethylene glycol 3350 17 Gram(s) Oral two times a day  senna 2 Tablet(s) Oral at bedtime  sodium chloride 3%  Inhalation 4 milliLiter(s) Inhalation every 12 hours  thiamine 100 milliGRAM(s) Oral daily    MEDICATIONS  (PRN):  albuterol/ipratropium for Nebulization 3 milliLiter(s) Nebulizer every 6 hours PRN Shortness of Breath and/or Wheezing  LORazepam   Injectable 1 milliGRAM(s) IV Push every 2 hours PRN Symptom-triggered: 2 point increase in CIWA -Ar score and a total score of 7 or LESS          Xrays:  TLC:  OG:  ET tube:                                                                                       ECHO:  [facility] ICU:

## 2024-03-09 NOTE — PROGRESS NOTE ADULT - SUBJECTIVE AND OBJECTIVE BOX
SUBJ:No chest pain or shortness of breath      MEDICATIONS  (STANDING):  albuterol/ipratropium for Nebulization 3 milliLiter(s) Nebulizer every 4 hours  ascorbic acid 500 milliGRAM(s) Oral daily  cefepime   IVPB 2000 milliGRAM(s) IV Intermittent every 8 hours  chlorhexidine 2% Cloths 1 Application(s) Topical daily  enoxaparin Injectable 40 milliGRAM(s) SubCutaneous every 24 hours  folic acid 1 milliGRAM(s) Oral daily  guaiFENesin Oral Liquid (Sugar-Free) 100 milliGRAM(s) Oral every 12 hours  influenza   Vaccine 0.5 milliLiter(s) IntraMuscular once  methimazole 2.5 milliGRAM(s) Oral daily  metoprolol tartrate 75 milliGRAM(s) Oral two times a day  metroNIDAZOLE    Tablet 500 milliGRAM(s) Oral every 8 hours  multivitamin 1 Tablet(s) Oral daily  nystatin    Suspension 822925 Unit(s) Oral four times a day  polyethylene glycol 3350 17 Gram(s) Oral two times a day  senna 2 Tablet(s) Oral at bedtime  sodium chloride 3%  Inhalation 4 milliLiter(s) Inhalation every 12 hours  thiamine 100 milliGRAM(s) Oral daily    MEDICATIONS  (PRN):  albuterol/ipratropium for Nebulization 3 milliLiter(s) Nebulizer every 6 hours PRN Shortness of Breath and/or Wheezing  LORazepam   Injectable 1 milliGRAM(s) IV Push every 2 hours PRN Symptom-triggered: 2 point increase in CIWA -Ar score and a total score of 7 or LESS            Vital Signs Last 24 Hrs  T(C): 36.6 (09 Mar 2024 11:00), Max: 37.6 (08 Mar 2024 16:12)  T(F): 97.9 (09 Mar 2024 11:00), Max: 99.7 (08 Mar 2024 16:12)  HR: 86 (09 Mar 2024 11:00) (75 - 87)  BP: 145/86 (09 Mar 2024 11:00) (131/83 - 159/89)  BP(mean): 110 (09 Mar 2024 11:00) (102 - 120)  RR: 18 (09 Mar 2024 11:00) (18 - 31)  SpO2: 96% (09 Mar 2024 11:00) (95% - 99%)    Parameters below as of 09 Mar 2024 11:00  Patient On (Oxygen Delivery Method): room air          ECG:NML    TTE:    LABS:                        12.9   9.59  )-----------( 252      ( 09 Mar 2024 05:26 )             36.9     03-09    142  |  107  |  9<L>  ----------------------------<  127<H>  4.1   |  23  |  0.6<L>    Ca    8.0<L>      09 Mar 2024 05:26  Mg     1.9     03-09              I&O's Summary    08 Mar 2024 07:01  -  09 Mar 2024 07:00  --------------------------------------------------------  IN: 170 mL / OUT: 1395 mL / NET: -1225 mL      BNP

## 2024-03-10 LAB
ANION GAP SERPL CALC-SCNC: 10 MMOL/L — SIGNIFICANT CHANGE UP (ref 7–14)
BASOPHILS # BLD AUTO: 0.06 K/UL — SIGNIFICANT CHANGE UP (ref 0–0.2)
BASOPHILS NFR BLD AUTO: 0.5 % — SIGNIFICANT CHANGE UP (ref 0–1)
BUN SERPL-MCNC: 9 MG/DL — LOW (ref 10–20)
CALCIUM SERPL-MCNC: 8 MG/DL — LOW (ref 8.4–10.5)
CHLORIDE SERPL-SCNC: 110 MMOL/L — SIGNIFICANT CHANGE UP (ref 98–110)
CO2 SERPL-SCNC: 25 MMOL/L — SIGNIFICANT CHANGE UP (ref 17–32)
CREAT SERPL-MCNC: 0.6 MG/DL — LOW (ref 0.7–1.5)
CULTURE RESULTS: SIGNIFICANT CHANGE UP
CULTURE RESULTS: SIGNIFICANT CHANGE UP
EGFR: 108 ML/MIN/1.73M2 — SIGNIFICANT CHANGE UP
EOSINOPHIL # BLD AUTO: 0.37 K/UL — SIGNIFICANT CHANGE UP (ref 0–0.7)
EOSINOPHIL NFR BLD AUTO: 3 % — SIGNIFICANT CHANGE UP (ref 0–8)
GAS PNL BLDA: SIGNIFICANT CHANGE UP
GLUCOSE SERPL-MCNC: 100 MG/DL — HIGH (ref 70–99)
HCT VFR BLD CALC: 39.1 % — LOW (ref 42–52)
HGB BLD-MCNC: 13.2 G/DL — LOW (ref 14–18)
IMM GRANULOCYTES NFR BLD AUTO: 2.4 % — HIGH (ref 0.1–0.3)
LYMPHOCYTES # BLD AUTO: 1.76 K/UL — SIGNIFICANT CHANGE UP (ref 1.2–3.4)
LYMPHOCYTES # BLD AUTO: 14.3 % — LOW (ref 20.5–51.1)
MAGNESIUM SERPL-MCNC: 1.8 MG/DL — SIGNIFICANT CHANGE UP (ref 1.8–2.4)
MCHC RBC-ENTMCNC: 33.8 G/DL — SIGNIFICANT CHANGE UP (ref 32–37)
MCHC RBC-ENTMCNC: 34.3 PG — HIGH (ref 27–31)
MCV RBC AUTO: 101.6 FL — HIGH (ref 80–94)
MONOCYTES # BLD AUTO: 0.8 K/UL — HIGH (ref 0.1–0.6)
MONOCYTES NFR BLD AUTO: 6.5 % — SIGNIFICANT CHANGE UP (ref 1.7–9.3)
NEUTROPHILS # BLD AUTO: 9.02 K/UL — HIGH (ref 1.4–6.5)
NEUTROPHILS NFR BLD AUTO: 73.3 % — SIGNIFICANT CHANGE UP (ref 42.2–75.2)
NRBC # BLD: 0 /100 WBCS — SIGNIFICANT CHANGE UP (ref 0–0)
PLATELET # BLD AUTO: 339 K/UL — SIGNIFICANT CHANGE UP (ref 130–400)
PMV BLD: 10.3 FL — SIGNIFICANT CHANGE UP (ref 7.4–10.4)
POTASSIUM SERPL-MCNC: 3.8 MMOL/L — SIGNIFICANT CHANGE UP (ref 3.5–5)
POTASSIUM SERPL-SCNC: 3.8 MMOL/L — SIGNIFICANT CHANGE UP (ref 3.5–5)
RBC # BLD: 3.85 M/UL — LOW (ref 4.7–6.1)
RBC # FLD: 11.6 % — SIGNIFICANT CHANGE UP (ref 11.5–14.5)
SODIUM SERPL-SCNC: 145 MMOL/L — SIGNIFICANT CHANGE UP (ref 135–146)
SPECIMEN SOURCE: SIGNIFICANT CHANGE UP
SPECIMEN SOURCE: SIGNIFICANT CHANGE UP
TSI ACT/NOR SER: <0.1 IU/L — SIGNIFICANT CHANGE UP (ref 0–0.55)
WBC # BLD: 12.3 K/UL — HIGH (ref 4.8–10.8)
WBC # FLD AUTO: 12.3 K/UL — HIGH (ref 4.8–10.8)

## 2024-03-10 PROCEDURE — 71045 X-RAY EXAM CHEST 1 VIEW: CPT | Mod: 26,77

## 2024-03-10 PROCEDURE — 71045 X-RAY EXAM CHEST 1 VIEW: CPT | Mod: 26

## 2024-03-10 PROCEDURE — 99233 SBSQ HOSP IP/OBS HIGH 50: CPT

## 2024-03-10 PROCEDURE — 99232 SBSQ HOSP IP/OBS MODERATE 35: CPT

## 2024-03-10 RX ORDER — MAGNESIUM SULFATE 500 MG/ML
2 VIAL (ML) INJECTION ONCE
Refills: 0 | Status: COMPLETED | OUTPATIENT
Start: 2024-03-10 | End: 2024-03-10

## 2024-03-10 RX ORDER — IPRATROPIUM/ALBUTEROL SULFATE 18-103MCG
3 AEROSOL WITH ADAPTER (GRAM) INHALATION ONCE
Refills: 0 | Status: DISCONTINUED | OUTPATIENT
Start: 2024-03-10 | End: 2024-03-11

## 2024-03-10 RX ORDER — HYDROXYZINE HCL 10 MG
25 TABLET ORAL ONCE
Refills: 0 | Status: COMPLETED | OUTPATIENT
Start: 2024-03-10 | End: 2024-03-10

## 2024-03-10 RX ORDER — POTASSIUM CHLORIDE 20 MEQ
20 PACKET (EA) ORAL ONCE
Refills: 0 | Status: COMPLETED | OUTPATIENT
Start: 2024-03-10 | End: 2024-03-10

## 2024-03-10 RX ADMIN — Medication 100 MILLIGRAM(S): at 17:51

## 2024-03-10 RX ADMIN — Medication 100 MILLIGRAM(S): at 11:49

## 2024-03-10 RX ADMIN — Medication 3 MILLILITER(S): at 13:50

## 2024-03-10 RX ADMIN — CEFEPIME 100 MILLIGRAM(S): 1 INJECTION, POWDER, FOR SOLUTION INTRAMUSCULAR; INTRAVENOUS at 13:49

## 2024-03-10 RX ADMIN — Medication 3 MILLILITER(S): at 05:22

## 2024-03-10 RX ADMIN — Medication 100 MILLIGRAM(S): at 05:23

## 2024-03-10 RX ADMIN — SENNA PLUS 2 TABLET(S): 8.6 TABLET ORAL at 22:31

## 2024-03-10 RX ADMIN — POLYETHYLENE GLYCOL 3350 17 GRAM(S): 17 POWDER, FOR SOLUTION ORAL at 05:23

## 2024-03-10 RX ADMIN — Medication 3 MILLILITER(S): at 17:36

## 2024-03-10 RX ADMIN — SODIUM CHLORIDE 4 MILLILITER(S): 9 INJECTION INTRAMUSCULAR; INTRAVENOUS; SUBCUTANEOUS at 19:46

## 2024-03-10 RX ADMIN — Medication 3 MILLILITER(S): at 19:46

## 2024-03-10 RX ADMIN — Medication 500 MILLIGRAM(S): at 11:48

## 2024-03-10 RX ADMIN — CEFEPIME 100 MILLIGRAM(S): 1 INJECTION, POWDER, FOR SOLUTION INTRAMUSCULAR; INTRAVENOUS at 05:21

## 2024-03-10 RX ADMIN — POLYETHYLENE GLYCOL 3350 17 GRAM(S): 17 POWDER, FOR SOLUTION ORAL at 17:46

## 2024-03-10 RX ADMIN — SODIUM CHLORIDE 4 MILLILITER(S): 9 INJECTION INTRAMUSCULAR; INTRAVENOUS; SUBCUTANEOUS at 07:45

## 2024-03-10 RX ADMIN — Medication 1 MILLIGRAM(S): at 11:49

## 2024-03-10 RX ADMIN — Medication 5 MILLIGRAM(S): at 22:31

## 2024-03-10 RX ADMIN — Medication 500000 UNIT(S): at 17:49

## 2024-03-10 RX ADMIN — Medication 500 MILLIGRAM(S): at 22:38

## 2024-03-10 RX ADMIN — Medication 1 TABLET(S): at 11:48

## 2024-03-10 RX ADMIN — Medication 3 MILLILITER(S): at 07:44

## 2024-03-10 RX ADMIN — Medication 75 MILLIGRAM(S): at 05:22

## 2024-03-10 RX ADMIN — CHLORHEXIDINE GLUCONATE 1 APPLICATION(S): 213 SOLUTION TOPICAL at 22:38

## 2024-03-10 RX ADMIN — Medication 500 MILLIGRAM(S): at 13:49

## 2024-03-10 RX ADMIN — Medication 25 MILLIGRAM(S): at 22:31

## 2024-03-10 RX ADMIN — Medication 50 MILLIEQUIVALENT(S): at 11:47

## 2024-03-10 RX ADMIN — ENOXAPARIN SODIUM 40 MILLIGRAM(S): 100 INJECTION SUBCUTANEOUS at 05:22

## 2024-03-10 RX ADMIN — Medication 500000 UNIT(S): at 11:47

## 2024-03-10 RX ADMIN — Medication 25 GRAM(S): at 09:05

## 2024-03-10 RX ADMIN — Medication 500000 UNIT(S): at 05:22

## 2024-03-10 RX ADMIN — Medication 60 MILLIGRAM(S): at 23:45

## 2024-03-10 RX ADMIN — Medication 500 MILLIGRAM(S): at 05:23

## 2024-03-10 RX ADMIN — CEFEPIME 100 MILLIGRAM(S): 1 INJECTION, POWDER, FOR SOLUTION INTRAMUSCULAR; INTRAVENOUS at 22:31

## 2024-03-10 RX ADMIN — Medication 75 MILLIGRAM(S): at 17:47

## 2024-03-10 NOTE — PROGRESS NOTE ADULT - SUBJECTIVE AND OBJECTIVE BOX
Patient is a 63y old  Male who presents with a chief complaint of chest pain and SOB (09 Mar 2024 12:52)      Over Night Events:  Patient seen and examined.   awake not agitated comfortable    ROS:  See HPI    PHYSICAL EXAM    ICU Vital Signs Last 24 Hrs  T(C): 37.1 (10 Mar 2024 08:10), Max: 37.1 (09 Mar 2024 16:00)  T(F): 98.7 (10 Mar 2024 08:10), Max: 98.7 (09 Mar 2024 16:00)  HR: 69 (10 Mar 2024 08:10) (62 - 98)  BP: 152/81 (10 Mar 2024 08:10) (131/79 - 156/87)  BP(mean): 110 (10 Mar 2024 08:10) (98 - 118)  ABP: --  ABP(mean): --  RR: 34 (10 Mar 2024 08:10) (18 - 34)  SpO2: 97% (10 Mar 2024 08:10) (95% - 99%)    O2 Parameters below as of 10 Mar 2024 04:00  Patient On (Oxygen Delivery Method): room air            General:awake   HEENT:       jeff         Lymph Nodes: NO cervical LN   Lungs: Bilateral BS  Cardiovascular: Regular   Abdomen: Soft, Positive BS  Extremities: No clubbing   Skin: warm   Neurological: no focal       I&O's Detail    09 Mar 2024 06:01  -  10 Mar 2024 07:00  --------------------------------------------------------  IN:  Total IN: 0 mL    OUT:    Voided (mL): 1425 mL  Total OUT: 1425 mL    Total NET: -1425 mL          LABS:                          13.2   12.30 )-----------( 339      ( 10 Mar 2024 05:41 )             39.1         10 Mar 2024 05:41    145    |  110    |  9      ----------------------------<  100    3.8     |  25     |  0.6      Ca    8.0        10 Mar 2024 05:41  Mg     1.8       10 Mar 2024 05:41                                                                                      Urinalysis Basic - ( 10 Mar 2024 05:41 )    Color: x / Appearance: x / SG: x / pH: x  Gluc: 100 mg/dL / Ketone: x  / Bili: x / Urobili: x   Blood: x / Protein: x / Nitrite: x   Leuk Esterase: x / RBC: x / WBC x   Sq Epi: x / Non Sq Epi: x / Bacteria: x                                                                                                                                                     MEDICATIONS  (STANDING):  albuterol/ipratropium for Nebulization 3 milliLiter(s) Nebulizer every 4 hours  ascorbic acid 500 milliGRAM(s) Oral daily  cefepime   IVPB 2000 milliGRAM(s) IV Intermittent every 8 hours  chlorhexidine 2% Cloths 1 Application(s) Topical daily  enoxaparin Injectable 40 milliGRAM(s) SubCutaneous every 24 hours  folic acid 1 milliGRAM(s) Oral daily  guaiFENesin Oral Liquid (Sugar-Free) 100 milliGRAM(s) Oral every 12 hours  hydrOXYzine hydrochloride 25 milliGRAM(s) Oral once  influenza   Vaccine 0.5 milliLiter(s) IntraMuscular once  magnesium sulfate  IVPB 2 Gram(s) IV Intermittent once  melatonin 5 milliGRAM(s) Oral at bedtime  methimazole 2.5 milliGRAM(s) Oral daily  metoprolol tartrate 75 milliGRAM(s) Oral two times a day  metroNIDAZOLE    Tablet 500 milliGRAM(s) Oral every 8 hours  multivitamin 1 Tablet(s) Oral daily  nystatin    Suspension 269438 Unit(s) Oral four times a day  polyethylene glycol 3350 17 Gram(s) Oral two times a day  potassium chloride  20 mEq/100 mL IVPB 20 milliEquivalent(s) IV Intermittent once  senna 2 Tablet(s) Oral at bedtime  sodium chloride 3%  Inhalation 4 milliLiter(s) Inhalation every 12 hours  thiamine 100 milliGRAM(s) Oral daily    MEDICATIONS  (PRN):  albuterol/ipratropium for Nebulization 3 milliLiter(s) Nebulizer every 6 hours PRN Shortness of Breath and/or Wheezing  LORazepam   Injectable 1 milliGRAM(s) IV Push every 2 hours PRN Symptom-triggered: 2 point increase in CIWA -Ar score and a total score of 7 or LESS          Xrays:  TLC:  OG:  ET tube:                                                                                       ECHO:  [facility] ICU:

## 2024-03-10 NOTE — PROGRESS NOTE ADULT - SUBJECTIVE AND OBJECTIVE BOX
INTERVAL HPI/OVERNIGHT EVENTS:  Patient is doing better.  Telemetry reviewed - SR, no sustained arrhythmia.    MEDICATIONS  (STANDING):  albuterol/ipratropium for Nebulization 3 milliLiter(s) Nebulizer every 4 hours  ascorbic acid 500 milliGRAM(s) Oral daily  cefepime   IVPB 2000 milliGRAM(s) IV Intermittent every 8 hours  chlorhexidine 2% Cloths 1 Application(s) Topical daily  enoxaparin Injectable 40 milliGRAM(s) SubCutaneous every 24 hours  folic acid 1 milliGRAM(s) Oral daily  guaiFENesin Oral Liquid (Sugar-Free) 100 milliGRAM(s) Oral every 12 hours  hydrOXYzine hydrochloride 25 milliGRAM(s) Oral once  influenza   Vaccine 0.5 milliLiter(s) IntraMuscular once  melatonin 5 milliGRAM(s) Oral at bedtime  methimazole 2.5 milliGRAM(s) Oral daily  metoprolol tartrate 75 milliGRAM(s) Oral two times a day  metroNIDAZOLE    Tablet 500 milliGRAM(s) Oral every 8 hours  multivitamin 1 Tablet(s) Oral daily  nystatin    Suspension 536152 Unit(s) Oral four times a day  polyethylene glycol 3350 17 Gram(s) Oral two times a day  senna 2 Tablet(s) Oral at bedtime  sodium chloride 3%  Inhalation 4 milliLiter(s) Inhalation every 12 hours  thiamine 100 milliGRAM(s) Oral daily    MEDICATIONS  (PRN):  albuterol/ipratropium for Nebulization 3 milliLiter(s) Nebulizer every 6 hours PRN Shortness of Breath and/or Wheezing  LORazepam   Injectable 1 milliGRAM(s) IV Push every 2 hours PRN Symptom-triggered: 2 point increase in CIWA -Ar score and a total score of 7 or LESS    Allergies  No Known Allergies  Intolerances    REVIEW OF SYSTEMS  [x] A ten-point review of systems was otherwise negative except as noted.  [ ] Due to altered mental status/intubation, subjective information were not able to be obtained from the patient. History was obtained, to the extent possible, from review of the chart and collateral sources of information.      Vital Signs Last 24 Hrs  T(C): 36.6 (10 Mar 2024 12:00), Max: 37.1 (09 Mar 2024 16:00)  T(F): 97.8 (10 Mar 2024 12:00), Max: 98.7 (09 Mar 2024 16:00)  HR: 73 (10 Mar 2024 12:00) (62 - 98)  BP: 147/88 (10 Mar 2024 12:00) (131/79 - 156/87)  BP(mean): 113 (10 Mar 2024 12:00) (98 - 118)  RR: 25 (10 Mar 2024 12:00) (20 - 34)  SpO2: 96% (10 Mar 2024 12:00) (95% - 99%)    Parameters below as of 10 Mar 2024 12:00  Patient On (Oxygen Delivery Method): room air        03-09-24 @ 06:01  -  03-10-24 @ 07:00  --------------------------------------------------------  IN: 0 mL / OUT: 1425 mL / NET: -1425 mL    03-10-24 @ 07:01  -  03-10-24 @ 14:10  --------------------------------------------------------  IN: 200 mL / OUT: 225 mL / NET: -25 mL        PHYSICAL EXAM:    GENERAL: In no apparent distress, well nourished, and hydrated.  HEART: Regular rate and rhythm; No murmur; NO rubs, or gallops.  PULMONARY: Clear to auscultation and percussion.  Normal expansion/effort. No rales, wheezing, or rhonchi bilaterally.  ABDOMEN: Soft, Nontender, Nondistended; Bowel sounds present  EXTREMITIES:  Extremities warm, pink, well-perfused, 2+ Peripheral Pulses, No clubbing, cyanosis, or edema  NEUROLOGICAL: alert & oriented x 3, no focal deficits, CECY SERNA    LABS:                        13.2   12.30 )-----------( 339      ( 10 Mar 2024 05:41 )             39.1     03-10    145  |  110  |  9<L>  ----------------------------<  100<H>  3.8   |  25  |  0.6<L>    Ca    8.0<L>      10 Mar 2024 05:41  Mg     1.8     03-10        Urinalysis Basic - ( 10 Mar 2024 05:41 )    Color: x / Appearance: x / SG: x / pH: x  Gluc: 100 mg/dL / Ketone: x  / Bili: x / Urobili: x   Blood: x / Protein: x / Nitrite: x   Leuk Esterase: x / RBC: x / WBC x   Sq Epi: x / Non Sq Epi: x / Bacteria: x          12 Lead ECG:   Ventricular Rate 75 BPM    Atrial Rate 75 BPM    P-R Interval 140 ms    QRS Duration 82 ms    Q-T Interval 456 ms    QTC Calculation(Bazett) 509 ms    P Axis 21 degrees    R Axis 15 degrees    T Axis 7 degrees    Diagnosis Line Normal sinus rhythm  Septal infarct , age undetermined  Possible Lateral infarct , age undetermined  Abnormal ECG    Confirmed by Avinash Barnhart ([id number]) on 3/8/2024 10:21:06 AM (03-08-24 @ 06:43)      TTE:  Summary:   1. Left ventricular ejection fraction, by visual estimation, is 50 to   55%.   2. Technically difficult study.   3. Normal global left ventricular systolic function.   4. The left ventricular diastolic function could not be assessed in this   study.   5. Normal left atrial size.   6. Normal right atrial size.   7. No evidence of mitral valve regurgitation.   8. Endocardial visualization was enhanced with intravenous echo contrast.

## 2024-03-10 NOTE — PROGRESS NOTE ADULT - ASSESSMENT
Cardiology: Alex    62 yo M with h/o alcohol abuse, hypertension, COPD? ( heavy smoker. acc to pt had PFTs done and uses inhalers at home ), recently diagnosed with hyperthyroidism (2 weeks ago started on methimazole 5 mg by PMD) presenting with palpitations and dyspnea admitted for ETOH withdrawal.  In ED concern for narrow complex tach (differential SVT vs AF)     TSH: 0.01          Impression:  ETOH withdrawal  Narrow complex tachycardia: differential AF vs salvos SVT  Transaminitis, improving  Hx HTN  Hx ?COPD    Plan:  - Monitor on tele. No recurrent arrhythmias seen on tele in CCU  - Hold off on OAC at this time as it is difficult to say this is definitively AF. CHADS VASc 1 (HTN)  - c/w Lopressor 75 mg BID  - TTE results noted  - Recommend a long term monitoring with loop recorder implant prior to discharge. ILR planned for Monday 3/11/2024 if family is agreeable. I discussed it with the patient and his son over the phone today.  They would like to think about it and will make a decision on 3/11/24 AM. Will follow.  - Maintain k>4 and mag >2

## 2024-03-10 NOTE — PROGRESS NOTE ADULT - SUBJECTIVE AND OBJECTIVE BOX
SUBJ:No chest pain or shortness of breath      MEDICATIONS  (STANDING):  albuterol/ipratropium for Nebulization 3 milliLiter(s) Nebulizer every 4 hours  ascorbic acid 500 milliGRAM(s) Oral daily  cefepime   IVPB 2000 milliGRAM(s) IV Intermittent every 8 hours  chlorhexidine 2% Cloths 1 Application(s) Topical daily  enoxaparin Injectable 40 milliGRAM(s) SubCutaneous every 24 hours  folic acid 1 milliGRAM(s) Oral daily  guaiFENesin Oral Liquid (Sugar-Free) 100 milliGRAM(s) Oral every 12 hours  hydrOXYzine hydrochloride 25 milliGRAM(s) Oral once  influenza   Vaccine 0.5 milliLiter(s) IntraMuscular once  melatonin 5 milliGRAM(s) Oral at bedtime  methimazole 2.5 milliGRAM(s) Oral daily  metoprolol tartrate 75 milliGRAM(s) Oral two times a day  metroNIDAZOLE    Tablet 500 milliGRAM(s) Oral every 8 hours  multivitamin 1 Tablet(s) Oral daily  nystatin    Suspension 184517 Unit(s) Oral four times a day  polyethylene glycol 3350 17 Gram(s) Oral two times a day  senna 2 Tablet(s) Oral at bedtime  sodium chloride 3%  Inhalation 4 milliLiter(s) Inhalation every 12 hours  thiamine 100 milliGRAM(s) Oral daily    MEDICATIONS  (PRN):  albuterol/ipratropium for Nebulization 3 milliLiter(s) Nebulizer every 6 hours PRN Shortness of Breath and/or Wheezing  LORazepam   Injectable 1 milliGRAM(s) IV Push every 2 hours PRN Symptom-triggered: 2 point increase in CIWA -Ar score and a total score of 7 or LESS            Vital Signs Last 24 Hrs  T(C): 37.1 (10 Mar 2024 08:10), Max: 37.1 (09 Mar 2024 16:00)  T(F): 98.7 (10 Mar 2024 08:10), Max: 98.7 (09 Mar 2024 16:00)  HR: 69 (10 Mar 2024 08:10) (62 - 98)  BP: 152/81 (10 Mar 2024 08:10) (131/79 - 156/87)  BP(mean): 110 (10 Mar 2024 08:10) (98 - 118)  RR: 34 (10 Mar 2024 08:10) (20 - 34)  SpO2: 97% (10 Mar 2024 08:10) (95% - 99%)    Parameters below as of 10 Mar 2024 04:00  Patient On (Oxygen Delivery Method): room air          ECG:NML    TTE:    LABS:                        13.2   12.30 )-----------( 339      ( 10 Mar 2024 05:41 )             39.1     03-10    145  |  110  |  9<L>  ----------------------------<  100<H>  3.8   |  25  |  0.6<L>    Ca    8.0<L>      10 Mar 2024 05:41  Mg     1.8     03-10              I&O's Summary    09 Mar 2024 06:01  -  10 Mar 2024 07:00  --------------------------------------------------------  IN: 0 mL / OUT: 1425 mL / NET: -1425 mL    10 Mar 2024 07:01  -  10 Mar 2024 13:16  --------------------------------------------------------  IN: 50 mL / OUT: 0 mL / NET: 50 mL      BNP

## 2024-03-10 NOTE — PROGRESS NOTE ADULT - SUBJECTIVE AND OBJECTIVE BOX
MATT MACDONALD  63y  Male  HPI:  63 years old male history of alcohol abuse, hypertension, COPD? ( heavy smoker. acc to pt had PFTs done and uses inhalers at home ), recently diagnosed with hyperthyroidism few weeks ago and started on methimazole 5 mg by PMD presents complaining of palpitations and exertional shortness of breath over the past few weeks.   At my encounter family not at bedside, acc to pt his only complaint is palpitaiosn, His HR was ranging 120-130 at home. No chest pain. He has cough and mild SOB which he attributes to smoking 1 pack daily. Otherwise he describes himself as very healthy. Pt is tremelous and restless. He admitted to drinking alcohol  (15-20 drinks of liquor daily). Last drink was earlier this evening.  Patient also reports he does want to stop drinking and try to cut down his alcohol use.    As per family, patient was seen by his cardiologist Dr. Johnson who increased that his metoprolol to 75 mg twice a day which did not improve his heart rate.  Patient had outpatient echocardiogram yesterday and noted to be tachycardic so he was recommended to come to ED for evaluation.  Otherwise denies fever, chills, recent illness, coughing, chest pain, abdominal pain, diarrhea or urinary symptoms.  Denies drug use.    Vitals: T 98.2, , /90, spo2 94% on RA   Labs remarkable for elevated ALT AST, Mg 1.5  Lactate 4 on VBGs   Alcohol level 202   CXR clear   EKG showed sinus tachycardia     s/p valium and ativan in ED  s/p 1 L NS, magnesium   HR improved from 130 to 90s  (29 Feb 2024 01:57)    MEDICATIONS  (STANDING):  albuterol/ipratropium for Nebulization 3 milliLiter(s) Nebulizer every 4 hours  ascorbic acid 500 milliGRAM(s) Oral daily  cefepime   IVPB 2000 milliGRAM(s) IV Intermittent every 8 hours  chlorhexidine 2% Cloths 1 Application(s) Topical daily  enoxaparin Injectable 40 milliGRAM(s) SubCutaneous every 24 hours  folic acid 1 milliGRAM(s) Oral daily  guaiFENesin Oral Liquid (Sugar-Free) 100 milliGRAM(s) Oral every 12 hours  hydrOXYzine hydrochloride 25 milliGRAM(s) Oral once  influenza   Vaccine 0.5 milliLiter(s) IntraMuscular once  melatonin 5 milliGRAM(s) Oral at bedtime  methimazole 2.5 milliGRAM(s) Oral daily  metoprolol tartrate 75 milliGRAM(s) Oral two times a day  metroNIDAZOLE    Tablet 500 milliGRAM(s) Oral every 8 hours  multivitamin 1 Tablet(s) Oral daily  nystatin    Suspension 525981 Unit(s) Oral four times a day  polyethylene glycol 3350 17 Gram(s) Oral two times a day  senna 2 Tablet(s) Oral at bedtime  sodium chloride 3%  Inhalation 4 milliLiter(s) Inhalation every 12 hours  thiamine 100 milliGRAM(s) Oral daily    MEDICATIONS  (PRN):  albuterol/ipratropium for Nebulization 3 milliLiter(s) Nebulizer every 6 hours PRN Shortness of Breath and/or Wheezing  LORazepam   Injectable 1 milliGRAM(s) IV Push every 2 hours PRN Symptom-triggered: 2 point increase in CIWA -Ar score and a total score of 7 or LESS    INTERVAL EVENTS: Patient seen today without distress, however adamant about going home, needs to go home and will come back tomorrow. Family at bedside concerned about need for f/u with CATCH TEAM.    T(C): 36.6 (03-10-24 @ 12:00), Max: 37.1 (03-09-24 @ 16:00)  HR: 73 (03-10-24 @ 12:00) (62 - 98)  BP: 147/88 (03-10-24 @ 12:00) (131/79 - 156/87)  RR: 25 (03-10-24 @ 12:00) (20 - 34)  SpO2: 96% (03-10-24 @ 12:00) (95% - 99%)  Wt(kg): --Vital Signs Last 24 Hrs  T(C): 36.6 (10 Mar 2024 12:00), Max: 37.1 (09 Mar 2024 16:00)  T(F): 97.8 (10 Mar 2024 12:00), Max: 98.7 (09 Mar 2024 16:00)  HR: 73 (10 Mar 2024 12:00) (62 - 98)  BP: 147/88 (10 Mar 2024 12:00) (131/79 - 156/87)  BP(mean): 113 (10 Mar 2024 12:00) (98 - 118)  RR: 25 (10 Mar 2024 12:00) (20 - 34)  SpO2: 96% (10 Mar 2024 12:00) (95% - 99%)    Parameters below as of 10 Mar 2024 12:00  Patient On (Oxygen Delivery Method): room air        PHYSICAL EXAM:  GENERAL:  NAD  NECK: Supple, No JVD  CHEST/LUNG: Occ wheeze  HEART: S1, S2  ABDOMEN: Soft, Nontender, Bowel sounds present  EXTREMITIES: No edema  SKIN: No rashes or lesions    LABS:  Labs:                        13.2   12.30 )-----------( 339      ( 10 Mar 2024 05:41 )             39.1             03-10    145  |  110  |  9<L>  ----------------------------<  100<H>  3.8   |  25  |  0.6<L>    Ca    8.0<L>      10 Mar 2024 05:41  Mg     1.8     03-10    Thyroid Stim. Immunoglobulin: <0.10: [facility]formed [facility]  Free Thyroxine, Serum: 0.7 ng/dL (03.08.24 @ 04:29)   Triiodothyronine, Total (T3 Total): 84 ng/dL (03.08.24 @ 04:29)                Urinalysis Basic - ( 10 Mar 2024 05:41 )    Color: x / Appearance: x / SG: x / pH: x  Gluc: 100 mg/dL / Ketone: x  / Bili: x / Urobili: x   Blood: x / Protein: x / Nitrite: x   Leuk Esterase: x / RBC: x / WBC x   Sq Epi: x / Non Sq Epi: x / Bacteria: x              RADIOLOGY & ADDITIONAL TESTS:     MATT MACDONALD  63y  Male  HPI:  63 years old male history of alcohol abuse, hypertension, COPD? ( heavy smoker. acc to pt had PFTs done and uses inhalers at home ), recently diagnosed with hyperthyroidism few weeks ago and started on methimazole 5 mg by PMD presents complaining of palpitations and exertional shortness of breath over the past few weeks.   At my encounter family not at bedside, acc to pt his only complaint is palpitaiosn, His HR was ranging 120-130 at home. No chest pain. He has cough and mild SOB which he attributes to smoking 1 pack daily. Otherwise he describes himself as very healthy. Pt is tremelous and restless. He admitted to drinking alcohol  (15-20 drinks of liquor daily). Last drink was earlier this evening.  Patient also reports he does want to stop drinking and try to cut down his alcohol use.    As per family, patient was seen by his cardiologist Dr. Johnson who increased that his metoprolol to 75 mg twice a day which did not improve his heart rate.  Patient had outpatient echocardiogram yesterday and noted to be tachycardic so he was recommended to come to ED for evaluation.  Otherwise denies fever, chills, recent illness, coughing, chest pain, abdominal pain, diarrhea or urinary symptoms.  Denies drug use.    Vitals: T 98.2, , /90, spo2 94% on RA   Labs remarkable for elevated ALT AST, Mg 1.5  Lactate 4 on VBGs   Alcohol level 202   CXR clear   EKG showed sinus tachycardia     s/p valium and ativan in ED  s/p 1 L NS, magnesium   HR improved from 130 to 90s  (29 Feb 2024 01:57)    MEDICATIONS  (STANDING):  albuterol/ipratropium for Nebulization 3 milliLiter(s) Nebulizer every 4 hours  ascorbic acid 500 milliGRAM(s) Oral daily  cefepime   IVPB 2000 milliGRAM(s) IV Intermittent every 8 hours  chlorhexidine 2% Cloths 1 Application(s) Topical daily  enoxaparin Injectable 40 milliGRAM(s) SubCutaneous every 24 hours  folic acid 1 milliGRAM(s) Oral daily  guaiFENesin Oral Liquid (Sugar-Free) 100 milliGRAM(s) Oral every 12 hours  hydrOXYzine hydrochloride 25 milliGRAM(s) Oral once  influenza   Vaccine 0.5 milliLiter(s) IntraMuscular once  melatonin 5 milliGRAM(s) Oral at bedtime  methimazole 2.5 milliGRAM(s) Oral daily  metoprolol tartrate 75 milliGRAM(s) Oral two times a day  metroNIDAZOLE    Tablet 500 milliGRAM(s) Oral every 8 hours  multivitamin 1 Tablet(s) Oral daily  nystatin    Suspension 424645 Unit(s) Oral four times a day  polyethylene glycol 3350 17 Gram(s) Oral two times a day  senna 2 Tablet(s) Oral at bedtime  sodium chloride 3%  Inhalation 4 milliLiter(s) Inhalation every 12 hours  thiamine 100 milliGRAM(s) Oral daily    MEDICATIONS  (PRN):  albuterol/ipratropium for Nebulization 3 milliLiter(s) Nebulizer every 6 hours PRN Shortness of Breath and/or Wheezing  LORazepam   Injectable 1 milliGRAM(s) IV Push every 2 hours PRN Symptom-triggered: 2 point increase in CIWA -Ar score and a total score of 7 or LESS    INTERVAL EVENTS: Patient seen today without distress, however adamant about going home, needs to go home and will come back tomorrow. Family at bedside concerned about need for f/u with CATCH TEAM.    T(C): 36.6 (03-10-24 @ 12:00), Max: 37.1 (03-09-24 @ 16:00)  HR: 73 (03-10-24 @ 12:00) (62 - 98)  BP: 147/88 (03-10-24 @ 12:00) (131/79 - 156/87)  RR: 25 (03-10-24 @ 12:00) (20 - 34)  SpO2: 96% (03-10-24 @ 12:00) (95% - 99%)  Wt(kg): --Vital Signs Last 24 Hrs  T(C): 36.6 (10 Mar 2024 12:00), Max: 37.1 (09 Mar 2024 16:00)  T(F): 97.8 (10 Mar 2024 12:00), Max: 98.7 (09 Mar 2024 16:00)  HR: 73 (10 Mar 2024 12:00) (62 - 98)  BP: 147/88 (10 Mar 2024 12:00) (131/79 - 156/87)  BP(mean): 113 (10 Mar 2024 12:00) (98 - 118)  RR: 25 (10 Mar 2024 12:00) (20 - 34)  SpO2: 96% (10 Mar 2024 12:00) (95% - 99%)    Parameters below as of 10 Mar 2024 12:00  Patient On (Oxygen Delivery Method): room air        PHYSICAL EXAM:  GENERAL:  NAD  NECK: Supple, No JVD  CHEST/LUNG: Occ wheeze  HEART: S1, S2  ABDOMEN: Soft, Nontender, Bowel sounds present  EXTREMITIES: No edema  SKIN: No rashes or lesions    LABS:                        13.2   12.30 )-----------( 339      ( 10 Mar 2024 05:41 )             39.1             03-10    145  |  110  |  9<L>  ----------------------------<  100<H>  3.8   |  25  |  0.6<L>    Ca    8.0<L>      10 Mar 2024 05:41  Mg     1.8     03-10    Thyroid Stim. Immunoglobulin: <0.10: Performed At:  [facility]  Free Thyroxine, Serum: 0.7 ng/dL (03.08.24 @ 04:29)   Triiodothyronine, Total (T3 Total): 84 ng/dL (03.08.24 @ 04:29)                Urinalysis Basic - ( 10 Mar 2024 05:41 )    Color: x / Appearance: x / SG: x / pH: x  Gluc: 100 mg/dL / Ketone: x  / Bili: x / Urobili: x   Blood: x / Protein: x / Nitrite: x   Leuk Esterase: x / RBC: x / WBC x   Sq Epi: x / Non Sq Epi: x / Bacteria: x              RADIOLOGY & ADDITIONAL TESTS:

## 2024-03-10 NOTE — PROGRESS NOTE ADULT - RESPIRATORY COMMENTS
Pt has fallen 3 times in the past week, most recent fall yesterday. Pt denies hitting head or LOC. Pt sts yesterday he stumbled and lost his footing, denies any weakness or passing out.
no change
occ rhonchi
no change
occ rhonchi

## 2024-03-10 NOTE — PROGRESS NOTE ADULT - ASSESSMENT
IMPRESSION:  Acute respiratory failure  Possible aspiration right lower lobe  possible seizures   Severe alcohol withdrawal   Alcohol abuse  COPD exacerbation  Hyperthyroidism     PLAN:    CNS:  Thiamine, folate, CIWA-driven PRN benzo completed, CATCH team,  EEG w/o s/o of seizure  Haldol 2.5mg IM PRN agitation  seroquel,  check QTc       HEENT: Oral care.     PULMONARY:  HOB @ 45 degrees.    Nebs q4hrs and PRN,   O2 supplemental target Spo2 92-96%.   Wean FiO2 as tolerated, on 2L NC currently    CARDIOVASCULAR: patient very net (+), trending negative past few days  Continue home beta blocker  Appreciate EP recommendations    GI: GI prophylaxis   Advance diet as per speech  Bowel regimen PRN.    Colitis on CT    RENAL:  Follow up lytes.  Correct as needed.  passed [facility]ARAGO[facility] d/c'ed    INFECTIOUS DISEASE:   Follow up cultures. Give Cefepime/Flagyl for now.  check procal, ID recommendations appreciated  Repeat cultures PRN fever    HEMATOLOGICAL:  DVT prophylaxis w/ lovenox SQ.     ENDOCRINE:  Follow up FS.  Insulin protocol if needed. CW methimazole.    Consult endocrine to clarify ?hyperthyroid, repeat labs pending    MUSCULOSKELETAL: OOBAT, PT/OT    DISPO: SDU  LINES: PIV  CODE: FULL

## 2024-03-10 NOTE — PROGRESS NOTE ADULT - ASSESSMENT
63 year old man with a pmh of COPD, alcohol use disorder and hyperthyroidism presented for palpitation. Upgraded for hypoxemic respiratory failure, vented this hospitalation    Alcohol withdrawal, Anxiety, Insomnia  Alcohol intoxication (alcohol level in blood >200) on adm  - Extubated on 03/06/2024.   - On 2L nasal canula  - On Seroquel 25 mg at night standing and haldol 2.5 mg q 8 PRN for agitation  - Off Ativan protocol, now prn  - continue Hydroxyzine  - Melatonin HS  - CATCH team re-eval now that off vent  - continue thiamine and folate     Constipation  Fever , Possible colitis   - Blood cultures taken  - CT Abdomen/pelvis with oral contrast showed distended column with possible signs of colitis  - on cefepime and metronidazole thru 3/11    Hyperthyroidism  - on Methimazole  - TSH 0.01 low, fT4 1.8.   - On Methimazole 5 mg TID, reduce to BID  - endocrinology following    Tachycardia, SVT? A Fib  HTN  - remains on Metoprolol  75 mg BID  - rate controlled, BP stable  - case discussed with cardiology  - EPS f/u for loop recorder prior to discharge    Right lower lobe consolidation- resolved  COPD, > 40 pack year smoking hx  - remains on Duoneb q4       Transaminitis   - , ALT 85,   - Secondary to alcohol use    Family discussion: at bedside with soon and wife  Disposition: remains in SDU

## 2024-03-10 NOTE — PROGRESS NOTE ADULT - NS ATTEND AMEND GEN_ALL_CORE FT
AF  EtOH withdrawl    Metoprolol  Eliquis- if AF determined. Risks/benefits w/alcholo use discussed  Tele  ILR - family to decide

## 2024-03-11 LAB
ANION GAP SERPL CALC-SCNC: 13 MMOL/L — SIGNIFICANT CHANGE UP (ref 7–14)
BASOPHILS # BLD AUTO: 0.04 K/UL — SIGNIFICANT CHANGE UP (ref 0–0.2)
BASOPHILS NFR BLD AUTO: 0.2 % — SIGNIFICANT CHANGE UP (ref 0–1)
BUN SERPL-MCNC: 8 MG/DL — LOW (ref 10–20)
CALCIUM SERPL-MCNC: 8.2 MG/DL — LOW (ref 8.4–10.4)
CHLORIDE SERPL-SCNC: 108 MMOL/L — SIGNIFICANT CHANGE UP (ref 98–110)
CO2 SERPL-SCNC: 20 MMOL/L — SIGNIFICANT CHANGE UP (ref 17–32)
CREAT SERPL-MCNC: 0.6 MG/DL — LOW (ref 0.7–1.5)
D DIMER BLD IA.RAPID-MCNC: 2249 NG/ML DDU — HIGH
EGFR: 108 ML/MIN/1.73M2 — SIGNIFICANT CHANGE UP
EOSINOPHIL # BLD AUTO: 0.01 K/UL — SIGNIFICANT CHANGE UP (ref 0–0.7)
EOSINOPHIL NFR BLD AUTO: 0.1 % — SIGNIFICANT CHANGE UP (ref 0–8)
GAS PNL BLDA: SIGNIFICANT CHANGE UP
GLUCOSE SERPL-MCNC: 194 MG/DL — HIGH (ref 70–99)
HCT VFR BLD CALC: 39.3 % — LOW (ref 42–52)
HGB BLD-MCNC: 13.8 G/DL — LOW (ref 14–18)
IMM GRANULOCYTES NFR BLD AUTO: 1.5 % — HIGH (ref 0.1–0.3)
LACTATE SERPL-SCNC: 1.8 MMOL/L — SIGNIFICANT CHANGE UP (ref 0.7–2)
LYMPHOCYTES # BLD AUTO: 0.61 K/UL — LOW (ref 1.2–3.4)
LYMPHOCYTES # BLD AUTO: 3.5 % — LOW (ref 20.5–51.1)
MAGNESIUM SERPL-MCNC: 2 MG/DL — SIGNIFICANT CHANGE UP (ref 1.8–2.4)
MCHC RBC-ENTMCNC: 34.9 PG — HIGH (ref 27–31)
MCHC RBC-ENTMCNC: 35.1 G/DL — SIGNIFICANT CHANGE UP (ref 32–37)
MCV RBC AUTO: 99.5 FL — HIGH (ref 80–94)
MONOCYTES # BLD AUTO: 0.12 K/UL — SIGNIFICANT CHANGE UP (ref 0.1–0.6)
MONOCYTES NFR BLD AUTO: 0.7 % — LOW (ref 1.7–9.3)
NEUTROPHILS # BLD AUTO: 16.62 K/UL — HIGH (ref 1.4–6.5)
NEUTROPHILS NFR BLD AUTO: 94 % — HIGH (ref 42.2–75.2)
NRBC # BLD: 0 /100 WBCS — SIGNIFICANT CHANGE UP (ref 0–0)
PLATELET # BLD AUTO: 372 K/UL — SIGNIFICANT CHANGE UP (ref 130–400)
PMV BLD: 10.2 FL — SIGNIFICANT CHANGE UP (ref 7.4–10.4)
POTASSIUM SERPL-MCNC: 4 MMOL/L — SIGNIFICANT CHANGE UP (ref 3.5–5)
POTASSIUM SERPL-SCNC: 4 MMOL/L — SIGNIFICANT CHANGE UP (ref 3.5–5)
PROCALCITONIN SERPL-MCNC: 0.12 NG/ML — HIGH (ref 0.02–0.1)
RBC # BLD: 3.95 M/UL — LOW (ref 4.7–6.1)
RBC # FLD: 11.4 % — LOW (ref 11.5–14.5)
SODIUM SERPL-SCNC: 141 MMOL/L — SIGNIFICANT CHANGE UP (ref 135–146)
WBC # BLD: 17.66 K/UL — HIGH (ref 4.8–10.8)
WBC # FLD AUTO: 17.66 K/UL — HIGH (ref 4.8–10.8)

## 2024-03-11 PROCEDURE — 71045 X-RAY EXAM CHEST 1 VIEW: CPT | Mod: 26

## 2024-03-11 PROCEDURE — 99233 SBSQ HOSP IP/OBS HIGH 50: CPT

## 2024-03-11 RX ORDER — SODIUM CHLORIDE 9 MG/ML
1000 INJECTION, SOLUTION INTRAVENOUS
Refills: 0 | Status: DISCONTINUED | OUTPATIENT
Start: 2024-03-11 | End: 2024-03-14

## 2024-03-11 RX ORDER — MAGNESIUM SULFATE 500 MG/ML
2 VIAL (ML) INJECTION ONCE
Refills: 0 | Status: COMPLETED | OUTPATIENT
Start: 2024-03-11 | End: 2024-03-11

## 2024-03-11 RX ORDER — POTASSIUM CHLORIDE 20 MEQ
20 PACKET (EA) ORAL ONCE
Refills: 0 | Status: COMPLETED | OUTPATIENT
Start: 2024-03-11 | End: 2024-03-11

## 2024-03-11 RX ORDER — IPRATROPIUM/ALBUTEROL SULFATE 18-103MCG
3 AEROSOL WITH ADAPTER (GRAM) INHALATION ONCE
Refills: 0 | Status: DISCONTINUED | OUTPATIENT
Start: 2024-03-11 | End: 2024-03-14

## 2024-03-11 RX ORDER — METRONIDAZOLE 500 MG
500 TABLET ORAL EVERY 8 HOURS
Refills: 0 | Status: DISCONTINUED | OUTPATIENT
Start: 2024-03-11 | End: 2024-03-13

## 2024-03-11 RX ADMIN — Medication 100 MILLIGRAM(S): at 13:21

## 2024-03-11 RX ADMIN — Medication 3 MILLILITER(S): at 08:54

## 2024-03-11 RX ADMIN — CHLORHEXIDINE GLUCONATE 1 APPLICATION(S): 213 SOLUTION TOPICAL at 13:21

## 2024-03-11 RX ADMIN — Medication 1 MILLIGRAM(S): at 10:23

## 2024-03-11 RX ADMIN — SENNA PLUS 2 TABLET(S): 8.6 TABLET ORAL at 21:48

## 2024-03-11 RX ADMIN — Medication 5 MILLIGRAM(S): at 21:48

## 2024-03-11 RX ADMIN — Medication 3 MILLILITER(S): at 19:47

## 2024-03-11 RX ADMIN — ENOXAPARIN SODIUM 40 MILLIGRAM(S): 100 INJECTION SUBCUTANEOUS at 07:15

## 2024-03-11 RX ADMIN — Medication 1 MILLIGRAM(S): at 05:00

## 2024-03-11 RX ADMIN — Medication 2 MILLIGRAM(S): at 06:30

## 2024-03-11 RX ADMIN — CEFEPIME 100 MILLIGRAM(S): 1 INJECTION, POWDER, FOR SOLUTION INTRAMUSCULAR; INTRAVENOUS at 13:20

## 2024-03-11 RX ADMIN — Medication 150 GRAM(S): at 09:29

## 2024-03-11 RX ADMIN — Medication 3 MILLILITER(S): at 16:43

## 2024-03-11 RX ADMIN — Medication 100 MILLIGRAM(S): at 21:49

## 2024-03-11 RX ADMIN — Medication 1 MILLIGRAM(S): at 08:36

## 2024-03-11 RX ADMIN — Medication 500000 UNIT(S): at 23:26

## 2024-03-11 RX ADMIN — CEFEPIME 100 MILLIGRAM(S): 1 INJECTION, POWDER, FOR SOLUTION INTRAMUSCULAR; INTRAVENOUS at 07:15

## 2024-03-11 RX ADMIN — Medication 60 MILLIGRAM(S): at 08:38

## 2024-03-11 RX ADMIN — Medication 50 MILLIEQUIVALENT(S): at 03:12

## 2024-03-11 RX ADMIN — CEFEPIME 100 MILLIGRAM(S): 1 INJECTION, POWDER, FOR SOLUTION INTRAMUSCULAR; INTRAVENOUS at 21:49

## 2024-03-11 NOTE — PROGRESS NOTE ADULT - SUBJECTIVE AND OBJECTIVE BOX
Over Night Events: events noted, on RA, EPS/ cardio noted    PHYSICAL EXAM    ICU Vital Signs Last 24 Hrs  T(C): 36.7 (10 Mar 2024 20:00), Max: 37.1 (10 Mar 2024 08:10)  T(F): 98 (10 Mar 2024 20:00), Max: 98.7 (10 Mar 2024 08:10)  HR: 66 (10 Mar 2024 20:00) (62 - 80)  BP: 123/87 (10 Mar 2024 20:00) (123/87 - 153/91)  BP(mean): 98 (10 Mar 2024 20:00) (98 - 116)  RR: 29 (10 Mar 2024 20:00) (22 - 34)  SpO2: 99% (10 Mar 2024 20:00) (96% - 99%)    O2 Parameters below as of 10 Mar 2024 12:00  Patient On (Oxygen Delivery Method): room air            General: comfortable  Lungs: dec bs both bases  Cardiovascular: Regular   Abdomen: Soft, Positive BS  Extremities: No clubbing   Neurological: Non focal       03-09-24 @ 06:01  -  03-10-24 @ 07:00  --------------------------------------------------------  IN:  Total IN: 0 mL    OUT:    Voided (mL): 1425 mL  Total OUT: 1425 mL    Total NET: -1425 mL      03-10-24 @ 07:01  -  03-11-24 @ 06:51  --------------------------------------------------------  IN:    IV PiggyBack: 200 mL  Total IN: 200 mL    OUT:    Voided (mL): 225 mL  Total OUT: 225 mL    Total NET: -25 mL          LABS:                          13.8   17.66 )-----------( 372      ( 11 Mar 2024 05:52 )             39.3                                               03-11    141  |  108  |  8<L>  ----------------------------<  194<H>  4.0   |  20  |  0.6<L>    Ca    8.2<L>      11 Mar 2024 05:52  Mg     2.0     03-11                                               Urinalysis Basic - ( 11 Mar 2024 05:52 )    Color: x / Appearance: x / SG: x / pH: x  Gluc: 194 mg/dL / Ketone: x  / Bili: x / Urobili: x   Blood: x / Protein: x / Nitrite: x   Leuk Esterase: x / RBC: x / WBC x   Sq Epi: x / Non Sq Epi: x / Bacteria: x                                                                                                                                                                            ABG - ( 10 Mar 2024 23:18 )  pH, Arterial: 7.40  pH, Blood: x     /  pCO2: 32    /  pO2: 107   / HCO3: 20    / Base Excess: -4.0  /  SaO2: 99.0                MEDICATIONS  (STANDING):  albuterol/ipratropium for Nebulization 3 milliLiter(s) Nebulizer every 4 hours  albuterol/ipratropium for Nebulization. 3 milliLiter(s) Nebulizer once  ascorbic acid 500 milliGRAM(s) Oral daily  cefepime   IVPB 2000 milliGRAM(s) IV Intermittent every 8 hours  chlorhexidine 2% Cloths 1 Application(s) Topical daily  enoxaparin Injectable 40 milliGRAM(s) SubCutaneous every 24 hours  folic acid 1 milliGRAM(s) Oral daily  guaiFENesin Oral Liquid (Sugar-Free) 100 milliGRAM(s) Oral every 12 hours  influenza   Vaccine 0.5 milliLiter(s) IntraMuscular once  melatonin 5 milliGRAM(s) Oral at bedtime  methimazole 2.5 milliGRAM(s) Oral daily  metoprolol tartrate 75 milliGRAM(s) Oral two times a day  metroNIDAZOLE    Tablet 500 milliGRAM(s) Oral every 8 hours  multivitamin 1 Tablet(s) Oral daily  nystatin    Suspension 883372 Unit(s) Oral four times a day  polyethylene glycol 3350 17 Gram(s) Oral two times a day  potassium chloride  20 mEq/100 mL IVPB 20 milliEquivalent(s) IV Intermittent once  senna 2 Tablet(s) Oral at bedtime  sodium chloride 3%  Inhalation 4 milliLiter(s) Inhalation every 12 hours  thiamine 100 milliGRAM(s) Oral daily    MEDICATIONS  (PRN):  albuterol/ipratropium for Nebulization 3 milliLiter(s) Nebulizer every 6 hours PRN Shortness of Breath and/or Wheezing  LORazepam   Injectable 1 milliGRAM(s) IV Push every 2 hours PRN Symptom-triggered: 2 point increase in CIWA -Ar score and a total score of 7 or LESS

## 2024-03-11 NOTE — PROGRESS NOTE ADULT - ASSESSMENT
63 year old man with a pmh of COPD, alcohol use disorder and hyperthyroidism presented for palpitation. Upgraded for hypoxemic respiratory failure, vented this hospitalation    Alcohol withdrawal, Anxiety, Insomnia  Alcohol intoxication (alcohol level in blood >200) on adm  - Extubated on 03/06/2024.   - On 2L nasal canula  - On Seroquel 25 mg at night standing and haldol 2.5 mg q 8 PRN for agitation  - Off Ativan protocol, now prn  - continue Hydroxyzine  - Melatonin HS  - CATCH team re-eval now that off vent  - continue thiamine and folate     Constipation  Fever , Possible colitis   - Blood cultures taken  - CT Abdomen/pelvis with oral contrast showed distended column with possible signs of colitis  - on cefepime and metronidazole thru 3/11    Hyperthyroidism  - on Methimazole  - TSH 0.01 low, fT4 1.8.   - On Methimazole 5 mg TID, reduce to BID  - endocrinology following    Tachycardia, SVT? A Fib  HTN  - remains on Metoprolol  75 mg BID  - rate controlled, BP stable  - case discussed with cardiology  - EPS f/u for loop recorder prior to discharge    Right lower lobe consolidation- resolved  COPD, > 40 pack year smoking hx  - remains on Duoneb q4   - pulm/CC F/U      Transaminitis   - , ALT 85,   - Secondary to alcohol use    Family discussion: at bedside with soon and wife  Disposition: remains in SDU         63 year old man with a pmh of COPD, alcohol use disorder and hyperthyroidism presented for palpitation. Upgraded for hypoxemic respiratory failure, vented this hospitalation    Alcohol withdrawal, Anxiety, Insomnia  Alcohol intoxication (alcohol level in blood >200) on adm  - Extubated on 03/06/2024. (3/1-3/6 ETT)  - On 2L nasal canula  - On Seroquel 25 mg at night standing and haldol 2.5 mg q 8 PRN for agitation  - Off Ativan protocol, now prn  Remains confused.  - continue Hydroxyzine  - Melatonin HS  - CATCH team re-eval now that off vent; NEED, and Addiction svc and Neuro to re-evaluate.  - continue thiamine and folate     Constipation  Fever , Possible colitis   - Blood cultures taken  - CT Abdomen/pelvis with oral contrast showed distended column with possible signs of colitis  - on cefepime and metronidazole thru 3/11;  - watch for fever as meds finish    Hyperthyroidism  - on Methimazole  - TSH 0.01 low, fT4 1.8.   - On Methimazole 5 mg TID, reduce to BID  - endocrinology following; consult noted    Tachycardia, SVT? A Fib  HTN  - remains on Metoprolol  75 mg BID  - rate controlled, BP stable  - case discussed with cardiology  - EPS f/u for loop recorder prior to discharge    Right lower lobe consolidation- resolved  COPD, > 40 pack year smoking hx  - remains on Duoneb q4   - pulm/CC F/U      Transaminitis   - , ALT 85,   - Secondary to alcohol use      Disposition: remains in SDU

## 2024-03-11 NOTE — PROGRESS NOTE ADULT - ASSESSMENT
IMPRESSION:    Acute respiratory failure resolved  Possible aspiration right lower lobe  possible seizures   Severe alcohol withdrawal   Alcohol abuse  COPD exacerbation  Hyperthyroidism   cardia c arrythmias    PLAN:    CNS:  Thiamine, folate, CIWA protocol    HEENT: Oral care.     PULMONARY:  HOB @ 45 degrees.    Nebs q4hrs and PRN,     CARDIOVASCULAR: EPS fup  Continue home beta blocker  Appreciate EP recommendations    GI: GI prophylaxis   Advance diet as per speech  Bowel regimen PRN.    Colitis on CT    RENAL:  Follow up lytes.  Correct as needed.     INFECTIOUS DISEASE: finish course of abx    HEMATOLOGICAL:  DVT prophylaxis w/ lovenox SQ.     ENDOCRINE:  Follow up FS.  Insulin protocol if needed. CW methimazole.    Consult endocrine to clarify ?hyperthyroid, repeat labs pending    MUSCULOSKELETAL: OOBAT, PT/OT    DISPO: tele  LINES: PIV  CODE: FULL

## 2024-03-11 NOTE — PROGRESS NOTE ADULT - SUBJECTIVE AND OBJECTIVE BOX
Chart reviewed, patient examined. Pertinent results reviewed.  Case discussed with HO; specialist f/u reviewed  HD#11; in ED 2/28; patient and case new to me  MATT MACDONALD    HPI:  63 years old male history of alcohol abuse, hypertension, COPD? ( heavy smoker. acc to pt had PFTs done and uses inhalers at home ), recently diagnosed with hyperthyroidism few weeks PTA and started on methimazole 5 mg by PMD presents complaining of palpitations and exertional shortness of breath over the past few weeks.   At my encounter family not at bedside, acc to pt his only complaint is palpitations His HR was ranging 120-130 at home. No chest pain. He has cough and mild SOB which he attributes to smoking 1 pack daily. Otherwise he describes himself as very healthy. Pt is tremelous and restless. He admitted to drinking alcohol  (15-20 drinks of liquor daily). Last drink was earlier this evening.  Patient also reports he does want to stop drinking and try to cut down his alcohol use.    As per family, patient was seen by his cardiologist Dr. Johnson who increased that his metoprolol to 75 mg twice a day which did not improve his heart rate.  Patient had outpatient echocardiogram the day PTA and noted to be tachycardic so he was recommended to come to ED for evaluation.  Otherwise denies fever, chills, recent illness, coughing, chest pain, abdominal pain, diarrhea or urinary symptoms.  Denies drug use.      Patient was intubated for ARF, then extubated   days later.    s/p valium and ativan in ED  s/p 1 L NS, magnesium   HR improved from 130 to 90s  (29 Feb 2024 01:57)  INTERVAL EVENTS: Patient seen today without distress, however adamant about going home, needs to go home and will come back tomorrow. Family at bedside concerned about need for f/u with CATCH TEAM.      MEDICATIONS  (STANDING):  albuterol/ipratropium for Nebulization 3 milliLiter(s) Nebulizer every 4 hours  albuterol/ipratropium for Nebulization. 3 milliLiter(s) Nebulizer once  ascorbic acid 500 milliGRAM(s) Oral daily  cefepime   IVPB 2000 milliGRAM(s) IV Intermittent every 8 hours  chlorhexidine 2% Cloths 1 Application(s) Topical daily  enoxaparin Injectable 40 milliGRAM(s) SubCutaneous every 24 hours  folic acid 1 milliGRAM(s) Oral daily  guaiFENesin Oral Liquid (Sugar-Free) 100 milliGRAM(s) Oral every 12 hours  influenza   Vaccine 0.5 milliLiter(s) IntraMuscular once  melatonin 5 milliGRAM(s) Oral at bedtime  methimazole 2.5 milliGRAM(s) Oral daily  metoprolol tartrate 75 milliGRAM(s) Oral two times a day  metroNIDAZOLE    Tablet 500 milliGRAM(s) Oral every 8 hours  multivitamin 1 Tablet(s) Oral daily  nystatin    Suspension 943054 Unit(s) Oral four times a day  polyethylene glycol 3350 17 Gram(s) Oral two times a day  senna 2 Tablet(s) Oral at bedtime  sodium chloride 3%  Inhalation 4 milliLiter(s) Inhalation every 12 hours  thiamine 100 milliGRAM(s) Oral daily    MEDICATIONS  (PRN):  albuterol/ipratropium for Nebulization 3 milliLiter(s) Nebulizer every 6 hours PRN Shortness of Breath and/or Wheezing  LORazepam   Injectable 1 milliGRAM(s) IV Push every 2 hours PRN Symptom-triggered: 2 point increase in CIWA -Ar score and a total score of 7 or LESS    Vital Signs Last 24 Hrs  T(C): 36.7 (10 Mar 2024 20:00), Max: 37.1 (10 Mar 2024 08:10)  T(F): 98 (10 Mar 2024 20:00), Max: 98.7 (10 Mar 2024 08:10)  HR: 66 (10 Mar 2024 20:00) (62 - 80)  BP: 123/87 (10 Mar 2024 20:00) (123/87 - 153/91)  BP(mean): 98 (10 Mar 2024 20:00) (98 - 116)  RR: 29 (10 Mar 2024 20:00) (22 - 34)  SpO2: 99% (10 Mar 2024 20:00) (96% - 99%)    Parameters below as of 10 Mar 2024 12:00  Patient On (Oxygen Delivery Method): room air      PHYSICAL EXAM:  GENERAL:  NAD  NECK: Supple, No JVD  CHEST/LUNG: Occ wheeze  HEART: RRR, no MRG  ABDOMEN: Soft, Nontender, Bowel sounds present  EXTREMITIES: No edema  SKIN: No rashes or lesions    LABS:                        13.2   12.30 )-----------( 339      ( 10 Mar 2024 05:41 )             39.1             03-10    145  |  110  |  9<L>  ----------------------------<  100<H>  3.8   |  25  |  0.6<L>    Ca    8.0<L>      10 Mar 2024 05:41  Mg     1.8     03-10    Thyroid Stim. Immunoglobulin: <0.10: [facility]  [facility]  Free Thyroxine, Serum: 0.7 ng/dL (03.08.24 @ 04:29)   Triiodothyronine, Total (T3 Total): 84 ng/dL (03.08.24 @ 04:29)                Urinalysis Basic - ( 10 Mar 2024 05:41 )    Color: x / Appearance: x / SG: x / pH: x  Gluc: 100 mg/dL / Ketone: x  / Bili: x / Urobili: x   Blood: x / Protein: x / Nitrite: x   Leuk Esterase: x / RBC: x / WBC x   Sq Epi: x / Non Sq Epi: x / Bacteria: x              RADIOLOGY & ADDITIONAL TESTS:   Chart reviewed, patient examined. Pertinent results reviewed.  Case discussed with HO; specialist f/u reviewed  HD#11; in ED 2/28; patient and case new to me  MATT MACDONALD    HPI:  63 years old male history of alcohol abuse, hypertension, COPD? ( heavy smoker. acc to pt had PFTs done and uses inhalers at home ), recently diagnosed with hyperthyroidism few weeks PTA and started on methimazole 5 mg by PMD presents complaining of palpitations and exertional shortness of breath over the past few weeks.   At my encounter family not at bedside, acc to pt his only complaint is palpitations His HR was ranging 120-130 at home. No chest pain. He has cough and mild SOB which he attributes to smoking 1 pack daily. Otherwise he describes himself as very healthy. Pt is tremelous and restless. He admitted to drinking alcohol  (15-20 drinks of liquor daily). Last drink was earlier this evening.  Patient also reports he does want to stop drinking and try to cut down his alcohol use.    As per family, patient was seen by his cardiologist Dr. Johnson who increased that his metoprolol to 75 mg twice a day which did not improve his heart rate.  Patient had outpatient echocardiogram the day PTA and noted to be tachycardic so he was recommended to come to ED for evaluation.  Otherwise denies fever, chills, recent illness, coughing, chest pain, abdominal pain, diarrhea or urinary symptoms.  Denies drug use.      Patient was intubated for ARF, then extubated   days later.    s/p valium and ativan in ED  s/p 1 L NS, magnesium       INTERVAL EVENTS: Patient seen today - tremulous and mumbling;, Family  concerned about need for f/u with CATCH TEAM.      MEDICATIONS  (STANDING):  albuterol/ipratropium for Nebulization 3 milliLiter(s) Nebulizer every 4 hours  albuterol/ipratropium for Nebulization. 3 milliLiter(s) Nebulizer once  ascorbic acid 500 milliGRAM(s) Oral daily  cefepime   IVPB 2000 milliGRAM(s) IV Intermittent every 8 hours  chlorhexidine 2% Cloths 1 Application(s) Topical daily  enoxaparin Injectable 40 milliGRAM(s) SubCutaneous every 24 hours  folic acid 1 milliGRAM(s) Oral daily  guaiFENesin Oral Liquid (Sugar-Free) 100 milliGRAM(s) Oral every 12 hours  influenza   Vaccine 0.5 milliLiter(s) IntraMuscular once  melatonin 5 milliGRAM(s) Oral at bedtime  methimazole 2.5 milliGRAM(s) Oral daily  metoprolol tartrate 75 milliGRAM(s) Oral two times a day  metroNIDAZOLE    Tablet 500 milliGRAM(s) Oral every 8 hours  multivitamin 1 Tablet(s) Oral daily  nystatin    Suspension [id number] Unit(s) Oral four times a day  polyethylene glycol 3350 17 Gram(s) Oral two times a day  senna 2 Tablet(s) Oral at bedtime  sodium chloride 3%  Inhalation 4 milliLiter(s) Inhalation every 12 hours  thiamine 100 milliGRAM(s) Oral daily    MEDICATIONS  (PRN):  albuterol/ipratropium for Nebulization 3 milliLiter(s) Nebulizer every 6 hours PRN Shortness of Breath and/or Wheezing  LORazepam   Injectable 1 milliGRAM(s) IV Push every 2 hours PRN Symptom-triggered: 2 point increase in CIWA -Ar score and a total score of 7 or LESS    Vital Signs Last 24 Hrs  T(C): 36.4 (11 Mar 2024 20:00), Max: 36.4 (11 Mar 2024 12:00)  T(F): 97.5 (11 Mar 2024 20:00), Max: 97.6 (11 Mar 2024 12:00)  HR: 77 (11 Mar 2024 20:00) (71 - 100)  BP: 122/81 (11 Mar 2024 20:00) (107/69 - 142/86)  BP(mean): 98 (11 Mar 2024 08:03) (74 - 98)  RR: 33 (11 Mar 2024 20:00) (25 - 36)  SpO2: 98% (11 Mar 2024 20:00) (94% - 99%)    Parameters below as of 11 Mar 2024 20:00  Patient On (Oxygen Delivery Method): nasal cannula, high flow    O2 Concentration (%): 40    PHYSICAL EXAM:  GENERAL:  NAD; Older man mumbling and trembling  NECK: Supple, No JVD  CHEST/LUNG: Occ wheeze b/l; good air mvt  HEART: RRR, no MRG  ABDOMEN: Soft, Nontender, Bowel sounds present  EXTREMITIES: No edema  SKIN: No rashes or lesions  Neuro: see above; no answers to questions; + withdrawal to Noxious stimuli to all extremities    LABS:                              13.8   17.66 )-----------( 372      ( 11 Mar 2024 05:52 )             39.3                   13.2   12.30 )-----------( 339      ( 10 Mar 2024 05:41 )             39.1     03-11    141  |  108  |  8<L>  ----------------------------<  194<H>  4.0   |  20  |  0.6<L>    Ca    8.2<L>      11 Mar 2024 05:52  Mg     2.0     03-11                03-10    145  |  110  |  9<L>  ----------------------------<  100<H>  3.8   |  25  |  0.6<L>    Ca    8.0<L>      10 Mar 2024 05:41  Mg     1.8     03-10    Thyroid Stim. Immunoglobulin: <0.10: [facility]formed [facility]  [facility] [place]  Free Thyroxine, Serum: 0.7 ng/dL (03.08.24 @ 04:29)   Triiodothyronine, Total (T3 Total): 84 ng/dL (03.08.24 @ 04:29)                Urinalysis Basic - ( 10 Mar 2024 05:41 )    Color: x / Appearance: x / SG: x / pH: x  Gluc: 100 mg/dL / Ketone: x  / Bili: x / Urobili: x   Blood: x / Protein: x / Nitrite: x   Leuk Esterase: x / RBC: x / WBC x   Sq Epi: x / Non Sq Epi: x / Bacteria: x              RADIOLOGY & ADDITIONAL TESTS:

## 2024-03-11 NOTE — PROGRESS NOTE ADULT - TIME BILLING
spent   40  minutes on this patient's case:   12   minutes w patient,  14  minutes reviewing the chart,    and  14   minutes speaking to the HO, RN and family, also coordinating care and documenting all.

## 2024-03-11 NOTE — CHART NOTE - NSCHARTNOTEFT_GEN_A_CORE
Spoke to the patient's son Mr. Nelson over the phone.  He discussed the loop recorder implant procedure with his father and they would like to post pone it and to do it as outpatient.   Outpatient follow up with EP Dr. Knowles will be arranged in 3-4 weeks.

## 2024-03-12 PROBLEM — I10 ESSENTIAL (PRIMARY) HYPERTENSION: Chronic | Status: ACTIVE | Noted: 2024-02-28

## 2024-03-12 PROBLEM — F10.10 ALCOHOL ABUSE, UNCOMPLICATED: Chronic | Status: ACTIVE | Noted: 2024-02-28

## 2024-03-12 PROBLEM — E05.90 THYROTOXICOSIS, UNSPECIFIED WITHOUT THYROTOXIC CRISIS OR STORM: Chronic | Status: ACTIVE | Noted: 2024-02-28

## 2024-03-12 LAB
ALBUMIN SERPL ELPH-MCNC: 2.8 G/DL — LOW (ref 3.5–5.2)
ALP SERPL-CCNC: 128 U/L — HIGH (ref 30–115)
ALT FLD-CCNC: 32 U/L — SIGNIFICANT CHANGE UP (ref 0–41)
ANION GAP SERPL CALC-SCNC: 7 MMOL/L — SIGNIFICANT CHANGE UP (ref 7–14)
APPEARANCE UR: CLEAR — SIGNIFICANT CHANGE UP
AST SERPL-CCNC: 35 U/L — SIGNIFICANT CHANGE UP (ref 0–41)
BASOPHILS # BLD AUTO: 0.03 K/UL — SIGNIFICANT CHANGE UP (ref 0–0.2)
BASOPHILS NFR BLD AUTO: 0.2 % — SIGNIFICANT CHANGE UP (ref 0–1)
BILIRUB SERPL-MCNC: 0.6 MG/DL — SIGNIFICANT CHANGE UP (ref 0.2–1.2)
BILIRUB UR-MCNC: NEGATIVE — SIGNIFICANT CHANGE UP
BUN SERPL-MCNC: 12 MG/DL — SIGNIFICANT CHANGE UP (ref 10–20)
CALCIUM SERPL-MCNC: 8.1 MG/DL — LOW (ref 8.4–10.5)
CHLORIDE SERPL-SCNC: 112 MMOL/L — HIGH (ref 98–110)
CO2 SERPL-SCNC: 24 MMOL/L — SIGNIFICANT CHANGE UP (ref 17–32)
COLOR SPEC: YELLOW — SIGNIFICANT CHANGE UP
CREAT SERPL-MCNC: 0.6 MG/DL — LOW (ref 0.7–1.5)
CULTURE RESULTS: SIGNIFICANT CHANGE UP
DIFF PNL FLD: NEGATIVE — SIGNIFICANT CHANGE UP
EGFR: 108 ML/MIN/1.73M2 — SIGNIFICANT CHANGE UP
EOSINOPHIL # BLD AUTO: 0.01 K/UL — SIGNIFICANT CHANGE UP (ref 0–0.7)
EOSINOPHIL NFR BLD AUTO: 0.1 % — SIGNIFICANT CHANGE UP (ref 0–8)
GLUCOSE SERPL-MCNC: 96 MG/DL — SIGNIFICANT CHANGE UP (ref 70–99)
GLUCOSE UR QL: NEGATIVE MG/DL — SIGNIFICANT CHANGE UP
HCT VFR BLD CALC: 34.9 % — LOW (ref 42–52)
HGB BLD-MCNC: 12 G/DL — LOW (ref 14–18)
IMM GRANULOCYTES NFR BLD AUTO: 1.2 % — HIGH (ref 0.1–0.3)
KETONES UR-MCNC: NEGATIVE MG/DL — SIGNIFICANT CHANGE UP
LEUKOCYTE ESTERASE UR-ACNC: NEGATIVE — SIGNIFICANT CHANGE UP
LYMPHOCYTES # BLD AUTO: 1.67 K/UL — SIGNIFICANT CHANGE UP (ref 1.2–3.4)
LYMPHOCYTES # BLD AUTO: 8.9 % — LOW (ref 20.5–51.1)
MAGNESIUM SERPL-MCNC: 2.4 MG/DL — SIGNIFICANT CHANGE UP (ref 1.8–2.4)
MCHC RBC-ENTMCNC: 34.4 G/DL — SIGNIFICANT CHANGE UP (ref 32–37)
MCHC RBC-ENTMCNC: 34.9 PG — HIGH (ref 27–31)
MCV RBC AUTO: 101.5 FL — HIGH (ref 80–94)
MONOCYTES # BLD AUTO: 1.03 K/UL — HIGH (ref 0.1–0.6)
MONOCYTES NFR BLD AUTO: 5.5 % — SIGNIFICANT CHANGE UP (ref 1.7–9.3)
NEUTROPHILS # BLD AUTO: 15.81 K/UL — HIGH (ref 1.4–6.5)
NEUTROPHILS NFR BLD AUTO: 84.1 % — HIGH (ref 42.2–75.2)
NITRITE UR-MCNC: NEGATIVE — SIGNIFICANT CHANGE UP
NRBC # BLD: 0 /100 WBCS — SIGNIFICANT CHANGE UP (ref 0–0)
PH UR: 7 — SIGNIFICANT CHANGE UP (ref 5–8)
PLATELET # BLD AUTO: 393 K/UL — SIGNIFICANT CHANGE UP (ref 130–400)
PMV BLD: 10.5 FL — HIGH (ref 7.4–10.4)
POTASSIUM SERPL-MCNC: 3.8 MMOL/L — SIGNIFICANT CHANGE UP (ref 3.5–5)
POTASSIUM SERPL-SCNC: 3.8 MMOL/L — SIGNIFICANT CHANGE UP (ref 3.5–5)
PROT SERPL-MCNC: 5.3 G/DL — LOW (ref 6–8)
PROT UR-MCNC: NEGATIVE MG/DL — SIGNIFICANT CHANGE UP
RBC # BLD: 3.44 M/UL — LOW (ref 4.7–6.1)
RBC # FLD: 11.5 % — SIGNIFICANT CHANGE UP (ref 11.5–14.5)
SODIUM SERPL-SCNC: 143 MMOL/L — SIGNIFICANT CHANGE UP (ref 135–146)
SP GR SPEC: 1.01 — SIGNIFICANT CHANGE UP (ref 1–1.03)
SPECIMEN SOURCE: SIGNIFICANT CHANGE UP
UROBILINOGEN FLD QL: 0.2 MG/DL — SIGNIFICANT CHANGE UP (ref 0.2–1)
WBC # BLD: 18.77 K/UL — HIGH (ref 4.8–10.8)
WBC # FLD AUTO: 18.77 K/UL — HIGH (ref 4.8–10.8)

## 2024-03-12 PROCEDURE — 99233 SBSQ HOSP IP/OBS HIGH 50: CPT

## 2024-03-12 RX ORDER — FUROSEMIDE 40 MG
40 TABLET ORAL DAILY
Refills: 0 | Status: DISCONTINUED | OUTPATIENT
Start: 2024-03-12 | End: 2024-03-14

## 2024-03-12 RX ORDER — NICOTINE POLACRILEX 2 MG
1 GUM BUCCAL DAILY
Refills: 0 | Status: DISCONTINUED | OUTPATIENT
Start: 2024-03-12 | End: 2024-03-14

## 2024-03-12 RX ADMIN — CEFEPIME 100 MILLIGRAM(S): 1 INJECTION, POWDER, FOR SOLUTION INTRAMUSCULAR; INTRAVENOUS at 05:10

## 2024-03-12 RX ADMIN — Medication 3 MILLILITER(S): at 20:21

## 2024-03-12 RX ADMIN — Medication 5 MILLIGRAM(S): at 21:53

## 2024-03-12 RX ADMIN — Medication 75 MILLIGRAM(S): at 18:22

## 2024-03-12 RX ADMIN — Medication 75 MILLIGRAM(S): at 05:08

## 2024-03-12 RX ADMIN — SENNA PLUS 2 TABLET(S): 8.6 TABLET ORAL at 21:53

## 2024-03-12 RX ADMIN — Medication 500000 UNIT(S): at 05:20

## 2024-03-12 RX ADMIN — Medication 100 MILLIGRAM(S): at 05:09

## 2024-03-12 RX ADMIN — Medication 1 MILLIGRAM(S): at 11:50

## 2024-03-12 RX ADMIN — Medication 40 MILLIGRAM(S): at 08:44

## 2024-03-12 RX ADMIN — Medication 100 MILLIGRAM(S): at 21:54

## 2024-03-12 RX ADMIN — Medication 1 TABLET(S): at 11:50

## 2024-03-12 RX ADMIN — Medication 500 MILLIGRAM(S): at 11:50

## 2024-03-12 RX ADMIN — Medication 500000 UNIT(S): at 18:22

## 2024-03-12 RX ADMIN — CEFEPIME 100 MILLIGRAM(S): 1 INJECTION, POWDER, FOR SOLUTION INTRAMUSCULAR; INTRAVENOUS at 21:54

## 2024-03-12 RX ADMIN — Medication 1 MILLIGRAM(S): at 20:08

## 2024-03-12 RX ADMIN — CEFEPIME 100 MILLIGRAM(S): 1 INJECTION, POWDER, FOR SOLUTION INTRAMUSCULAR; INTRAVENOUS at 14:54

## 2024-03-12 RX ADMIN — Medication 3 MILLILITER(S): at 13:28

## 2024-03-12 RX ADMIN — ENOXAPARIN SODIUM 40 MILLIGRAM(S): 100 INJECTION SUBCUTANEOUS at 05:09

## 2024-03-12 RX ADMIN — CHLORHEXIDINE GLUCONATE 1 APPLICATION(S): 213 SOLUTION TOPICAL at 11:51

## 2024-03-12 RX ADMIN — Medication 100 MILLIGRAM(S): at 11:50

## 2024-03-12 RX ADMIN — Medication 500000 UNIT(S): at 11:50

## 2024-03-12 RX ADMIN — Medication 40 MILLIGRAM(S): at 05:09

## 2024-03-12 RX ADMIN — POLYETHYLENE GLYCOL 3350 17 GRAM(S): 17 POWDER, FOR SOLUTION ORAL at 05:08

## 2024-03-12 RX ADMIN — Medication 100 MILLIGRAM(S): at 14:54

## 2024-03-12 NOTE — PHYSICAL THERAPY INITIAL EVALUATION ADULT - PERTINENT HX OF CURRENT PROBLEM, REHAB EVAL
63 years old male history of alcohol abuse, hypertension, COPD? ( heavy smoker. acc to pt had PFTs done and uses inhalers at home ), recently diagnosed with hyperthyroidism few weeks PTA and started on methimazole 5 mg by PMD presents complaining of palpitations and exertional shortness of breath over the past few weeks.   At my encounter family not at bedside, acc to pt his only complaint is palpitations His HR was ranging 120-130 at home. No chest pain. He has cough and mild SOB which he attributes to smoking 1 pack daily. Otherwise he describes himself as very healthy. Pt is tremelous and restless. He admitted to drinking alcohol  (15-20 drinks of liquor daily). Last drink was earlier this evening.  Patient also reports he does want to stop drinking and try to cut down his alcohol use.

## 2024-03-12 NOTE — SWALLOW BEDSIDE ASSESSMENT ADULT - SLP GENERAL OBSERVATIONS
pt received in bed awake more alert w/o c/o pain. +HFNC 40L/min, 47% O2 +son at bedside; +1:1 sit at bedside, reports pt tolerated breakfast well, dislikes minced+moist consistency
pt awake however confused. pt oriented to self and place. hoarse vocal quality noted. NGT in place. pt on o2 via NC. Audible chest congestion noted and unproductive cough

## 2024-03-12 NOTE — PROGRESS NOTE ADULT - ASSESSMENT
IMPRESSION:    Acute respiratory failure improved  Possible aspiration right lower lobe  possible seizures   Severe alcohol withdrawal   Alcohol abuse  COPD exacerbation  Hyperthyroidism   cardia c arrythmias    PLAN:    CNS:  Thiamine, folate, CIWA protocol    HEENT: Oral care.     PULMONARY:  HOB @ 45 degrees.    Nebs q4hrs and PRN, solumedrol 40 daily, dec FIO2 keep SaO2 92 TO 96%    CARDIOVASCULAR: EPS fup  Continue home beta blocker  lasix daily    GI: GI prophylaxis   Advance diet as per speech  Bowel regimen PRN.        RENAL:  Follow up lytes.  Correct as needed.     INFECTIOUS DISEASE: finish course of abx    HEMATOLOGICAL:  DVT prophylaxis w/ lovenox SQ.     ENDOCRINE:  Follow up FS.  Insulin protocol if needed. CW methimazole.      MUSCULOSKELETAL: OOBAT, PT/OT    LINES: PIV  CODE: FULL

## 2024-03-12 NOTE — SWALLOW BEDSIDE ASSESSMENT ADULT - SLP PERTINENT HISTORY OF CURRENT PROBLEM
Pt is a 62 y/o M w/ PMHx: alcohol abuse, HTN, COPD, recently dx w/ hyperthyroidism, presented for feelings of palpitations and exertional SOB. Pt admitted for treatment of alcohol withdrawal, placed on Ativan taper; course c/b O2 desaturation requiring mechanical ventilation on 3/1, s/p extubation 3/6. CATCH team c/s. +ARF, RLL consolidation, possible aspiration, fever, ?colitis, questionable afib, transaminitis. EEG (-) for signs of seizure activity. CTH (-).
63 year old man with a pmh of COPD, alcohol use disorder and hyperthyroidism presented for palpitation. Upgraded for hypoxemic respiratory failure likely alcohol withdrawal aspiration PNA and sepsis.

## 2024-03-12 NOTE — PROGRESS NOTE ADULT - SUBJECTIVE AND OBJECTIVE BOX
MATT MACDONALD  63y  Male  HPI:  63 years old male history of alcohol abuse, hypertension, COPD? ( heavy smoker. acc to pt had PFTs done and uses inhalers at home ), recently diagnosed with hyperthyroidism few weeks ago and started on methimazole 5 mg by PMD presents complaining of palpitations and exertional shortness of breath over the past few weeks.   At my encounter family not at bedside, acc to pt his only complaint is palpitaiosn, His HR was ranging 120-130 at home. No chest pain. He has cough and mild SOB which he attributes to smoking 1 pack daily. Otherwise he describes himself as very healthy. Pt is tremelous and restless. He admitted to drinking alcohol  (15-20 drinks of liquor daily). Last drink was earlier this evening.  Patient also reports he does want to stop drinking and try to cut down his alcohol use.    As per family, patient was seen by his cardiologist Dr. Johnson who increased that his metoprolol to 75 mg twice a day which did not improve his heart rate.  Patient had outpatient echocardiogram yesterday and noted to be tachycardic so he was recommended to come to ED for evaluation.  Otherwise denies fever, chills, recent illness, coughing, chest pain, abdominal pain, diarrhea or urinary symptoms.  Denies drug use.    Vitals: T 98.2, , /90, spo2 94% on RA   Labs remarkable for elevated ALT AST, Mg 1.5  Lactate 4 on VBGs   Alcohol level 202   CXR clear   EKG showed sinus tachycardia     s/p valium and ativan in ED  s/p 1 L NS, magnesium   HR improved from 130 to 90s  (29 Feb 2024 01:57)    MEDICATIONS  (STANDING):  albuterol/ipratropium for Nebulization 3 milliLiter(s) Nebulizer every 4 hours  albuterol/ipratropium for Nebulization. 3 milliLiter(s) Nebulizer once  albuterol/ipratropium for Nebulization. 3 milliLiter(s) Nebulizer once  ascorbic acid 500 milliGRAM(s) Oral daily  cefepime   IVPB 2000 milliGRAM(s) IV Intermittent every 8 hours  chlorhexidine 2% Cloths 1 Application(s) Topical daily  enoxaparin Injectable 40 milliGRAM(s) SubCutaneous every 24 hours  folic acid 1 milliGRAM(s) Oral daily  furosemide   Injectable 40 milliGRAM(s) IV Push daily  influenza   Vaccine 0.5 milliLiter(s) IntraMuscular once  lactated ringers. 1000 milliLiter(s) (75 mL/Hr) IV Continuous <Continuous>  melatonin 5 milliGRAM(s) Oral at bedtime  methimazole 2.5 milliGRAM(s) Oral daily  methylPREDNISolone sodium succinate Injectable 60 milliGRAM(s) IV Push daily  metoprolol tartrate 75 milliGRAM(s) Oral two times a day  metroNIDAZOLE  IVPB 500 milliGRAM(s) IV Intermittent every 8 hours  multivitamin 1 Tablet(s) Oral daily  nystatin    Suspension 235857 Unit(s) Oral four times a day  polyethylene glycol 3350 17 Gram(s) Oral two times a day  senna 2 Tablet(s) Oral at bedtime  sodium chloride 3%  Inhalation 4 milliLiter(s) Inhalation every 12 hours  thiamine 100 milliGRAM(s) Oral daily    MEDICATIONS  (PRN):  albuterol/ipratropium for Nebulization 3 milliLiter(s) Nebulizer every 6 hours PRN Shortness of Breath and/or Wheezing  LORazepam   Injectable 1 milliGRAM(s) IV Push every 2 hours PRN Symptom-triggered: 2 point increase in CIWA -Ar score and a total score of 7 or LESS    INTERVAL EVENTS:    T(C): 36.7 (03-12-24 @ 08:01), Max: 36.7 (03-12-24 @ 04:00)  HR: 64 (03-12-24 @ 08:01) (63 - 80)  BP: 169/82 (03-12-24 @ 08:01) (107/69 - 169/82)  RR: 19 (03-12-24 @ 08:01) (19 - 36)  SpO2: 97% (03-12-24 @ 08:01) (95% - 99%)  Wt(kg): --Vital Signs Last 24 Hrs  T(C): 36.7 (12 Mar 2024 08:01), Max: 36.7 (12 Mar 2024 04:00)  T(F): 98 (12 Mar 2024 08:01), Max: 98 (12 Mar 2024 04:00)  HR: 64 (12 Mar 2024 08:01) (63 - 80)  BP: 169/82 (12 Mar 2024 08:01) (107/69 - 169/82)  BP(mean): 114 (12 Mar 2024 08:01) (93 - 114)  RR: 19 (12 Mar 2024 08:01) (19 - 36)  SpO2: 97% (12 Mar 2024 08:01) (95% - 99%)    Parameters below as of 12 Mar 2024 08:00  Patient On (Oxygen Delivery Method): nasal cannula        PHYSICAL EXAM:  GENERAL:   NECK: Supple, No JVD  CHEST/LUNG: wheezing  HEART: S1, S2  ABDOMEN: Soft, Nontender, Nondistended; Bowel sounds present  EXTREMITIES: No clubbing, cyanosis, or edema  SKIN: No rashes or lesions    LABS:  Labs:                        12.0   18.77 )-----------( 393      ( 12 Mar 2024 05:32 )             34.9             03-12    143  |  112<H>  |  12  ----------------------------<  96  3.8   |  24  |  0.6<L>    Ca    8.1<L>      12 Mar 2024 05:32  Mg     2.4     03-12    TPro  5.3<L>  /  Alb  2.8<L>  /  TBili  0.6  /  DBili  x   /  AST  35  /  ALT  32  /  AlkPhos  128<H>  03-12    LIVER FUNCTIONS - ( 12 Mar 2024 05:32 )  Alb: 2.8 g/dL / Pro: 5.3 g/dL / ALK PHOS: 128 U/L / ALT: 32 U/L / AST: 35 U/L / GGT: x                       ABG - ( 11 Mar 2024 11:13 )  pH, Arterial: 7.46  pH, Blood: x     /  pCO2: 31    /  pO2: 90    / HCO3: 22    / Base Excess: -1.0  /  SaO2: 97.8              Urinalysis Basic - ( 12 Mar 2024 05:32 )    Color: x / Appearance: x / SG: x / pH: x  Gluc: 96 mg/dL / Ketone: x  / Bili: x / Urobili: x   Blood: x / Protein: x / Nitrite: x   Leuk Esterase: x / RBC: x / WBC x   Sq Epi: x / Non Sq Epi: x / Bacteria: x              RADIOLOGY & ADDITIONAL TESTS:  < from: Xray Chest 1 View-PORTABLE IMMEDIATE (Xray Chest 1 View-PORTABLE IMMEDIATE .) (03.11.24 @ 08:51) >  PROCEDURE DATE:  03/11/2024          INTERPRETATION:  Clinical History / Reason for exam: Shortness of breath.    Comparison : Chest radiograph 3/10/2024.    Technique/Positioning: Single frontal radiograph of the chest.    Findings:    Support devices: None.    Cardiac/mediastinum/hilum: Unchanged.    Lung parenchyma/Pleura: Stable elevation of the right hemidiaphragm with   stable right basilar opacity. No pneumothorax.    Skeleton/soft tissues: Unchanged.    Impression:    Stable right basilar opacity. No pneumothorax.        --- End of Report ---            < end of copied text >     MATT MACDONALD  63y  Male  HPI:  63 years old male history of alcohol abuse, hypertension, COPD? ( heavy smoker. acc to pt had PFTs done and uses inhalers at home ), recently diagnosed with hyperthyroidism few weeks ago and started on methimazole 5 mg by PMD presents complaining of palpitations and exertional shortness of breath over the past few weeks.   At my encounter family not at bedside, acc to pt his only complaint is palpitaiosn, His HR was ranging 120-130 at home. No chest pain. He has cough and mild SOB which he attributes to smoking 1 pack daily. Otherwise he describes himself as very healthy. Pt is tremelous and restless. He admitted to drinking alcohol  (15-20 drinks of liquor daily). Last drink was earlier this evening.  Patient also reports he does want to stop drinking and try to cut down his alcohol use.    As per family, patient was seen by his cardiologist Dr. Johnson who increased that his metoprolol to 75 mg twice a day which did not improve his heart rate.  Patient had outpatient echocardiogram yesterday and noted to be tachycardic so he was recommended to come to ED for evaluation.  Otherwise denies fever, chills, recent illness, coughing, chest pain, abdominal pain, diarrhea or urinary symptoms.  Denies drug use.    Vitals: T 98.2, , /90, spo2 94% on RA   Labs remarkable for elevated ALT AST, Mg 1.5  Lactate 4 on VBGs   Alcohol level 202   CXR clear   EKG showed sinus tachycardia     s/p valium and ativan in ED  s/p 1 L NS, magnesium   HR improved from 130 to 90s  (29 Feb 2024 01:57)    MEDICATIONS  (STANDING):  albuterol/ipratropium for Nebulization 3 milliLiter(s) Nebulizer every 4 hours  albuterol/ipratropium for Nebulization. 3 milliLiter(s) Nebulizer once  albuterol/ipratropium for Nebulization. 3 milliLiter(s) Nebulizer once  ascorbic acid 500 milliGRAM(s) Oral daily  cefepime   IVPB 2000 milliGRAM(s) IV Intermittent every 8 hours  chlorhexidine 2% Cloths 1 Application(s) Topical daily  enoxaparin Injectable 40 milliGRAM(s) SubCutaneous every 24 hours  folic acid 1 milliGRAM(s) Oral daily  furosemide   Injectable 40 milliGRAM(s) IV Push daily  influenza   Vaccine 0.5 milliLiter(s) IntraMuscular once  lactated ringers. 1000 milliLiter(s) (75 mL/Hr) IV Continuous <Continuous>  melatonin 5 milliGRAM(s) Oral at bedtime  methimazole 2.5 milliGRAM(s) Oral daily  methylPREDNISolone sodium succinate Injectable 60 milliGRAM(s) IV Push daily  metoprolol tartrate 75 milliGRAM(s) Oral two times a day  metroNIDAZOLE  IVPB 500 milliGRAM(s) IV Intermittent every 8 hours  multivitamin 1 Tablet(s) Oral daily  nystatin    Suspension 368298 Unit(s) Oral four times a day  polyethylene glycol 3350 17 Gram(s) Oral two times a day  senna 2 Tablet(s) Oral at bedtime  sodium chloride 3%  Inhalation 4 milliLiter(s) Inhalation every 12 hours  thiamine 100 milliGRAM(s) Oral daily    MEDICATIONS  (PRN):  albuterol/ipratropium for Nebulization 3 milliLiter(s) Nebulizer every 6 hours PRN Shortness of Breath and/or Wheezing  LORazepam   Injectable 1 milliGRAM(s) IV Push every 2 hours PRN Symptom-triggered: 2 point increase in CIWA -Ar score and a total score of 7 or LESS    INTERVAL EVENTS:    T(C): 36.7 (03-12-24 @ 08:01), Max: 36.7 (03-12-24 @ 04:00)  HR: 64 (03-12-24 @ 08:01) (63 - 80)  BP: 169/82 (03-12-24 @ 08:01) (107/69 - 169/82)  RR: 19 (03-12-24 @ 08:01) (19 - 36)  SpO2: 97% (03-12-24 @ 08:01) (95% - 99%)  Wt(kg): --Vital Signs Last 24 Hrs  T(C): 36.7 (12 Mar 2024 08:01), Max: 36.7 (12 Mar 2024 04:00)  T(F): 98 (12 Mar 2024 08:01), Max: 98 (12 Mar 2024 04:00)  HR: 64 (12 Mar 2024 08:01) (63 - 80)  BP: 169/82 (12 Mar 2024 08:01) (107/69 - 169/82)  BP(mean): 114 (12 Mar 2024 08:01) (93 - 114)  RR: 19 (12 Mar 2024 08:01) (19 - 36)  SpO2: 97% (12 Mar 2024 08:01) (95% - 99%)    Parameters below as of 12 Mar 2024 08:00  Patient On (Oxygen Delivery Method): nasal cannula        PHYSICAL EXAM:  GENERAL: Patient alert, with roll belt on  NECK: Supple, No JVD  CHEST/LUNG: wheezing  HEART: S1, S2  ABDOMEN: Soft, Nontender, Nondistended; Bowel sounds present  EXTREMITIES: No clubbing, cyanosis, or edema  SKIN: No rashes or lesions    LABS:                        12.0   18.77 )-----------( 393      ( 12 Mar 2024 05:32 )             34.9             03-12    143  |  112<H>  |  12  ----------------------------<  96  3.8   |  24  |  0.6<L>    Ca    8.1<L>      12 Mar 2024 05:32  Mg     2.4     03-12    TPro  5.3<L>  /  Alb  2.8<L>  /  TBili  0.6  /  DBili  x   /  AST  35  /  ALT  32  /  AlkPhos  128<H>  03-12    LIVER FUNCTIONS - ( 12 Mar 2024 05:32 )  Alb: 2.8 g/dL / Pro: 5.3 g/dL / ALK PHOS: 128 U/L / ALT: 32 U/L / AST: 35 U/L / GGT: x                       ABG - ( 11 Mar 2024 11:13 )  pH, Arterial: 7.46  pH, Blood: x     /  pCO2: 31    /  pO2: 90    / HCO3: 22    / Base Excess: -1.0  /  SaO2: 97.8              Urinalysis Basic - ( 12 Mar 2024 05:32 )    Color: x / Appearance: x / SG: x / pH: x  Gluc: 96 mg/dL / Ketone: x  / Bili: x / Urobili: x   Blood: x / Protein: x / Nitrite: x   Leuk Esterase: x / RBC: x / WBC x   Sq Epi: x / Non Sq Epi: x / Bacteria: x              RADIOLOGY & ADDITIONAL TESTS:  < from: Xray Chest 1 View-PORTABLE IMMEDIATE (Xray Chest 1 View-PORTABLE IMMEDIATE .) (03.11.24 @ 08:51) >  PROCEDURE DATE:  03/11/2024          INTERPRETATION:  Clinical History / Reason for exam: Shortness of breath.    Comparison : Chest radiograph 3/10/2024.    Technique/Positioning: Single frontal radiograph of the chest.    Findings:    Support devices: None.    Cardiac/mediastinum/hilum: Unchanged.    Lung parenchyma/Pleura: Stable elevation of the right hemidiaphragm with   stable right basilar opacity. No pneumothorax.    Skeleton/soft tissues: Unchanged.    Impression:    Stable right basilar opacity. No pneumothorax.        --- End of Report ---            < end of copied text >

## 2024-03-12 NOTE — SWALLOW BEDSIDE ASSESSMENT ADULT - COMMENTS
Pt s/p FEES 3/8 w/ recs for minced+moist, thin liquids.  Pt hypoxic yesterday, placed on HFNC, respiratory status improved today. CXR 3/11-> Stable right basilar opacity.

## 2024-03-12 NOTE — PROGRESS NOTE ADULT - SUBJECTIVE AND OBJECTIVE BOX
Over Night Events: events noted, EPS reviewed, afebrile    PHYSICAL EXAM    ICU Vital Signs Last 24 Hrs  T(C): 36.7 (12 Mar 2024 04:00), Max: 36.7 (12 Mar 2024 04:00)  T(F): 98 (12 Mar 2024 04:00), Max: 98 (12 Mar 2024 04:00)  HR: 80 (12 Mar 2024 04:00) (71 - 93)  BP: 138/81 (12 Mar 2024 04:00) (107/69 - 142/86)  BP(mean): 99 (12 Mar 2024 04:00) (93 - 99)  RR: 20 (12 Mar 2024 04:00) (20 - 36)  SpO2: 99% (12 Mar 2024 04:00) (94% - 99%)    O2 Parameters below as of 12 Mar 2024 04:00  Patient On (Oxygen Delivery Method): nasal cannula, high flow    O2 Concentration (%): 40        General: ill looking  Lungs: dec bs both bases  Cardiovascular: Regular   Abdomen: Soft, Positive BS  Extremities: No clubbing   Neurological: Non focal, confused      03-10-24 @ 07:01  -  03-11-24 @ 07:00  --------------------------------------------------------  IN:    IV PiggyBack: 200 mL    Oral Fluid: 200 mL  Total IN: 400 mL    OUT:    Voided (mL): 825 mL  Total OUT: 825 mL    Total NET: -425 mL      03-11-24 @ 07:01  -  03-12-24 @ 06:50  --------------------------------------------------------  IN:    IV PiggyBack: 250 mL    IV PiggyBack: 200 mL    Lactated Ringers: 1275 mL  Total IN: 1725 mL    OUT:    Voided (mL): 1085 mL  Total OUT: 1085 mL    Total NET: 640 mL          LABS:                          12.0   18.77 )-----------( 393      ( 12 Mar 2024 05:32 )             34.9                                               03-12    143  |  112<H>  |  12  ----------------------------<  96  3.8   |  24  |  0.6<L>    Ca    8.1<L>      12 Mar 2024 05:32  Mg     2.4     03-12    TPro  5.3<L>  /  Alb  2.8<L>  /  TBili  0.6  /  DBili  x   /  AST  35  /  ALT  32  /  AlkPhos  128<H>  03-12                                             Urinalysis Basic - ( 12 Mar 2024 05:32 )    Color: x / Appearance: x / SG: x / pH: x  Gluc: 96 mg/dL / Ketone: x  / Bili: x / Urobili: x   Blood: x / Protein: x / Nitrite: x   Leuk Esterase: x / RBC: x / WBC x   Sq Epi: x / Non Sq Epi: x / Bacteria: x                                                  LIVER FUNCTIONS - ( 12 Mar 2024 05:32 )  Alb: 2.8 g/dL / Pro: 5.3 g/dL / ALK PHOS: 128 U/L / ALT: 32 U/L / AST: 35 U/L / GGT: x                                                                                                                                   ABG - ( 11 Mar 2024 11:13 )  pH, Arterial: 7.46  pH, Blood: x     /  pCO2: 31    /  pO2: 90    / HCO3: 22    / Base Excess: -1.0  /  SaO2: 97.8                MEDICATIONS  (STANDING):  albuterol/ipratropium for Nebulization 3 milliLiter(s) Nebulizer every 4 hours  albuterol/ipratropium for Nebulization. 3 milliLiter(s) Nebulizer once  albuterol/ipratropium for Nebulization. 3 milliLiter(s) Nebulizer once  ascorbic acid 500 milliGRAM(s) Oral daily  cefepime   IVPB 2000 milliGRAM(s) IV Intermittent every 8 hours  chlorhexidine 2% Cloths 1 Application(s) Topical daily  enoxaparin Injectable 40 milliGRAM(s) SubCutaneous every 24 hours  folic acid 1 milliGRAM(s) Oral daily  influenza   Vaccine 0.5 milliLiter(s) IntraMuscular once  lactated ringers. 1000 milliLiter(s) (75 mL/Hr) IV Continuous <Continuous>  melatonin 5 milliGRAM(s) Oral at bedtime  methimazole 2.5 milliGRAM(s) Oral daily  methylPREDNISolone sodium succinate Injectable 60 milliGRAM(s) IV Push daily  metoprolol tartrate 75 milliGRAM(s) Oral two times a day  metroNIDAZOLE  IVPB 500 milliGRAM(s) IV Intermittent every 8 hours  multivitamin 1 Tablet(s) Oral daily  nystatin    Suspension 077775 Unit(s) Oral four times a day  polyethylene glycol 3350 17 Gram(s) Oral two times a day  senna 2 Tablet(s) Oral at bedtime  sodium chloride 3%  Inhalation 4 milliLiter(s) Inhalation every 12 hours  thiamine 100 milliGRAM(s) Oral daily    MEDICATIONS  (PRN):  albuterol/ipratropium for Nebulization 3 milliLiter(s) Nebulizer every 6 hours PRN Shortness of Breath and/or Wheezing  LORazepam   Injectable 1 milliGRAM(s) IV Push every 2 hours PRN Symptom-triggered: 2 point increase in CIWA -Ar score and a total score of 7 or LESS

## 2024-03-12 NOTE — PROGRESS NOTE ADULT - ASSESSMENT
63 year old man with a pmh of COPD, alcohol use disorder and hyperthyroidism presented for palpitation. Upgraded for hypoxemic respiratory failure, vented this hospitalation    Alcohol withdrawal, Anxiety, Insomnia  Alcohol intoxication (alcohol level in blood >200) on adm  - Extubated on 03/06/2024.   - On 2L nasal canula  - On Seroquel 25 mg at night standing and haldol 2.5 mg q 8 PRN for agitation  - Off Ativan protocol, now prn  - continue Hydroxyzine  - Melatonin HS  - CATCH team re-eval   - continue thiamine and folate     Constipation  Fever , Possible colitis   - Blood cultures taken  - CT Abdomen/pelvis with oral contrast showed distended column with possible signs of colitis  - on Cefepime and Metronidazole thru 3/11    Hyperthyroidism  - on Methimazole dose reduced  - TSH 0.01 low, fT4 1.8.   - endocrinology following    Tachycardia, SVT? A Fib  HTN  - remains on Metoprolol  75 mg BID  - rate controlled, BP stable  - case discussed with cardiology  - EPS f/u for loop recorder prior to discharge    Right lower lobe consolidation  COPD, > 40 pack year smoking hx  - remains on Duoneb q4   - CXR noted  - WBC increasing? prior to steroids?  - continue to trend WBC      Transaminitis   - , ALT 85,   - Secondary to alcohol use    Family discussion: at bedside with soon and wife  Disposition: remains in SDU    Patient remains acute, Supportive measures.

## 2024-03-12 NOTE — PHYSICAL THERAPY INITIAL EVALUATION ADULT - GENERAL OBSERVATIONS, REHAB EVAL
2:00-3:23 pt encountered [facility] with 1:1 obs, IV, +tele.  Patient performed bed mobility, transfers, and ambulated with assistance, limited by severe ataxia and poor balance. Pt would benefit from continued PT to restore prior level of mobility and safety.

## 2024-03-12 NOTE — PHYSICAL THERAPY INITIAL EVALUATION ADULT - ADDITIONAL COMMENTS
pt is a poor historian, lives in pt lives in private home with spouse/family, has stairs to climb. with son and wife, has a stairs lift.

## 2024-03-12 NOTE — SWALLOW BEDSIDE ASSESSMENT ADULT - SWALLOW EVAL: DIAGNOSIS
+toleration for easy to chew, thin liquid consistencies w/o overt s/s aspiration/penetration
min overts noted; + toleration observed without overt symptoms of penetration/aspiration for small controlled cup sips of thins and puree consistency; chewables not trialed 2/2 AMS

## 2024-03-12 NOTE — CHART NOTE - NSCHARTNOTEFT_GEN_A_CORE
Registered Dietitian Follow-Up     Patient Profile Reviewed                           Yes [x]   No []     Nutrition History Previously Obtained        Yes []  No [x]       Pertinent Subjective Information: Pt unable to participate in nutrition interview at this time; disoriented at time of RD visit. Unable to reach emergency contact at this time. Per aide at bedside, pt has good appetite; consumed >75% of breakfast this morning. Pt dislikes texture/consistency of meals. Aide stated diet texture to be advanced today? No new SLP evaluation note documented.      Pertinent Medical Information:   # Acute respiratory failure improved  # Possible aspiration right lower lobe  # Severe alcohol withdrawal   # Alcohol abuse  # COPD exacerbation  # Hyperthyroidism     Per SLP note on 3/8: recommend minced+moist, thin liquids    Diet order: Diet, Easy to Chew (24 @ 10:37) [Active]    Anthropometrics:  Weight: 73.1 KG taken on 3/1  Height: 167.6 cm  BMI: 26  IBW: 64.5 KG    Daily Weight in k.5 (), Weight in k.5 (), Weight in k.4 () -- no weight loss observed per flow sheet    MEDICATIONS  (STANDING):  albuterol/ipratropium for Nebulization 3 milliLiter(s) Nebulizer every 4 hours  albuterol/ipratropium for Nebulization. 3 milliLiter(s) Nebulizer once  albuterol/ipratropium for Nebulization. 3 milliLiter(s) Nebulizer once  ascorbic acid 500 milliGRAM(s) Oral daily  cefepime   IVPB 2000 milliGRAM(s) IV Intermittent every 8 hours  chlorhexidine 2% Cloths 1 Application(s) Topical daily  enoxaparin Injectable 40 milliGRAM(s) SubCutaneous every 24 hours  folic acid 1 milliGRAM(s) Oral daily  furosemide   Injectable 40 milliGRAM(s) IV Push daily  influenza   Vaccine 0.5 milliLiter(s) IntraMuscular once  lactated ringers. 1000 milliLiter(s) (75 mL/Hr) IV Continuous <Continuous>  melatonin 5 milliGRAM(s) Oral at bedtime  methimazole 2.5 milliGRAM(s) Oral daily  methylPREDNISolone sodium succinate Injectable 60 milliGRAM(s) IV Push daily  metoprolol tartrate 75 milliGRAM(s) Oral two times a day  metroNIDAZOLE  IVPB 500 milliGRAM(s) IV Intermittent every 8 hours  multivitamin 1 Tablet(s) Oral daily  nystatin    Suspension 153617 Unit(s) Oral four times a day  polyethylene glycol 3350 17 Gram(s) Oral two times a day  senna 2 Tablet(s) Oral at bedtime  sodium chloride 3%  Inhalation 4 milliLiter(s) Inhalation every 12 hours  thiamine 100 milliGRAM(s) Oral daily    MEDICATIONS  (PRN):  albuterol/ipratropium for Nebulization 3 milliLiter(s) Nebulizer every 6 hours PRN Shortness of Breath and/or Wheezing  LORazepam   Injectable 1 milliGRAM(s) IV Push every 2 hours PRN Symptom-triggered: 2 point increase in CIWA -Ar score and a total score of 7 or LESS    Pertinent Labs:  @ 05:32: Na 143, BUN 12, Cr 0.6<L>, BG 96, K+ 3.8, Phos --, Mg 2.4, Alk Phos 128<H>, ALT/SGPT 32, AST/SGOT 35, HbA1c --    Physical Findings:  - Appearance: disoriented per flow sheet   - GI function: last BM on 3/11  - Tubes: no feeding tubes  - Oral/Mouth cavity: tolerating current diet texture/consistency well  - Skin: intact; no pressure injuries noted  - Edema: edema 2+ to scrotum     Nutrition Requirements:  Weight Used: 73.1 KG -- with consideration for age, BMI     Estimated Energy Needs    Continue [x]  Adjust []  6843-7877 kcal/day (25-30 kcal/kg ABW)     Estimated Protein Needs    Continue [x]  Adjust []  73-87 g/day (1-1.2 g/kg ABW) aim towards higher end     Estimated Fluid Needs        Continue [x]  Adjust []  1462 mL/day (20 mL/kg ABW)     Nutrient Intake: Diet advanced today; will monitor tolerance to diet.    [x] Previous Nutrition Diagnosis: Inadequate protein-energy intake            [x] Ongoing - improving         [] Resolved     Nutrition Education: Deferred at this time     Goal/Expected Outcome: Pt to meet >75% of estimated need within 7-10 days or prn.      Indicator/Monitoring: Diet order, PO intake, weights, labs, NFPF, body composition, BM    Recommendation:  1. Continue with current diet order; texture/consistency per SLP recs. If appetite declines, supplement with Ensure Plus HP (350 kcal, 20g pro per carton)  2. Encourage PO intake    Will follow pt at low risk protocol; 7-10 days or prn.     RD to remain available: Sravani Valdez [phone] or [phone]

## 2024-03-13 LAB
ALBUMIN SERPL ELPH-MCNC: 2.9 G/DL — LOW (ref 3.5–5.2)
ALP SERPL-CCNC: 133 U/L — HIGH (ref 30–115)
ALT FLD-CCNC: 37 U/L — SIGNIFICANT CHANGE UP (ref 0–41)
AMMONIA BLD-MCNC: 64 UMOL/L — HIGH (ref 11–55)
ANION GAP SERPL CALC-SCNC: 9 MMOL/L — SIGNIFICANT CHANGE UP (ref 7–14)
AST SERPL-CCNC: 45 U/L — HIGH (ref 0–41)
BASOPHILS # BLD AUTO: 0.03 K/UL — SIGNIFICANT CHANGE UP (ref 0–0.2)
BASOPHILS NFR BLD AUTO: 0.2 % — SIGNIFICANT CHANGE UP (ref 0–1)
BILIRUB SERPL-MCNC: 0.6 MG/DL — SIGNIFICANT CHANGE UP (ref 0.2–1.2)
BUN SERPL-MCNC: 13 MG/DL — SIGNIFICANT CHANGE UP (ref 10–20)
CALCIUM SERPL-MCNC: 8.2 MG/DL — LOW (ref 8.4–10.5)
CHLORIDE SERPL-SCNC: 109 MMOL/L — SIGNIFICANT CHANGE UP (ref 98–110)
CO2 SERPL-SCNC: 25 MMOL/L — SIGNIFICANT CHANGE UP (ref 17–32)
CREAT SERPL-MCNC: 0.6 MG/DL — LOW (ref 0.7–1.5)
EGFR: 108 ML/MIN/1.73M2 — SIGNIFICANT CHANGE UP
EOSINOPHIL # BLD AUTO: 0.17 K/UL — SIGNIFICANT CHANGE UP (ref 0–0.7)
EOSINOPHIL NFR BLD AUTO: 1 % — SIGNIFICANT CHANGE UP (ref 0–8)
GLUCOSE SERPL-MCNC: 80 MG/DL — SIGNIFICANT CHANGE UP (ref 70–99)
HCT VFR BLD CALC: 38.5 % — LOW (ref 42–52)
HGB BLD-MCNC: 13.3 G/DL — LOW (ref 14–18)
IMM GRANULOCYTES NFR BLD AUTO: 1 % — HIGH (ref 0.1–0.3)
LYMPHOCYTES # BLD AUTO: 15.6 % — LOW (ref 20.5–51.1)
LYMPHOCYTES # BLD AUTO: 2.57 K/UL — SIGNIFICANT CHANGE UP (ref 1.2–3.4)
MAGNESIUM SERPL-MCNC: 1.9 MG/DL — SIGNIFICANT CHANGE UP (ref 1.8–2.4)
MCHC RBC-ENTMCNC: 34.5 G/DL — SIGNIFICANT CHANGE UP (ref 32–37)
MCHC RBC-ENTMCNC: 35.4 PG — HIGH (ref 27–31)
MCV RBC AUTO: 102.4 FL — HIGH (ref 80–94)
MONOCYTES # BLD AUTO: 1.08 K/UL — HIGH (ref 0.1–0.6)
MONOCYTES NFR BLD AUTO: 6.6 % — SIGNIFICANT CHANGE UP (ref 1.7–9.3)
NEUTROPHILS # BLD AUTO: 12.46 K/UL — HIGH (ref 1.4–6.5)
NEUTROPHILS NFR BLD AUTO: 75.6 % — HIGH (ref 42.2–75.2)
NRBC # BLD: 0 /100 WBCS — SIGNIFICANT CHANGE UP (ref 0–0)
PLATELET # BLD AUTO: 389 K/UL — SIGNIFICANT CHANGE UP (ref 130–400)
PMV BLD: 10.3 FL — SIGNIFICANT CHANGE UP (ref 7.4–10.4)
POTASSIUM SERPL-MCNC: 3.6 MMOL/L — SIGNIFICANT CHANGE UP (ref 3.5–5)
POTASSIUM SERPL-SCNC: 3.6 MMOL/L — SIGNIFICANT CHANGE UP (ref 3.5–5)
PROT SERPL-MCNC: 5.3 G/DL — LOW (ref 6–8)
RBC # BLD: 3.76 M/UL — LOW (ref 4.7–6.1)
RBC # FLD: 11.5 % — SIGNIFICANT CHANGE UP (ref 11.5–14.5)
SODIUM SERPL-SCNC: 143 MMOL/L — SIGNIFICANT CHANGE UP (ref 135–146)
WBC # BLD: 16.47 K/UL — HIGH (ref 4.8–10.8)
WBC # FLD AUTO: 16.47 K/UL — HIGH (ref 4.8–10.8)

## 2024-03-13 PROCEDURE — 99233 SBSQ HOSP IP/OBS HIGH 50: CPT

## 2024-03-13 RX ORDER — OLANZAPINE 15 MG/1
5 TABLET, FILM COATED ORAL ONCE
Refills: 0 | Status: COMPLETED | OUTPATIENT
Start: 2024-03-13 | End: 2024-03-13

## 2024-03-13 RX ORDER — OLANZAPINE 15 MG/1
5 TABLET, FILM COATED ORAL AT BEDTIME
Refills: 0 | Status: DISCONTINUED | OUTPATIENT
Start: 2024-03-13 | End: 2024-03-13

## 2024-03-13 RX ORDER — HALOPERIDOL DECANOATE 100 MG/ML
5 INJECTION INTRAMUSCULAR EVERY 6 HOURS
Refills: 0 | Status: DISCONTINUED | OUTPATIENT
Start: 2024-03-13 | End: 2024-03-14

## 2024-03-13 RX ORDER — QUETIAPINE FUMARATE 200 MG/1
25 TABLET, FILM COATED ORAL AT BEDTIME
Refills: 0 | Status: DISCONTINUED | OUTPATIENT
Start: 2024-03-13 | End: 2024-03-14

## 2024-03-13 RX ADMIN — CEFEPIME 100 MILLIGRAM(S): 1 INJECTION, POWDER, FOR SOLUTION INTRAMUSCULAR; INTRAVENOUS at 07:01

## 2024-03-13 RX ADMIN — Medication 75 MILLIGRAM(S): at 07:25

## 2024-03-13 RX ADMIN — Medication 2 MILLIGRAM(S): at 03:35

## 2024-03-13 RX ADMIN — Medication 3 MILLILITER(S): at 08:56

## 2024-03-13 RX ADMIN — POLYETHYLENE GLYCOL 3350 17 GRAM(S): 17 POWDER, FOR SOLUTION ORAL at 07:26

## 2024-03-13 RX ADMIN — Medication 500000 UNIT(S): at 21:28

## 2024-03-13 RX ADMIN — OLANZAPINE 5 MILLIGRAM(S): 15 TABLET, FILM COATED ORAL at 02:14

## 2024-03-13 RX ADMIN — Medication 1 TABLET(S): at 12:24

## 2024-03-13 RX ADMIN — Medication 40 MILLIGRAM(S): at 07:26

## 2024-03-13 RX ADMIN — Medication 500000 UNIT(S): at 18:04

## 2024-03-13 RX ADMIN — Medication 500000 UNIT(S): at 12:30

## 2024-03-13 RX ADMIN — Medication 100 MILLIGRAM(S): at 12:30

## 2024-03-13 RX ADMIN — Medication 100 MILLIGRAM(S): at 07:08

## 2024-03-13 RX ADMIN — Medication 1 PATCH: at 18:03

## 2024-03-13 RX ADMIN — Medication 1 PATCH: at 18:04

## 2024-03-13 RX ADMIN — Medication 40 MILLIGRAM(S): at 06:59

## 2024-03-13 RX ADMIN — Medication 500 MILLIGRAM(S): at 12:27

## 2024-03-13 RX ADMIN — QUETIAPINE FUMARATE 25 MILLIGRAM(S): 200 TABLET, FILM COATED ORAL at 21:27

## 2024-03-13 RX ADMIN — Medication 1 MILLIGRAM(S): at 12:27

## 2024-03-13 RX ADMIN — Medication 2 MILLIGRAM(S): at 03:23

## 2024-03-13 RX ADMIN — Medication 5 MILLIGRAM(S): at 21:27

## 2024-03-13 RX ADMIN — Medication 1 PATCH: at 16:13

## 2024-03-13 RX ADMIN — ENOXAPARIN SODIUM 40 MILLIGRAM(S): 100 INJECTION SUBCUTANEOUS at 06:58

## 2024-03-13 RX ADMIN — Medication 3 MILLILITER(S): at 21:56

## 2024-03-13 RX ADMIN — CHLORHEXIDINE GLUCONATE 1 APPLICATION(S): 213 SOLUTION TOPICAL at 12:57

## 2024-03-13 RX ADMIN — Medication 75 MILLIGRAM(S): at 18:04

## 2024-03-13 RX ADMIN — SENNA PLUS 2 TABLET(S): 8.6 TABLET ORAL at 21:26

## 2024-03-13 NOTE — PROGRESS NOTE ADULT - SUBJECTIVE AND OBJECTIVE BOX
Over Night Events: events noted, agitated overnight on RA, afebrile    PHYSICAL EXAM    ICU Vital Signs Last 24 Hrs  T(C): 36.9 (12 Mar 2024 20:15), Max: 36.9 (12 Mar 2024 20:15)  T(F): 98.4 (12 Mar 2024 20:15), Max: 98.4 (12 Mar 2024 20:15)  HR: 71 (13 Mar 2024 00:30) (63 - 134)  BP: 119/66 (13 Mar 2024 00:30) (119/66 - 169/82)  BP(mean): 117 (12 Mar 2024 20:15) (109 - 117)  RR: 29 (13 Mar 2024 00:30) (16 - 39)  SpO2: 95% (13 Mar 2024 00:30) (95% - 100%)    O2 Parameters below as of 13 Mar 2024 00:30  Patient On (Oxygen Delivery Method): room air            General: ill looking  Lungs: dec bs both bases  Cardiovascular: Regular   Abdomen: Soft, Positive BS  Extremities: No clubbing   Neurological: Non focal       24 @ 07:01  -  24 @ 07:00  --------------------------------------------------------  IN:    Lactated Ringers: 900 mL  Total IN: 900 mL    OUT:    Voided (mL): 800 mL  Total OUT: 800 mL    Total NET: 100 mL          LABS:                          12.0   18.77 )-----------( 393      ( 12 Mar 2024 05:32 )             34.9                                                   143  |  112<H>  |  12  ----------------------------<  96  3.8   |  24  |  0.6<L>    Ca    8.1<L>      12 Mar 2024 05:32  Mg     2.4         TPro  5.3<L>  /  Alb  2.8<L>  /  TBili  0.6  /  DBili  x   /  AST  35  /  ALT  32  /  AlkPhos  128<H>                                               Urinalysis Basic - ( 12 Mar 2024 12:06 )    Color: Yellow / Appearance: Clear / S.009 / pH: x  Gluc: x / Ketone: Negative mg/dL  / Bili: Negative / Urobili: 0.2 mg/dL   Blood: x / Protein: Negative mg/dL / Nitrite: Negative   Leuk Esterase: Negative / RBC: x / WBC x   Sq Epi: x / Non Sq Epi: x / Bacteria: x                                                  LIVER FUNCTIONS - ( 12 Mar 2024 05:32 )  Alb: 2.8 g/dL / Pro: 5.3 g/dL / ALK PHOS: 128 U/L / ALT: 32 U/L / AST: 35 U/L / GGT: x                                                                                                                                   ABG - ( 11 Mar 2024 11:13 )  pH, Arterial: 7.46  pH, Blood: x     /  pCO2: 31    /  pO2: 90    / HCO3: 22    / Base Excess: -1.0  /  SaO2: 97.8                MEDICATIONS  (STANDING):  albuterol/ipratropium for Nebulization 3 milliLiter(s) Nebulizer every 4 hours  albuterol/ipratropium for Nebulization. 3 milliLiter(s) Nebulizer once  albuterol/ipratropium for Nebulization. 3 milliLiter(s) Nebulizer once  ascorbic acid 500 milliGRAM(s) Oral daily  cefepime   IVPB 2000 milliGRAM(s) IV Intermittent every 8 hours  chlorhexidine 2% Cloths 1 Application(s) Topical daily  enoxaparin Injectable 40 milliGRAM(s) SubCutaneous every 24 hours  folic acid 1 milliGRAM(s) Oral daily  furosemide   Injectable 40 milliGRAM(s) IV Push daily  influenza   Vaccine 0.5 milliLiter(s) IntraMuscular once  lactated ringers. 1000 milliLiter(s) (75 mL/Hr) IV Continuous <Continuous>  melatonin 5 milliGRAM(s) Oral at bedtime  methimazole 2.5 milliGRAM(s) Oral daily  methylPREDNISolone sodium succinate Injectable 60 milliGRAM(s) IV Push daily  metoprolol tartrate 75 milliGRAM(s) Oral two times a day  metroNIDAZOLE  IVPB 500 milliGRAM(s) IV Intermittent every 8 hours  multivitamin 1 Tablet(s) Oral daily  nicotine -   7 mG/24Hr(s) Patch 1 Patch Transdermal daily  nystatin    Suspension 002198 Unit(s) Oral four times a day  polyethylene glycol 3350 17 Gram(s) Oral two times a day  senna 2 Tablet(s) Oral at bedtime  sodium chloride 3%  Inhalation 4 milliLiter(s) Inhalation every 12 hours  thiamine 100 milliGRAM(s) Oral daily    MEDICATIONS  (PRN):  albuterol/ipratropium for Nebulization 3 milliLiter(s) Nebulizer every 6 hours PRN Shortness of Breath and/or Wheezing  LORazepam   Injectable 1 milliGRAM(s) IV Push every 2 hours PRN Symptom-triggered: 2 point increase in CIWA -Ar score and a total score of 7 or LESS

## 2024-03-13 NOTE — PROGRESS NOTE ADULT - SUBJECTIVE AND OBJECTIVE BOX
Chart reviewed, patient examined. Pertinent results reviewed.  Case discussed with HO; specialist f/u reviewed  HD#13; in ED 2/28; patient and case new to me  MATT MACDONALD    HPI:  63 years old male history of alcohol abuse, hypertension, COPD? ( heavy smoker. acc to pt had PFTs done and uses inhalers at home ), recently diagnosed with hyperthyroidism few weeks PTA and started on methimazole 5 mg by PMD presents complaining of palpitations and exertional shortness of breath over the past few weeks.   At my encounter family not at bedside, acc to pt his only complaint is palpitations His HR was ranging 120-130 at home. No chest pain. He has cough and mild SOB which he attributes to smoking 1 pack daily. Otherwise he describes himself as very healthy. Pt is tremelous and restless. He admitted to drinking alcohol  (15-20 drinks of liquor daily). Last drink was earlier this evening.  Patient also reports he does want to stop drinking and try to cut down his alcohol use.    As per family, patient was seen by his cardiologist Dr. Johnson who increased that his metoprolol to 75 mg twice a day which did not improve his heart rate.  Patient had outpatient echocardiogram the day PTA and noted to be tachycardic so he was recommended to come to ED for evaluation.  Otherwise denies fever, chills, recent illness, coughing, chest pain, abdominal pain, diarrhea or urinary symptoms.  Denies drug use.      Patient was intubated for ARF, then extubated   days later.    MEDICATIONS  (STANDING):  albuterol/ipratropium for Nebulization 3 milliLiter(s) Nebulizer every 4 hours  albuterol/ipratropium for Nebulization. 3 milliLiter(s) Nebulizer once  albuterol/ipratropium for Nebulization. 3 milliLiter(s) Nebulizer once  ascorbic acid 500 milliGRAM(s) Oral daily  cefepime   IVPB 2000 milliGRAM(s) IV Intermittent every 8 hours  chlorhexidine 2% Cloths 1 Application(s) Topical daily  enoxaparin Injectable 40 milliGRAM(s) SubCutaneous every 24 hours  folic acid 1 milliGRAM(s) Oral daily  furosemide   Injectable 40 milliGRAM(s) IV Push daily  influenza   Vaccine 0.5 milliLiter(s) IntraMuscular once  lactated ringers. 1000 milliLiter(s) (75 mL/Hr) IV Continuous <Continuous>  melatonin 5 milliGRAM(s) Oral at bedtime  methimazole 2.5 milliGRAM(s) Oral daily  methylPREDNISolone sodium succinate Injectable 60 milliGRAM(s) IV Push daily  metoprolol tartrate 75 milliGRAM(s) Oral two times a day  metroNIDAZOLE  IVPB 500 milliGRAM(s) IV Intermittent every 8 hours  multivitamin 1 Tablet(s) Oral daily  nicotine -   7 mG/24Hr(s) Patch 1 Patch Transdermal daily  nystatin    Suspension 637100 Unit(s) Oral four times a day  polyethylene glycol 3350 17 Gram(s) Oral two times a day  senna 2 Tablet(s) Oral at bedtime  sodium chloride 3%  Inhalation 4 milliLiter(s) Inhalation every 12 hours  thiamine 100 milliGRAM(s) Oral daily    MEDICATIONS  (PRN):  albuterol/ipratropium for Nebulization 3 milliLiter(s) Nebulizer every 6 hours PRN Shortness of Breath and/or Wheezing  LORazepam   Injectable 1 milliGRAM(s) IV Push every 2 hours PRN Symptom-triggered: 2 point increase in CIWA -Ar score and a total score of 7 or LESS  s/p valium and ativan in ED  s/p 1 L NS, magnesium       INTERVAL EVENTS: Patient seen today - tremulous and mumbling;, Family  concerned about need for f/u with CATCH TEAM.    Vital Signs Last 24 Hrs  T(C): 36.8 (13 Mar 2024 08:00), Max: 36.9 (12 Mar 2024 20:15)  T(F): 98.3 (13 Mar 2024 08:00), Max: 98.4 (12 Mar 2024 20:15)  HR: 77 (13 Mar 2024 08:00) (63 - 134)  BP: 119/85 (13 Mar 2024 08:00) (119/66 - 153/92)  BP(mean): 99 (13 Mar 2024 08:00) (99 - 117)  RR: 31 (13 Mar 2024 08:00) (16 - 39)  SpO2: 92% (13 Mar 2024 08:00) (92% - 100%)    Parameters below as of 13 Mar 2024 08:00  Patient On (Oxygen Delivery Method): room air      PHYSICAL EXAM:  GENERAL:  NAD; Older man mumbling and trembling  NECK: Supple, No JVD  CHEST/LUNG: Occ wheeze b/l; good air mvt  HEART: RRR, no MRG  ABDOMEN: Soft, Nontender, Bowel sounds present  EXTREMITIES: No edema  SKIN: No rashes or lesions  Neuro: see above; no answers to questions; + withdrawal to Noxious stimuli to all extremities    LABS:                              13.8   17.66 )-----------( 372      ( 11 Mar 2024 05:52 )             39.3                   13.2   12.30 )-----------( 339      ( 10 Mar 2024 05:41 )             39.1     03-11    141  |  108  |  8<L>  ----------------------------<  194<H>  4.0   |  20  |  0.6<L>    Ca    8.2<L>      11 Mar 2024 05:52  Mg     2.0     03-11                03-10    145  |  110  |  9<L>  ----------------------------<  100<H>  3.8   |  25  |  0.6<L>    Ca    8.0<L>      10 Mar 2024 05:41  Mg     1.8     03-10    Thyroid Stim. Immunoglobulin: <0.10: Performed At: [facility]  Free Thyroxine, Serum: 0.7 ng/dL (03.08.24 @ 04:29)   Triiodothyronine, Total (T3 Total): 84 ng/dL (03.08.24 @ 04:29)                Urinalysis Basic - ( 10 Mar 2024 05:41 )    Color: x / Appearance: x / SG: x / pH: x  Gluc: 100 mg/dL / Ketone: x  / Bili: x / Urobili: x   Blood: x / Protein: x / Nitrite: x   Leuk Esterase: x / RBC: x / WBC x   Sq Epi: x / Non Sq Epi: x / Bacteria: x              RADIOLOGY & ADDITIONAL TESTS:   Chart reviewed, patient examined. Pertinent results reviewed.  Case discussed with HO; specialist f/u reviewed  HD#13; in ED 2/28; patient and case new to me  MATT MACDONALD    HPI:  63 years old male history of alcohol abuse, hypertension, COPD? ( heavy smoker. acc to pt had PFTs done and uses inhalers at home ), recently diagnosed with hyperthyroidism few weeks PTA and started on methimazole 5 mg by PMD presents complaining of palpitations and exertional shortness of breath over the past few weeks.   At my encounter family not at bedside, acc to pt his only complaint is palpitations His HR was ranging 120-130 at home. No chest pain. He has cough and mild SOB which he attributes to smoking 1 pack daily. Otherwise he describes himself as very healthy. Pt is tremelous and restless. He admitted to drinking alcohol  (15-20 drinks of liquor daily). Last drink was earlier this evening.  Patient also reports he does want to stop drinking and try to cut down his alcohol use.    As per family, patient was seen by his cardiologist Dr. Johnson who increased that his metoprolol to 75 mg twice a day which did not improve his heart rate.  Patient had outpatient echocardiogram the day PTA and noted to be tachycardic so he was recommended to come to ED for evaluation.  Otherwise denies fever, chills, recent illness, coughing, chest pain, abdominal pain, diarrhea or urinary symptoms.  Denies drug use.      Patient was intubated for ARF, then extubated   days later.    MEDICATIONS  (STANDING):  albuterol/ipratropium for Nebulization 3 milliLiter(s) Nebulizer every 4 hours  albuterol/ipratropium for Nebulization. 3 milliLiter(s) Nebulizer once  albuterol/ipratropium for Nebulization. 3 milliLiter(s) Nebulizer once  ascorbic acid 500 milliGRAM(s) Oral daily  cefepime   IVPB 2000 milliGRAM(s) IV Intermittent every 8 hours  chlorhexidine 2% Cloths 1 Application(s) Topical daily  enoxaparin Injectable 40 milliGRAM(s) SubCutaneous every 24 hours  folic acid 1 milliGRAM(s) Oral daily  furosemide   Injectable 40 milliGRAM(s) IV Push daily  influenza   Vaccine 0.5 milliLiter(s) IntraMuscular once  lactated ringers. 1000 milliLiter(s) (75 mL/Hr) IV Continuous <Continuous>  melatonin 5 milliGRAM(s) Oral at bedtime  methimazole 2.5 milliGRAM(s) Oral daily  methylPREDNISolone sodium succinate Injectable 60 milliGRAM(s) IV Push daily  metoprolol tartrate 75 milliGRAM(s) Oral two times a day  metroNIDAZOLE  IVPB 500 milliGRAM(s) IV Intermittent every 8 hours  multivitamin 1 Tablet(s) Oral daily  nicotine -   7 mG/24Hr(s) Patch 1 Patch Transdermal daily  nystatin    Suspension 592879 Unit(s) Oral four times a day  polyethylene glycol 3350 17 Gram(s) Oral two times a day  senna 2 Tablet(s) Oral at bedtime  sodium chloride 3%  Inhalation 4 milliLiter(s) Inhalation every 12 hours  thiamine 100 milliGRAM(s) Oral daily    MEDICATIONS  (PRN):  albuterol/ipratropium for Nebulization 3 milliLiter(s) Nebulizer every 6 hours PRN Shortness of Breath and/or Wheezing  LORazepam   Injectable 1 milliGRAM(s) IV Push every 2 hours PRN Symptom-triggered: 2 point increase in CIWA -Ar score and a total score of 7 or LESS  s/p valium and ativan in ED  s/p 1 L NS, magnesium       INTERVAL EVENTS: Patient seen today - tremulous and mumbling;, Family  concerned about need for f/u with CATCH TEAM.    Vital Signs Last 24 Hrs  T(C): 36.8 (13 Mar 2024 08:00), Max: 36.9 (12 Mar 2024 20:15)  T(F): 98.3 (13 Mar 2024 08:00), Max: 98.4 (12 Mar 2024 20:15)  HR: 77 (13 Mar 2024 08:00) (63 - 134)  BP: 119/85 (13 Mar 2024 08:00) (119/66 - 153/92)  BP(mean): 99 (13 Mar 2024 08:00) (99 - 117)  RR: 31 (13 Mar 2024 08:00) (16 - 39)  SpO2: 92% (13 Mar 2024 08:00) (92% - 100%)    Parameters below as of 13 Mar 2024 08:00  Patient On (Oxygen Delivery Method): room air      PHYSICAL EXAM:  GENERAL:  NAD; Older man Confused says year 2002, and that he is home.He is fidgeting with his bed sheets  NECK: Supple, No JVD  CHEST/LUNG: Occ wheeze b/l; good air mvt  HEART: RRR, no MRG  ABDOMEN: Soft, Nontender, Bowel sounds present  EXTREMITIES: No edema  SKIN: No rashes or lesions  Neuro: see above; Positive tremulous but no gross focal weakness    LABS:                            13.3   16.47 )-----------( 389      ( 13 Mar 2024 06:34 )             38.5                           13.8   17.66 )-----------( 372      ( 11 Mar 2024 05:52 )             39.3                   13.2   12.30 )-----------( 339      ( 10 Mar 2024 05:41 )             39.1     03-13    143  |  109  |  13  ----------------------------<  80  3.6   |  25  |  0.6<L>    Ca    8.2<L>      13 Mar 2024 06:34  Mg     1.9     03-13    TPro  5.3<L>  /  Alb  2.9<L>  /  TBili  0.6  /  DBili  x   /  AST  45<H>  /  ALT  37  /  AlkPhos  133<H>  03-13 03-11    141  |  108  |  8<L>  ----------------------------<  194<H>  4.0   |  20  |  0.6<L>    Ca    8.2<L>      11 Mar 2024 05:52  Mg     2.0     03-11                03-10    145  |  110  |  9<L>  ----------------------------<  100<H>  3.8   |  25  |  0.6<L>    Ca    8.0<L>      10 Mar 2024 05:41  Mg     1.8     03-10    Thyroid Stim. Immunoglobulin: <0.10: Performed At: [facility]  Free Thyroxine, Serum: 0.7 ng/dL (03.08.24 @ 04:29)   Triiodothyronine, Total (T3 Total): 84 ng/dL (03.08.24 @ 04:29)                Urinalysis Basic - ( 10 Mar 2024 05:41 )    Color: x / Appearance: x / SG: x / pH: x  Gluc: 100 mg/dL / Ketone: x  / Bili: x / Urobili: x   Blood: x / Protein: x / Nitrite: x   Leuk Esterase: x / RBC: x / WBC x   Sq Epi: x / Non Sq Epi: x / Bacteria: x              RADIOLOGY & ADDITIONAL TESTS:

## 2024-03-13 NOTE — CHART NOTE - NSCHARTNOTEFT_GEN_A_CORE
Transfer Note    Transfer from: SICU to tele     ----------------------------------------------------------------------------------------------------------    HPI  COURSE:  63 years old male history of alcohol abuse, hypertension, COPD? ( heavy smoker. acc to pt had PFTs done and uses inhalers at home ), recently diagnosed with hyperthyroidism few weeks ago and started on methimazole 5 mg by PMD presents complaining of palpitations and exertional shortness of breath over the past few weeks.   At my encounter family not at bedside, acc to pt his only complaint is palpitaiosn, His HR was ranging 120-130 at home. No chest pain. He has cough and mild SOB which he attributes to smoking 1 pack daily. Otherwise he describes himself as very healthy. Pt is tremelous and restless. He admitted to drinking alcohol  (15-20 drinks of liquor daily). Last drink was earlier this evening.  Patient also reports he does want to stop drinking and try to cut down his alcohol use.    As per family, patient was seen by his cardiologist Dr. Johnson who increased that his metoprolol to 75 mg twice a day which did not improve his heart rate.  Patient had outpatient echocardiogram yesterday and noted to be tachycardic so he was recommended to come to ED for evaluation.  Otherwise denies fever, chills, recent illness, coughing, chest pain, abdominal pain, diarrhea or urinary symptoms.  Denies drug use.    Vitals: T 98.2, , /90, spo2 94% on RA   Labs remarkable for elevated ALT AST, Mg 1.5  Lactate 4 on VBGs   Alcohol level 202   CXR clear   EKG showed sinus tachycardia     s/p valium and ativan in ED  s/p 1 L NS, magnesium   HR improved from 130 to 90s     ICU course -   Patient was admitted for treatment of alcohol withdrawal and was put on Ativan taper and PRN. Patient was transferred to CCU due to severe alcohol withdrawal, requiring Ativan every 1 hour. Patient desatted, was intubated. He was started on ceftriaxone due to possible pneumonia. Due to multiple episodes of fever, and patient having no bowel movements, A CT abdomen pelvis with oral contrast was done which showed Dilated colon with signs suggestive of colitis. Patient was started on cefepime and metronidazole. ceftriaxone discontinued. blood cultures and RVPs came back negative . Patient stopped requiring Ativan and was extubated on 03/06/2024. He was able to eat and was slowly regaining mental status. Downgraded to SDU    SDU course -   Pt remained stable during his SICU course. He had episodes of wheezing treated with nebs and steroids. In additon, he had episodes of dilirium for which he was started on seroquel. He was stable during downgrade.     --------------------------------------------------------------------------------------------------------  PMH/PSH:  PAST MEDICAL & SURGICAL HISTORY:  HTN (hypertension)    Alcohol abuse    Hyperthyroidism    -------------------------------------------------------------------------------------------------------  PHYSICAL EXAM:  General: NAD  HEENT: Atraumatic  Respiratory: CTAB  Cardiac: RRR  Abdomen: soft, non-tender, non-distended  Extremities: warm and well-perfused  Neuro: A+Ox4. No FND    Vital Signs Last 24 Hrs  T(C): 36.4 (13 Mar 2024 12:00), Max: 36.9 (12 Mar 2024 20:15)  T(F): 97.6 (13 Mar 2024 12:00), Max: 98.4 (12 Mar 2024 20:15)  HR: 65 (13 Mar 2024 12:00) (63 - 134)  BP: 120/88 (13 Mar 2024 12:00) (119/66 - 153/92)  BP(mean): 10 (13 Mar 2024 12:00) (10 - 117)  RR: 46 (13 Mar 2024 12:00) (29 - 46)  SpO2: 93% (13 Mar 2024 12:00) (92% - 97%)    Parameters below as of 13 Mar 2024 12:00  Patient On (Oxygen Delivery Method): room air        I&O's Summary    12 Mar 2024 07:01  -  13 Mar 2024 07:00  --------------------------------------------------------  IN: 900 mL / OUT: 800 mL / NET: 100 mL    13 Mar 2024 07:01  -  13 Mar 2024 17:07  --------------------------------------------------------  IN: 465 mL / OUT: 3450 mL / NET: -2985 mL        --------------------------------------------------------------------------------------------------------  LABS:                       13.3   16.47 )-----------( 389      ( 13 Mar 2024 06:34 )             38.5       03-13    143  |  109  |  13  ----------------------------<  80  3.6   |  25  |  0.6<L>    Ca    8.2<L>      13 Mar 2024 06:34  Mg     1.9     03-13    TPro  5.3<L>  /  Alb  2.9<L>  /  TBili  0.6  /  DBili  x   /  AST  45<H>  /  ALT  37  /  AlkPhos  133<H>  03-13              CULTURE RESULTS:                03-07-24 @ 01:16  Specimen Source: --  Method Type: --  Gram Stain - RRL: --  Gram Stain - Wound: --  Bacteria: --  Culture Results:   No growth at 5 days      Specimen Source:   Method Type:   Gram Stain:   Culture Results: Culture Results:   No growth at 5 days (03-07-24 @ 01:16)      ---------------------------------------------------------------------------------------------------  ASSESSMENT & PLAN:   63 year old man with a pmh of COPD, alcohol use disorder and hyperthyroidism presented for palpitation. Upgraded for hypoxemic respiratory failure, vented this hospitalation    Alcohol withdrawal, Anxiety, Insomnia  Alcohol intoxication (alcohol level in blood >200) on adm  - Extubated on 03/06/2024. (3/1-3/6 ETT)  - On 2L nasal canula  - On Seroquel 25 mg at night standing and  Zyprexa and Ativan as needed for agitation  - Off Ativan protocol, now prn  Remains confused.  - continue Hydroxyzine  - Melatonin HS  - CATCH team re-eval now that off vent; NEED, and Addiction svc and Neuro to re-evaluate.  - Catch team states and present physical condition he could not do inpatient alcohol rehab;            Patient also states he is not interested in inpatient alcohol rehab at this time           Patient may be candidate for inpatient physical rehab on for a or short-term rehab at [facility]          I discussed all these options with his son;   - continue thiamine and folate     Constipation  Fever , Possible colitis   - Blood cultures taken  - CT Abdomen/pelvis with oral contrast showed distended column with possible signs of colitis  - on cefepime and metronidazole thru 3/11;  - watch for fever as meds finish    Hyperthyroidism  - on Methimazole  - TSH 0.01 low, fT4 1.8.   - On Methimazole 5 mg TID, reduce to BID  - endocrinology following; consult noted    Tachycardia, SVT? A Fib  HTN  - remains on Metoprolol  75 mg BID  - rate controlled, BP stable  - case discussed with cardiology  - EPS f/u for loop recorder prior to discharge    Right lower lobe consolidation- resolved  COPD, > 40 pack year smoking hx  - remains on Duoneb q4   - pulm/CC F/U      Transaminitis   - , ALT 85,   - Secondary to alcohol use  - With altered mental status check ammonia level; Case discussed with HO      FOR FOLLOW UP:  - mental status for dilirium and seroquel, ativan as needed   - PT and dispo planning  - steroid taper and Off O2

## 2024-03-13 NOTE — CONSULT NOTE ADULT - CONSULT REQUESTED DATE/TIME
03-Mar-2024 23:54
03-Mar-2024 10:49
06-Mar-2024 21:29
01-Mar-2024 12:36
07-Mar-2024 09:39
13-Mar-2024 17:16
01-Mar-2024 11:27

## 2024-03-13 NOTE — CONSULT NOTE ADULT - REASON FOR ADMISSION
chest pain and SOB

## 2024-03-13 NOTE — CONSULT NOTE ADULT - ASSESSMENT
IMPRESSION: Rehab of deconditioning / Alcohol intoxication, Alcohol withdrawal, Anxiety, Insomnia / COPD, alcohol use disorder and hyperthyroidism     PRECAUTIONS: [   ] Cardiac  [   ] Respiratory  [   ] Seizures [   ] Contact Isolation  [   ] Droplet Isolation  [   ] Other    Weight Bearing Status:     RECOMMENDATION:    Out of Bed to Chair     DVT/Decubiti Prophylaxis    REHAB PLAN:     [  x  ] Bedside P/T 3-5 times a week   [    ]   Bedside O/T  2-3 times a week             [    ] Speech Therapy               [    ]  No Rehab Therapy Indicated   Conditioning/ROM                                    ADL  Bed Mobility                                               Conditioning/ROM  Transfers                                                     Bed Mobility  Sitting /Standing Balance                         Transfers                                        Gait Training                                               Sitting/Standing Balance  Stair Training [   ]Applicable                    Home equipment Eval                                                                        Splinting  [   ] Only      GOALS:   ADL   [    ]   Independent                    Transfers  [  x  ] Independent                          Ambulation  [ x   ] Independent     [  x   ] With device                            [    ]  CG                                                         [    ]  CG                                                                  [    ] CG                            [    ] Min A                                                   [    ] Min A                                                              [    ] Min  A          DISCHARGE PLAN:   [    ]  Good candidate for Intensive Rehabilitation/Hospital based                                             Will tolerate 3hrs Intensive Rehab Daily                                       [   x  ]  Short Term Rehab in Skilled Nursing Facility                           vs            [   x  ]  Home with Outpatient or  services                                         [     ]  Possible Candidate for Intensive Hospital based Rehab

## 2024-03-13 NOTE — CONSULT NOTE ADULT - CONSULT REASON
tachycardia
recent dx of hyperthyroid, tsh low, free t4 normal, cw methimazole (was started because of tachycardia)?
deconditioning
Seizure like activity
Suspected new onset AF
fever
Severe alcohol withdrawal

## 2024-03-13 NOTE — PROGRESS NOTE ADULT - ASSESSMENT
IMPRESSION:    Acute respiratory failure improved  Possible aspiration right lower lobe  possible seizures   Severe alcohol withdrawal   Alcohol abuse  COPD exacerbation  Hyperthyroidism   cardia c arrythmias    PLAN:    CNS:  Thiamine, folate, BH eval    HEENT: Oral care.     PULMONARY:  HOB @ 45 degrees.    Nebs q4hrs and PRN,  on RA keep SaO2 92 TO 96%, prednisone 40 for 5 days 20 for 5 days    CARDIOVASCULAR: EPS fup  Continue home beta blocker  lasix daily    GI: GI prophylaxis   Advance diet as per speech  Bowel regimen PRN.        RENAL:  Follow up lytes.  Correct as needed.     INFECTIOUS DISEASE: finish course of abx    HEMATOLOGICAL:  DVT prophylaxis w/ lovenox SQ.     ENDOCRINE:  Follow up FS.  Insulin protocol if needed. CW methimazole.      MUSCULOSKELETAL: OOBAT, PT/OT    LINES: PIV  CODE: FULL

## 2024-03-13 NOTE — PROGRESS NOTE ADULT - ASSESSMENT
63 year old man with a pmh of COPD, alcohol use disorder and hyperthyroidism presented for palpitation. Upgraded for hypoxemic respiratory failure, vented this hospitalation    Alcohol withdrawal, Anxiety, Insomnia  Alcohol intoxication (alcohol level in blood >200) on adm  - Extubated on 03/06/2024. (3/1-3/6 ETT)  - On 2L nasal canula  - On Seroquel 25 mg at night standing and haldol 2.5 mg q 8 PRN for agitation  - Off Ativan protocol, now prn  Remains confused.  - continue Hydroxyzine  - Melatonin HS  - CATCH team re-eval now that off vent; NEED, and Addiction svc and Neuro to re-evaluate.  - continue thiamine and folate     Constipation  Fever , Possible colitis   - Blood cultures taken  - CT Abdomen/pelvis with oral contrast showed distended column with possible signs of colitis  - on cefepime and metronidazole thru 3/11;  - watch for fever as meds finish    Hyperthyroidism  - on Methimazole  - TSH 0.01 low, fT4 1.8.   - On Methimazole 5 mg TID, reduce to BID  - endocrinology following; consult noted    Tachycardia, SVT? A Fib  HTN  - remains on Metoprolol  75 mg BID  - rate controlled, BP stable  - case discussed with cardiology  - EPS f/u for loop recorder prior to discharge    Right lower lobe consolidation- resolved  COPD, > 40 pack year smoking hx  - remains on Duoneb q4   - pulm/CC F/U      Transaminitis   - , ALT 85,   - Secondary to alcohol use      Disposition: remains in SDU         63 year old man with a pmh of COPD, alcohol use disorder and hyperthyroidism presented for palpitation. Upgraded for hypoxemic respiratory failure, vented this hospitalation    Alcohol withdrawal, Anxiety, Insomnia  Alcohol intoxication (alcohol level in blood >200) on adm  - Extubated on 03/06/2024. (3/1-3/6 ETT)  - On 2L nasal canula  - On Seroquel 25 mg at night standing and  Zyprexa and Ativan as needed for agitation  - Off Ativan protocol, now prn  Remains confused.  - continue Hydroxyzine  - Melatonin HS  - CATCH team re-eval now that off vent; NEED, and Addiction svc and Neuro to re-evaluate.  - Catch team states and present physical condition he could not do inpatient alcohol rehab;            Patient also states he is not interested in inpatient alcohol rehab at this time           Patient may be candidate for inpatient physical rehab on for a or short-term rehab at [facility]          I discussed all these options with his son;   - continue thiamine and folate     Constipation  Fever , Possible colitis   - Blood cultures taken  - CT Abdomen/pelvis with oral contrast showed distended column with possible signs of colitis  - on cefepime and metronidazole thru 3/11;  - watch for fever as meds finish    Hyperthyroidism  - on Methimazole  - TSH 0.01 low, fT4 1.8.   - On Methimazole 5 mg TID, reduce to BID  - endocrinology following; consult noted    Tachycardia, SVT? A Fib  HTN  - remains on Metoprolol  75 mg BID  - rate controlled, BP stable  - case discussed with cardiology  - EPS f/u for loop recorder prior to discharge    Right lower lobe consolidation- resolved  COPD, > 40 pack year smoking hx  - remains on Duoneb q4   - pulm/CC F/U      Transaminitis   - , ALT 85,   - Secondary to alcohol use  - With altered mental status check ammonia level; Case discussed with HO        Disposition: remains in SDU; Now being downgraded to neuro bed

## 2024-03-13 NOTE — PROGRESS NOTE ADULT - TIME BILLING
spent   40  minutes on this patient's case:   12   minutes w patient,  14  minutes reviewing the chart,    and  14   minutes speaking to the HO, RN and family, also coordinating care and documenting all. spent   38  minutes on this patient's case:   10   minutes w patient,  12  minutes reviewing the chart,    and  16   minutes speaking to the HO, RN and son, also coordinating care and documenting all.

## 2024-03-13 NOTE — CHART NOTE - NSCHARTNOTESELECT_GEN_ALL_CORE
EP/Event Note
Event Note
Transfer Note
Dietitian Follow-Up/Event Note
Follow Up/Nutrition Services
Neurology/Event Note
Transfer Note
Transfer Note
intubation/Event Note

## 2024-03-14 ENCOUNTER — TRANSCRIPTION ENCOUNTER (OUTPATIENT)
Age: 64
End: 2024-03-14

## 2024-03-14 VITALS — SYSTOLIC BLOOD PRESSURE: 112 MMHG | RESPIRATION RATE: 17 BRPM | HEART RATE: 75 BPM | DIASTOLIC BLOOD PRESSURE: 74 MMHG

## 2024-03-14 LAB
ALBUMIN SERPL ELPH-MCNC: 3.5 G/DL — SIGNIFICANT CHANGE UP (ref 3.5–5.2)
ALP SERPL-CCNC: 159 U/L — HIGH (ref 30–115)
ALT FLD-CCNC: 42 U/L — HIGH (ref 0–41)
ANION GAP SERPL CALC-SCNC: 14 MMOL/L — SIGNIFICANT CHANGE UP (ref 7–14)
AST SERPL-CCNC: 51 U/L — HIGH (ref 0–41)
BASOPHILS # BLD AUTO: 0.03 K/UL — SIGNIFICANT CHANGE UP (ref 0–0.2)
BASOPHILS NFR BLD AUTO: 0.2 % — SIGNIFICANT CHANGE UP (ref 0–1)
BILIRUB SERPL-MCNC: 1.1 MG/DL — SIGNIFICANT CHANGE UP (ref 0.2–1.2)
BUN SERPL-MCNC: 9 MG/DL — LOW (ref 10–20)
CALCIUM SERPL-MCNC: 9.3 MG/DL — SIGNIFICANT CHANGE UP (ref 8.4–10.4)
CHLORIDE SERPL-SCNC: 102 MMOL/L — SIGNIFICANT CHANGE UP (ref 98–110)
CO2 SERPL-SCNC: 28 MMOL/L — SIGNIFICANT CHANGE UP (ref 17–32)
CREAT SERPL-MCNC: 0.6 MG/DL — LOW (ref 0.7–1.5)
EGFR: 108 ML/MIN/1.73M2 — SIGNIFICANT CHANGE UP
EOSINOPHIL # BLD AUTO: 0.26 K/UL — SIGNIFICANT CHANGE UP (ref 0–0.7)
EOSINOPHIL NFR BLD AUTO: 2.2 % — SIGNIFICANT CHANGE UP (ref 0–8)
GLUCOSE SERPL-MCNC: 122 MG/DL — HIGH (ref 70–99)
HCT VFR BLD CALC: 42 % — SIGNIFICANT CHANGE UP (ref 42–52)
HGB BLD-MCNC: 15 G/DL — SIGNIFICANT CHANGE UP (ref 14–18)
IMM GRANULOCYTES NFR BLD AUTO: 0.8 % — HIGH (ref 0.1–0.3)
LYMPHOCYTES # BLD AUTO: 1.32 K/UL — SIGNIFICANT CHANGE UP (ref 1.2–3.4)
LYMPHOCYTES # BLD AUTO: 11 % — LOW (ref 20.5–51.1)
MAGNESIUM SERPL-MCNC: 1.8 MG/DL — SIGNIFICANT CHANGE UP (ref 1.8–2.4)
MCHC RBC-ENTMCNC: 35 PG — HIGH (ref 27–31)
MCHC RBC-ENTMCNC: 35.7 G/DL — SIGNIFICANT CHANGE UP (ref 32–37)
MCV RBC AUTO: 97.9 FL — HIGH (ref 80–94)
MONOCYTES # BLD AUTO: 0.32 K/UL — SIGNIFICANT CHANGE UP (ref 0.1–0.6)
MONOCYTES NFR BLD AUTO: 2.7 % — SIGNIFICANT CHANGE UP (ref 1.7–9.3)
NEUTROPHILS # BLD AUTO: 10 K/UL — HIGH (ref 1.4–6.5)
NEUTROPHILS NFR BLD AUTO: 83.1 % — HIGH (ref 42.2–75.2)
NRBC # BLD: 0 /100 WBCS — SIGNIFICANT CHANGE UP (ref 0–0)
PLATELET # BLD AUTO: 349 K/UL — SIGNIFICANT CHANGE UP (ref 130–400)
PMV BLD: 10.8 FL — HIGH (ref 7.4–10.4)
POTASSIUM SERPL-MCNC: 3.4 MMOL/L — LOW (ref 3.5–5)
POTASSIUM SERPL-SCNC: 3.4 MMOL/L — LOW (ref 3.5–5)
PROT SERPL-MCNC: 6.4 G/DL — SIGNIFICANT CHANGE UP (ref 6–8)
RBC # BLD: 4.29 M/UL — LOW (ref 4.7–6.1)
RBC # FLD: 11.6 % — SIGNIFICANT CHANGE UP (ref 11.5–14.5)
SODIUM SERPL-SCNC: 144 MMOL/L — SIGNIFICANT CHANGE UP (ref 135–146)
WBC # BLD: 12.03 K/UL — HIGH (ref 4.8–10.8)
WBC # FLD AUTO: 12.03 K/UL — HIGH (ref 4.8–10.8)

## 2024-03-14 RX ORDER — METHIMAZOLE 10 MG/1
0.5 TABLET ORAL
Qty: 15 | Refills: 0
Start: 2024-03-14 | End: 2024-04-12

## 2024-03-14 RX ORDER — METOPROLOL TARTRATE 50 MG
1 TABLET ORAL
Refills: 0 | DISCHARGE

## 2024-03-14 RX ORDER — METHIMAZOLE 10 MG/1
1 TABLET ORAL
Refills: 0 | DISCHARGE

## 2024-03-14 RX ORDER — METOPROLOL TARTRATE 50 MG
1 TABLET ORAL
Qty: 0 | Refills: 0 | DISCHARGE
Start: 2024-03-14

## 2024-03-14 RX ORDER — FOLIC ACID 0.8 MG
1 TABLET ORAL
Qty: 30 | Refills: 0
Start: 2024-03-14 | End: 2024-04-12

## 2024-03-14 RX ORDER — NICOTINE POLACRILEX 2 MG
1 GUM BUCCAL
Qty: 1 | Refills: 0
Start: 2024-03-14 | End: 2024-03-27

## 2024-03-14 RX ORDER — ASCORBIC ACID 60 MG
1 TABLET,CHEWABLE ORAL
Qty: 14 | Refills: 0
Start: 2024-03-14 | End: 2024-03-27

## 2024-03-14 RX ORDER — THIAMINE MONONITRATE (VIT B1) 100 MG
1 TABLET ORAL
Qty: 14 | Refills: 0
Start: 2024-03-14 | End: 2024-03-27

## 2024-03-14 RX ADMIN — SODIUM CHLORIDE 4 MILLILITER(S): 9 INJECTION INTRAMUSCULAR; INTRAVENOUS; SUBCUTANEOUS at 08:16

## 2024-03-14 RX ADMIN — Medication 500000 UNIT(S): at 06:01

## 2024-03-14 RX ADMIN — Medication 40 MILLIGRAM(S): at 06:00

## 2024-03-14 RX ADMIN — Medication 3 MILLILITER(S): at 08:15

## 2024-03-14 RX ADMIN — Medication 75 MILLIGRAM(S): at 06:00

## 2024-03-14 RX ADMIN — ENOXAPARIN SODIUM 40 MILLIGRAM(S): 100 INJECTION SUBCUTANEOUS at 06:01

## 2024-03-14 RX ADMIN — POLYETHYLENE GLYCOL 3350 17 GRAM(S): 17 POWDER, FOR SOLUTION ORAL at 05:59

## 2024-03-14 RX ADMIN — Medication 3 MILLILITER(S): at 16:23

## 2024-03-14 NOTE — DISCHARGE NOTE NURSING/CASE MANAGEMENT/SOCIAL WORK - PATIENT PORTAL LINK FT
You can access the FollowMyHealth Patient Portal offered by [facility] by registering at the following website: [email or web address]. By joining [facility]’s FollowMyHealth portal, you will also be able to view your health information using other applications (apps) compatible with our system.

## 2024-03-14 NOTE — PROGRESS NOTE ADULT - PROVIDER SPECIALTY LIST ADULT
CCU
Cardiology
Critical Care
Infectious Disease
Internal Medicine
Pulmonology
CCU
Cardiology
Critical Care
Critical Care
Internal Medicine
Pulmonology
Critical Care
Critical Care
Hospitalist
Internal Medicine
Pulmonology
CCU
Electrophysiology
Endocrinology
Internal Medicine
Internal Medicine
Critical Care
Cardiology
Pulmonology
Critical Care
Pulmonology

## 2024-03-14 NOTE — PROGRESS NOTE ADULT - SUBJECTIVE AND OBJECTIVE BOX
MATT MACDONALD  63y  Male  HPI:  63 years old male history of alcohol abuse, hypertension, COPD? ( heavy smoker. acc to pt had PFTs done and uses inhalers at home ), recently diagnosed with hyperthyroidism few weeks ago and started on methimazole 5 mg by PMD presents complaining of palpitations and exertional shortness of breath over the past few weeks.   At my encounter family not at bedside, acc to pt his only complaint is palpitaiosn, His HR was ranging 120-130 at home. No chest pain. He has cough and mild SOB which he attributes to smoking 1 pack daily. Otherwise he describes himself as very healthy. Pt is tremelous and restless. He admitted to drinking alcohol  (15-20 drinks of liquor daily). Last drink was earlier this evening.  Patient also reports he does want to stop drinking and try to cut down his alcohol use.    As per family, patient was seen by his cardiologist Dr. Johnson who increased that his metoprolol to 75 mg twice a day which did not improve his heart rate.  Patient had outpatient echocardiogram yesterday and noted to be tachycardic so he was recommended to come to ED for evaluation.  Otherwise denies fever, chills, recent illness, coughing, chest pain, abdominal pain, diarrhea or urinary symptoms.  Denies drug use.    Vitals: T 98.2, , /90, spo2 94% on RA   Labs remarkable for elevated ALT AST, Mg 1.5  Lactate 4 on VBGs   Alcohol level 202   CXR clear   EKG showed sinus tachycardia     s/p valium and ativan in ED  s/p 1 L NS, magnesium   HR improved from 130 to 90s  (29 Feb 2024 01:57)    MEDICATIONS  (STANDING):  albuterol/ipratropium for Nebulization 3 milliLiter(s) Nebulizer every 4 hours  albuterol/ipratropium for Nebulization. 3 milliLiter(s) Nebulizer once  albuterol/ipratropium for Nebulization. 3 milliLiter(s) Nebulizer once  ascorbic acid 500 milliGRAM(s) Oral daily  chlorhexidine 2% Cloths 1 Application(s) Topical daily  enoxaparin Injectable 40 milliGRAM(s) Subcutaneous every 24 hours  folic acid 1 milliGRAM(s) Oral daily  furosemide   Injectable 40 milliGRAM(s) IV Push daily  influenza   Vaccine 0.5 milliLiter(s) IntraMuscular once  lactated ringers. 1000 milliLiter(s) (75 mL/Hr) IV Continuous <Continuous>  melatonin 5 milliGRAM(s) Oral at bedtime  methimazole 2.5 milliGRAM(s) Oral daily  methylPREDNISolone sodium succinate Injectable 60 milliGRAM(s) IV Push daily  metoprolol tartrate 75 milliGRAM(s) Oral two times a day  multivitamin 1 Tablet(s) Oral daily  nicotine -   7 mG/24Hr(s) Patch 1 Patch Transdermal daily  nystatin    Suspension 429703 Unit(s) Oral four times a day  polyethylene glycol 3350 17 Gram(s) Oral two times a day  QUEtiapine 25 milliGRAM(s) Oral at bedtime  senna 2 Tablet(s) Oral at bedtime  sodium chloride 3%  Inhalation 4 milliLiter(s) Inhalation every 12 hours  thiamine 100 milliGRAM(s) Oral daily    MEDICATIONS  (PRN):  albuterol/ipratropium for Nebulization 3 milliLiter(s) Nebulizer every 6 hours PRN Shortness of Breath and/or Wheezing  haloperidol    Injectable 5 milliGRAM(s) IntraMuscular every 6 hours PRN Agitation  LORazepam   Injectable 1 milliGRAM(s) IV Push every 6 hours PRN Symptom-triggered: 2 point increase in CIWA -Ar score and a total score of 7 or LESS    INTERVAL EVENTS: Patient seen today without distress. Patient more alert, eating well. Son in today    T(C): 36.4 (03-14-24 @ 04:24), Max: 36.4 (03-14-24 @ 04:24)  HR: 75 (03-14-24 @ 12:40) (75 - 87)  BP: 112/74 (03-14-24 @ 12:40) (112/74 - 120/77)  RR: 17 (03-14-24 @ 12:40) (17 - 18)  SpO2: --  Wt(kg): --Vital Signs Last 24 Hrs  T(C): 36.4 (14 Mar 2024 04:24), Max: 36.4 (14 Mar 2024 04:24)  T(F): 97.5 (14 Mar 2024 04:24), Max: 97.5 (14 Mar 2024 04:24)  HR: 75 (14 Mar 2024 12:40) (75 - 87)  BP: 112/74 (14 Mar 2024 12:40) (112/74 - 120/77)  BP(mean): --  RR: 17 (14 Mar 2024 12:40) (17 - 18)  SpO2: --        PHYSICAL EXAM:  GENERAL: NAD  NECK: Supple, No JVD  CHEST/LUNG: Decreased BS, wheeze  HEART: S1, S2  ABDOMEN: Soft, Nontender,  Bowel sounds present  EXTREMITIES: No edema  SKIN: No rashes or lesions    LABS:                        15.0   12.03 )-----------( 349      ( 14 Mar 2024 08:28 )             42.0             03-14    144  |  102  |  9<L>  ----------------------------<  122<H>  3.4<L>   |  28  |  0.6<L>    Ca    9.3      14 Mar 2024 08:28  Mg     1.8     03-14    TPro  6.4  /  Alb  3.5  /  TBili  1.1  /  DBili  x   /  AST  51<H>  /  ALT  42<H>  /  AlkPhos  159<H>  03-14    LIVER FUNCTIONS - ( 14 Mar 2024 08:28 )  Alb: 3.5 g/dL / Pro: 6.4 g/dL / ALK PHOS: 159 U/L / ALT: 42 U/L / AST: 51 U/L / GGT: x                         Urinalysis Basic - ( 14 Mar 2024 08:28 )    Color: x / Appearance: x / SG: x / pH: x  Gluc: 122 mg/dL / Ketone: x  / Bili: x / Urobili: x   Blood: x / Protein: x / Nitrite: x   Leuk Esterase: x / RBC: x / WBC x   Sq Epi: x / Non Sq Epi: x / Bacteria: x          RADIOLOGY & ADDITIONAL TESTS:

## 2024-03-14 NOTE — DISCHARGE NOTE PROVIDER - PROVIDER TOKENS
PROVIDER:[TOKEN:[[id number]],FOLLOWUP:[1 week]] PROVIDER:[TOKEN:[[id number]],FOLLOWUP:[1 week]],PROVIDER:[TOKEN:[[id number]],FOLLOWUP:[1 month]]

## 2024-03-14 NOTE — DISCHARGE NOTE PROVIDER - NSDCFUSCHEDAPPT_GEN_ALL_CORE_FT
Alyssa Knowles  [facility]  ELECTROPH [address] [place] [address]  Scheduled Appointment: 04/08/2024

## 2024-03-14 NOTE — DISCHARGE NOTE NURSING/CASE MANAGEMENT/SOCIAL WORK - NSDCPEEMAIL_GEN_ALL_CORE
[facility] for Tobacco [facility] email [email or web address]

## 2024-03-14 NOTE — DISCHARGE NOTE PROVIDER - NSDCCPCAREPLAN_GEN_ALL_CORE_FT
PRINCIPAL DISCHARGE DIAGNOSIS  Diagnosis: Alcohol withdrawal  Assessment and Plan of Treatment: Alcohol intoxication occurs when the amount of alcohol that a person has consumed impairs his or her ability to mentally and physically function. Chronic alcohol consumption can also lead to a variety of health issues including neurological disease, stomach disease, heart disease, liver disease, etc. Do not drive after drinking alcohol. Drinking enough alcohol to end up in an Emergency Room suggests you may have an alcohol abuse problem. Seek help at a drug addiction center.  SEEK IMMEDIATE MEDICAL CARE IF YOU HAVE ANY OF THE FOLLOWING SYMPTOMS: seizures, vomiting blood, blood in your stool, lightheadedness/dizziness, or becoming shaky to tremulous when you stop drinking.

## 2024-03-14 NOTE — DISCHARGE NOTE NURSING/CASE MANAGEMENT/SOCIAL WORK - NSDCPEWEB_GEN_ALL_CORE
[facility] for Tobacco Control website --- [email or web address] website --- [email or web address]

## 2024-03-14 NOTE — DISCHARGE NOTE PROVIDER - HOSPITAL COURSE
63 years old male history of alcohol abuse, hypertension, COPD? ( heavy smoker. acc to pt had PFTs done and uses inhalers at home ), recently diagnosed with hyperthyroidism few weeks ago and started on methimazole 5 mg by PMD presents complaining of palpitations and exertional shortness of breath over the past few weeks.   At my encounter family not at bedside, acc to pt his only complaint is palpitaiosn, His HR was ranging 120-130 at home. No chest pain. He has cough and mild SOB which he attributes to smoking 1 pack daily. Otherwise he describes himself as very healthy. Pt is tremelous and restless. He admitted to drinking alcohol  (15-20 drinks of liquor daily). Last drink was earlier this evening.  Patient also reports he does want to stop drinking and try to cut down his alcohol use.    Patient was admitted for treatment of alcohol withdrawal and was put on Ativan taper and PRN. Patient was transferred to CCU due to severe alcohol withdrawal, requiring Ativan every 1 hour. Patient desatted, was intubated. He was started on ceftriaxone due to possible pneumonia. Due to multiple episodes of fever, and patient having no bowel movements, A CT abdomen pelvis with oral contrast was done which showed Dilated colon with signs suggestive of colitis. Patient was started on cefepime and metronidazole. ceftriaxone discontinued. blood cultures and RVPs came back negative . Patient stopped requiring Ativan and was extubated on 03/06/2024. He was able to eat and was slowly regaining mental status. Downgraded to SDU    SDU course -   Pt remained stable during his SICU course. He had episodes of wheezing treated with nebs and steroids. In additon, he had episodes of dilirium for which he was started on seroquel. He was stable during downgrade.   Alcohol withdrawal, Anxiety, Insomnia  Alcohol intoxication (alcohol level in blood >200) on adm  - Extubated on 03/06/2024. (3/1-3/6 ETT)  - On 2L nasal canula  - On Seroquel 25 mg at night standing and  Zyprexa and Ativan as needed for agitation  - Off Ativan protocol, now prn  Remains confused.  - continue Hydroxyzine  - Melatonin HS  - Catch team states and present physical condition he could not do inpatient alcohol rehab;            Patient also states he is not interested in inpatient alcohol rehab at this time           Patient may be candidate for inpatient physical rehab on for a or short-term rehab at SNF          I discussed all these options with his son; Patient and the son wants to leave today to home and pt will go to rehab after.   - continue thiamine and folate     Constipation  Fever , Possible colitis   - Blood cultures taken  - CT Abdomen/pelvis with oral contrast showed distended column with possible signs of colitis  - on cefepime and metronidazole thru 3/11;  - watch for fever as meds finish    Hyperthyroidism  - on Methimazole  - TSH 0.01 low, fT4 1.8.   - On Methimazole 5 mg TID, reduce to BID  - endocrinology following; consult noted    Tachycardia, SVT? A Fib  HTN  - remains on Metoprolol  75 mg BID  - rate controlled, BP stable  - case discussed with cardiology  - EPS f/u for loop recorder prior to discharge    Right lower lobe consolidation- resolved  COPD, > 40 pack year smoking hx  - remains on Duoneb q4   - pulm/CC F/U      Transaminitis   - , ALT 85,   - Secondary to alcohol use  - With altered mental status check ammonia level; Case discussed with MARY

## 2024-03-14 NOTE — DISCHARGE NOTE PROVIDER - CARE PROVIDER_API CALL
Giovana Crespo  Internal Medicine  [address] [place] [address]  Phone: [phone]  Fax: [phone]  Follow Up Time: 1 week   Giovana Crespo  Internal Medicine  [address] [facility] [place] [place] [address]  Phone: [phone]  Fax: [phone]  Follow Up Time: 1 week    Sakina Garcia  Endocrinology/Metab/Diabetes  [address] [place] [address], [address]  [place] [address]  Phone: [phone]  Fax: [phone]  Follow Up Time: 1 month

## 2024-03-14 NOTE — DISCHARGE NOTE PROVIDER - NSDCMRMEDTOKEN_GEN_ALL_CORE_FT
methIMAzole 5 mg oral tablet: 1 tab(s) orally once a day  metoprolol tartrate 25 mg oral tablet: 1 tab(s) orally once a day  metoprolol tartrate 50 mg oral tablet: 1 tab(s) orally once a day (at bedtime)   ascorbic acid 500 mg oral tablet: 1 tab(s) orally once a day  folic acid 1 mg oral tablet: 1 tab(s) orally once a day  methIMAzole 5 mg oral tablet: 0.5 tab(s) orally once a day  metoprolol tartrate 75 mg oral tablet: 1 tab(s) orally 2 times a day  Multiple Vitamins oral tablet: 1 tab(s) orally once a day  nicotine 7 mg/24 hr transdermal film, extended release: 1 patch transdermal once a day  predniSONE 20 mg oral tablet: 2 tab(s) orally once a day  thiamine 100 mg oral tablet: 1 tab(s) orally once a day

## 2024-03-14 NOTE — PROGRESS NOTE ADULT - ASSESSMENT
63 year old man with a pmh of COPD, alcohol use disorder and hyperthyroidism presented for palpitation. Upgraded for hypoxemic respiratory failure, vented this hospitalation    Alcohol withdrawal, Anxiety, Insomnia  Alcohol intoxication (alcohol level in blood >200) on adm  - Extubated on 03/06/2024.   - On 2L nasal canula  - On Seroquel 25 mg at night standing and haldol 2.5 mg q 8 PRN for agitation  - Off Ativan protocol  - continue Hydroxyzine prn  - Melatonin HS  - CATCH team re-eval noted  - continue thiamine and folate     Constipation  Fever , Possible colitis   - Blood cultures taken  - CT Abdomen/pelvis with oral contrast showed distended column with possible signs of colitis  - on Cefepime and Metronidazole thru 3/11    Hyperthyroidism  - on Methimazole dose reduced  - TSH 0.01 low, fT4 1.8.   - endocrinology following    Tachycardia, SVT? A Fib  HTN  - remains on Metoprolol  75 mg BID  - rate controlled, BP stable  - EPS f/u for loop recorder postponed    Right lower lobe consolidation  COPD, > 40 pack year smoking hx  - remains on Duoneb q4   - wean steroids as outpatient  - continue to trend WBC      Transaminitis   - , ALT 85,   - Secondary to alcohol use    Family discussion: at bedside with resident at bedside with son. Patient wants to go home. Patient to f/u with cardiology, PCP, and EP as outpatient

## 2024-03-14 NOTE — DISCHARGE NOTE PROVIDER - CARE PROVIDERS DIRECT ADDRESSES
[email or web address]

## 2024-03-18 DIAGNOSIS — J44.1 CHRONIC OBSTRUCTIVE PULMONARY DISEASE WITH (ACUTE) EXACERBATION: ICD-10-CM

## 2024-03-18 DIAGNOSIS — F10.220 ALCOHOL DEPENDENCE WITH INTOXICATION, UNCOMPLICATED: ICD-10-CM

## 2024-03-18 DIAGNOSIS — K59.00 CONSTIPATION, UNSPECIFIED: ICD-10-CM

## 2024-03-18 DIAGNOSIS — R56.9 UNSPECIFIED CONVULSIONS: ICD-10-CM

## 2024-03-18 DIAGNOSIS — K52.9 NONINFECTIVE GASTROENTERITIS AND COLITIS, UNSPECIFIED: ICD-10-CM

## 2024-03-18 DIAGNOSIS — I42.9 CARDIOMYOPATHY, UNSPECIFIED: ICD-10-CM

## 2024-03-18 DIAGNOSIS — E87.20 ACIDOSIS, UNSPECIFIED: ICD-10-CM

## 2024-03-18 DIAGNOSIS — R74.01 ELEVATION OF LEVELS OF LIVER TRANSAMINASE LEVELS: ICD-10-CM

## 2024-03-18 DIAGNOSIS — F17.210 NICOTINE DEPENDENCE, CIGARETTES, UNCOMPLICATED: ICD-10-CM

## 2024-03-18 DIAGNOSIS — R00.0 TACHYCARDIA, UNSPECIFIED: ICD-10-CM

## 2024-03-18 DIAGNOSIS — I10 ESSENTIAL (PRIMARY) HYPERTENSION: ICD-10-CM

## 2024-03-18 DIAGNOSIS — F10.230 ALCOHOL DEPENDENCE WITH WITHDRAWAL, UNCOMPLICATED: ICD-10-CM

## 2024-03-18 DIAGNOSIS — E05.90 THYROTOXICOSIS, UNSPECIFIED WITHOUT THYROTOXIC CRISIS OR STORM: ICD-10-CM

## 2024-03-18 DIAGNOSIS — G47.00 INSOMNIA, UNSPECIFIED: ICD-10-CM

## 2024-03-18 DIAGNOSIS — E11.65 TYPE 2 DIABETES MELLITUS WITH HYPERGLYCEMIA: ICD-10-CM

## 2024-03-18 DIAGNOSIS — G92.8 OTHER TOXIC ENCEPHALOPATHY: ICD-10-CM

## 2024-03-18 DIAGNOSIS — J96.01 ACUTE RESPIRATORY FAILURE WITH HYPOXIA: ICD-10-CM

## 2024-03-18 DIAGNOSIS — J69.0 PNEUMONITIS DUE TO INHALATION OF FOOD AND VOMIT: ICD-10-CM
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
## 2024-03-18 DIAGNOSIS — F41.9 ANXIETY DISORDER, UNSPECIFIED: ICD-10-CM

## 2024-04-08 ENCOUNTER — APPOINTMENT (OUTPATIENT)
Dept: ELECTROPHYSIOLOGY | Facility: CLINIC | Age: 64
End: 2024-04-08

## 2024-04-29 ENCOUNTER — APPOINTMENT (OUTPATIENT)
Dept: ELECTROPHYSIOLOGY | Facility: CLINIC | Age: 64
End: 2024-04-29

## 2024-09-20 ENCOUNTER — INPATIENT (INPATIENT)
Facility: HOSPITAL | Age: 64
LOS: 9 days | Discharge: HOME HEALTH | DRG: 280 | End: 2024-09-30
Attending: INTERNAL MEDICINE | Admitting: INTERNAL MEDICINE
Payer: COMMERCIAL

## 2024-09-20 VITALS
WEIGHT: 184.97 LBS | SYSTOLIC BLOOD PRESSURE: 130 MMHG | RESPIRATION RATE: 17 BRPM | DIASTOLIC BLOOD PRESSURE: 80 MMHG | OXYGEN SATURATION: 98 % | HEART RATE: 106 BPM | TEMPERATURE: 99 F

## 2024-09-20 DIAGNOSIS — K70.9 ALCOHOLIC LIVER DISEASE, UNSPECIFIED: ICD-10-CM

## 2024-09-20 LAB
ALBUMIN SERPL ELPH-MCNC: 3.8 G/DL — SIGNIFICANT CHANGE UP (ref 3.5–5.2)
ALP SERPL-CCNC: 494 U/L — HIGH (ref 30–115)
ALT FLD-CCNC: 174 U/L — HIGH (ref 0–41)
ANION GAP SERPL CALC-SCNC: 17 MMOL/L — HIGH (ref 7–14)
APTT BLD: 31.3 SEC — SIGNIFICANT CHANGE UP (ref 27–39.2)
AST SERPL-CCNC: 439 U/L — HIGH (ref 0–41)
B-OH-BUTYR SERPL-SCNC: 0.4 MMOL/L — SIGNIFICANT CHANGE UP
BASE EXCESS BLDV CALC-SCNC: 3.1 MMOL/L — HIGH (ref -2–3)
BASOPHILS # BLD AUTO: 0.08 K/UL — SIGNIFICANT CHANGE UP (ref 0–0.2)
BASOPHILS NFR BLD AUTO: 1.3 % — HIGH (ref 0–1)
BILIRUB SERPL-MCNC: 3.6 MG/DL — HIGH (ref 0.2–1.2)
BUN SERPL-MCNC: 5 MG/DL — LOW (ref 10–20)
CA-I SERPL-SCNC: 1.04 MMOL/L — LOW (ref 1.15–1.33)
CALCIUM SERPL-MCNC: 8.4 MG/DL — SIGNIFICANT CHANGE UP (ref 8.4–10.5)
CHLORIDE SERPL-SCNC: 99 MMOL/L — SIGNIFICANT CHANGE UP (ref 98–110)
CO2 SERPL-SCNC: 25 MMOL/L — SIGNIFICANT CHANGE UP (ref 17–32)
CREAT SERPL-MCNC: 0.7 MG/DL — SIGNIFICANT CHANGE UP (ref 0.7–1.5)
EGFR: 104 ML/MIN/1.73M2 — SIGNIFICANT CHANGE UP
EOSINOPHIL # BLD AUTO: 0.03 K/UL — SIGNIFICANT CHANGE UP (ref 0–0.7)
EOSINOPHIL NFR BLD AUTO: 0.5 % — SIGNIFICANT CHANGE UP (ref 0–8)
ETHANOL SERPL-MCNC: 251 MG/DL — HIGH
GAS PNL BLDV: 138 MMOL/L — SIGNIFICANT CHANGE UP (ref 136–145)
GAS PNL BLDV: SIGNIFICANT CHANGE UP
GLUCOSE SERPL-MCNC: 118 MG/DL — HIGH (ref 70–99)
HCO3 BLDV-SCNC: 28 MMOL/L — SIGNIFICANT CHANGE UP (ref 22–29)
HCT VFR BLD CALC: 44.4 % — SIGNIFICANT CHANGE UP (ref 42–52)
HCT VFR BLDA CALC: 46 % — SIGNIFICANT CHANGE UP (ref 39–51)
HGB BLD CALC-MCNC: 15.2 G/DL — SIGNIFICANT CHANGE UP (ref 12.6–17.4)
HGB BLD-MCNC: 15.7 G/DL — SIGNIFICANT CHANGE UP (ref 14–18)
IMM GRANULOCYTES NFR BLD AUTO: 0.8 % — HIGH (ref 0.1–0.3)
INR BLD: 0.96 RATIO — SIGNIFICANT CHANGE UP (ref 0.65–1.3)
LACTATE BLDV-MCNC: 5.3 MMOL/L — CRITICAL HIGH (ref 0.5–2)
LYMPHOCYTES # BLD AUTO: 2.78 K/UL — SIGNIFICANT CHANGE UP (ref 1.2–3.4)
LYMPHOCYTES # BLD AUTO: 43.8 % — SIGNIFICANT CHANGE UP (ref 20.5–51.1)
MAGNESIUM SERPL-MCNC: 1.6 MG/DL — LOW (ref 1.8–2.4)
MCHC RBC-ENTMCNC: 34.7 PG — HIGH (ref 27–31)
MCHC RBC-ENTMCNC: 35.4 G/DL — SIGNIFICANT CHANGE UP (ref 32–37)
MCV RBC AUTO: 98 FL — HIGH (ref 80–94)
MONOCYTES # BLD AUTO: 0.39 K/UL — SIGNIFICANT CHANGE UP (ref 0.1–0.6)
MONOCYTES NFR BLD AUTO: 6.1 % — SIGNIFICANT CHANGE UP (ref 1.7–9.3)
NEUTROPHILS # BLD AUTO: 3.02 K/UL — SIGNIFICANT CHANGE UP (ref 1.4–6.5)
NEUTROPHILS NFR BLD AUTO: 47.5 % — SIGNIFICANT CHANGE UP (ref 42.2–75.2)
NRBC # BLD: 0 /100 WBCS — SIGNIFICANT CHANGE UP (ref 0–0)
PCO2 BLDV: 45 MMHG — SIGNIFICANT CHANGE UP (ref 42–55)
PH BLDV: 7.41 — SIGNIFICANT CHANGE UP (ref 7.32–7.43)
PHOSPHATE SERPL-MCNC: 4 MG/DL — SIGNIFICANT CHANGE UP (ref 2.1–4.9)
PLATELET # BLD AUTO: 292 K/UL — SIGNIFICANT CHANGE UP (ref 130–400)
PMV BLD: 10.4 FL — SIGNIFICANT CHANGE UP (ref 7.4–10.4)
PO2 BLDV: 33 MMHG — SIGNIFICANT CHANGE UP (ref 25–45)
POTASSIUM BLDV-SCNC: 3.8 MMOL/L — SIGNIFICANT CHANGE UP (ref 3.5–5.1)
POTASSIUM SERPL-MCNC: 4.2 MMOL/L — SIGNIFICANT CHANGE UP (ref 3.5–5)
POTASSIUM SERPL-SCNC: 4.2 MMOL/L — SIGNIFICANT CHANGE UP (ref 3.5–5)
PROT SERPL-MCNC: 6.4 G/DL — SIGNIFICANT CHANGE UP (ref 6–8)
PROTHROM AB SERPL-ACNC: 10.9 SEC — SIGNIFICANT CHANGE UP (ref 9.95–12.87)
RBC # BLD: 4.53 M/UL — LOW (ref 4.7–6.1)
RBC # FLD: 14.5 % — SIGNIFICANT CHANGE UP (ref 11.5–14.5)
SAO2 % BLDV: 45.7 % — LOW (ref 67–88)
SODIUM SERPL-SCNC: 141 MMOL/L — SIGNIFICANT CHANGE UP (ref 135–146)
WBC # BLD: 6.35 K/UL — SIGNIFICANT CHANGE UP (ref 4.8–10.8)
WBC # FLD AUTO: 6.35 K/UL — SIGNIFICANT CHANGE UP (ref 4.8–10.8)

## 2024-09-20 PROCEDURE — 82390 ASSAY OF CERULOPLASMIN: CPT

## 2024-09-20 PROCEDURE — 83520 IMMUNOASSAY QUANT NOS NONAB: CPT

## 2024-09-20 PROCEDURE — 71045 X-RAY EXAM CHEST 1 VIEW: CPT | Mod: 26

## 2024-09-20 PROCEDURE — 97164 PT RE-EVAL EST PLAN CARE: CPT | Mod: GP

## 2024-09-20 PROCEDURE — 80074 ACUTE HEPATITIS PANEL: CPT

## 2024-09-20 PROCEDURE — 70450 CT HEAD/BRAIN W/O DYE: CPT | Mod: 26,MC

## 2024-09-20 PROCEDURE — 84480 ASSAY TRIIODOTHYRONINE (T3): CPT

## 2024-09-20 PROCEDURE — 83521 IG LIGHT CHAINS FREE EACH: CPT

## 2024-09-20 PROCEDURE — 74177 CT ABD & PELVIS W/CONTRAST: CPT | Mod: 26,MC

## 2024-09-20 PROCEDURE — 36415 COLL VENOUS BLD VENIPUNCTURE: CPT

## 2024-09-20 PROCEDURE — 84443 ASSAY THYROID STIM HORMONE: CPT

## 2024-09-20 PROCEDURE — 87640 STAPH A DNA AMP PROBE: CPT

## 2024-09-20 PROCEDURE — 85018 HEMOGLOBIN: CPT

## 2024-09-20 PROCEDURE — 82784 ASSAY IGA/IGD/IGG/IGM EACH: CPT

## 2024-09-20 PROCEDURE — 82140 ASSAY OF AMMONIA: CPT

## 2024-09-20 PROCEDURE — 86381 MITOCHONDRIAL ANTIBODY EACH: CPT

## 2024-09-20 PROCEDURE — 99223 1ST HOSP IP/OBS HIGH 75: CPT | Mod: GC

## 2024-09-20 PROCEDURE — 97116 GAIT TRAINING THERAPY: CPT | Mod: GP

## 2024-09-20 PROCEDURE — 99285 EMERGENCY DEPT VISIT HI MDM: CPT

## 2024-09-20 PROCEDURE — 97162 PT EVAL MOD COMPLEX 30 MIN: CPT | Mod: GP

## 2024-09-20 PROCEDURE — 97112 NEUROMUSCULAR REEDUCATION: CPT | Mod: GP

## 2024-09-20 PROCEDURE — 87641 MR-STAPH DNA AMP PROBE: CPT

## 2024-09-20 PROCEDURE — 94640 AIRWAY INHALATION TREATMENT: CPT

## 2024-09-20 PROCEDURE — 84132 ASSAY OF SERUM POTASSIUM: CPT

## 2024-09-20 PROCEDURE — 84295 ASSAY OF SERUM SODIUM: CPT

## 2024-09-20 PROCEDURE — 80307 DRUG TEST PRSMV CHEM ANLYZR: CPT

## 2024-09-20 PROCEDURE — 85014 HEMATOCRIT: CPT

## 2024-09-20 PROCEDURE — 86255 FLUORESCENT ANTIBODY SCREEN: CPT

## 2024-09-20 PROCEDURE — 85610 PROTHROMBIN TIME: CPT

## 2024-09-20 PROCEDURE — 84100 ASSAY OF PHOSPHORUS: CPT

## 2024-09-20 PROCEDURE — 99223 1ST HOSP IP/OBS HIGH 75: CPT

## 2024-09-20 PROCEDURE — 85025 COMPLETE CBC W/AUTO DIFF WBC: CPT

## 2024-09-20 PROCEDURE — 71260 CT THORAX DX C+: CPT | Mod: 26,MC

## 2024-09-20 PROCEDURE — 80053 COMPREHEN METABOLIC PANEL: CPT

## 2024-09-20 PROCEDURE — 82803 BLOOD GASES ANY COMBINATION: CPT

## 2024-09-20 PROCEDURE — 83735 ASSAY OF MAGNESIUM: CPT

## 2024-09-20 PROCEDURE — 72125 CT NECK SPINE W/O DYE: CPT | Mod: 26,MC

## 2024-09-20 PROCEDURE — 86038 ANTINUCLEAR ANTIBODIES: CPT

## 2024-09-20 PROCEDURE — 86376 MICROSOMAL ANTIBODY EACH: CPT

## 2024-09-20 PROCEDURE — 85730 THROMBOPLASTIN TIME PARTIAL: CPT

## 2024-09-20 PROCEDURE — 83605 ASSAY OF LACTIC ACID: CPT

## 2024-09-20 PROCEDURE — 76705 ECHO EXAM OF ABDOMEN: CPT

## 2024-09-20 PROCEDURE — 84439 ASSAY OF FREE THYROXINE: CPT

## 2024-09-20 PROCEDURE — 82330 ASSAY OF CALCIUM: CPT

## 2024-09-20 RX ORDER — IPRATROPIUM BROMIDE AND ALBUTEROL SULFATE .5; 3 MG/3ML; MG/3ML
3 SOLUTION RESPIRATORY (INHALATION) EVERY 6 HOURS
Refills: 0 | Status: DISCONTINUED | OUTPATIENT
Start: 2024-09-20 | End: 2024-09-30

## 2024-09-20 RX ORDER — MAG HYDROX/ALUMINUM HYD/SIMETH 200-200-20
30 SUSPENSION, ORAL (FINAL DOSE FORM) ORAL EVERY 4 HOURS
Refills: 0 | Status: DISCONTINUED | OUTPATIENT
Start: 2024-09-20 | End: 2024-09-30

## 2024-09-20 RX ORDER — DIAZEPAM 10 MG/1
5 TABLET ORAL ONCE
Refills: 0 | Status: DISCONTINUED | OUTPATIENT
Start: 2024-09-20 | End: 2024-09-20

## 2024-09-20 RX ORDER — SODIUM CHLORIDE IRRIG SOLUTION 0.9 %
1000 SOLUTION, IRRIGATION IRRIGATION
Refills: 0 | Status: DISCONTINUED | OUTPATIENT
Start: 2024-09-20 | End: 2024-09-30

## 2024-09-20 RX ORDER — ENOXAPARIN SODIUM 150 MG/ML
40 INJECTION SUBCUTANEOUS EVERY 24 HOURS
Refills: 0 | Status: DISCONTINUED | OUTPATIENT
Start: 2024-09-20 | End: 2024-09-30

## 2024-09-20 RX ORDER — FOLIC ACID 1 MG/1
1 TABLET ORAL DAILY
Refills: 0 | Status: DISCONTINUED | OUTPATIENT
Start: 2024-09-20 | End: 2024-09-30

## 2024-09-20 RX ORDER — METOPROLOL TARTRATE 50 MG
75 TABLET ORAL
Refills: 0 | Status: DISCONTINUED | OUTPATIENT
Start: 2024-09-20 | End: 2024-09-30

## 2024-09-20 RX ORDER — PANTOPRAZOLE SODIUM 40 MG/1
40 TABLET, DELAYED RELEASE ORAL
Refills: 0 | Status: DISCONTINUED | OUTPATIENT
Start: 2024-09-20 | End: 2024-09-30

## 2024-09-20 RX ORDER — MULTI VITAMIN/MINERAL SUPPLEMENT WITH ASCORBIC ACID, NIACIN, PYRIDOXINE, PANTOTHENIC ACID, FOLIC ACID, RIBOFLAVIN, THIAMIN, BIOTIN, COBALAMIN AND ZINC. 60; 20; 12.5; 10; 10; 1.7; 1.5; 1; .3; .006 MG/1; MG/1; MG/1; MG/1; MG/1; MG/1; MG/1; MG/1; MG/1; MG/1
1 TABLET, COATED ORAL DAILY
Refills: 0 | Status: DISCONTINUED | OUTPATIENT
Start: 2024-09-20 | End: 2024-09-30

## 2024-09-20 RX ORDER — 5-HYDROXYTRYPTOPHAN (5-HTP) 100 MG
3 TABLET,DISINTEGRATING ORAL AT BEDTIME
Refills: 0 | Status: DISCONTINUED | OUTPATIENT
Start: 2024-09-20 | End: 2024-09-30

## 2024-09-20 RX ORDER — FLUTICASONE PROPION/SALMETEROL 100-50 MCG
1 BLISTER, WITH INHALATION DEVICE INHALATION
Refills: 0 | Status: DISCONTINUED | OUTPATIENT
Start: 2024-09-20 | End: 2024-09-30

## 2024-09-20 RX ORDER — THIAMINE HYDROCHLORIDE 100 MG/ML
100 INJECTION, SOLUTION INTRAMUSCULAR; INTRAVENOUS DAILY
Refills: 0 | Status: DISCONTINUED | OUTPATIENT
Start: 2024-09-20 | End: 2024-09-23

## 2024-09-20 RX ORDER — LACTULOSE 10 G/15ML
20 SOLUTION ORAL; RECTAL EVERY 6 HOURS
Refills: 0 | Status: COMPLETED | OUTPATIENT
Start: 2024-09-20 | End: 2024-09-21

## 2024-09-20 RX ORDER — THIAMINE HYDROCHLORIDE 100 MG/ML
100 INJECTION, SOLUTION INTRAMUSCULAR; INTRAVENOUS ONCE
Refills: 0 | Status: COMPLETED | OUTPATIENT
Start: 2024-09-20 | End: 2024-09-20

## 2024-09-20 RX ORDER — ONDANSETRON HCL/PF 4 MG/2 ML
4 VIAL (ML) INJECTION EVERY 8 HOURS
Refills: 0 | Status: DISCONTINUED | OUTPATIENT
Start: 2024-09-20 | End: 2024-09-20

## 2024-09-20 RX ORDER — SENNOSIDES 8.6 MG
2 TABLET ORAL AT BEDTIME
Refills: 0 | Status: DISCONTINUED | OUTPATIENT
Start: 2024-09-20 | End: 2024-09-30

## 2024-09-20 RX ORDER — FOLIC ACID 1 MG/1
1 TABLET ORAL ONCE
Refills: 0 | Status: COMPLETED | OUTPATIENT
Start: 2024-09-20 | End: 2024-09-20

## 2024-09-20 RX ORDER — SODIUM CHLORIDE IRRIG SOLUTION 0.9 %
1000 SOLUTION, IRRIGATION IRRIGATION ONCE
Refills: 0 | Status: COMPLETED | OUTPATIENT
Start: 2024-09-20 | End: 2024-09-20

## 2024-09-20 RX ADMIN — LACTULOSE 20 GRAM(S): 10 SOLUTION ORAL; RECTAL at 22:43

## 2024-09-20 RX ADMIN — Medication 4 MILLIGRAM(S): at 18:18

## 2024-09-20 RX ADMIN — Medication 1000 MILLILITER(S): at 15:00

## 2024-09-20 RX ADMIN — DIAZEPAM 5 MILLIGRAM(S): 10 TABLET ORAL at 16:26

## 2024-09-20 RX ADMIN — FOLIC ACID 1 MILLIGRAM(S): 1 TABLET ORAL at 16:25

## 2024-09-20 RX ADMIN — Medication 1 DOSE(S): at 22:43

## 2024-09-20 RX ADMIN — Medication 100 MILLILITER(S): at 18:20

## 2024-09-20 RX ADMIN — Medication 2 TABLET(S): at 22:43

## 2024-09-20 RX ADMIN — Medication 4 MILLIGRAM(S): at 22:44

## 2024-09-20 RX ADMIN — Medication 75 MILLIGRAM(S): at 18:19

## 2024-09-20 RX ADMIN — THIAMINE HYDROCHLORIDE 100 MILLIGRAM(S): 100 INJECTION, SOLUTION INTRAMUSCULAR; INTRAVENOUS at 15:00

## 2024-09-20 NOTE — H&P ADULT - NSHPPHYSICALEXAM_GEN_ALL_CORE
CONSTITUTIONAL: Restless, mild distress  EYES: PERRLA and symmetric, EOMI, No conjunctival or scleral injection, non-icteric  ENMT: Oral mucosa with moist membranes.   RESP: No respiratory distress, no use of accessory muscles; CTA b/l, no WRR  CV: RRR, +S1S2, no MRG  GI: Soft, NT, ND, no rebound, no guarding  LYMPH: No cervical LAD or tenderness  MSK:  No digital clubbing or cyanosis  SKIN: No rashes or ulcers noted; no subcutaneous nodules or induration palpable  NEURO: CN II-XII intact; normal reflexes in upper and lower extremities, sensation intact in upper and lower extremities b/l to light touch   PSYCH: Appropriate insight/judgment; A+O x 3, mood and affect appropriate, recent/remote memory intact

## 2024-09-20 NOTE — ED PROVIDER NOTE - ATTENDING APP SHARED VISIT CONTRIBUTION OF CARE
62yo male PMH etoh abuse, htn, hyperthyroid coming to ED for falls and alcohol intoxication. pt unable to give good history, son bedside reports pt has been drinking approx. 1 liter whiskey daily for months.  CONSTITUTIONAL: well developed; in no acute distress  HEAD: normocephalic; atraumatic  EYES: no conjunctival injection, + scleral icterus  ENT: no nasal discharge; airway clear.  NECK: supple; non tender.  CARD: warm and well perfused, not tachycardic  RESP: breathing comfortably on RA, speaking in full sentences w/o distress  Abdomen: Soft, None Tender, + Distended   EXT: moving all extremities spontaneously, normal ROM.  SKIN: warm and dry, no lesions noted  NEURO: alert, oriented,  speech is slurred  PSYCH: calm, cooperative  labs thiamine ct ap and reevaluate

## 2024-09-20 NOTE — ED ADULT TRIAGE NOTE - GLASGOW COMA SCALE: EYE OPENING, MLM
(E4) spontaneous Clofazimine Pregnancy And Lactation Text: This medication is Pregnancy Category C and isn't considered safe during pregnancy. It is excreted in breast milk.

## 2024-09-20 NOTE — ED PROVIDER NOTE - PHYSICAL EXAMINATION
CONST: in NAD  EYES: EOMI, Sclera and conjunctiva clear.   ENT: No nasal discharge.   NECK: Non-tender  CARD: Normal S1 S2; Normal rate and rhythm  RESP: Equal BS B/L, No wheezes, rhonchi or rales. No distress  GI: Soft, non-tender, non-distended.  MS: Normal ROM in all extremities. No midline spinal tenderness.  SKIN: Warm, dry, Good turgor  NEURO: A&Ox2, Strength 5/5

## 2024-09-20 NOTE — H&P ADULT - NSHPLABSRESULTS_GEN_ALL_CORE
LABS:                         15.7   6.35  )-----------( 292      ( 20 Sep 2024 12:25 )             44.4     09-20    141  |  99  |  5[L]  ----------------------------<  118[H]  4.2   |  25  |  0.7    Ca    8.4      20 Sep 2024 12:25  Phos  4.0     09-20  Mg     1.6     09-20    TPro  6.4  /  Alb  3.8  /  TBili  3.6[H]  /  DBili  x   /  AST  439[H]  /  ALT  174[H]  /  AlkPhos  494[H]  09-20      Urinalysis Basic - ( 20 Sep 2024 12:25 )    Color: x / Appearance: x / SG: x / pH: x  Gluc: 118 mg/dL / Ketone: x  / Bili: x / Urobili: x   Blood: x / Protein: x / Nitrite: x   Leuk Esterase: x / RBC: x / WBC x   Sq Epi: x / Non Sq Epi: x / Bacteria: x                RADIOLOGY, EKG & ADDITIONAL TESTS: Reviewed.

## 2024-09-20 NOTE — ED PROVIDER NOTE - ATTENDING CONTRIBUTION TO CARE
62yo male PMH etoh abuse, htn, hyperthyroid coming to ED for falls and alcohol intoxication. pt unable to give good history, son bedside reports pt has been drinking approx. 1 liter whiskey daily for months.  CONSTITUTIONAL: well developed; in no acute distress  HEAD: normocephalic; atraumatic  EYES: no conjunctival injection, + scleral icterus  ENT: no nasal discharge; airway clear.  NECK: supple; non tender.  CARD: warm and well perfused, not tachycardic  RESP: breathing comfortably on RA, speaking in full sentences w/o distress  Abdomen: Soft, None Tender, + Distended   EXT: moving all extremities spontaneously, normal ROM.  SKIN: warm and dry, no lesions noted  NEURO: alert, oriented,  speech is slurred  PSYCH: calm, cooperative  labs thiamine ct ap and reevaluate   \

## 2024-09-20 NOTE — ED PROVIDER NOTE - CLINICAL SUMMARY MEDICAL DECISION MAKING FREE TEXT BOX
64yo male PMH etoh abuse, htn, hyperthyroid coming to ED for falls and alcohol intoxication. pt unable to give good history, son bedside reports pt has been drinking approx. 1 liter whiskey daily for months. exam showed intoxicated, distended abdomen and icterus. daughter in law wants to have admitted for management.

## 2024-09-20 NOTE — ED PROVIDER NOTE - NSCAREINITIATED _GEN_ER
Patient presents to [facility] with complaints of swelling in left eye from beesting   No mouth or throat swelling , no wheezing  Also has a flare up of gout in right great toe  Past Medical History:   Diagnosis Date   • Depression    • Essential (primary) hypertension    • Gout      Blood pressure 120/66, pulse 77, temperature 98.4 °F (36.9 °C), temperature source Tympanic, resp. rate 20, weight 82.6 kg, SpO2 97 %.    General appearance: Healthy, alert, in no distress.   Skin: Skin color, texture, turgor are normal. There is induration and erythema of the left periorbital area   Eyes: Corneas clear and pupils equal, round, reactive to light and accommodation.   Nose: Clear rhinorrhea.   Ears: External ears normal. Canals clear. TM's (Tympanic membranes) normal.   Throat: Moderate erythema noted.   Neck: Supple, no lymphadenopathy.  Lungs: Clear to auscultation.   Cardiac: Regular rate and rhythm, no rubs, click, gallops or murmurs.   Abdomen: Soft, nontender. No hepatosplenomegaly.  Exam of the right toe there is swelling, erythema and induration of first mtp joint   A/P    Bee sting, accidental or unintentional, initial encounter  (primary encounter diagnosis)      Periorbital cellulitis of left eye    Plan: methylPREDNISolone (MEDROL, AUGUSTO,) 4 MG tablet,         cephalexin (Keflex) 500 MG capsule        WARM COMPRESSES    Gouty arthritis of right great toe      Plan: methylPREDNISolone (MEDROL, AUGUSTO,) 4 MG tablet,         indomethacin (INDOCIN) 50 MG capsule         GOUT DIET     Portions of this note are brought forward from [facility] note; reviewed and edited by me as appropriate.         Ayaka Lawrence)

## 2024-09-20 NOTE — ED PROVIDER NOTE - CARE PLAN
1 Principal Discharge DX:	Alcoholic liver disease  Secondary Diagnosis:	Elevated bilirubin  Secondary Diagnosis:	Alcohol withdrawal  Secondary Diagnosis:	Falls

## 2024-09-20 NOTE — ED PROVIDER NOTE - OBJECTIVE STATEMENT
patient is a 62yo male PMH etoh abuse, htn, hyperthyroid coming to ED for falls and alcohol intoxication. pt unable to give good history, son bedside reports pt has been drinking approx 1 liter whiskey daily for months, was admitted and intubated for etoh in April, has been drinking since being discharged. pt has fallen multiple times in the last week patient is a 64yo male PMH etoh abuse, htn, hyperthyroid coming to ED for falls and alcohol intoxication. pt unable to give good history, son bedside reports pt has been drinking approx 1 liter whiskey daily for months, was admitted and intubated for etoh in April, has been drinking since being discharged. pt has fallen multiple times in the last week, and has been laying in bed more frequently. had few episodes of vomiting last week. reports last drink yesterday. denies headache, chest pain, SOB, fever,

## 2024-09-20 NOTE — H&P ADULT - HISTORY OF PRESENT ILLNESS
63 years old male history of alcohol abuse, hypertension, COPD, hyperthyroidism, anxiety, insomnia presented top the ED for the evaluation of frequent falls and alcohol intoxication. As per the son at bedside, the patient has been drinking about 1 liter whiskey daily for months, was admitted and intubated for etoh intoxication in March, 2024, has been drinking since being discharged. Pt has fallen multiple times in the last week. On my eval, Pt is tremulous and restless. Last drink was earlier this evening.  Patient also reports he does want to stop drinking and try to cut down his alcohol use. Denies drug use. Otherwise denies fever, chills, recent illness, coughing, chest pain, abdominal pain, diarrhea or urinary symptoms.     #ED Vitals:  T(C): 37   HR: 106   BP: 130/80 mmhg  RR: 17   SpO2: 98%    #Labs: MCV 98, AG 17, T Bili 3.6, , , , Mag 1.6, VBG: Lactate 6.3    #Imaging:   Trauma work up Negative:   < from: CT Abdomen and Pelvis w/ IV Cont (09.20.24 @ 12:42) >  Diffuse hepatic steatosis.    < end of copied text >  < from: CT Head No Cont (09.20.24 @ 12:34) >  Stable scattered chronic lacunar infarcts.    < end of copied text >     63 years old male history of alcohol abuse, hypertension, COPD, hyperthyroidism, anxiety, insomnia presented top the ED for the evaluation of frequent falls and alcohol intoxication. As per the son at bedside, the patient has been drinking about 1 liter whiskey daily for months, was admitted and intubated for etoh intoxication in March, 2024, has been drinking since being discharged. Pt has fallen multiple times in the last week. On my eval, Pt is tremulous and restless. Last drink was earlier this evening.  Patient also reports he does want to stop drinking and try to cut down his alcohol use. Denies drug use. Otherwise denies fever, chills, recent illness, coughing, chest pain, abdominal pain, diarrhea or urinary symptoms.     #ED Vitals:  T(C): 37   HR: 106   BP: 130/80 mmhg  RR: 17   SpO2: 98%    #Labs: MCV 98, AG 17, T Bili 3.6, , , , Mag 1.6, VBG: Lactate 6.3    #Imaging:   Trauma work up Negative    CT Abdomen and Pelvis w/ IV Cont : Diffuse hepatic steatosis.    CT Head No Cont: Stable scattered chronic lacunar infarcts.    #S/P: LR bolus 1L, Valium 5mg IV, Thiamine and Folate in the ED.     The patient is being admitted to medicine for the further management.      63 years old male history of alcohol abuse, hypertension, COPD, hyperthyroidism, anxiety, insomnia presented top the ED for the evaluation of frequent falls and alcohol intoxication. As per the son at bedside, the patient has been drinking about 1 bottle(750ml) whiskey daily for months(last drink 9/19/24 PM), was admitted and intubated for etoh intoxication in March, 2024, has been drinking since being discharged. Pt has fallen multiple times in the last week. On my eval, Pt is tremulous and restless.  Patient also reports he does want to stop drinking and try to cut down his alcohol use. Denies drug use. Otherwise denies fever, chills, recent illness, coughing, chest pain, abdominal pain, diarrhea or urinary symptoms.     #ED Vitals:  T(C): 37   HR: 106   BP: 130/80 mmhg  RR: 17   SpO2: 98%    #Labs: MCV 98, AG 17, T Bili 3.6, , , , Mag 1.6, VBG: Lactate 6.3    #Imaging:   Trauma work up Negative    CT Abdomen and Pelvis w/ IV Cont : Diffuse hepatic steatosis.    CT Head No Cont: Stable scattered chronic lacunar infarcts.    #S/P: LR bolus 1L, Valium 5mg IV, Thiamine and Folate in the ED.     The patient is being admitted to medicine for the further management.      63 years old male history of alcohol abuse, hypertension, COPD, hyperthyroidism, anxiety, insomnia presented top the ED for the evaluation of frequent falls and alcohol intoxication. As per the son at bedside, the patient has been drinking about 1 bottle(750ml) whiskey daily for months(last drink 9/19/24 PM), was admitted and intubated for etoh intoxication in March, 2024, has been drinking since being discharged. Pt has fallen multiple times in the last week. On my eval, Pt is tremulous and restless.  Patient also reports he does want to stop drinking and try to cut down his alcohol use. Also endorses constipation, last BM about a week back. Denies drug use. Otherwise denies fever, chills, recent illness, coughing, chest pain, abdominal pain, diarrhea or urinary symptoms.     #ED Vitals:  T(C): 37   HR: 106   BP: 130/80 mmhg  RR: 17   SpO2: 98%    #Labs: MCV 98, AG 17, T Bili 3.6, , , , Mag 1.6, VBG: Lactate 6.3    #Imaging:   Trauma work up Negative    CT Abdomen and Pelvis w/ IV Cont : Diffuse hepatic steatosis.    CT Head No Cont: Stable scattered chronic lacunar infarcts.    #S/P: LR bolus 1L, Valium 5mg IV, Thiamine and Folate in the ED.     The patient is being admitted to medicine for the further management.

## 2024-09-20 NOTE — H&P ADULT - ASSESSMENT
63 years old male history of alcohol abuse, hypertension, COPD, hyperthyroidism, anxiety, insomnia presented top the ED for the evaluation of frequent falls and alcohol intoxication. As per the son at bedside, the patient has been drinking about 1 liter whiskey daily for months, was admitted and intubated for etoh intoxication in March, 2024, has been drinking since being discharged. Pt has fallen multiple times in the last week. On my eval, Pt is tremulous and restless. Last drink was earlier this evening.  Patient also reports he does want to stop drinking and try to cut down his alcohol use. Denies drug use.     #Fall injury likely 2/2 Alcohol intoxication   #Macrocytosis likely 2/2 Alcohol intoxication  -MCV 98, AG 17, , Mag 1.6, VBG: Lactate 6.3  -Trauma work up Negative  -CT Abdomen and Pelvis w/ IV Cont : Diffuse hepatic steatosis.  -CT Head No Cont: Stable scattered chronic lacunar infarcts.  -S/P: LR bolus 1L, Valium 5mg IV, Thiamine and Folate in the ED.   - CXR clear   - EKG showed sinus tachycardia     Plan:  -F/U Serum Alcohol level  -CIWA protocol with ativan taper   -CATCH team eval  -thiamine and folate   -replete Mg and K as needed   -continue with IVF LR at 100cc/hr for 12 hours   -Seizure/Fall/Aspiration precautions  -utox  -Alcohol cessation advised    #Lactic acidosis   - no hypotension or hypoxia   - c/w  cc/hr   - trend lactate    #Transaminitis; mixed pattern  -T Bili 3.6, , ,   - likely 2/2 to alcohol use   -Trend LFt  -Avoid Hepatotoxic meds  -Alcohol cessation advised    #HTN  #H/O Palpitations  - monitor BP   - continue with Metoprolol tartrate 75mg BID    #Hypothyroidism  -3/24: TSH 0.52, fT4 0.7, T3 84  -Continue with Methimazole 2.5mg OD    #COPD  - on RA   - duonebs PRN   - prednisone 40 mg OD for 5 days     #DVT ppx: lovenox   #GI ppx: protonix   #Diet: DASH   #Activity: as tolerated   #Dispo: SDU   63 years old male history of alcohol abuse, hypertension, COPD, hyperthyroidism, anxiety, insomnia presented top the ED for the evaluation of frequent falls and alcohol intoxication. As per the son at bedside, the patient has been drinking about 1 bottle(750ml) whiskey daily for months(last drink 9/19/24 PM), was admitted and intubated for etoh intoxication in March, 2024, has been drinking since being discharged. Pt has fallen multiple times in the last week. On my eval, Pt is tremulous and restless.  Patient also reports he does want to stop drinking and try to cut down his alcohol use.    #Fall injury likely 2/2 Alcohol intoxication   #Likely wernicke's encephalopathy   #ETOH abuse  #Macrocytosis likely 2/2 Alcohol intoxication  -MCV 98, AG 17, , Mag 1.6, VBG: Lactate 6.3  -Trauma work up Negative  -CT Abdomen and Pelvis w/ IV Cont : Diffuse hepatic steatosis.  -CT Head No Cont: Stable scattered chronic lacunar infarcts.  -S/P: LR bolus 1L, Valium 5mg IV, Thiamine and Folate in the ED.   - CXR clear   - EKG showed sinus tachycardia     Plan:  -F/U Serum Alcohol level  -CIWA protocol with ativan taper   -CATCH team eval  -thiamine and folate   -replete Mg and K as needed   -continue with IVF LR at 100cc/hr for 12 hours   -Seizure/Fall/Aspiration precautions  -utox  -Alcohol cessation advised    #Lactic acidosis   - no hypotension or hypoxia   - c/w  cc/hr   - trend lactate    #Alcoholic hepatitis  #Transaminitis; mixed pattern  -T Bili 3.6, , ,   - likely 2/2 to alcohol use   -Trend LFT  -F/U Coags  -Avoid Hepatotoxic meds  -Alcohol cessation advised    #HTN  #H/O Palpitations  - monitor BP   - continue with Metoprolol tartrate 75mg BID    #Hypothyroidism  -3/24: TSH 0.52, fT4 0.7, T3 84  -F/U TFT  -Continue with Methimazole 2.5mg OD    #COPD  - on RA   - duonebs PRN, symbicort BID    #DVT ppx: lovenox   #GI ppx: protonix   #Diet: DASH   #Activity: as tolerated   #Dispo: SDU   63 years old male history of alcohol abuse, hypertension, COPD, hyperthyroidism, anxiety, insomnia presented top the ED for the evaluation of frequent falls and alcohol intoxication. As per the son at bedside, the patient has been drinking about 1 bottle(750ml) whiskey daily for months(last drink 9/19/24 PM), was admitted and intubated for etoh intoxication in March, 2024, has been drinking since being discharged. Pt has fallen multiple times in the last week. On my eval, Pt is tremulous and restless.  Patient also reports he does want to stop drinking and try to cut down his alcohol use. Also endorses constipation, last BM about a week back.    #Fall injury likely 2/2 Alcohol intoxication   #Likely wernicke's encephalopathy   #ETOH abuse  #Macrocytosis likely 2/2 Alcohol intoxication  -MCV 98, AG 17, , Mag 1.6, VBG: Lactate 6.3  -Trauma work up Negative  -CT Abdomen and Pelvis w/ IV Cont : Diffuse hepatic steatosis.  -CT Head No Cont: Stable scattered chronic lacunar infarcts.  -S/P: LR bolus 1L, Valium 5mg IV, Thiamine and Folate in the ED.   - CXR clear   - EKG showed sinus tachycardia     Plan:  -F/U Serum Alcohol level  -CIWA protocol with ativan taper   -CATCH team eval  -thiamine and folate   -replete Mg and K as needed   -continue with IVF LR at 100cc/hr for 12 hours   -Seizure/Fall/Aspiration precautions  -utox  -Alcohol cessation advised    #Lactic acidosis   - no hypotension or hypoxia   - c/w  cc/hr   - trend lactate    #Alcoholic hepatitis  #Transaminitis; mixed pattern  -T Bili 3.6, , ,   - likely 2/2 to alcohol use   -Trend LFT  -F/U Coags  -Avoid Hepatotoxic meds  -Alcohol cessation advised    #HTN  #H/O Palpitations  - monitor BP   - continue with Metoprolol tartrate 75mg BID    #Hypothyroidism  -3/24: TSH 0.52, fT4 0.7, T3 84  -F/U TFT  -Continue with Methimazole 2.5mg OD    #COPD  - on RA   - duonebs PRN, symbicort BID    #Constipation:  -Bowel regimen    #DVT ppx: lovenox   #GI ppx: protonix   #Diet: DASH   #Activity: as tolerated   #Dispo: SDU

## 2024-09-21 LAB
ALBUMIN SERPL ELPH-MCNC: 3.2 G/DL — LOW (ref 3.5–5.2)
ALP SERPL-CCNC: 451 U/L — HIGH (ref 30–115)
ALT FLD-CCNC: 139 U/L — HIGH (ref 0–41)
ANION GAP SERPL CALC-SCNC: 11 MMOL/L — SIGNIFICANT CHANGE UP (ref 7–14)
AST SERPL-CCNC: 322 U/L — HIGH (ref 0–41)
BASOPHILS # BLD AUTO: 0.06 K/UL — SIGNIFICANT CHANGE UP (ref 0–0.2)
BASOPHILS NFR BLD AUTO: 0.9 % — SIGNIFICANT CHANGE UP (ref 0–1)
BILIRUB SERPL-MCNC: 4.3 MG/DL — HIGH (ref 0.2–1.2)
BUN SERPL-MCNC: 7 MG/DL — LOW (ref 10–20)
CALCIUM SERPL-MCNC: 8.4 MG/DL — SIGNIFICANT CHANGE UP (ref 8.4–10.4)
CHLORIDE SERPL-SCNC: 101 MMOL/L — SIGNIFICANT CHANGE UP (ref 98–110)
CO2 SERPL-SCNC: 26 MMOL/L — SIGNIFICANT CHANGE UP (ref 17–32)
CREAT SERPL-MCNC: 0.6 MG/DL — LOW (ref 0.7–1.5)
EGFR: 108 ML/MIN/1.73M2 — SIGNIFICANT CHANGE UP
EOSINOPHIL # BLD AUTO: 0.01 K/UL — SIGNIFICANT CHANGE UP (ref 0–0.7)
EOSINOPHIL NFR BLD AUTO: 0.2 % — SIGNIFICANT CHANGE UP (ref 0–8)
GLUCOSE SERPL-MCNC: 136 MG/DL — HIGH (ref 70–99)
HCT VFR BLD CALC: 40.4 % — LOW (ref 42–52)
HGB BLD-MCNC: 14.4 G/DL — SIGNIFICANT CHANGE UP (ref 14–18)
IMM GRANULOCYTES NFR BLD AUTO: 0.6 % — HIGH (ref 0.1–0.3)
LACTATE SERPL-SCNC: 2.5 MMOL/L — HIGH (ref 0.7–2)
LYMPHOCYTES # BLD AUTO: 1.55 K/UL — SIGNIFICANT CHANGE UP (ref 1.2–3.4)
LYMPHOCYTES # BLD AUTO: 24.4 % — SIGNIFICANT CHANGE UP (ref 20.5–51.1)
MAGNESIUM SERPL-MCNC: 1.3 MG/DL — LOW (ref 1.8–2.4)
MCHC RBC-ENTMCNC: 35.5 PG — HIGH (ref 27–31)
MCHC RBC-ENTMCNC: 35.6 G/DL — SIGNIFICANT CHANGE UP (ref 32–37)
MCV RBC AUTO: 99.5 FL — HIGH (ref 80–94)
MONOCYTES # BLD AUTO: 0.49 K/UL — SIGNIFICANT CHANGE UP (ref 0.1–0.6)
MONOCYTES NFR BLD AUTO: 7.7 % — SIGNIFICANT CHANGE UP (ref 1.7–9.3)
NEUTROPHILS # BLD AUTO: 4.21 K/UL — SIGNIFICANT CHANGE UP (ref 1.4–6.5)
NEUTROPHILS NFR BLD AUTO: 66.2 % — SIGNIFICANT CHANGE UP (ref 42.2–75.2)
NRBC # BLD: 0 /100 WBCS — SIGNIFICANT CHANGE UP (ref 0–0)
PHOSPHATE SERPL-MCNC: 2.7 MG/DL — SIGNIFICANT CHANGE UP (ref 2.1–4.9)
PLATELET # BLD AUTO: 208 K/UL — SIGNIFICANT CHANGE UP (ref 130–400)
PMV BLD: 11.1 FL — HIGH (ref 7.4–10.4)
POTASSIUM SERPL-MCNC: 4.2 MMOL/L — SIGNIFICANT CHANGE UP (ref 3.5–5)
POTASSIUM SERPL-SCNC: 4.2 MMOL/L — SIGNIFICANT CHANGE UP (ref 3.5–5)
PROT SERPL-MCNC: 5.8 G/DL — LOW (ref 6–8)
RBC # BLD: 4.06 M/UL — LOW (ref 4.7–6.1)
RBC # FLD: 14.4 % — SIGNIFICANT CHANGE UP (ref 11.5–14.5)
SODIUM SERPL-SCNC: 138 MMOL/L — SIGNIFICANT CHANGE UP (ref 135–146)
T3 SERPL-MCNC: 123 NG/DL — SIGNIFICANT CHANGE UP (ref 80–200)
T4 FREE SERPL-MCNC: 1.3 NG/DL — SIGNIFICANT CHANGE UP (ref 0.9–1.8)
TSH SERPL-MCNC: 1.83 UIU/ML — SIGNIFICANT CHANGE UP (ref 0.27–4.2)
WBC # BLD: 6.36 K/UL — SIGNIFICANT CHANGE UP (ref 4.8–10.8)
WBC # FLD AUTO: 6.36 K/UL — SIGNIFICANT CHANGE UP (ref 4.8–10.8)

## 2024-09-21 PROCEDURE — 99233 SBSQ HOSP IP/OBS HIGH 50: CPT

## 2024-09-21 PROCEDURE — 99223 1ST HOSP IP/OBS HIGH 75: CPT

## 2024-09-21 RX ADMIN — PANTOPRAZOLE SODIUM 40 MILLIGRAM(S): 40 TABLET, DELAYED RELEASE ORAL at 07:25

## 2024-09-21 RX ADMIN — Medication 2 MILLIGRAM(S): at 07:25

## 2024-09-21 RX ADMIN — Medication 4 MILLIGRAM(S): at 03:32

## 2024-09-21 RX ADMIN — Medication 2 MILLIGRAM(S): at 04:54

## 2024-09-21 RX ADMIN — Medication 3 MILLIGRAM(S): at 23:37

## 2024-09-21 RX ADMIN — Medication 4 MILLIGRAM(S): at 10:20

## 2024-09-21 RX ADMIN — Medication 4 MILLIGRAM(S): at 06:20

## 2024-09-21 RX ADMIN — Medication 1 DOSE(S): at 07:25

## 2024-09-21 RX ADMIN — ENOXAPARIN SODIUM 40 MILLIGRAM(S): 150 INJECTION SUBCUTANEOUS at 10:18

## 2024-09-21 RX ADMIN — Medication 3 MILLIGRAM(S): at 18:41

## 2024-09-21 RX ADMIN — Medication 17 GRAM(S): at 18:34

## 2024-09-21 RX ADMIN — Medication 17 GRAM(S): at 06:20

## 2024-09-21 RX ADMIN — Medication 2 TABLET(S): at 23:40

## 2024-09-21 RX ADMIN — Medication 1 DOSE(S): at 23:40

## 2024-09-21 RX ADMIN — Medication 75 MILLIGRAM(S): at 06:20

## 2024-09-21 RX ADMIN — Medication 75 MILLIGRAM(S): at 18:34

## 2024-09-21 RX ADMIN — LACTULOSE 20 GRAM(S): 10 SOLUTION ORAL; RECTAL at 06:20

## 2024-09-21 NOTE — PROGRESS NOTE ADULT - SUBJECTIVE AND OBJECTIVE BOX
Patient is a 63y old  Male who presents with a chief complaint of     Pt seen and examined at bedside. No CP or SOB.          PAST MEDICAL & SURGICAL HISTORY:  HTN (hypertension)    Alcohol abuse    Hyperthyroidism        VITAL SIGNS (Last 24 hrs):  T(C): 36.7 (09-21-24 @ 12:00), Max: 36.9 (09-21-24 @ 06:07)  HR: 88 (09-21-24 @ 16:02) (68 - 88)  BP: 138/76 (09-21-24 @ 12:00) (127/89 - 160/75)  RR: 17 (09-21-24 @ 16:02) (16 - 18)  SpO2: 95% (09-21-24 @ 16:02) (95% - 100%)  Wt(kg): --  Daily     Daily     I&O's Summary      PHYSICAL EXAM:  GENERAL: NAD, well-developed  HEAD:  Atraumatic, Normocephalic  EYES: EOMI, PERRLA, conjunctiva and sclera clear  NECK: Supple, No JVD  CHEST/LUNG: Clear to auscultation bilaterally; No wheeze  HEART: Regular rate and rhythm; No murmurs, rubs, or gallops  ABDOMEN: Soft, Nontender, Nondistended; Bowel sounds present  EXTREMITIES:  2+ Peripheral Pulses, No clubbing, cyanosis, or edema  PSYCH: AAOx3  NEUROLOGY: non-focal  SKIN: No rashes or lesions    Labs Reviewed  Spoke to patient in regards to abnormal labs.    CBC Full  -  ( 21 Sep 2024 08:28 )  WBC Count : 6.36 K/uL  Hemoglobin : 14.4 g/dL  Hematocrit : 40.4 %  Platelet Count - Automated : 208 K/uL  Mean Cell Volume : 99.5 fL  Mean Cell Hemoglobin : 35.5 pg  Mean Cell Hemoglobin Concentration : 35.6 g/dL  Auto Neutrophil # : 4.21 K/uL  Auto Lymphocyte # : 1.55 K/uL  Auto Monocyte # : 0.49 K/uL  Auto Eosinophil # : 0.01 K/uL  Auto Basophil # : 0.06 K/uL  Auto Neutrophil % : 66.2 %  Auto Lymphocyte % : 24.4 %  Auto Monocyte % : 7.7 %  Auto Eosinophil % : 0.2 %  Auto Basophil % : 0.9 %    BMP:    09-21 @ 08:28    Blood Urea Nitrogen - 7  Calcium - 8.4  Carbond Dioxide - 26  Chloride - 101  Creatinine - 0.6  Glucose - 136  Potassium - 4.2  Sodium - 138      Hemoglobin A1c -   PT/INR - ( 20 Sep 2024 17:41 )   PT: 10.90 sec;   INR: 0.96 ratio         PTT - ( 20 Sep 2024 17:41 )  PTT:31.3 sec  Urine Culture:        COVID Labs  CRP:      D-Dimer:            Imaging reviewed independently and reviewed read        MEDICATIONS  (STANDING):  enoxaparin Injectable 40 milliGRAM(s) SubCutaneous every 24 hours  fluticasone propionate/ salmeterol 100-50 MICROgram(s) Diskus 1 Dose(s) Inhalation two times a day  folic acid 1 milliGRAM(s) Oral daily  lactated ringers. 1000 milliLiter(s) (100 mL/Hr) IV Continuous <Continuous>  LORazepam   Injectable 3 milliGRAM(s) IV Push every 4 hours  LORazepam   Injectable   IV Push   methimazole 2.5 milliGRAM(s) Oral daily  metoprolol tartrate 75 milliGRAM(s) Oral two times a day  multivitamin 1 Tablet(s) Oral daily  pantoprazole    Tablet 40 milliGRAM(s) Oral before breakfast  polyethylene glycol 3350 17 Gram(s) Oral two times a day  senna 2 Tablet(s) Oral at bedtime  thiamine 100 milliGRAM(s) Oral daily    MEDICATIONS  (PRN):  albuterol/ipratropium for Nebulization 3 milliLiter(s) Nebulizer every 6 hours PRN Shortness of Breath and/or Wheezing  aluminum hydroxide/magnesium hydroxide/simethicone Suspension 30 milliLiter(s) Oral every 4 hours PRN Dyspepsia  LORazepam   Injectable 2 milliGRAM(s) IV Push every 1 hour PRN Symptom-triggered: each CIWA -Ar score 8 or GREATER  melatonin 3 milliGRAM(s) Oral at bedtime PRN Insomnia

## 2024-09-21 NOTE — PROGRESS NOTE ADULT - SUBJECTIVE AND OBJECTIVE BOX
Patient is a 63y old  Male who presents with a chief complaint of       Over Night Events:    Remains altered, agitated overnight  Somnolent at the time of my assessment but remains restless  Coags wnl, but EtOH level elevated    ROS:     All ROS are negative except HPI       PHYSICAL EXAM    ICU Vital Signs Last 24 Hrs  T(C): 36.9 (21 Sep 2024 06:07), Max: 37 (20 Sep 2024 11:45)  T(F): 98.4 (21 Sep 2024 06:07), Max: 98.6 (20 Sep 2024 11:45)  HR: 82 (21 Sep 2024 06:07) (80 - 112)  BP: 160/75 (21 Sep 2024 06:07) (127/89 - 160/75)  BP(mean): --  ABP: --  ABP(mean): --  RR: 18 (21 Sep 2024 06:07) (17 - 18)  SpO2: 100% (21 Sep 2024 06:07) (97% - 100%)    O2 Parameters below as of 21 Sep 2024 06:07  Patient On (Oxygen Delivery Method): room air            Constitutional: no acute distress, well nourished well developed  Neuro: moving all 4 limbs spontaneously, no facial droop or dysarthria  HEENT: NCAT, anicteric  Neck: no visible lymphadenopathy or goiter  Pulm: no respiratory distress. clear to auscultation bilaterally  Cardiac: extremities appear pink and well-perfused.  regular rhythm and rate, no murmur detected  Abdomen: non-distended  Extremities: no peripheral edema        LABS:                            15.7   6.35  )-----------( 292      ( 20 Sep 2024 12:25 )             44.4                                               09-20    141  |  99  |  5[L]  ----------------------------<  118[H]  4.2   |  25  |  0.7    Ca    8.4      20 Sep 2024 12:25  Phos  4.0     09-20  Mg     1.6     09-20    TPro  6.4  /  Alb  3.8  /  TBili  3.6[H]  /  DBili  x   /  AST  439[H]  /  ALT  174[H]  /  AlkPhos  494[H]  09-20      PT/INR - ( 20 Sep 2024 17:41 )   PT: 10.90 sec;   INR: 0.96 ratio         PTT - ( 20 Sep 2024 17:41 )  PTT:31.3 sec                                       Urinalysis Basic - ( 20 Sep 2024 12:25 )    Color: x / Appearance: x / SG: x / pH: x  Gluc: 118 mg/dL / Ketone: x  / Bili: x / Urobili: x   Blood: x / Protein: x / Nitrite: x   Leuk Esterase: x / RBC: x / WBC x   Sq Epi: x / Non Sq Epi: x / Bacteria: x                                                  LIVER FUNCTIONS - ( 20 Sep 2024 12:25 )  Alb: 3.8 g/dL / Pro: 6.4 g/dL / ALK PHOS: 494 U/L / ALT: 174 U/L / AST: 439 U/L / GGT: x                                                                                                                                       MEDICATIONS  (STANDING):  enoxaparin Injectable 40 milliGRAM(s) SubCutaneous every 24 hours  fluticasone propionate/ salmeterol 100-50 MICROgram(s) Diskus 1 Dose(s) Inhalation two times a day  folic acid 1 milliGRAM(s) Oral daily  lactated ringers. 1000 milliLiter(s) (100 mL/Hr) IV Continuous <Continuous>  lactulose Syrup 20 Gram(s) Oral every 6 hours  LORazepam   Injectable   IV Push   LORazepam   Injectable 3 milliGRAM(s) IV Push every 4 hours  LORazepam   Injectable 4 milliGRAM(s) IV Push every 4 hours  methimazole 2.5 milliGRAM(s) Oral daily  metoprolol tartrate 75 milliGRAM(s) Oral two times a day  multivitamin 1 Tablet(s) Oral daily  pantoprazole    Tablet 40 milliGRAM(s) Oral before breakfast  polyethylene glycol 3350 17 Gram(s) Oral two times a day  senna 2 Tablet(s) Oral at bedtime  thiamine 100 milliGRAM(s) Oral daily    MEDICATIONS  (PRN):  albuterol/ipratropium for Nebulization 3 milliLiter(s) Nebulizer every 6 hours PRN Shortness of Breath and/or Wheezing  aluminum hydroxide/magnesium hydroxide/simethicone Suspension 30 milliLiter(s) Oral every 4 hours PRN Dyspepsia  LORazepam   Injectable 2 milliGRAM(s) IV Push every 1 hour PRN Symptom-triggered: each CIWA -Ar score 8 or GREATER  melatonin 3 milliGRAM(s) Oral at bedtime PRN Insomnia      New X-rays reviewed:       ECHO reviewed    CXR interpreted by me:

## 2024-09-21 NOTE — PROGRESS NOTE ADULT - ASSESSMENT
63 years old male history of alcohol abuse, hypertension, COPD, hyperthyroidism, anxiety, insomnia presented top the ED for the evaluation of frequent falls and alcohol intoxication    alcohol withdrawal ETOH abuse  -pt has severe hx of alcohol withdrawal, requiring intubation in march of this year  -ativan taper in setting of elevated liver enzymes   -F/U Serum Alcohol level  -[facility] protocol with ativan taper   -CATCH team eval  -thiamine and folate   -replete Mg and K as needed   -continue with IVF LR at 100cc/hr for 12 hours   -Seizure/Fall/Aspiration precautions  -utox  -Alcohol cessation advised    Alcoholic hepatitis  elevated LFTs; mixed pattern  -CT abdomen and pelvis with diffuse hepatic steatosis   -obtain RUQ ultrasound  -consider consult to hepatology after RUQ ultrasound  -T Bili 3.6, , ,   -MADDREY score is 8.4, no indication for glucocorticoids  -MELD score 12  -likely 2/2 to alcohol use   -Trend LFTs  -hepatitis panel  -F/U Coags  -Avoid Hepatotoxic meds  -Alcohol cessation advised    suspect wernicke's encephalopathy   suspect thiamine and folate deficiency  -thiamine  -folate    Fall injury likely 2/2 Alcohol intoxication   -Trauma work up Negative  -CT Abdomen and Pelvis w/ IV Cont : Diffuse hepatic steatosis.  -CT Head No Cont: Stable scattered chronic lacunar infarcts.  - CXR clear   - EKG showed sinus tachycardia     Macrocytosis likely 2/2 Alcohol intoxication  MCV 98    Lactic acidosis, HAGMA  -suspect in setting of alcohol intoxication  - no hypotension or hypoxia   BHB stable  - c/w  cc/hr   - trend lactate -> downtrending    HTN  H/O Palpitations  - monitor BP   - continue with Metoprolol tartrate 75mg BID  - EKG showed sinus tachycardia     hx of hyperthyroidism  -3/24: TSH 0.52, fT4 0.7, T3 84  -F/U TFT  -Continue with Methimazole 2.5mg OD    #COPD  - on RA   - duonebs PRN, symbicort BID    #Constipation:  -Bowel regimen    #DVT ppx: lovenox   #GI ppx: protonix   #Diet: DASH   #Activity: as tolerated   #Dispo: SDU    I personally reviewed labs and imaging and ordered necessary testing/medications  I discussed care of the patient with licensed providers  I personally reviewed chart and consultant recommendations  Pt has complex medical issues that require extensive diagnosis and intervention / threat to life  I personally spent 70 minutes in care of patient.  63 years old male history of alcohol abuse, hypertension, COPD, hyperthyroidism, anxiety, insomnia presented top the ED for the evaluation of frequent falls and alcohol intoxication    alcohol withdrawal ETOH abuse  -pt has severe hx of alcohol withdrawal, requiring intubation in march of this year  -ativan taper in setting of elevated liver enzymes   -F/U Serum Alcohol level  -[facility] protocol with ativan taper   -CATCH team eval  -thiamine and folate   -replete Mg and K as needed   -continue with IVF LR at 100cc/hr for 12 hours   -Seizure/Fall/Aspiration precautions  -utox  -Alcohol cessation advised    Alcoholic hepatitis  elevated LFTs; mixed pattern  -CT abdomen and pelvis with diffuse hepatic steatosis   -obtain RUQ ultrasound  -consider consult to hepatology after RUQ ultrasound  -T Bili 3.6, , ,   -MADDREY score is 8.4, no indication for glucocorticoids  -MELD score 12 -> hepatology consult   -likely 2/2 to alcohol use   -Trend LFTs  -hepatitis panel  -F/U Coags  -Avoid Hepatotoxic meds  -Alcohol cessation advised    suspect wernicke's encephalopathy   suspect thiamine and folate deficiency  -thiamine  -folate    Fall injury likely 2/2 Alcohol intoxication   -Trauma work up Negative  -CT Abdomen and Pelvis w/ IV Cont : Diffuse hepatic steatosis.  -CT Head No Cont: Stable scattered chronic lacunar infarcts.  - CXR clear   - EKG showed sinus tachycardia     Macrocytosis likely 2/2 Alcohol intoxication  MCV 98    Lactic acidosis, HAGMA  -suspect in setting of alcohol intoxication  - no hypotension or hypoxia   BHB stable  - c/w  cc/hr   - trend lactate -> downtrending    HTN  H/O Palpitations  - monitor BP   - continue with Metoprolol tartrate 75mg BID  - EKG showed sinus tachycardia     hx of hyperthyroidism  -3/24: TSH 0.52, fT4 0.7, T3 84  -F/U TFT  -Continue with Methimazole 2.5mg OD    #COPD  - on RA   - duonebs PRN, symbicort BID    #Constipation:  -Bowel regimen    #DVT ppx: lovenox   #GI ppx: protonix   #Diet: DASH   #Activity: as tolerated   #Dispo: SDU    I personally reviewed labs and imaging and ordered necessary testing/medications  I discussed care of the patient with licensed providers  I personally reviewed chart and consultant recommendations  Pt has complex medical issues that require extensive diagnosis and intervention / threat to life  I personally spent 70 minutes in care of patient.  63 years old male history of alcohol abuse, hypertension, COPD, hyperthyroidism, anxiety, insomnia presented top the ED for the evaluation of frequent falls and alcohol intoxication    alcohol withdrawal ETOH abuse  -pt has severe hx of alcohol withdrawal, requiring intubation in march of this year  -ativan taper in setting of elevated liver enzymes   -F/U Serum Alcohol level  -[facility] protocol with ativan taper   -CATCH team eval  -thiamine and folate   -replete Mg and K as needed   -continue with IVF LR at 100cc/hr for 12 hours   -Seizure/Fall/Aspiration precautions  -utox  -Alcohol cessation advised    Alcoholic hepatitis  elevated LFTs; mixed pattern  -CT abdomen and pelvis with diffuse hepatic steatosis   -obtain RUQ ultrasound  -consider consult to hepatology after RUQ ultrasound  -T Bili 3.6, , ,   -MADDREY score is 8.4, no indication for glucocorticoids  -MELD score 12 -> hepatology consult   -likely 2/2 to alcohol use   -Trend LFTs  -hepatitis panel pending  -ammonia level ordered for 9.22  -F/U Coags  -Avoid Hepatotoxic meds  -Alcohol cessation advised    suspect wernicke's encephalopathy   suspect thiamine and folate deficiency  -thiamine  -folate    Fall injury likely 2/2 Alcohol intoxication   -Trauma work up Negative  -CT Abdomen and Pelvis w/ IV Cont : Diffuse hepatic steatosis.  -CT Head No Cont: Stable scattered chronic lacunar infarcts.  - CXR clear   - EKG showed sinus tachycardia     Macrocytosis likely 2/2 Alcohol intoxication  MCV 98    Lactic acidosis, HAGMA  -suspect in setting of alcohol intoxication  - no hypotension or hypoxia   BHB stable  - c/w  cc/hr   - trend lactate -> downtrending    HTN  H/O Palpitations  - monitor BP   - continue with Metoprolol tartrate 75mg BID  - EKG showed sinus tachycardia     hx of hyperthyroidism  -3/24: TSH 0.52, fT4 0.7, T3 84  -F/U TFT  -Continue with Methimazole 2.5mg OD    #COPD  - on RA   - duonebs PRN, symbicort BID    #Constipation:  -Bowel regimen    #DVT ppx: lovenox   #GI ppx: protonix   #Diet: DASH   #Activity: as tolerated   #Dispo: SDU    I personally reviewed labs and imaging and ordered necessary testing/medications  I discussed care of the patient with licensed providers  I personally reviewed chart and consultant recommendations  Pt has complex medical issues that require extensive diagnosis and intervention / threat to life  I personally spent 70 minutes in care of patient.

## 2024-09-21 NOTE — CONSULT NOTE ADULT - ATTENDING COMMENTS
Mr. Nelson was seen and examined at his stretcher in the emergency department, this patient has a history of multiple presentations for alcohol withdrawal and intoxication.  Patient presenting today again with intoxication and more frequent falls recently.  On lab work, patient is found to have multiple acute abnormalities including rising transaminases, bilirubins and development of withdrawal-like symptoms.  Given patient's prior severe episodes of withdrawal, and possible evidence of new alcoholic hepatitis, will admit patient to SDU for further workup and treatment of withdrawal.  Obtain hepatology consult, start benzodiazepine taper, and provide treatment dose thiamine.  I agree with the fellow note, with the exceptions listed in my attestation above.  The remainder of impression and plan per fellow note.
64yo male with etoh hepatitis, no evidence of underlying advanced liver disease/cirrhosis. Low MDF. Continue supportive measures, CIWA protocol. Recommend outpt follow up for colonoscopy when clinically improved.

## 2024-09-21 NOTE — PROGRESS NOTE ADULT - ASSESSMENT
IMPRESSION:    Likely Alcoholic hepatitis   ETOH withdrawal   Possible wernicke's encephalopathy   ETOH abuse  Frequent falls SP trauma work up  HO COPD  HO hyperthyroidism     PLAN:    CNS: CIWA monitoring. High dose thiamine. Folate. CTH noted. Start benzo taper given history of severe withdrawal.  Haldol/Zyprexa PRN agitation.    HEENT: Oral care    PULMONARY:  HOB @ 45 degrees. On room air.     CARDIOVASCULAR: Check TTE. Avoid overload. Trend lactate    GI: GI prophylaxis. Check coags STAT. Bilirubin and LFTs elevated. Likely alcoholic hepatitis, may need steroids. Hepatology consult. RUQ ultrasound.  Trend LFT.    RENAL:  Follow up lytes.  Correct as needed    INFECTIOUS DISEASE: Monitor VS off abx. Procal.     HEMATOLOGICAL:  DVT prophylaxis. STAT coags. Trend CBC and coags.     ENDOCRINE:  Follow up FS.  Insulin protocol if needed. Check TSH     MUSCULOSKELETAL: Bed rest for now       SDU

## 2024-09-22 LAB
ALBUMIN SERPL ELPH-MCNC: 3.4 G/DL — LOW (ref 3.5–5.2)
ALP SERPL-CCNC: 415 U/L — HIGH (ref 30–115)
ALT FLD-CCNC: 115 U/L — HIGH (ref 0–41)
AMMONIA BLD-MCNC: 54 UMOL/L — SIGNIFICANT CHANGE UP (ref 11–55)
ANION GAP SERPL CALC-SCNC: 10 MMOL/L — SIGNIFICANT CHANGE UP (ref 7–14)
AST SERPL-CCNC: 235 U/L — HIGH (ref 0–41)
BASOPHILS # BLD AUTO: 0.04 K/UL — SIGNIFICANT CHANGE UP (ref 0–0.2)
BASOPHILS NFR BLD AUTO: 0.6 % — SIGNIFICANT CHANGE UP (ref 0–1)
BILIRUB SERPL-MCNC: 4.8 MG/DL — HIGH (ref 0.2–1.2)
BUN SERPL-MCNC: 7 MG/DL — LOW (ref 10–20)
CALCIUM SERPL-MCNC: 8.3 MG/DL — LOW (ref 8.4–10.5)
CHLORIDE SERPL-SCNC: 101 MMOL/L — SIGNIFICANT CHANGE UP (ref 98–110)
CO2 SERPL-SCNC: 25 MMOL/L — SIGNIFICANT CHANGE UP (ref 17–32)
CREAT SERPL-MCNC: 0.6 MG/DL — LOW (ref 0.7–1.5)
EGFR: 108 ML/MIN/1.73M2 — SIGNIFICANT CHANGE UP
EOSINOPHIL # BLD AUTO: 0.1 K/UL — SIGNIFICANT CHANGE UP (ref 0–0.7)
EOSINOPHIL NFR BLD AUTO: 1.5 % — SIGNIFICANT CHANGE UP (ref 0–8)
GLUCOSE SERPL-MCNC: 102 MG/DL — HIGH (ref 70–99)
HCT VFR BLD CALC: 43.6 % — SIGNIFICANT CHANGE UP (ref 42–52)
HGB BLD-MCNC: 15 G/DL — SIGNIFICANT CHANGE UP (ref 14–18)
IMM GRANULOCYTES NFR BLD AUTO: 0.6 % — HIGH (ref 0.1–0.3)
INR BLD: 1.04 RATIO — SIGNIFICANT CHANGE UP (ref 0.65–1.3)
LYMPHOCYTES # BLD AUTO: 1.91 K/UL — SIGNIFICANT CHANGE UP (ref 1.2–3.4)
LYMPHOCYTES # BLD AUTO: 29.1 % — SIGNIFICANT CHANGE UP (ref 20.5–51.1)
MAGNESIUM SERPL-MCNC: 1.3 MG/DL — LOW (ref 1.8–2.4)
MCHC RBC-ENTMCNC: 34.4 G/DL — SIGNIFICANT CHANGE UP (ref 32–37)
MCHC RBC-ENTMCNC: 34.6 PG — HIGH (ref 27–31)
MCV RBC AUTO: 100.5 FL — HIGH (ref 80–94)
MONOCYTES # BLD AUTO: 0.48 K/UL — SIGNIFICANT CHANGE UP (ref 0.1–0.6)
MONOCYTES NFR BLD AUTO: 7.3 % — SIGNIFICANT CHANGE UP (ref 1.7–9.3)
MRSA PCR RESULT.: NEGATIVE — SIGNIFICANT CHANGE UP
NEUTROPHILS # BLD AUTO: 3.99 K/UL — SIGNIFICANT CHANGE UP (ref 1.4–6.5)
NEUTROPHILS NFR BLD AUTO: 60.9 % — SIGNIFICANT CHANGE UP (ref 42.2–75.2)
NRBC # BLD: 0 /100 WBCS — SIGNIFICANT CHANGE UP (ref 0–0)
PLATELET # BLD AUTO: 174 K/UL — SIGNIFICANT CHANGE UP (ref 130–400)
PMV BLD: 11 FL — HIGH (ref 7.4–10.4)
POTASSIUM SERPL-MCNC: 3.8 MMOL/L — SIGNIFICANT CHANGE UP (ref 3.5–5)
POTASSIUM SERPL-SCNC: 3.8 MMOL/L — SIGNIFICANT CHANGE UP (ref 3.5–5)
PROT SERPL-MCNC: 5.7 G/DL — LOW (ref 6–8)
PROTHROM AB SERPL-ACNC: 11.9 SEC — SIGNIFICANT CHANGE UP (ref 9.95–12.87)
RBC # BLD: 4.34 M/UL — LOW (ref 4.7–6.1)
RBC # FLD: 13.9 % — SIGNIFICANT CHANGE UP (ref 11.5–14.5)
SODIUM SERPL-SCNC: 136 MMOL/L — SIGNIFICANT CHANGE UP (ref 135–146)
WBC # BLD: 6.56 K/UL — SIGNIFICANT CHANGE UP (ref 4.8–10.8)
WBC # FLD AUTO: 6.56 K/UL — SIGNIFICANT CHANGE UP (ref 4.8–10.8)

## 2024-09-22 PROCEDURE — 99233 SBSQ HOSP IP/OBS HIGH 50: CPT

## 2024-09-22 PROCEDURE — 99232 SBSQ HOSP IP/OBS MODERATE 35: CPT | Mod: GC

## 2024-09-22 PROCEDURE — 76705 ECHO EXAM OF ABDOMEN: CPT | Mod: 26

## 2024-09-22 RX ORDER — THIAMINE HYDROCHLORIDE 100 MG/ML
500 INJECTION, SOLUTION INTRAMUSCULAR; INTRAVENOUS EVERY 8 HOURS
Refills: 0 | Status: DISCONTINUED | OUTPATIENT
Start: 2024-09-22 | End: 2024-09-23

## 2024-09-22 RX ORDER — CHLORHEXIDINE GLUCONATE ORAL RINSE 1.2 MG/ML
1 SOLUTION DENTAL DAILY
Refills: 0 | Status: DISCONTINUED | OUTPATIENT
Start: 2024-09-22 | End: 2024-09-30

## 2024-09-22 RX ADMIN — Medication 75 MILLIGRAM(S): at 05:40

## 2024-09-22 RX ADMIN — Medication 17 GRAM(S): at 05:38

## 2024-09-22 RX ADMIN — THIAMINE HYDROCHLORIDE 105 MILLIGRAM(S): 100 INJECTION, SOLUTION INTRAMUSCULAR; INTRAVENOUS at 13:29

## 2024-09-22 RX ADMIN — THIAMINE HYDROCHLORIDE 100 MILLIGRAM(S): 100 INJECTION, SOLUTION INTRAMUSCULAR; INTRAVENOUS at 11:33

## 2024-09-22 RX ADMIN — Medication 2 MILLIGRAM(S): at 18:01

## 2024-09-22 RX ADMIN — FOLIC ACID 1 MILLIGRAM(S): 1 TABLET ORAL at 11:33

## 2024-09-22 RX ADMIN — PANTOPRAZOLE SODIUM 40 MILLIGRAM(S): 40 TABLET, DELAYED RELEASE ORAL at 05:41

## 2024-09-22 RX ADMIN — ENOXAPARIN SODIUM 40 MILLIGRAM(S): 150 INJECTION SUBCUTANEOUS at 11:31

## 2024-09-22 RX ADMIN — Medication 2 TABLET(S): at 21:07

## 2024-09-22 RX ADMIN — Medication 75 MILLIGRAM(S): at 18:02

## 2024-09-22 RX ADMIN — CHLORHEXIDINE GLUCONATE ORAL RINSE 1 APPLICATION(S): 1.2 SOLUTION DENTAL at 11:57

## 2024-09-22 RX ADMIN — THIAMINE HYDROCHLORIDE 105 MILLIGRAM(S): 100 INJECTION, SOLUTION INTRAMUSCULAR; INTRAVENOUS at 21:46

## 2024-09-22 RX ADMIN — Medication 3 MILLIGRAM(S): at 05:39

## 2024-09-22 RX ADMIN — Medication 3 MILLIGRAM(S): at 03:15

## 2024-09-22 RX ADMIN — Medication 3 MILLIGRAM(S): at 11:32

## 2024-09-22 RX ADMIN — Medication 2 MILLIGRAM(S): at 21:07

## 2024-09-22 RX ADMIN — Medication 2 MILLIGRAM(S): at 13:49

## 2024-09-22 RX ADMIN — MULTI VITAMIN/MINERAL SUPPLEMENT WITH ASCORBIC ACID, NIACIN, PYRIDOXINE, PANTOTHENIC ACID, FOLIC ACID, RIBOFLAVIN, THIAMIN, BIOTIN, COBALAMIN AND ZINC. 1 TABLET(S): 60; 20; 12.5; 10; 10; 1.7; 1.5; 1; .3; .006 TABLET, COATED ORAL at 11:33

## 2024-09-22 NOTE — BH CHART NOTE - NSEVENTNOTEFT_PSY_ALL_CORE
Psychiatry attempted to interview patient with .  number ([id number] - Thomas). However, pt appeared sedated/ Patient reported that he does not feel depressed, does not have issues sleeping, and is not feeling suicidal. Patient reports that he would like to go back to sleep and starts to fall sleep. Patient only mumbling incoherent responses and then stops speaking altogether, thus interview was unproductive. Team will reassess when patient is able to participate.

## 2024-09-22 NOTE — PROGRESS NOTE ADULT - ASSESSMENT
IMPRESSION:    Likely Alcoholic hepatitis   ETOH withdrawal   Possible wernicke's encephalopathy   ETOH abuse  Frequent falls SP trauma work up  HO COPD  HO hyperthyroidism     PLAN:    CNS: CIWA monitoring. High dose thiamine. Folate. CTH noted. CW benzo taper given history of severe withdrawal.  Haldol/Zyprexa PRN agitation. Psychiatry consult.     HEENT: Oral care    PULMONARY:  HOB @ 45 degrees. On room air.     CARDIOVASCULAR: Check TTE. Avoid overload. Trend lactate    GI: GI prophylaxis. Check coags STAT. Bilirubin and LFTs elevated. Hepatology following. Follow up RUQ ultrasound.  Trend LFT.    RENAL:  Follow up lytes.  Correct as needed    INFECTIOUS DISEASE: Monitor VS off abx.     HEMATOLOGICAL:  DVT prophylaxis. STAT coags. Trend CBC and coags.     ENDOCRINE:  Follow up FS.  Insulin protocol if needed. TSH noted.      MUSCULOSKELETAL: Bed rest for now       SDU

## 2024-09-22 NOTE — ED ADULT NURSE NOTE - CHIEF COMPLAINT QUOTE
Pt here admits to ETOH use last night and for past 4 days. BIBA as per family has been drinking and not getting out of bed. abrasions to knees. Pt sclera appear jaundiced. .

## 2024-09-22 NOTE — PROGRESS NOTE ADULT - SUBJECTIVE AND OBJECTIVE BOX
Hepatology Follow Up Note      Location: [facility] 2A 012 A [facility]  Patient Name: MATT MACDONALD  Age: 63y  Gender: Male      Chief Complaint  Patient is a 63y old Male who presents with a chief complaint of   Primary diagnosis of Alcoholic liver damage      Reason for Consult  Alcoholic Hepatitis      Progress Note  This morning patient was seen and examined at bedside.    Today is hospital day 2d.  Patient seems more awake than 09/21.  Patient denies abdominal pain or nausea or vomiting.   Last bowel movement was soft and brown on 09/21.          Vital Signs in the last 24 hours   Vitals Summary T(C): 36.8 (09-22-24 @ 12:00), Max: 36.8 (09-22-24 @ 12:00)  HR: 69 (09-22-24 @ 12:00) (67 - 86)  BP: 131/77 (09-22-24 @ 12:00) (131/77 - 143/88)  RR: 18 (09-22-24 @ 12:00) (17 - 20)  SpO2: 100% (09-22-24 @ 12:00) (98% - 100%)  Vent Data   Intake/ Output   09-21-24 @ 07:01  -  09-22-24 @ 07:00  --------------------------------------------------------  IN: 0 mL / OUT: 0 mL / NET: 0 mL      Measurements       Physical Exam  * General Appearance: less confused; oriented to place ([facility]) but not year; not in acute distress  * Eyes: mild icterus  * Lungs: Good bilateral air entry, normal breath sounds (Clear to auscultation bilaterally, no audible wheezes, crackles, or rhonchi)  * Heart: Regular Rate and Rhythm, normal S1 and S2, no audible murmur, rub, or gallop  * Abdomen: Symmetric, non-distended, soft, non-tender, bowel sounds active all four quadrants, no masses  * Extremities: no lower extremity pitting edema bilaterally; no asterexis or tremors noted      Investigations   Laboratory Workup      - CBC:                        15.0   6.56  )-----------( 174      ( 22 Sep 2024 05:59 )             43.6       - Hgb Trend:  15.0  09-22-24 @ 05:59  14.4  09-21-24 @ 08:28  15.7  09-20-24 @ 12:25          - Chemistry:  09-22    136  |  101  |  7[L]  ----------------------------<  102[H]  3.8   |  25  |  0.6[L]    Ca    8.3[L]      22 Sep 2024 05:59  Phos  2.7     09-21  Mg     1.3     09-22    TPro  5.7[L]  /  Alb  3.4[L]  /  TBili  4.8[H]  /  DBili  x   /  AST  235[H]  /  ALT  115[H]  /  AlkPhos  415[H]  09-22    Liver panel trend:  TBili 4.8   /      /      /   AlkP 415   /   Tptn 5.7   /   Alb 3.4    /   DBili --      09-22  TBili 4.3   /      /      /   AlkP 451   /   Tptn 5.8   /   Alb 3.2    /   DBili --      09-21  TBili 3.6   /      /      /   AlkP 494   /   Tptn 6.4   /   Alb 3.8    /   DBili --      09-20      - Coagulation Studies:  PT/INR - ( 22 Sep 2024 05:59 )   PT: 11.90 sec;   INR: 1.04 ratio             - ABG:      - Cardiac Markers:        Microbiological Workup  Urinalysis Basic - ( 22 Sep 2024 05:59 )    Color: x / Appearance: x / SG: x / pH: x  Gluc: 102 mg/dL / Ketone: x  / Bili: x / Urobili: x   Blood: x / Protein: x / Nitrite: x   Leuk Esterase: x / RBC: x / WBC x   Sq Epi: x / Non Sq Epi: x / Bacteria: x          Radiological Workup            Current Medications  Standing Medications  chlorhexidine 2% Cloths 1 Application(s) Topical daily  enoxaparin Injectable 40 milliGRAM(s) SubCutaneous every 24 hours  fluticasone propionate/ salmeterol 100-50 MICROgram(s) Diskus 1 Dose(s) Inhalation two times a day  folic acid 1 milliGRAM(s) Oral daily  lactated ringers. 1000 milliLiter(s) (100 mL/Hr) IV Continuous <Continuous>  LORazepam   Injectable   IV Push   LORazepam   Injectable 2 milliGRAM(s) IV Push every 4 hours  methimazole 2.5 milliGRAM(s) Oral daily  metoprolol tartrate 75 milliGRAM(s) Oral two times a day  multivitamin 1 Tablet(s) Oral daily  pantoprazole    Tablet 40 milliGRAM(s) Oral before breakfast  polyethylene glycol 3350 17 Gram(s) Oral two times a day  senna 2 Tablet(s) Oral at bedtime  thiamine 100 milliGRAM(s) Oral daily  thiamine IVPB 500 milliGRAM(s) IV Intermittent every 8 hours    PRN Medications  albuterol/ipratropium for Nebulization 3 milliLiter(s) Nebulizer every 6 hours PRN Shortness of Breath and/or Wheezing  aluminum hydroxide/magnesium hydroxide/simethicone Suspension 30 milliLiter(s) Oral every 4 hours PRN Dyspepsia  LORazepam   Injectable 2 milliGRAM(s) IV Push every 1 hour PRN Symptom-triggered: each CIWA -Ar score 8 or GREATER  melatonin 3 milliGRAM(s) Oral at bedtime PRN Insomnia    Singles Doses Administered  (ADM OVERRIDE) 2 each &lt;see task&gt; GiveOnce  (ADM OVERRIDE) 2 each &lt;see task&gt; GiveOnce  (ADM OVERRIDE) 2 each &lt;see task&gt; GiveOnce  (ADM OVERRIDE) 2 each &lt;see task&gt; GiveOnce  (ADM OVERRIDE) 2 each &lt;see task&gt; GiveOnce  (ADM OVERRIDE) 2 each &lt;see task&gt; GiveOnce  (ADM OVERRIDE) 2 each &lt;see task&gt; GiveOnce  diazepam  Injectable 5 milliGRAM(s) IV Push Once  folic acid Injectable 1 milliGRAM(s) IV Push Once  lactated ringers Bolus 1000 milliLiter(s) IV Bolus once  lactulose Syrup 20 Gram(s) Oral every 6 hours  LORazepam   Injectable 4 milliGRAM(s) IV Push every 4 hours  LORazepam   Injectable 3 milliGRAM(s) IV Push every 4 hours  thiamine 100 milliGRAM(s) Oral Once

## 2024-09-22 NOTE — ED ADULT NURSE NOTE - NSFALLRISKINTERV_ED_ALL_ED
Assistance OOB with selected safe patient handling equipment if applicable/Assistance with ambulation/Communicate fall risk and risk factors to all staff, patient, and family/Monitor gait and stability/Monitor for mental status changes and reorient to person, place, and time, as needed/Provide visual cue: yellow wristband, yellow gown, etc/Reinforce activity limits and safety measures with patient and family/Toileting schedule using arm’s reach rule for commode and bathroom/Use of alarms - bed, stretcher, chair and/or video monitoring/Call bell, personal items and telephone in reach/Instruct patient to call for assistance before getting out of bed/chair/stretcher/Non-slip footwear applied when patient is off stretcher/[place] to call system/Physically safe environment - no spills, clutter or unnecessary equipment/Purposeful Proactive Rounding/Room/bathroom lighting operational, light cord in reach

## 2024-09-22 NOTE — PROGRESS NOTE ADULT - ASSESSMENT
63 years old male history of alcohol abuse, hypertension, COPD, hyperthyroidism, anxiety, insomnia presented top the ED for the evaluation of frequent falls and alcohol intoxication    alcohol withdrawal ETOH abuse  -pt has severe hx of alcohol withdrawal, requiring intubation in march of this year  -ativan taper in setting of elevated liver enzymes   -F/U Serum Alcohol level  -[facility] protocol with ativan taper   -CATCH team eval  -thiamine and folate   -replete Mg and K as needed   -continue with IVF LR at 100cc/hr for 12 hours   -Seizure/Fall/Aspiration precautions  -utox  -Alcohol cessation advised    Alcoholic hepatitis  elevated LFTs; mixed pattern  -CT abdomen and pelvis with diffuse hepatic steatosis   -obtain RUQ ultrasound  -consider consult to hepatology after RUQ ultrasound  -T Bili 3.6, , ,   -MADDREY score is 8.4, no indication for glucocorticoids  -MELD score 12 -> hepatology consult   -likely 2/2 to alcohol use   -Trend LFTs  -hepatitis panel pending  -ammonia level ordered for 9.22  -F/U Coags  -Avoid Hepatotoxic meds  -Alcohol cessation advised    suspect wernicke's encephalopathy   suspect thiamine and folate deficiency  -thiamine  -folate    Fall injury likely 2/2 Alcohol intoxication   -Trauma work up Negative  -CT Abdomen and Pelvis w/ IV Cont : Diffuse hepatic steatosis.  -CT Head No Cont: Stable scattered chronic lacunar infarcts.  - CXR clear   - EKG showed sinus tachycardia     Macrocytosis likely 2/2 Alcohol intoxication  MCV 98    Lactic acidosis, HAGMA  -suspect in setting of alcohol intoxication  - no hypotension or hypoxia   BHB stable  - c/w  cc/hr   - trend lactate -> downtrending    HTN  H/O Palpitations  - monitor BP   - continue with Metoprolol tartrate 75mg BID  - EKG showed sinus tachycardia     hx of hyperthyroidism  -3/24: TSH 0.52, fT4 0.7, T3 84  -F/U TFT  -Continue with Methimazole 2.5mg OD    #COPD  - on RA   - duonebs PRN, symbicort BID    #Constipation:  -Bowel regimen    #DVT ppx: lovenox   #GI ppx: protonix   #Diet: DASH   #Activity: as tolerated   #Dispo: SDU    I personally reviewed labs and imaging and ordered necessary testing/medications  I discussed care of the patient with licensed providers  I personally reviewed chart and consultant recommendations  Pt has complex medical issues that require extensive diagnosis and intervention / threat to life  I personally spent 70 minutes in care of patient.      63 years old male history of alcohol abuse, hypertension, COPD, hyperthyroidism, anxiety, insomnia presented top the ED for the evaluation of frequent falls and alcohol intoxication    alcohol withdrawal with a history of ETOH abuse  Alcoholic hepatitis  Suspected folate and thiamine deficiency   Elevated lactate - imp[roved  -pt has severe hx of alcohol withdrawal, requiring intubation in march of this year  - per family, patient with worsening depression ? suicidal thoughts. Today, patient is confused but denying suicidal thoughts   - c/w CIWA monitoring, Ativan taper and PRN  - c/w high dose thiamine, folate  - CT abdomen and pelvis with diffuse hepatic steatosis   - Discussed with GI, holding off on steroids now\  - daily LFTs, INR, monitor and replete electrolytes. repeat AM lactate  - will call psychiatry once mental status improves  - CATCH team eval    Fall injury likely 2/2 Alcohol intoxication   -Trauma work up Negative  -CT Abdomen and Pelvis w/ IV Cont : Diffuse hepatic steatosis.  -CT Head No Cont: Stable scattered chronic lacunar infarcts.  - CXR clear   - EKG showed sinus tachycardia     HTN  H/O Palpitations  - monitor BP   - continue with Metoprolol tartrate 75mg BID    hx of hyperthyroidism -Continue with Methimazole 2.5mg OD    COPD - on RA , duonebs PRN, symbicort BID    Overall prognosis is guarded, high risk of decompensation, continue monitoring in SDU    Patient seen at bedside, total time spent to evaluate and treat the patient's acute illness and chronic medical conditions as well as time spent reviewing prior records, labs, radiology, documenting in electronic medical records,  discussing medical plan with   medical team was more than 51 minutes with >50% of time spent face to face with patient, discussing with patient/family as well as coordination of care

## 2024-09-22 NOTE — PROGRESS NOTE ADULT - SUBJECTIVE AND OBJECTIVE BOX
Patient is a 63y old  Male who presents with a chief complaint of       Over Night Events:  Remains confused. Afebrile. On room air.       ROS:  See HPI    PHYSICAL EXAM    ICU Vital Signs Last 24 Hrs  T(C): 36 (22 Sep 2024 08:00), Max: 36.9 (21 Sep 2024 16:02)  T(F): 96.8 (22 Sep 2024 08:00), Max: 98.4 (21 Sep 2024 16:02)  HR: 71 (22 Sep 2024 08:00) (67 - 88)  BP: 134/83 (22 Sep 2024 08:00) (134/83 - 153/90)  BP(mean): 104 (22 Sep 2024 08:00) (104 - 106)  ABP: --  ABP(mean): --  RR: 20 (22 Sep 2024 08:00) (16 - 20)  SpO2: 98% (22 Sep 2024 08:00) (96% - 98%)    O2 Parameters below as of 22 Sep 2024 04:00  Patient On (Oxygen Delivery Method): room air      General: NAD   HEENT: IRA             Lymphatic system: No cervical LN   Lungs: Bilateral BS  Cardiovascular: Regular   Gastrointestinal: Soft, Positive BS  Extremities: No clubbing.  Moves extremities.  Full Range of motion   Skin: Warm, intact  Neurological: No motor or sensory deficit; confused AOx2    09-21-24 @ 07:01  -  09-22-24 @ 07:00  --------------------------------------------------------  IN:  Total IN: 0 mL    OUT:    Voided (mL): 0 mL  Total OUT: 0 mL    Total NET: 0 mL          LABS:                            15.0   6.56  )-----------( 174      ( 22 Sep 2024 05:59 )             43.6                                               09-22    136  |  101  |  7[L]  ----------------------------<  102[H]  3.8   |  25  |  0.6[L]    Ca    8.3[L]      22 Sep 2024 05:59  Phos  2.7     09-21  Mg     1.3     09-21    TPro  5.7[L]  /  Alb  3.4[L]  /  TBili  4.8[H]  /  DBili  x   /  AST  235[H]  /  ALT  115[H]  /  AlkPhos  415[H]  09-22      PT/INR - ( 22 Sep 2024 05:59 )   PT: 11.90 sec;   INR: 1.04 ratio         PTT - ( 20 Sep 2024 17:41 )  PTT:31.3 sec                                       Urinalysis Basic - ( 22 Sep 2024 05:59 )    Color: x / Appearance: x / SG: x / pH: x  Gluc: 102 mg/dL / Ketone: x  / Bili: x / Urobili: x   Blood: x / Protein: x / Nitrite: x   Leuk Esterase: x / RBC: x / WBC x   Sq Epi: x / Non Sq Epi: x / Bacteria: x                                                  LIVER FUNCTIONS - ( 22 Sep 2024 05:59 )  Alb: 3.4 g/dL / Pro: 5.7 g/dL / ALK PHOS: 415 U/L / ALT: 115 U/L / AST: 235 U/L / GGT: x                                                                                                                                       MEDICATIONS  (STANDING):  chlorhexidine 2% Cloths 1 Application(s) Topical daily  enoxaparin Injectable 40 milliGRAM(s) SubCutaneous every 24 hours  fluticasone propionate/ salmeterol 100-50 MICROgram(s) Diskus 1 Dose(s) Inhalation two times a day  folic acid 1 milliGRAM(s) Oral daily  lactated ringers. 1000 milliLiter(s) (100 mL/Hr) IV Continuous <Continuous>  LORazepam   Injectable   IV Push   LORazepam   Injectable 2 milliGRAM(s) IV Push every 4 hours  LORazepam   Injectable 3 milliGRAM(s) IV Push every 4 hours  methimazole 2.5 milliGRAM(s) Oral daily  metoprolol tartrate 75 milliGRAM(s) Oral two times a day  multivitamin 1 Tablet(s) Oral daily  pantoprazole    Tablet 40 milliGRAM(s) Oral before breakfast  polyethylene glycol 3350 17 Gram(s) Oral two times a day  senna 2 Tablet(s) Oral at bedtime  thiamine 100 milliGRAM(s) Oral daily  thiamine IVPB 500 milliGRAM(s) IV Intermittent every 8 hours    MEDICATIONS  (PRN):  albuterol/ipratropium for Nebulization 3 milliLiter(s) Nebulizer every 6 hours PRN Shortness of Breath and/or Wheezing  aluminum hydroxide/magnesium hydroxide/simethicone Suspension 30 milliLiter(s) Oral every 4 hours PRN Dyspepsia  LORazepam   Injectable 2 milliGRAM(s) IV Push every 1 hour PRN Symptom-triggered: each CIWA -Ar score 8 or GREATER  melatonin 3 milliGRAM(s) Oral at bedtime PRN Insomnia      Xrays:                                                                                     ECHO

## 2024-09-22 NOTE — PROGRESS NOTE ADULT - SUBJECTIVE AND OBJECTIVE BOX
MATT MACDONALD  63y Male    CHIEF COMPLAINT:    Patient is a 63y old  Male who presents with a chief complaint of     INTERVAL HPI/OVERNIGHT EVENTS:    Patient seen and examined. No acute events overnight. this morning CIWA 6, less agitated    ROS: All other systems are negative.    Vital Signs:    T(F): 96.8 (24 @ 08:00), Max: 98.4 (24 @ 16:02)  HR: 71 (24 @ 08:00) (67 - 88)  BP: 134/83 (24 @ 08:00) (134/83 - 153/90)  RR: 20 (24 @ 08:00) (17 - 20)  SpO2: 98% (24 @ 08:00) (97% - 98%)  I&O's Summary    21 Sep 2024 07:01  -  22 Sep 2024 07:00  --------------------------------------------------------  IN: 0 mL / OUT: 0 mL / NET: 0 mL      Daily     Daily Weight in k.9 (22 Sep 2024 04:00)  CAPILLARY BLOOD GLUCOSE          PHYSICAL EXAM:    GENERAL:  NAD  SKIN: No rashes or lesions  HEENT: Atraumatic. Normocephalic. PERRL. Moist membranes.  NECK: Supple, No JVD. No lymphadenopathy.  PULMONARY: CTA B/L. No wheezing. No rales  CVS: Normal S1, S2. Rate and Rhythm are regular. No murmurs.  ABDOMEN/GI: Soft, Nontender, Nondistended; BS present  MSK:  No edema B/L LE.  NEUROLOGIC:  No motor or sensory deficit.  PSYCH: Alert & oriented x 3, normal affect    Consultant(s) Notes Reviewed:  [x ] YES  [ ] NO  Care Discussed with Consultants/Other Providers [ x] YES  [ ] NO    LABS:                        15.0   6.56  )-----------( 174      ( 22 Sep 2024 05:59 )             43.6         136  |  101  |  7[L]  ----------------------------<  102[H]  3.8   |  25  |  0.6[L]    Ca    8.3[L]      22 Sep 2024 05:59  Phos  2.7       Mg     1.3         TPro  5.7[L]  /  Alb  3.4[L]  /  TBili  4.8[H]  /  DBili  x   /  AST  235[H]  /  ALT  115[H]  /  AlkPhos  415[H]      PT/INR - ( 22 Sep 2024 05:59 )   PT: 11.90 sec;   INR: 1.04 ratio         PTT - ( 20 Sep 2024 17:41 )  PTT:31.3 sec          RADIOLOGY & ADDITIONAL TESTS:      Imaging or report Personally Reviewed:  [x] YES  [ ] NO  EKG reviewed: [x] YES  [ ] NO    Medications:  Standing  chlorhexidine 2% Cloths 1 Application(s) Topical daily  enoxaparin Injectable 40 milliGRAM(s) SubCutaneous every 24 hours  fluticasone propionate/ salmeterol 100-50 MICROgram(s) Diskus 1 Dose(s) Inhalation two times a day  folic acid 1 milliGRAM(s) Oral daily  lactated ringers. 1000 milliLiter(s) IV Continuous <Continuous>  LORazepam   Injectable 2 milliGRAM(s) IV Push every 4 hours  LORazepam   Injectable   IV Push   methimazole 2.5 milliGRAM(s) Oral daily  metoprolol tartrate 75 milliGRAM(s) Oral two times a day  multivitamin 1 Tablet(s) Oral daily  pantoprazole    Tablet 40 milliGRAM(s) Oral before breakfast  polyethylene glycol 3350 17 Gram(s) Oral two times a day  senna 2 Tablet(s) Oral at bedtime  thiamine 100 milliGRAM(s) Oral daily  thiamine IVPB 500 milliGRAM(s) IV Intermittent every 8 hours    PRN Meds  albuterol/ipratropium for Nebulization 3 milliLiter(s) Nebulizer every 6 hours PRN  aluminum hydroxide/magnesium hydroxide/simethicone Suspension 30 milliLiter(s) Oral every 4 hours PRN  LORazepam   Injectable 2 milliGRAM(s) IV Push every 1 hour PRN  melatonin 3 milliGRAM(s) Oral at bedtime PRN             MATT MACDONALD  63y Male    CHIEF COMPLAINT:    Patient is a 63y old  Male who presents with a chief complaint of     INTERVAL HPI/OVERNIGHT EVENTS:    Patient seen and examined. No acute events overnight. this morning CIWA 6, less agitated    ROS: All other systems are negative.    Vital Signs:    T(F): 96.8 (24 @ 08:00), Max: 98.4 (24 @ 16:02)  HR: 71 (24 @ 08:00) (67 - 88)  BP: 134/83 (24 @ 08:00) (134/83 - 153/90)  RR: 20 (24 @ 08:00) (17 - 20)  SpO2: 98% (24 @ 08:00) (97% - 98%)  I&O's Summary    21 Sep 2024 07:01  -  22 Sep 2024 07:00  --------------------------------------------------------  IN: 0 mL / OUT: 0 mL / NET: 0 mL    Daily Weight in k.9 (22 Sep 2024 04:00)  CAPILLARY BLOOD GLUCOSE    PHYSICAL EXAM:    GENERAL:  NAD chronically ill appearing   SKIN: No rashes or lesions  HEENT: Atraumatic. Normocephalic.   Moist membranes.  NECK: Supple, No JVD.   PULMONARY: CTA B/L. No wheezing. No rales  CVS: Normal S1, S2. Rate and Rhythm are regular.   ABDOMEN/GI: Soft, Nontender, Nondistended   MSK:  No clubbing or cyanosis   NEUROLOGIC: follows commands, moves all extermities   PSYCH: Alert & oriented x 2    Consultant(s) Notes Reviewed:  [x ] YES  [ ] NO  Care Discussed with Consultants/Other Providers [ x] YES  [ ] NO    LABS:                        15.0   6.56  )-----------( 174      ( 22 Sep 2024 05:59 )             43.6     136  |  101  |  7[L]  ----------------------------<  102[H]  3.8   |  25  |  0.6[L]    Ca    8.3[L]      22 Sep 2024 05:59  Phos  2.7       Mg     1.3         TPro  5.7[L]  /  Alb  3.4[L]  /  TBili  4.8[H]  /  DBili  x   /  AST  235[H]  /  ALT  115[H]  /  AlkPhos  415[H]      PT/INR - ( 22 Sep 2024 05:59 )   PT: 11.90 sec;   INR: 1.04 ratio       PTT - ( 20 Sep 2024 17:41 )  PTT:31.3 sec    RADIOLOGY & ADDITIONAL TESTS:  Imaging or report Personally Reviewed:  [x] YES  [ ] NO  EKG reviewed: [x] YES  [ ] NO    Medications:  Standing  chlorhexidine 2% Cloths 1 Application(s) Topical daily  enoxaparin Injectable 40 milliGRAM(s) SubCutaneous every 24 hours  fluticasone propionate/ salmeterol 100-50 MICROgram(s) Diskus 1 Dose(s) Inhalation two times a day  folic acid 1 milliGRAM(s) Oral daily  lactated ringers. 1000 milliLiter(s) IV Continuous <Continuous>  LORazepam   Injectable 2 milliGRAM(s) IV Push every 4 hours  LORazepam   Injectable   IV Push   methimazole 2.5 milliGRAM(s) Oral daily  metoprolol tartrate 75 milliGRAM(s) Oral two times a day  multivitamin 1 Tablet(s) Oral daily  pantoprazole    Tablet 40 milliGRAM(s) Oral before breakfast  polyethylene glycol 3350 17 Gram(s) Oral two times a day  senna 2 Tablet(s) Oral at bedtime  thiamine 100 milliGRAM(s) Oral daily  thiamine IVPB 500 milliGRAM(s) IV Intermittent every 8 hours    PRN Meds  albuterol/ipratropium for Nebulization 3 milliLiter(s) Nebulizer every 6 hours PRN  aluminum hydroxide/magnesium hydroxide/simethicone Suspension 30 milliLiter(s) Oral every 4 hours PRN  LORazepam   Injectable 2 milliGRAM(s) IV Push every 1 hour PRN  melatonin 3 milliGRAM(s) Oral at bedtime PRN

## 2024-09-22 NOTE — PROGRESS NOTE ADULT - SUBJECTIVE AND OBJECTIVE BOX
SUBJECTIVE/OVERNIGHT EVENTS  Today is hospital day 2d. This morning patient was seen and examined at bedside, resting comfortably in bed. No acute or major events overnight.    HOSPITAL COURSE  Day 1:   Day 2:   Day 3:     CODE STATUS:    FAMILY COMMUNICATION  Contact date:  Name of person contacted:  Relationship to patient:  Communication details:    MEDICATIONS  STANDING MEDICATIONS  enoxaparin Injectable 40 milliGRAM(s) SubCutaneous every 24 hours  fluticasone propionate/ salmeterol 100-50 MICROgram(s) Diskus 1 Dose(s) Inhalation two times a day  folic acid 1 milliGRAM(s) Oral daily  lactated ringers. 1000 milliLiter(s) IV Continuous <Continuous>  LORazepam   Injectable 2 milliGRAM(s) IV Push every 4 hours  LORazepam   Injectable 3 milliGRAM(s) IV Push every 4 hours  LORazepam   Injectable   IV Push   methimazole 2.5 milliGRAM(s) Oral daily  metoprolol tartrate 75 milliGRAM(s) Oral two times a day  multivitamin 1 Tablet(s) Oral daily  pantoprazole    Tablet 40 milliGRAM(s) Oral before breakfast  polyethylene glycol 3350 17 Gram(s) Oral two times a day  senna 2 Tablet(s) Oral at bedtime  thiamine 100 milliGRAM(s) Oral daily    PRN MEDICATIONS  albuterol/ipratropium for Nebulization 3 milliLiter(s) Nebulizer every 6 hours PRN  aluminum hydroxide/magnesium hydroxide/simethicone Suspension 30 milliLiter(s) Oral every 4 hours PRN  LORazepam   Injectable 2 milliGRAM(s) IV Push every 1 hour PRN  melatonin 3 milliGRAM(s) Oral at bedtime PRN    VITALS  T(F): 98.1 (09-22-24 @ 00:26), Max: 98.5 (09-21-24 @ 08:08)  HR: 86 (09-22-24 @ 00:26) (68 - 88)  BP: 143/88 (09-22-24 @ 00:26) (138/76 - 160/75)  RR: 17 (09-22-24 @ 00:26) (16 - 18)  SpO2: 98% (09-22-24 @ 00:26) (96% - 100%)    PHYSICAL EXAM  GENERAL  (  ) NAD, lying in bed comfortably     (  ) obtunded     (  ) lethargic     (  ) somnolent    HEAD  (  ) Atraumatic     (  ) hematoma     (  ) laceration (specify location:       )     NECK  (  ) Supple     (  ) neck stiffness     (  ) nuchal rigidity     (  )  no JVD     (  ) JVD present ( -- cm)    HEART  Rate -->  (  ) normal rate    (  ) bradycardic    (  ) tachycardic  Rhythm -->  (  ) regular    (  ) regularly irregular    (  ) irregularly irregular  Murmurs -->  (  ) normal s1/s2    (  ) systolic murmur    (  ) diastolic murmur    (  ) continuous murmur     (  ) S3 present    (  ) S4 present    LUNGS  (  )Unlabored respirations     (  ) tachypnea  (  ) B/L air entry     (  ) decreased breath sounds in:  (location     )    (  ) no adventitious sound     (  ) crackles     (  ) wheezing      (  ) rhonchi      (specify location:       )  (  ) chest wall tenderness (specify location:       )    ABDOMEN  (  ) Soft     (  ) tense   |   (  ) nondistended     (  ) distended   |   (  ) +BS     (  ) hypoactive bowel sounds     (  ) hyperactive bowel sounds  (  ) nontender     (  ) RUQ tenderness     (  ) RLQ tenderness     (  ) LLQ tenderness     (  ) epigastric tenderness     (  ) diffuse tenderness  (  ) Splenomegaly      (  ) Hepatomegaly      (  ) Jaundice     (  ) ecchymosis     EXTREMITIES  (  ) Normal     (  ) Rash     (  ) ecchymosis     (  ) varicose veins      (  ) pitting edema     (  ) non-pitting edema   (  ) ulceration     (  ) gangrene:     (location:     )    NERVOUS SYSTEM  (  ) A&Ox3     (  ) confused     (  ) lethargic  CN II-XII:     (  ) Intact     (  ) focal deficits  (Specify:     )   Upper extremities:     (  ) strength X/5     (  ) focal deficit (specify:    )  Lower extremities:     (  ) strength  X/5    (  ) focal deficit (specify:    )    SKIN  (  ) No rashes or lesions     (  ) maculopapular rash     (  ) pustules     (  ) vesicles     (  ) ulcer     (  ) ecchymosis     (specify location:     )    (  ) Indwelling Maria Catheter   Date insterted:    Reason (  ) Critical illness     (  ) urinary retention    (  ) Accurate Ins/Outs Monitoring     (  ) CMO patient    (  ) Central Line  Date inserted:  Location: (  ) Right IJ   (  ) Left IJ   (  ) Right Fem   (  ) Left Fem    (  ) SPC  (  ) pigtail  (  ) PEG tube  (  ) colostomy  (  ) jejunostomy  (  ) U-Dall    LABS             14.4   6.36  )-----------( 208      ( 09-21-24 @ 08:28 )             40.4     138  |  101  |  7   -------------------------<  136   09-21-24 @ 08:28  4.2  |  26  |  0.6    Ca      8.4     09-21-24 @ 08:28  Phos   2.7     09-21-24 @ 08:28  Mg     1.3     09-21-24 @ 08:28    TPro  5.8  /  Alb  3.2  /  TBili  4.3  /  DBili  x   /  AST  322  /  ALT  139  /  AlkPhos  451  /  GGT  x     09-21-24 @ 08:28    PT/INR - ( 09-20-24 @ 17:41 )   PT: 10.90 sec;   INR: 0.96 ratio  PTT - ( 09-20-24 @ 17:41 )  PTT:31.3 sec      Urinalysis Basic - ( 21 Sep 2024 08:28 )    Color: x / Appearance: x / SG: x / pH: x  Gluc: 136 mg/dL / Ketone: x  / Bili: x / Urobili: x   Blood: x / Protein: x / Nitrite: x   Leuk Esterase: x / RBC: x / WBC x   Sq Epi: x / Non Sq Epi: x / Bacteria: x          IMAGING

## 2024-09-22 NOTE — PROGRESS NOTE ADULT - ASSESSMENT
Assessment and Plan  He is a 63-year-old male patient with history of alcohol use disorder, alcoholic intoxication requiring intubation in March 2024, hypertension, COPD, hypothyroidism, anxiety, and insomnia who was brought to the ED on September 20 for evaluation of multiple falls, confusion, and tremulousness, found to have elevated serum alcohol levels and liver enzymes.  We are consulted for concern of alcoholic hepatitis.      Elevated LFTs: Mixed Pattern  Alcoholic hepatitis with an MDF-5.4  Severe hepatic steatosis with no evidence of cirrhosis or portal hypertension  Metabolic Encephalopathy: Likely Alcohol Withdrawal  * Social history: Has been drinking alcohol for more than 20 years; recently admitted in March for alcohol intoxication requiring intubation; continue to drink after discharge; has been drinking 1 bottle (750 mL) of whiskey every day for the last few months; last drink was September 20 a.m.  * Presentation: Brought for evaluation of frequent falls in the last week; associated with confusion of 1 day duration; tremulousness; restlessness; has been feeling down for few months (suicidal ideation per son); denies pain or nausea; 1 episode of nonbilious vomiting last week; no unintentional weight loss; review of systems positive for constipation with last bowel movement being a week ago  * Vitals: Blood pressure 146/87 mmHg; current heart rate 68 bpm (had sinus tachycardia before); no fever  * Labs: WBC 6.36, hemoglobin 14.4, platelet 208 on September 21, sodium 138, potassium 4-2, BUN 7, creatinine 0.6 on September 21  * LFTs: 3.6/49/49/174 on September 20-> 4.3/451/322/139 on September 21 -> 4.8/415/235/115 on 09/22 (versus 1.1/159/51/42 on March 14 during recent admission)  * No lipase  * INR 0.96 on September 20; lactate 6.3 and 5.3 on September 20-> 2.5 on September 21  * Serum alcohol 251 in September 20  * Previous hep C serology unremarkable in February 2024  * No previous ultrasound of the abdomen  * Previous CT abdomen with oral contrast on March 5, 2024 for constipation revealed hepatic steatosis with circumferential thickening along the distal descending colon to the rectum with colonic dilation up to 7 cm from the cecum to the splenic flexure, no transition point with LBO, right inguinal hernia with loops of bowels, prominent small bowel loops suggestive of ileus  * Current CT abdomen with IV contrast on September 20: Hepatic steatosis, unremarkable pancreas or spleen, moderate large right inguinal hernia with nonobstructing small bowel loops  * Refused previous colonoscopy; no prior EGD  * No family history of GI cancers or liver disease    RECOMMENDATIONS  - Trend LFTs and INR  - In the setting of suspected alcoholic hepatitis: MDF score was calculated (-5.4): Hold off steroids for now  - Follow-up right upper quadrant ultrasound ordered on September 21  - Follow-up acute hepatitis panel and ordered for September 22  - Trend electrolytes and replete as needed  - Avoid hepatotoxic agents: Avoid NSAIDs  - Counseled son about alcohol cessation.  Recommend inpatient rehab.  Catch and SBIRT teams on board  - Recommend psych evaluation in the setting of low mood/suicidal ideation both of which have been exacerbating alcohol intake in the last few months  - Continue CIWA protocol: Continue IV Ativan 2 mg every 1 hour as needed and Ativan taper as ordered  - Continue multivitamins and folate supplementation daily; switch p.o. thiamine 100 mg daily to IV thiamine 500 mg every 8 hours in the setting of confusion  - Monitor bowel movements and avoid constipation: Agree with lactulose 20 g every 6 hours, MiraLAX 17 g twice daily, and senna 2 tabs at bedtime in the setting of last bowel movement being last week  - Recommend starting p.o. Protonix 40 mg daily for GI prophylaxis  - Follow up with our [facility] located at [address] [place] [address], [facility]. Telephone No: [phone]      History of colonic distention/thickening and ileus in March 2024  Moderate to large right inguinal hernia with nonobstructive bowel  * Previous CT abdomen with oral contrast on March 5, 2024 for constipation revealed hepatic steatosis with circumferential thickening along the distal descending colon to the rectum with colonic dilation up to 7 cm from the cecum to the splenic flexure, no transition point with LBO, right inguinal hernia with loops of bowels, prominent small bowel loops suggestive of ileus  * Current CT abdomen with IV contrast on September 20: Hepatic steatosis, unremarkable pancreas or spleen, moderate large right inguinal hernia with nonobstructing small bowel loops  * Refused previous colonoscopy; no prior EGD  * No family history of GI cancers or liver disease    RECOMMENDATIONS  - Monitor bowel movements and avoid constipation as above  - Serial abdominal exam  - In case of worsening distention or constipation, recommend checking KUB  - Would ideally benefit from colonoscopy  - Discussed the above recommendation with the son at bedside.  Per son, the patient has been refusing to go for colonoscopy for years  - If becomes agreeable, would recommend outpatient follow-up at [facility] located at [address] [place] [address]. Phone Number: [phone]        Thank you for your consult.  - Please note that plan was communicated with medical team.   - Please reach GI on [phone] during weekdays till 5pm.  - Please call the GI service line after 5pm on Weekdays and anytime on Weekends: [phone].      Lux Abdalla MD  PGY - 5 Gastroenterology Fellow   [facility]

## 2024-09-23 DIAGNOSIS — E51.2 WERNICKE'S ENCEPHALOPATHY: ICD-10-CM

## 2024-09-23 DIAGNOSIS — F10.239 ALCOHOL DEPENDENCE WITH WITHDRAWAL, UNSPECIFIED: ICD-10-CM

## 2024-09-23 DIAGNOSIS — F17.200 NICOTINE DEPENDENCE, UNSPECIFIED, UNCOMPLICATED: ICD-10-CM

## 2024-09-23 DIAGNOSIS — K76.0 FATTY (CHANGE OF) LIVER, NOT ELSEWHERE CLASSIFIED: ICD-10-CM

## 2024-09-23 LAB
ALBUMIN SERPL ELPH-MCNC: 3.3 G/DL — LOW (ref 3.5–5.2)
ALP SERPL-CCNC: 374 U/L — HIGH (ref 30–115)
ALT FLD-CCNC: 99 U/L — HIGH (ref 0–41)
ANION GAP SERPL CALC-SCNC: 11 MMOL/L — SIGNIFICANT CHANGE UP (ref 7–14)
AST SERPL-CCNC: 216 U/L — HIGH (ref 0–41)
BASOPHILS # BLD AUTO: 0.06 K/UL — SIGNIFICANT CHANGE UP (ref 0–0.2)
BASOPHILS NFR BLD AUTO: 0.9 % — SIGNIFICANT CHANGE UP (ref 0–1)
BILIRUB SERPL-MCNC: 4.3 MG/DL — HIGH (ref 0.2–1.2)
BUN SERPL-MCNC: 9 MG/DL — LOW (ref 10–20)
CALCIUM SERPL-MCNC: 8.2 MG/DL — LOW (ref 8.4–10.5)
CHLORIDE SERPL-SCNC: 105 MMOL/L — SIGNIFICANT CHANGE UP (ref 98–110)
CO2 SERPL-SCNC: 24 MMOL/L — SIGNIFICANT CHANGE UP (ref 17–32)
CREAT SERPL-MCNC: 0.7 MG/DL — SIGNIFICANT CHANGE UP (ref 0.7–1.5)
EGFR: 104 ML/MIN/1.73M2 — SIGNIFICANT CHANGE UP
EOSINOPHIL # BLD AUTO: 0.12 K/UL — SIGNIFICANT CHANGE UP (ref 0–0.7)
EOSINOPHIL NFR BLD AUTO: 1.8 % — SIGNIFICANT CHANGE UP (ref 0–8)
GLUCOSE SERPL-MCNC: 82 MG/DL — SIGNIFICANT CHANGE UP (ref 70–99)
HAV IGM SER-ACNC: SIGNIFICANT CHANGE UP
HBV CORE IGM SER-ACNC: SIGNIFICANT CHANGE UP
HBV SURFACE AG SER-ACNC: SIGNIFICANT CHANGE UP
HCT VFR BLD CALC: 41.2 % — LOW (ref 42–52)
HCV AB S/CO SERPL IA: 0.1 S/CO — SIGNIFICANT CHANGE UP (ref 0–0.99)
HCV AB SERPL-IMP: SIGNIFICANT CHANGE UP
HGB BLD-MCNC: 14.2 G/DL — SIGNIFICANT CHANGE UP (ref 14–18)
IMM GRANULOCYTES NFR BLD AUTO: 1.1 % — HIGH (ref 0.1–0.3)
LYMPHOCYTES # BLD AUTO: 1.84 K/UL — SIGNIFICANT CHANGE UP (ref 1.2–3.4)
LYMPHOCYTES # BLD AUTO: 28 % — SIGNIFICANT CHANGE UP (ref 20.5–51.1)
MAGNESIUM SERPL-MCNC: 1.5 MG/DL — LOW (ref 1.8–2.4)
MCHC RBC-ENTMCNC: 34.5 G/DL — SIGNIFICANT CHANGE UP (ref 32–37)
MCHC RBC-ENTMCNC: 35 PG — HIGH (ref 27–31)
MCV RBC AUTO: 101.5 FL — HIGH (ref 80–94)
MONOCYTES # BLD AUTO: 0.44 K/UL — SIGNIFICANT CHANGE UP (ref 0.1–0.6)
MONOCYTES NFR BLD AUTO: 6.7 % — SIGNIFICANT CHANGE UP (ref 1.7–9.3)
NEUTROPHILS # BLD AUTO: 4.04 K/UL — SIGNIFICANT CHANGE UP (ref 1.4–6.5)
NEUTROPHILS NFR BLD AUTO: 61.5 % — SIGNIFICANT CHANGE UP (ref 42.2–75.2)
NRBC # BLD: 0 /100 WBCS — SIGNIFICANT CHANGE UP (ref 0–0)
PHOSPHATE SERPL-MCNC: 3.3 MG/DL — SIGNIFICANT CHANGE UP (ref 2.1–4.9)
PLATELET # BLD AUTO: 160 K/UL — SIGNIFICANT CHANGE UP (ref 130–400)
PMV BLD: 11.7 FL — HIGH (ref 7.4–10.4)
POTASSIUM SERPL-MCNC: 4.5 MMOL/L — SIGNIFICANT CHANGE UP (ref 3.5–5)
POTASSIUM SERPL-SCNC: 4.5 MMOL/L — SIGNIFICANT CHANGE UP (ref 3.5–5)
PROT SERPL-MCNC: 5.5 G/DL — LOW (ref 6–8)
RBC # BLD: 4.06 M/UL — LOW (ref 4.7–6.1)
RBC # FLD: 14 % — SIGNIFICANT CHANGE UP (ref 11.5–14.5)
SODIUM SERPL-SCNC: 140 MMOL/L — SIGNIFICANT CHANGE UP (ref 135–146)
WBC # BLD: 6.57 K/UL — SIGNIFICANT CHANGE UP (ref 4.8–10.8)
WBC # FLD AUTO: 6.57 K/UL — SIGNIFICANT CHANGE UP (ref 4.8–10.8)

## 2024-09-23 PROCEDURE — 99233 SBSQ HOSP IP/OBS HIGH 50: CPT

## 2024-09-23 PROCEDURE — 99222 1ST HOSP IP/OBS MODERATE 55: CPT

## 2024-09-23 RX ORDER — THIAMINE HYDROCHLORIDE 100 MG/ML
500 INJECTION, SOLUTION INTRAMUSCULAR; INTRAVENOUS EVERY 8 HOURS
Refills: 0 | Status: DISCONTINUED | OUTPATIENT
Start: 2024-09-23 | End: 2024-09-26

## 2024-09-23 RX ORDER — MAGNESIUM SULFATE 500 MG/ML
2 VIAL (ML) INJECTION ONCE
Refills: 0 | Status: COMPLETED | OUTPATIENT
Start: 2024-09-23 | End: 2024-09-23

## 2024-09-23 RX ADMIN — FOLIC ACID 1 MILLIGRAM(S): 1 TABLET ORAL at 11:02

## 2024-09-23 RX ADMIN — Medication 17 GRAM(S): at 18:31

## 2024-09-23 RX ADMIN — Medication 75 MILLIGRAM(S): at 18:32

## 2024-09-23 RX ADMIN — Medication 2 TABLET(S): at 21:06

## 2024-09-23 RX ADMIN — CHLORHEXIDINE GLUCONATE ORAL RINSE 1 APPLICATION(S): 1.2 SOLUTION DENTAL at 11:04

## 2024-09-23 RX ADMIN — Medication 25 GRAM(S): at 09:01

## 2024-09-23 RX ADMIN — THIAMINE HYDROCHLORIDE 105 MILLIGRAM(S): 100 INJECTION, SOLUTION INTRAMUSCULAR; INTRAVENOUS at 05:40

## 2024-09-23 RX ADMIN — MULTI VITAMIN/MINERAL SUPPLEMENT WITH ASCORBIC ACID, NIACIN, PYRIDOXINE, PANTOTHENIC ACID, FOLIC ACID, RIBOFLAVIN, THIAMIN, BIOTIN, COBALAMIN AND ZINC. 1 TABLET(S): 60; 20; 12.5; 10; 10; 1.7; 1.5; 1; .3; .006 TABLET, COATED ORAL at 11:02

## 2024-09-23 RX ADMIN — ENOXAPARIN SODIUM 40 MILLIGRAM(S): 150 INJECTION SUBCUTANEOUS at 10:22

## 2024-09-23 RX ADMIN — THIAMINE HYDROCHLORIDE 105 MILLIGRAM(S): 100 INJECTION, SOLUTION INTRAMUSCULAR; INTRAVENOUS at 15:47

## 2024-09-23 RX ADMIN — Medication 75 MILLIGRAM(S): at 05:41

## 2024-09-23 RX ADMIN — Medication 1 MILLIGRAM(S): at 21:06

## 2024-09-23 RX ADMIN — PANTOPRAZOLE SODIUM 40 MILLIGRAM(S): 40 TABLET, DELAYED RELEASE ORAL at 05:41

## 2024-09-23 RX ADMIN — THIAMINE HYDROCHLORIDE 105 MILLIGRAM(S): 100 INJECTION, SOLUTION INTRAMUSCULAR; INTRAVENOUS at 22:08

## 2024-09-23 RX ADMIN — Medication 2 MILLIGRAM(S): at 03:05

## 2024-09-23 RX ADMIN — Medication 1 MILLIGRAM(S): at 18:30

## 2024-09-23 RX ADMIN — Medication 2 MILLIGRAM(S): at 05:41

## 2024-09-23 RX ADMIN — Medication 2 MILLIGRAM(S): at 10:21

## 2024-09-23 RX ADMIN — Medication 17 GRAM(S): at 05:40

## 2024-09-23 RX ADMIN — THIAMINE HYDROCHLORIDE 100 MILLIGRAM(S): 100 INJECTION, SOLUTION INTRAMUSCULAR; INTRAVENOUS at 11:03

## 2024-09-23 RX ADMIN — Medication 1 MILLIGRAM(S): at 14:49

## 2024-09-23 NOTE — PROGRESS NOTE ADULT - ASSESSMENT
63 years old male history of alcohol abuse, hypertension, COPD, hyperthyroidism, anxiety, insomnia presented top the ED for the evaluation of frequent falls and alcohol intoxication    alcohol withdrawal with a history of ETOH abuse  Alcoholic hepatitis  Suspected folate and thiamine deficiency   Elevated lactate - imp[roved  -pt has severe hx of alcohol withdrawal, requiring intubation in march of this year  - per family, patient with worsening depression ? suicidal thoughts. Today, patient is less confused   - c/w CIWA monitoring, Ativan taper and PRN  - c/w   thiamine, folate  - CT abdomen and pelvis with diffuse hepatic steatosis   - Discussed with GI, holding off on steroids now  - daily LFTs, INR, monitor and replete electrolytes. repeat AM lactate  - psychiatry eval   - CATCH team eval    Fall injury likely 2/2 Alcohol intoxication   -Trauma work up Negative  -CT Abdomen and Pelvis w/ IV Cont : Diffuse hepatic steatosis.  -CT Head No Cont: Stable scattered chronic lacunar infarcts.  - CXR clear   - EKG showed sinus tachycardia     HTN  H/O Palpitations  - monitor BP   - continue with Metoprolol tartrate 75mg BID    hx of hyperthyroidism -Continue with Methimazole 2.5mg OD, TFTs reviewed    COPD - on RA , duonebs PRN, symbicort BID    Overall prognosis is guarded, high risk of decompensation  Pending: CIWA/Ativan taper, catch, psych, labs, monitor electrolytes and replete PRN    Patient seen at bedside, total time spent to evaluate and treat the patient's acute illness and chronic medical conditions as well as time spent reviewing prior records, labs, radiology, documenting in electronic medical records,  discussing medical plan with   medical/critical care team was more than 50 minutes with >50% of time spent face to face with patient, discussing with patient/family as well as coordination of care

## 2024-09-23 NOTE — PHYSICAL THERAPY INITIAL EVALUATION ADULT - GAIT TRAINING, PT EVAL
Pt will ambulate using RW or least restrictive AD for 100 ft with CGA by discharge to facilitate return to PLOF. Pt will negotiate 12 steps using 1 HR under CGA

## 2024-09-23 NOTE — PHYSICAL THERAPY INITIAL EVALUATION ADULT - ADDITIONAL COMMENTS
Pt is unreliable historian. Pt stated to resides alone in 2nd floor apt using 2 flight of stairs to enter, able to ambulate using cane/ RW at baseline.

## 2024-09-23 NOTE — PROGRESS NOTE ADULT - ASSESSMENT
The patient is a 63-year-old male with past medical history of alcohol abuse, hypertension, COPD, hyperthyroidism, anxiety, insomnia who presented to the ED for the evaluation of frequent falls and alcohol intoxication.     #Alcohol withdrawal  #Diffuse Hepatic Steatosis  #Lactic Acidosis - Resolved  - HO alcohol abuse and withdrawals.  - CT abdomen and pelvis with diffuse hepatic steatosis   - Per son Anthony; the patient's functional capacity has been declining, getting more depressed with suicide attempts.  - On high dose thiamine, will continue 500mg IV q8 for 3 days then 250mg IV for 5 days.  - Continue CIWA monitoring, Ativan taper and PRN  - Addiction team consult note reviewed.  - Psychiatry eval pending more adequate mental status.    #Fall injury  - HO frequent falls  - Likely due to alcohol intoxication   - CT Head No Cont: Stable scattered chronic lacunar infarcts.  - Trauma work up Negative. No need for any acute intervention.    #HTN  - BP has been within normal limits.  - Flowsheet reviewed.  - Not on home meds.    #H/O Palpitations  - Continue with Metoprolol tartrate 75mg BID    #Hyperthyroidism  -3/24: TSH 0.52, fT4 0.7, T3 84  -F/U TFT  -Continue with Methimazole 2.5mg OD    #COPD  - on RA   - Duonebs PRN, Symbicort BID      Pending: Psych eval.

## 2024-09-23 NOTE — PROGRESS NOTE ADULT - SUBJECTIVE AND OBJECTIVE BOX
SUBJECTIVE/OVERNIGHT EVENTS  Today is hospital day 3d. This morning patient was seen and examined at bedside, resting comfortably in bed. No acute or major events overnight.    MEDICATIONS  STANDING MEDICATIONS  chlorhexidine 2% Cloths 1 Application(s) Topical daily  enoxaparin Injectable 40 milliGRAM(s) SubCutaneous every 24 hours  fluticasone propionate/ salmeterol 100-50 MICROgram(s) Diskus 1 Dose(s) Inhalation two times a day  folic acid 1 milliGRAM(s) Oral daily  lactated ringers. 1000 milliLiter(s) IV Continuous <Continuous>  LORazepam   Injectable 1 milliGRAM(s) IV Push every 4 hours  LORazepam   Injectable   IV Push   methimazole 2.5 milliGRAM(s) Oral daily  metoprolol tartrate 75 milliGRAM(s) Oral two times a day  multivitamin 1 Tablet(s) Oral daily  pantoprazole    Tablet 40 milliGRAM(s) Oral before breakfast  polyethylene glycol 3350 17 Gram(s) Oral two times a day  senna 2 Tablet(s) Oral at bedtime  thiamine 100 milliGRAM(s) Oral daily    PRN MEDICATIONS  albuterol/ipratropium for Nebulization 3 milliLiter(s) Nebulizer every 6 hours PRN  aluminum hydroxide/magnesium hydroxide/simethicone Suspension 30 milliLiter(s) Oral every 4 hours PRN  LORazepam   Injectable 2 milliGRAM(s) IV Push every 1 hour PRN  melatonin 3 milliGRAM(s) Oral at bedtime PRN    VITALS  T(F): 97.9 (09-23-24 @ 07:52), Max: 98.2 (09-22-24 @ 12:00)  HR: 65 (09-23-24 @ 07:52) (65 - 70)  BP: 114/80 (09-23-24 @ 07:52) (114/80 - 131/77)  RR: 20 (09-23-24 @ 07:52) (18 - 20)  SpO2: 99% (09-23-24 @ 07:52) (96% - 100%)    PHYSICAL EXAM  GENERAL: NAD, mental status is slow but adequate.  HEART: Normal  LUNGS: Normal  ABDOMEN: Normal  EXTREMITIES: Normal  NERVOUS SYSTEM: AO4, patient's mental status fluctuates between normal and delirium  SKIN: Normal    LABS             14.2   6.57  )-----------( 160      ( 09-23-24 @ 06:24 )             41.2     140  |  105  |  9   -------------------------<  82   09-23-24 @ 06:24  4.5  |  24  |  0.7    Ca      8.2     09-23-24 @ 06:24  Phos   3.3     09-23-24 @ 10:07  Mg     1.5     09-23-24 @ 10:07    TPro  5.5  /  Alb  3.3  /  TBili  4.3  /  DBili  x   /  AST  216  /  ALT  99  /  AlkPhos  374  /  GGT  x     09-23-24 @ 06:24    PT/INR - ( 09-22-24 @ 05:59 )   PT: 11.90 sec;   INR: 1.04 ratio      Urinalysis Basic - ( 23 Sep 2024 06:24 )    Color: x / Appearance: x / SG: x / pH: x  Gluc: 82 mg/dL / Ketone: x  / Bili: x / Urobili: x   Blood: x / Protein: x / Nitrite: x   Leuk Esterase: x / RBC: x / WBC x   Sq Epi: x / Non Sq Epi: x / Bacteria: x          IMAGING

## 2024-09-23 NOTE — PROGRESS NOTE ADULT - ASSESSMENT
Assessment and Plan  He is a 63-year-old male patient with history of alcohol use disorder, alcoholic intoxication requiring intubation in March 2024, hypertension, COPD, hypothyroidism, anxiety, and insomnia who was brought to the ED on September 20 for evaluation of multiple falls, confusion, and tremulousness, found to have elevated serum alcohol levels and liver enzymes.  We are consulted for concern of alcoholic hepatitis.      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  * Presentation: Brought for evaluation of frequent falls in the last week; associated with confusion of 1 day duration; tremulousness; restlessness; has been feeling down for few months (suicidal ideation per son); denies pain or nausea; 1 episode of nonbilious vomiting last week; no unintentional weight loss; review of systems positive for constipation with last bowel movement being a week ago  * Vitals: Blood pressure 146/87 mmHg; current heart rate 68 bpm (had sinus tachycardia before); no fever  * Labs: WBC 6.36, hemoglobin 14.4, platelet 208 on September 21, sodium 138, potassium 4-2, BUN 7, creatinine 0.6 on September 21  * LFTs: 3.6/49/49/174 on September 20-> 4.3/451/322/139 on September 21 -> 4.8/415/235/115 on 09/22-> 4.3/374/216/99 on 09/23 (versus 1.1/159/51/42 on March 14 during recent admission)  * No lipase  * INR 0.96 on September 20; lactate 6.3 and 5.3 on September 20-> 2.5 on September 21  * Serum alcohol 251 in September 20  * Previous hep C serology unremarkable in February 2024  * No previous ultrasound of the abdomen  * Previous CT abdomen with oral contrast on March 5, 2024 for constipation revealed hepatic steatosis with circumferential thickening along the distal descending colon to the rectum with colonic dilation up to 7 cm from the cecum to the splenic flexure, no transition point with LBO, right inguinal hernia with loops of bowels, prominent small bowel loops suggestive of ileus  * Current CT abdomen with IV contrast on September 20: Hepatic steatosis, unremarkable pancreas or spleen, moderate large right inguinal hernia with nonobstructing small bowel loops  * RUQ US 09/22 poor exam; hepatic steatosis; no ascites  * Refused previous colonoscopy; no prior EGD  * No family history of GI cancers or liver disease    RECOMMENDATIONS  - Trend LFTs and INR  - In the setting of suspected alcoholic hepatitis: MDF score was calculated (-5.4): Hold off steroids for now  - Follow-up acute hepatitis panel received on September 22  - Trend electrolytes and replete as needed  - Avoid hepatotoxic agents: Avoid NSAIDs  - Counseled son about alcohol cessation.  Consider rehab.  Catch and SBIRT teams on board  - Recommend psych evaluation in the setting of low mood/suicidal ideation both of which have been exacerbating alcohol intake in the last few months  - Continue CIWA protocol: Continue IV Ativan 2 mg every 1 hour as needed and Ativan taper as ordered  - Continue multivitamins and folate supplementation daily; switched p.o. thiamine 100 mg daily to IV thiamine 500 mg every 8 hours on 09/22 in the setting of confusion (would continue for 72 hours)  - Monitor bowel movements and avoid constipation: Agree with lactulose 20 g every 6 hours, MiraLAX 17 g twice daily, and senna 2 tabs at bedtime in the setting of last bowel movement being last week  - Recommend starting p.o. Protonix 40 mg daily for GI prophylaxis  - Follow up with our [facility] located at [address] [place] [address], [facility]. Telephone No: [phone]      History of colonic distention/thickening and ileus in March 2024  Moderate to large right inguinal hernia with nonobstructive bowel  * Previous CT abdomen with oral contrast on March 5, 2024 for constipation revealed hepatic steatosis with circumferential thickening along the distal descending colon to the rectum with colonic dilation up to 7 cm from the cecum to the splenic flexure, no transition point with LBO, right inguinal hernia with loops of bowels, prominent small bowel loops suggestive of ileus  * Current CT abdomen with IV contrast on September 20: Hepatic steatosis, unremarkable pancreas or spleen, moderate large right inguinal hernia with nonobstructing small bowel loops  * Refused previous colonoscopy; no prior EGD  * No family history of GI cancers or liver disease    RECOMMENDATIONS  - Monitor bowel movements and avoid constipation as above  - Serial abdominal exam  - In case of worsening distention or constipation, recommend checking KUB  - Would ideally benefit from colonoscopy  - Discussed the above recommendation with the son at bedside.  Per son, the patient has been refusing to go for colonoscopy for years  - If becomes agreeable, would recommend outpatient follow-up at the [facility] located at [address] [place] [address]. Phone Number: [phone]        Thank you for your consult.  - Please note that plan was communicated with medical team.   - Please reach GI on [phone] during weekdays till 5pm.  - Please call the GI service line after 5pm on Weekdays and anytime on Weekends: [phone].      Lux Abdalla MD  PGY - 5 Gastroenterology Fellow   [facility] Assessment and Plan  He is a 63-year-old male patient with history of alcohol use disorder, alcoholic intoxication requiring intubation in March 2024, hypertension, COPD, hypothyroidism, anxiety, and insomnia who was brought to the ED on September 20 for evaluation of multiple falls, confusion, and tremulousness, found to have elevated serum alcohol levels and liver enzymes.  We are consulted for concern of alcoholic hepatitis.      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  * Presentation: Brought for evaluation of frequent falls in the last week; associated with confusion of 1 day duration; tremulousness; restlessness; has been feeling down for few months (suicidal ideation per son); denies pain or nausea; 1 episode of nonbilious vomiting last week; no unintentional weight loss; review of systems positive for constipation with last bowel movement being a week ago  * Vitals: Blood pressure 146/87 mmHg; current heart rate 68 bpm (had sinus tachycardia before); no fever  * Labs: WBC 6.36, hemoglobin 14.4, platelet 208 on September 21, sodium 138, potassium 4-2, BUN 7, creatinine 0.6 on September 21  * LFTs: 3.6/49/49/174 on September 20-> 4.3/451/322/139 on September 21 -> 4.8/415/235/115 on 09/22-> 4.3/374/216/99 on 09/23 (versus 1.1/159/51/42 on March 14 during recent admission)  * No lipase  * INR 0.96 on September 20; lactate 6.3 and 5.3 on September 20-> 2.5 on September 21  * Serum alcohol 251 in September 20  * Previous hep C serology unremarkable in February 2024  * No previous ultrasound of the abdomen  * Previous CT abdomen with oral contrast on March 5, 2024 for constipation revealed hepatic steatosis with circumferential thickening along the distal descending colon to the rectum with colonic dilation up to 7 cm from the cecum to the splenic flexure, no transition point with LBO, right inguinal hernia with loops of bowels, prominent small bowel loops suggestive of ileus  * Current CT abdomen with IV contrast on September 20: Hepatic steatosis, unremarkable pancreas or spleen, moderate large right inguinal hernia with nonobstructing small bowel loops  * RUQ US 09/22 poor exam; hepatic steatosis; no ascites  * Refused previous colonoscopy; no prior EGD  * No family history of GI cancers or liver disease    RECOMMENDATIONS  - Trend LFTs and INR  - In the setting of suspected alcoholic hepatitis: MDF score was calculated (-5.4): Hold off steroids for now  - Follow-up acute hepatitis panel received on September 22  - Check RENÉ, AMA, anti-Smooth Muscle Ab type 1, Anti liver-kidney microsomal Ab, Anti-soluble liver Ag, immunoglobulin panel, ceruloplasmin, A1AT phenotype  - Trend electrolytes and replete as needed  - Avoid hepatotoxic agents: Avoid NSAIDs  - Counseled son about alcohol cessation.  Consider rehab.  Catch and SBIRT teams on board  - Recommend psych evaluation in the setting of low mood/suicidal ideation both of which have been exacerbating alcohol intake in the last few months  - Continue CIWA protocol: Continue IV Ativan 2 mg every 1 hour as needed and Ativan taper as ordered  - Continue multivitamins and folate supplementation daily; switched p.o. thiamine 100 mg daily to IV thiamine 500 mg every 8 hours on 09/22 in the setting of confusion (would continue for 72 hours)  - Monitor bowel movements and avoid constipation: Agree with lactulose 20 g every 6 hours, MiraLAX 17 g twice daily, and senna 2 tabs at bedtime in the setting of last bowel movement being last week  - Recommend starting p.o. Protonix 40 mg daily for GI prophylaxis  - Follow up with our [facility] located at [address] [place] [address], [id number]. Telephone No: [phone]      History of colonic distention/thickening and ileus in March 2024  Moderate to large right inguinal hernia with nonobstructive bowel  * Previous CT abdomen with oral contrast on March 5, 2024 for constipation revealed hepatic steatosis with circumferential thickening along the distal descending colon to the rectum with colonic dilation up to 7 cm from the cecum to the splenic flexure, no transition point with LBO, right inguinal hernia with loops of bowels, prominent small bowel loops suggestive of ileus  * Current CT abdomen with IV contrast on September 20: Hepatic steatosis, unremarkable pancreas or spleen, moderate large right inguinal hernia with nonobstructing small bowel loops  * Refused previous colonoscopy; no prior EGD  * No family history of GI cancers or liver disease    RECOMMENDATIONS  - Monitor bowel movements and avoid constipation as above  - Serial abdominal exam  - In case of worsening distention or constipation, recommend checking KUB  - Would ideally benefit from colonoscopy  - Discussed the above recommendation with the son at bedside.  Per son, the patient has been refusing to go for colonoscopy for years  - If becomes agreeable, would recommend outpatient follow-up at [facility] located at [address] [place] [address]. Phone Number: [phone]        Thank you for your consult.  - Please note that plan was communicated with medical team.   - Please reach GI on [phone] during weekdays till 5pm.  - Please call the GI service line after 5pm on Weekdays and anytime on Weekends: [phone].      Lux Abdalla MD  PGY - 5 Gastroenterology Fellow   [facility]

## 2024-09-23 NOTE — PHYSICAL THERAPY INITIAL EVALUATION ADULT - PERTINENT HX OF CURRENT PROBLEM, REHAB EVAL
63 years old male history of alcohol abuse, hypertension, COPD, hyperthyroidism, anxiety, insomnia presented top the ED for the evaluation of frequent falls and alcohol intoxication

## 2024-09-23 NOTE — PROGRESS NOTE ADULT - SUBJECTIVE AND OBJECTIVE BOX
Over Night Events: events noted, on RA, BH, hepato noted, afebrile    PHYSICAL EXAM    ICU Vital Signs Last 24 Hrs  T(C): 36.7 (23 Sep 2024 04:01), Max: 36.8 (22 Sep 2024 12:00)  T(F): 98.1 (23 Sep 2024 04:01), Max: 98.2 (22 Sep 2024 12:00)  HR: 70 (23 Sep 2024 04:01) (67 - 71)  BP: 121/75 (23 Sep 2024 04:01) (119/74 - 134/83)  BP(mean): 104 (22 Sep 2024 08:00) (104 - 104)  RR: 18 (23 Sep 2024 04:01) (18 - 20)  SpO2: 96% (23 Sep 2024 04:01) (96% - 100%)    O2 Parameters below as of 22 Sep 2024 21:00  Patient On (Oxygen Delivery Method): room air            General: ill looking  icteric sclera  Lungs: dec bs both bases  Cardiovascular: Regular   Abdomen: Soft, Positive BS  Extremities: No clubbing   Neurological: Non focal       09-21-24 @ 07:01  -  09-22-24 @ 07:00  --------------------------------------------------------  IN:  Total IN: 0 mL    OUT:    Voided (mL): 0 mL  Total OUT: 0 mL    Total NET: 0 mL          LABS:                          15.0   6.56  )-----------( 174      ( 22 Sep 2024 05:59 )             43.6                                               09-22    136  |  101  |  7[L]  ----------------------------<  102[H]  3.8   |  25  |  0.6[L]    Ca    8.3[L]      22 Sep 2024 05:59  Phos  2.7     09-21  Mg     1.3     09-22    TPro  5.7[L]  /  Alb  3.4[L]  /  TBili  4.8[H]  /  DBili  x   /  AST  235[H]  /  ALT  115[H]  /  AlkPhos  415[H]  09-22      PT/INR - ( 22 Sep 2024 05:59 )   PT: 11.90 sec;   INR: 1.04 ratio                                                Urinalysis Basic - ( 22 Sep 2024 05:59 )    Color: x / Appearance: x / SG: x / pH: x  Gluc: 102 mg/dL / Ketone: x  / Bili: x / Urobili: x   Blood: x / Protein: x / Nitrite: x   Leuk Esterase: x / RBC: x / WBC x   Sq Epi: x / Non Sq Epi: x / Bacteria: x                                                  LIVER FUNCTIONS - ( 22 Sep 2024 05:59 )  Alb: 3.4 g/dL / Pro: 5.7 g/dL / ALK PHOS: 415 U/L / ALT: 115 U/L / AST: 235 U/L / GGT: x                                                                                                                                       MEDICATIONS  (STANDING):  chlorhexidine 2% Cloths 1 Application(s) Topical daily  enoxaparin Injectable 40 milliGRAM(s) SubCutaneous every 24 hours  fluticasone propionate/ salmeterol 100-50 MICROgram(s) Diskus 1 Dose(s) Inhalation two times a day  folic acid 1 milliGRAM(s) Oral daily  lactated ringers. 1000 milliLiter(s) (100 mL/Hr) IV Continuous <Continuous>  LORazepam   Injectable 1 milliGRAM(s) IV Push every 4 hours  LORazepam   Injectable 2 milliGRAM(s) IV Push every 4 hours  LORazepam   Injectable   IV Push   methimazole 2.5 milliGRAM(s) Oral daily  metoprolol tartrate 75 milliGRAM(s) Oral two times a day  multivitamin 1 Tablet(s) Oral daily  pantoprazole    Tablet 40 milliGRAM(s) Oral before breakfast  polyethylene glycol 3350 17 Gram(s) Oral two times a day  senna 2 Tablet(s) Oral at bedtime  thiamine 100 milliGRAM(s) Oral daily  thiamine IVPB 500 milliGRAM(s) IV Intermittent every 8 hours    MEDICATIONS  (PRN):  albuterol/ipratropium for Nebulization 3 milliLiter(s) Nebulizer every 6 hours PRN Shortness of Breath and/or Wheezing  aluminum hydroxide/magnesium hydroxide/simethicone Suspension 30 milliLiter(s) Oral every 4 hours PRN Dyspepsia  LORazepam   Injectable 2 milliGRAM(s) IV Push every 1 hour PRN Symptom-triggered: each CIWA -Ar score 8 or GREATER  melatonin 3 milliGRAM(s) Oral at bedtime PRN Insomnia      cxr/ ct noted

## 2024-09-23 NOTE — PROGRESS NOTE ADULT - ASSESSMENT
IMPRESSION:    Alcoholic hepatitis   ETOH withdrawal   Possible wernicke's encephalopathy   ETOH abuse  Frequent falls SP trauma work up  HO COPD  HO hyperthyroidism     PLAN:    CNS: CIWA monitoring. thiamine. Folate. CTH noted. CW benzo taper given history of severe withdrawal.      HEENT: Oral care    PULMONARY:  HOB @ 45 degrees. On room air. aspiration precaution    CARDIOVASCULAR:  CC/H, Trend lactate    GI: GI prophylaxis. Check coags STAT. Bilirubin and LFTs elevated. Hepatology following.  Trend LFT.    RENAL:  Follow up lytes.  Correct as needed    INFECTIOUS DISEASE: Monitor VS off abx.     HEMATOLOGICAL:  DVT prophylaxis Trend CBC and coags.     ENDOCRINE:  Follow up FS.  Insulin protocol if needed. TSH noted.      MUSCULOSKELETAL: AAT      SDU

## 2024-09-23 NOTE — BH CHART NOTE - NSEVENTNOTEFT_PSY_ALL_CORE
Psychiatry attempted to interview patient today. Patient is confused and unable to engage in a conversation, which is reported by the nursing team to be patient's baseline today. Per primary team, psychiatry was consulted because patient's son had concerns that patient was depressed and suicidal. Attempted to call son [phone] for clarification, with no answers. Will attempt to reach and reassess tomorrow.   Psychiatry attempted to interview patient today. Patient is confused and unable to engage in a conversation, which according to the nursing team is patient's baseline today. Nursing team reported that patient has not been agitated today, most recent CIWA score was 3. Per primary team, psychiatry was consulted because patient's son had concerns that patient was depressed and suicidal. Attempted to call son [phone] for clarification, with no answers. Will attempt to reach and reassess tomorrow.

## 2024-09-23 NOTE — PROGRESS NOTE ADULT - SUBJECTIVE AND OBJECTIVE BOX
MATT MACDONALD  63y Male    CHIEF COMPLAINT:    Patient is a 63y old  Male who presents with a chief complaint of alcohol intoxication (23 Sep 2024 06:37)    INTERVAL HPI/OVERNIGHT EVENTS:    Patient seen and examined. No acute events overnight. Less tremulous today, CIWA 3    ROS: All other systems are negative.    Vital Signs:    T(F): 97.7 (24 @ 11:46), Max: 98.2 (24 @ 00:00)  HR: 73 (24 @ 11:46) (65 - 73)  BP: 125/86 (24 @ 11:46) (114/80 - 125/86)  RR: 20 (24 @ 11:46) (18 - 20)  SpO2: 98% (24 @ 11:46) (96% - 99%)    Daily Weight in k.4 (23 Sep 2024 00:00)    PHYSICAL EXAM:    GENERAL:  NAD chronically ill appearing   SKIN: No rashes or lesions  HEENT: Atraumatic. Normocephalic.   NECK: Supple, No JVD.    PULMONARY: CTA B/L. No wheezing. No rales  CVS: Normal S1, S2. Rate and Rhythm are regular.    ABDOMEN/GI: Soft, Nontender, Nondistended   MSK:  No clubbing or cyanosis   NEUROLOGIC: follows simple commands   PSYCH: Alert & oriented x 2, unreliable historian     Consultant(s) Notes Reviewed:  [x ] YES  [ ] NO  Care Discussed with Consultants/Other Providers [ x] YES  [ ] NO    LABS:                        14.2   6.57  )-----------( 160      ( 23 Sep 2024 06:24 )             41.2     140  |  105  |  9[L]  ----------------------------<  82  4.5   |  24  |  0.7    Ca    8.2[L]      23 Sep 2024 06:24  Phos  3.3       Mg     1.5         TPro  5.5[L]  /  Alb  3.3[L]  /  TBili  4.3[H]  /  DBili  x   /  AST  216[H]  /  ALT  99[H]  /  AlkPhos  374[H]      PT/INR - ( 22 Sep 2024 05:59 )   PT: 11.90 sec;   INR: 1.04 ratio      RADIOLOGY & ADDITIONAL TESTS:  Imaging or report Personally Reviewed:  [x] YES  [ ] NO  EKG reviewed: [x] YES  [ ] NO    Medications:  Standing  chlorhexidine 2% Cloths 1 Application(s) Topical daily  enoxaparin Injectable 40 milliGRAM(s) SubCutaneous every 24 hours  fluticasone propionate/ salmeterol 100-50 MICROgram(s) Diskus 1 Dose(s) Inhalation two times a day  folic acid 1 milliGRAM(s) Oral daily  lactated ringers. 1000 milliLiter(s) IV Continuous <Continuous>  LORazepam   Injectable   IV Push   LORazepam   Injectable 1 milliGRAM(s) IV Push every 4 hours  methimazole 2.5 milliGRAM(s) Oral daily  metoprolol tartrate 75 milliGRAM(s) Oral two times a day  multivitamin 1 Tablet(s) Oral daily  pantoprazole    Tablet 40 milliGRAM(s) Oral before breakfast  polyethylene glycol 3350 17 Gram(s) Oral two times a day  senna 2 Tablet(s) Oral at bedtime  thiamine 100 milliGRAM(s) Oral daily    PRN Meds  albuterol/ipratropium for Nebulization 3 milliLiter(s) Nebulizer every 6 hours PRN  aluminum hydroxide/magnesium hydroxide/simethicone Suspension 30 milliLiter(s) Oral every 4 hours PRN  LORazepam   Injectable 2 milliGRAM(s) IV Push every 1 hour PRN  melatonin 3 milliGRAM(s) Oral at bedtime PRN

## 2024-09-23 NOTE — PHYSICAL THERAPY INITIAL EVALUATION ADULT - GENERAL OBSERVATIONS, REHAB EVAL
8:30-9:00. chart reviewed. Pt received semi-skinner at B/S, alert, confused at times, oriented X 2, able to follow one step instructions and agreeable to PT evaluation. + IV, + monitoring, VSS, -ve orthostatic, denies pain or discomfort, NAD.

## 2024-09-23 NOTE — PROGRESS NOTE ADULT - SUBJECTIVE AND OBJECTIVE BOX
Hepatology Follow Up Note      Location: [facility] 2A 012 A [facility]  Patient Name: MATT MACDONALD  Age: 63y  Gender: Male      Chief Complaint  Patient is a 63y old Male who presents with a chief complaint of AUD (23 Sep 2024 12:08)  Primary diagnosis of Alcoholic liver damage      Reason for Consult  Alcoholic Hepatitis      Progress Note  This morning patient was seen and examined at bedside.    Today is hospital day 3d.  Patient seems more awake but still not oriented to place or time.  Patient denies abdominal pain or nausea or vomiting.   Last bowel movement was soft and brown on 09/22.        Vital Signs in the last 24 hours   Vitals Summary T(C): 36.3 (09-23-24 @ 16:00), Max: 36.8 (09-23-24 @ 00:00)  HR: 73 (09-23-24 @ 16:00) (65 - 73)  BP: 136/86 (09-23-24 @ 16:00) (114/80 - 136/86)  RR: 18 (09-23-24 @ 16:00) (18 - 20)  SpO2: 97% (09-23-24 @ 16:00) (96% - 99%)  Vent Data   Intake/ Output   09-23-24 @ 07:01  -  09-23-24 @ 18:23  --------------------------------------------------------  IN: 200 mL / OUT: 0 mL / NET: 200 mL      Measurements Height (cm): 175.3 (09-23-24 @ 00:00)      Physical Exam  * General Appearance: less confused; not oriented to place (thinks in [facility]) or year; not in acute distress  * Eyes: mild icterus  * Lungs: Good bilateral air entry, normal breath sounds (Clear to auscultation bilaterally, no audible wheezes, crackles, or rhonchi)  * Heart: Regular Rate and Rhythm, normal S1 and S2, no audible murmur, rub, or gallop  * Abdomen: Symmetric, non-distended, soft, non-tender, bowel sounds active all four quadrants, no masses  * Extremities: no lower extremity pitting edema bilaterally; no asterexis; + tremors noted      Investigations   Laboratory Workup      - CBC:                        14.2   6.57  )-----------( 160      ( 23 Sep 2024 06:24 )             41.2       - Hgb Trend:  14.2  09-23-24 @ 06:24  15.0  09-22-24 @ 05:59  14.4  09-21-24 @ 08:28          - Chemistry:  09-23    140  |  105  |  9[L]  ----------------------------<  82  4.5   |  24  |  0.7    Ca    8.2[L]      23 Sep 2024 06:24  Phos  3.3     09-23  Mg     1.5     09-23    TPro  5.5[L]  /  Alb  3.3[L]  /  TBili  4.3[H]  /  DBili  x   /  AST  216[H]  /  ALT  99[H]  /  AlkPhos  374[H]  09-23    Liver panel trend:  TBili 4.3   /      /   ALT 99   /   AlkP 374   /   Tptn 5.5   /   Alb 3.3    /   DBili --      09-23  TBili 4.8   /      /      /   AlkP 415   /   Tptn 5.7   /   Alb 3.4    /   DBili --      09-22  TBili 4.3   /      /      /   AlkP 451   /   Tptn 5.8   /   Alb 3.2    /   DBili --      09-21  TBili 3.6   /      /      /   AlkP 494   /   Tptn 6.4   /   Alb 3.8    /   DBili --      09-20      - Coagulation Studies:  PT/INR - ( 22 Sep 2024 05:59 )   PT: 11.90 sec;   INR: 1.04 ratio             - ABG:      - Cardiac Markers:        Microbiological Workup  Urinalysis Basic - ( 23 Sep 2024 06:24 )    Color: x / Appearance: x / SG: x / pH: x  Gluc: 82 mg/dL / Ketone: x  / Bili: x / Urobili: x   Blood: x / Protein: x / Nitrite: x   Leuk Esterase: x / RBC: x / WBC x   Sq Epi: x / Non Sq Epi: x / Bacteria: x          Radiological Workup    US Abdomen Upper Quadrant Right:   ACC: [id number] EXAM:  US ABDOMEN RT UPR QUADRANT   ORDERED BY: ELIGIO JOHNSON     PROCEDURE DATE:  09/22/2024          INTERPRETATION:  CLINICAL INFORMATION: Cirrhosis    COMPARISON: CT dated 9/20/2024    TECHNIQUE: Sonography of the right upper quadrant.    FINDINGS:  Liver: Liver poorly evaluated by ultrasound. Hepatic steatosis as seen on   CT.  Bile ducts: Normal caliber. Common bile duct measures 6 mm.  Gallbladder: Gallbladder sludge. Negative sonographic Barlow's sign.  Pancreas: Poorly visualized.  Right kidney: 11.0 cm. No hydronephrosis. 1.4 cm cyst.  Ascites: None.      IMPRESSION:  Liver poorly evaluated by ultrasound. Hepatic steatosis as seen on CT.    --- End of Report ---            ZARA QUINTANILLA MD; Attending Radiologist  This document has been electronically signed. Sep 23 2024  9:08AM (09-22-24 @ 21:02)          Current Medications  Standing Medications  chlorhexidine 2% Cloths 1 Application(s) Topical daily  enoxaparin Injectable 40 milliGRAM(s) SubCutaneous every 24 hours  fluticasone propionate/ salmeterol 100-50 MICROgram(s) Diskus 1 Dose(s) Inhalation two times a day  folic acid 1 milliGRAM(s) Oral daily  lactated ringers. 1000 milliLiter(s) (100 mL/Hr) IV Continuous <Continuous>  LORazepam   Injectable 1 milliGRAM(s) IV Push every 4 hours  LORazepam   Injectable   IV Push   methimazole 2.5 milliGRAM(s) Oral daily  metoprolol tartrate 75 milliGRAM(s) Oral two times a day  multivitamin 1 Tablet(s) Oral daily  pantoprazole    Tablet 40 milliGRAM(s) Oral before breakfast  polyethylene glycol 3350 17 Gram(s) Oral two times a day  senna 2 Tablet(s) Oral at bedtime  thiamine IVPB 500 milliGRAM(s) IV Intermittent every 8 hours    PRN Medications  albuterol/ipratropium for Nebulization 3 milliLiter(s) Nebulizer every 6 hours PRN Shortness of Breath and/or Wheezing  aluminum hydroxide/magnesium hydroxide/simethicone Suspension 30 milliLiter(s) Oral every 4 hours PRN Dyspepsia  LORazepam   Injectable 2 milliGRAM(s) IV Push every 1 hour PRN Symptom-triggered: each CIWA -Ar score 8 or GREATER  melatonin 3 milliGRAM(s) Oral at bedtime PRN Insomnia    Singles Doses Administered  (ADM OVERRIDE) 2 each &lt;see task&gt; GiveOnce  (ADM OVERRIDE) 2 each &lt;see task&gt; GiveOnce  (ADM OVERRIDE) 2 each &lt;see task&gt; GiveOnce  (ADM OVERRIDE) 2 each &lt;see task&gt; GiveOnce  (ADM OVERRIDE) 2 each &lt;see task&gt; GiveOnce  (ADM OVERRIDE) 2 each &lt;see task&gt; GiveOnce  (ADM OVERRIDE) 2 each &lt;see task&gt; GiveOnce  (ADM OVERRIDE) 1 each &lt;see task&gt; GiveOnce  (ADM OVERRIDE) 1 each &lt;see task&gt; GiveOnce  diazepam  Injectable 5 milliGRAM(s) IV Push Once  folic acid Injectable 1 milliGRAM(s) IV Push Once  lactated ringers Bolus 1000 milliLiter(s) IV Bolus once  lactulose Syrup 20 Gram(s) Oral every 6 hours  LORazepam   Injectable 2 milliGRAM(s) IV Push every 4 hours  LORazepam   Injectable 3 milliGRAM(s) IV Push every 4 hours  LORazepam   Injectable 4 milliGRAM(s) IV Push every 4 hours  magnesium sulfate  IVPB 2 Gram(s) IV Intermittent once  thiamine 100 milliGRAM(s) Oral Once

## 2024-09-23 NOTE — CONSULT NOTE ADULT - TIME BILLING
Coordination of care
Chart review of current notes, labs, imaging and past admission documentation, counseling, patient education and harm reduction counseling, coordination of care and documentation of treatment plan.

## 2024-09-24 LAB
ALBUMIN SERPL ELPH-MCNC: 3.3 G/DL — LOW (ref 3.5–5.2)
ALP SERPL-CCNC: 365 U/L — HIGH (ref 30–115)
ALT FLD-CCNC: 92 U/L — HIGH (ref 0–41)
ANION GAP SERPL CALC-SCNC: 15 MMOL/L — HIGH (ref 7–14)
AST SERPL-CCNC: 182 U/L — HIGH (ref 0–41)
BASOPHILS # BLD AUTO: 0.05 K/UL — SIGNIFICANT CHANGE UP (ref 0–0.2)
BASOPHILS NFR BLD AUTO: 0.7 % — SIGNIFICANT CHANGE UP (ref 0–1)
BILIRUB SERPL-MCNC: 3.6 MG/DL — HIGH (ref 0.2–1.2)
BUN SERPL-MCNC: 10 MG/DL — SIGNIFICANT CHANGE UP (ref 10–20)
CALCIUM SERPL-MCNC: 8.3 MG/DL — LOW (ref 8.4–10.5)
CHLORIDE SERPL-SCNC: 105 MMOL/L — SIGNIFICANT CHANGE UP (ref 98–110)
CO2 SERPL-SCNC: 21 MMOL/L — SIGNIFICANT CHANGE UP (ref 17–32)
CREAT SERPL-MCNC: 0.6 MG/DL — LOW (ref 0.7–1.5)
EGFR: 108 ML/MIN/1.73M2 — SIGNIFICANT CHANGE UP
EOSINOPHIL # BLD AUTO: 0.14 K/UL — SIGNIFICANT CHANGE UP (ref 0–0.7)
EOSINOPHIL NFR BLD AUTO: 1.9 % — SIGNIFICANT CHANGE UP (ref 0–8)
GLUCOSE SERPL-MCNC: 90 MG/DL — SIGNIFICANT CHANGE UP (ref 70–99)
HCT VFR BLD CALC: 40.8 % — LOW (ref 42–52)
HGB BLD-MCNC: 14.5 G/DL — SIGNIFICANT CHANGE UP (ref 14–18)
IMM GRANULOCYTES NFR BLD AUTO: 1 % — HIGH (ref 0.1–0.3)
INR BLD: 1 RATIO — SIGNIFICANT CHANGE UP (ref 0.65–1.3)
LYMPHOCYTES # BLD AUTO: 1.64 K/UL — SIGNIFICANT CHANGE UP (ref 1.2–3.4)
LYMPHOCYTES # BLD AUTO: 22.4 % — SIGNIFICANT CHANGE UP (ref 20.5–51.1)
MAGNESIUM SERPL-MCNC: 1.9 MG/DL — SIGNIFICANT CHANGE UP (ref 1.8–2.4)
MCHC RBC-ENTMCNC: 35.5 G/DL — SIGNIFICANT CHANGE UP (ref 32–37)
MCHC RBC-ENTMCNC: 36.5 PG — HIGH (ref 27–31)
MCV RBC AUTO: 102.8 FL — HIGH (ref 80–94)
MELD SCORE WITH DIALYSIS: 25 POINTS — SIGNIFICANT CHANGE UP
MELD SCORE WITHOUT DIALYSIS: 11 POINTS — SIGNIFICANT CHANGE UP
MONOCYTES # BLD AUTO: 0.46 K/UL — SIGNIFICANT CHANGE UP (ref 0.1–0.6)
MONOCYTES NFR BLD AUTO: 6.3 % — SIGNIFICANT CHANGE UP (ref 1.7–9.3)
NEUTROPHILS # BLD AUTO: 4.97 K/UL — SIGNIFICANT CHANGE UP (ref 1.4–6.5)
NEUTROPHILS NFR BLD AUTO: 67.7 % — SIGNIFICANT CHANGE UP (ref 42.2–75.2)
NRBC # BLD: 0 /100 WBCS — SIGNIFICANT CHANGE UP (ref 0–0)
PHOSPHATE SERPL-MCNC: 3.2 MG/DL — SIGNIFICANT CHANGE UP (ref 2.1–4.9)
PLATELET # BLD AUTO: 142 K/UL — SIGNIFICANT CHANGE UP (ref 130–400)
PMV BLD: 12.3 FL — HIGH (ref 7.4–10.4)
POTASSIUM SERPL-MCNC: 4 MMOL/L — SIGNIFICANT CHANGE UP (ref 3.5–5)
POTASSIUM SERPL-SCNC: 4 MMOL/L — SIGNIFICANT CHANGE UP (ref 3.5–5)
PROT SERPL-MCNC: 5.6 G/DL — LOW (ref 6–8)
PROTHROM AB SERPL-ACNC: 11.4 SEC — SIGNIFICANT CHANGE UP (ref 9.95–12.87)
RBC # BLD: 3.97 M/UL — LOW (ref 4.7–6.1)
RBC # FLD: 13.8 % — SIGNIFICANT CHANGE UP (ref 11.5–14.5)
SODIUM SERPL-SCNC: 141 MMOL/L — SIGNIFICANT CHANGE UP (ref 135–146)
WBC # BLD: 7.33 K/UL — SIGNIFICANT CHANGE UP (ref 4.8–10.8)
WBC # FLD AUTO: 7.33 K/UL — SIGNIFICANT CHANGE UP (ref 4.8–10.8)

## 2024-09-24 PROCEDURE — 99231 SBSQ HOSP IP/OBS SF/LOW 25: CPT

## 2024-09-24 PROCEDURE — 99232 SBSQ HOSP IP/OBS MODERATE 35: CPT

## 2024-09-24 PROCEDURE — 99233 SBSQ HOSP IP/OBS HIGH 50: CPT

## 2024-09-24 RX ORDER — MAGNESIUM SULFATE 500 MG/ML
2 VIAL (ML) INJECTION ONCE
Refills: 0 | Status: COMPLETED | OUTPATIENT
Start: 2024-09-24 | End: 2024-09-24

## 2024-09-24 RX ADMIN — Medication 1 MILLIGRAM(S): at 09:15

## 2024-09-24 RX ADMIN — MULTI VITAMIN/MINERAL SUPPLEMENT WITH ASCORBIC ACID, NIACIN, PYRIDOXINE, PANTOTHENIC ACID, FOLIC ACID, RIBOFLAVIN, THIAMIN, BIOTIN, COBALAMIN AND ZINC. 1 TABLET(S): 60; 20; 12.5; 10; 10; 1.7; 1.5; 1; .3; .006 TABLET, COATED ORAL at 11:19

## 2024-09-24 RX ADMIN — THIAMINE HYDROCHLORIDE 105 MILLIGRAM(S): 100 INJECTION, SOLUTION INTRAMUSCULAR; INTRAVENOUS at 06:31

## 2024-09-24 RX ADMIN — Medication 17 GRAM(S): at 06:20

## 2024-09-24 RX ADMIN — CHLORHEXIDINE GLUCONATE ORAL RINSE 1 APPLICATION(S): 1.2 SOLUTION DENTAL at 11:19

## 2024-09-24 RX ADMIN — Medication 2 MILLIGRAM(S): at 14:22

## 2024-09-24 RX ADMIN — THIAMINE HYDROCHLORIDE 105 MILLIGRAM(S): 100 INJECTION, SOLUTION INTRAMUSCULAR; INTRAVENOUS at 13:36

## 2024-09-24 RX ADMIN — THIAMINE HYDROCHLORIDE 105 MILLIGRAM(S): 100 INJECTION, SOLUTION INTRAMUSCULAR; INTRAVENOUS at 23:16

## 2024-09-24 RX ADMIN — Medication 17 GRAM(S): at 17:12

## 2024-09-24 RX ADMIN — FOLIC ACID 1 MILLIGRAM(S): 1 TABLET ORAL at 11:20

## 2024-09-24 RX ADMIN — Medication 75 MILLIGRAM(S): at 17:11

## 2024-09-24 RX ADMIN — ENOXAPARIN SODIUM 40 MILLIGRAM(S): 150 INJECTION SUBCUTANEOUS at 11:19

## 2024-09-24 RX ADMIN — Medication 1 DOSE(S): at 09:00

## 2024-09-24 RX ADMIN — Medication 1 MILLIGRAM(S): at 06:21

## 2024-09-24 RX ADMIN — Medication 75 MILLIGRAM(S): at 06:20

## 2024-09-24 RX ADMIN — Medication 1 MILLIGRAM(S): at 02:15

## 2024-09-24 RX ADMIN — PANTOPRAZOLE SODIUM 40 MILLIGRAM(S): 40 TABLET, DELAYED RELEASE ORAL at 06:21

## 2024-09-24 RX ADMIN — Medication 2 TABLET(S): at 22:12

## 2024-09-24 RX ADMIN — Medication 25 GRAM(S): at 08:59

## 2024-09-24 NOTE — PROGRESS NOTE ADULT - SUBJECTIVE AND OBJECTIVE BOX
SUBJECTIVE/OVERNIGHT EVENTS  Today is hospital day 4d. This morning patient was seen and examined at bedside, resting comfortably in bed. No acute or major events overnight.    MEDICATIONS  STANDING MEDICATIONS  chlorhexidine 2% Cloths 1 Application(s) Topical daily  enoxaparin Injectable 40 milliGRAM(s) SubCutaneous every 24 hours  fluticasone propionate/ salmeterol 100-50 MICROgram(s) Diskus 1 Dose(s) Inhalation two times a day  folic acid 1 milliGRAM(s) Oral daily  lactated ringers. 1000 milliLiter(s) IV Continuous <Continuous>  LORazepam   Injectable 1 milliGRAM(s) IV Push every 4 hours  LORazepam   Injectable   IV Push   methimazole 2.5 milliGRAM(s) Oral daily  metoprolol tartrate 75 milliGRAM(s) Oral two times a day  multivitamin 1 Tablet(s) Oral daily  pantoprazole    Tablet 40 milliGRAM(s) Oral before breakfast  polyethylene glycol 3350 17 Gram(s) Oral two times a day  senna 2 Tablet(s) Oral at bedtime  thiamine 100 milliGRAM(s) Oral daily    PRN MEDICATIONS  albuterol/ipratropium for Nebulization 3 milliLiter(s) Nebulizer every 6 hours PRN  aluminum hydroxide/magnesium hydroxide/simethicone Suspension 30 milliLiter(s) Oral every 4 hours PRN  LORazepam   Injectable 2 milliGRAM(s) IV Push every 1 hour PRN  melatonin 3 milliGRAM(s) Oral at bedtime PRN    VITALS  T(F): 97.9 (09-23-24 @ 07:52), Max: 98.2 (09-22-24 @ 12:00)  HR: 65 (09-23-24 @ 07:52) (65 - 70)  BP: 114/80 (09-23-24 @ 07:52) (114/80 - 131/77)  RR: 20 (09-23-24 @ 07:52) (18 - 20)  SpO2: 99% (09-23-24 @ 07:52) (96% - 100%)    PHYSICAL EXAM  GENERAL: NAD, mental status is slow but adequate.  HEART: Normal  LUNGS: Normal  ABDOMEN: Normal  EXTREMITIES: Normal  NERVOUS SYSTEM: AO4, patient's mental status fluctuates between normal and delirium  SKIN: Normal    LABS             14.2   6.57  )-----------( 160      ( 09-23-24 @ 06:24 )             41.2     140  |  105  |  9   -------------------------<  82   09-23-24 @ 06:24  4.5  |  24  |  0.7    Ca      8.2     09-23-24 @ 06:24  Phos   3.3     09-23-24 @ 10:07  Mg     1.5     09-23-24 @ 10:07    TPro  5.5  /  Alb  3.3  /  TBili  4.3  /  DBili  x   /  AST  216  /  ALT  99  /  AlkPhos  374  /  GGT  x     09-23-24 @ 06:24    PT/INR - ( 09-22-24 @ 05:59 )   PT: 11.90 sec;   INR: 1.04 ratio      Urinalysis Basic - ( 23 Sep 2024 06:24 )    Color: x / Appearance: x / SG: x / pH: x  Gluc: 82 mg/dL / Ketone: x  / Bili: x / Urobili: x   Blood: x / Protein: x / Nitrite: x   Leuk Esterase: x / RBC: x / WBC x   Sq Epi: x / Non Sq Epi: x / Bacteria: x          IMAGING

## 2024-09-24 NOTE — PROGRESS NOTE ADULT - ASSESSMENT
63 years old male history of alcohol abuse, hypertension, COPD, hyperthyroidism, anxiety, insomnia presented top the ED for the evaluation of frequent falls and alcohol intoxication    alcohol withdrawal with a history of ETOH abuse  Alcoholic hepatitis  Suspected folate and thiamine deficiency   Elevated lactate - imp[roved  -pt has severe hx of alcohol withdrawal, requiring intubation in march of this year  - per family, patient with worsening depression ? suicidal thoughts. Today, patient is less confused, pending psychiatry eval   - c/w CIWA monitoring, Ativan taper and PRN  - c/w   thiamine high dose per addiction med recs, folate  - CT abdomen and pelvis with diffuse hepatic steatosis   - Discussed with GI, holding off on steroids now  - daily LFTs, INR, monitor and replete electrolytes   - CATCH team following     Fall injury likely 2/2 Alcohol intoxication   -Trauma work up Negative  -CT Abdomen and Pelvis w/ IV Cont : Diffuse hepatic steatosis.  -CT Head No Cont: Stable scattered chronic lacunar infarcts.  - CXR clear   - EKG showed sinus tachycardia     HTN  H/O Palpitations  - monitor BP   - continue with Metoprolol tartrate 75mg BID    hx of hyperthyroidism -Continue with Methimazole 2.5mg OD, TFTs reviewed    COPD - on RA , duonebs PRN, symbicort BID    Overall prognosis is guarded, high risk of decompensation  Pending: CIWA/Ativan taper, catch fu, psych fu, labs, monitor electrolytes and replete PRN    Patient seen at bedside, total time spent to evaluate and treat the patient's acute illness and chronic medical conditions as well as time spent reviewing prior records, labs, radiology, documenting in electronic medical records,  discussing medical plan with   medical/critical care team was more than 45 minutes with >50% of time spent face to face with patient, discussing with patient/family as well as coordination of care

## 2024-09-24 NOTE — BH CONSULTATION LIAISON ASSESSMENT NOTE - CURRENT MEDICATION
MEDICATIONS  (STANDING):  chlorhexidine 2% Cloths 1 Application(s) Topical daily  enoxaparin Injectable 40 milliGRAM(s) SubCutaneous every 24 hours  fluticasone propionate/ salmeterol 100-50 MICROgram(s) Diskus 1 Dose(s) Inhalation two times a day  folic acid 1 milliGRAM(s) Oral daily  lactated ringers. 1000 milliLiter(s) (100 mL/Hr) IV Continuous <Continuous>  LORazepam   Injectable   IV Push   LORazepam   Injectable 1 milliGRAM(s) IV Push every 12 hours  methimazole 2.5 milliGRAM(s) Oral daily  metoprolol tartrate 75 milliGRAM(s) Oral two times a day  multivitamin 1 Tablet(s) Oral daily  pantoprazole    Tablet 40 milliGRAM(s) Oral before breakfast  polyethylene glycol 3350 17 Gram(s) Oral two times a day  senna 2 Tablet(s) Oral at bedtime  thiamine IVPB 500 milliGRAM(s) IV Intermittent every 8 hours    MEDICATIONS  (PRN):  albuterol/ipratropium for Nebulization 3 milliLiter(s) Nebulizer every 6 hours PRN Shortness of Breath and/or Wheezing  aluminum hydroxide/magnesium hydroxide/simethicone Suspension 30 milliLiter(s) Oral every 4 hours PRN Dyspepsia  LORazepam   Injectable 2 milliGRAM(s) IV Push every 4 hours PRN Agitation  melatonin 3 milliGRAM(s) Oral at bedtime PRN Insomnia

## 2024-09-24 NOTE — PROGRESS NOTE ADULT - ASSESSMENT
IMPRESSION:      ETOH withdrawal   Possible wernicke's encephalopathy   Alcoholic hepatitis   ETOH abuse  Frequent falls SP trauma work up  HO COPD  HO hyperthyroidism     PLAN:    CNS: CIWA monitoring. thiamine. Folate. CTH noted. CW benzo taper    HEENT: Oral care    PULMONARY:  HOB @ 45 degrees. On room air. aspiration precaution keep Sao2 92 to 96%    CARDIOVASCULAR: dc  CC/H if tolerates po    GI: GI prophylaxis.  Bilirubin and LFTs elevated. Hepatology following.  Trend LFT.    RENAL:  Follow up lytes.  Correct as needed    INFECTIOUS DISEASE: Monitor VS off abx.     HEMATOLOGICAL:  DVT prophylaxis Trend CBC and coags.     ENDOCRINE:  Follow up FS.  Insulin protocol if needed. TSH noted.      MUSCULOSKELETAL: AAT    GOC

## 2024-09-24 NOTE — CHART NOTE - NSCHARTNOTEFT_GEN_A_CORE
Transfer from Step Down Unit to 3B    HPI:  63 years old male history of alcohol abuse, hypertension, COPD, hyperthyroidism, anxiety, insomnia presented top the ED for the evaluation of frequent falls and alcohol intoxication. As per the son at bedside, the patient has been drinking about 1 bottle(750ml) whiskey daily for months(last drink 9/19/24 PM), was admitted and intubated for etoh intoxication in March, 2024, has been drinking since being discharged. Pt has fallen multiple times in the last week. On my eval, Pt is tremulous and restless.  Patient also reports he does want to stop drinking and try to cut down his alcohol use. Also endorses constipation, last BM about a week back. Denies drug use. Otherwise denies fever, chills, recent illness, coughing, chest pain, abdominal pain, diarrhea or urinary symptoms.     #ED Vitals:  T(C): 37   HR: 106   BP: 130/80 mmhg  RR: 17   SpO2: 98%    #Labs: MCV 98, AG 17, T Bili 3.6, , , , Mag 1.6, VBG: Lactate 6.3    #Imaging:   Trauma work up Negative    CT Abdomen and Pelvis w/ IV Cont : Diffuse hepatic steatosis.    CT Head No Cont: Stable scattered chronic lacunar infarcts.    #S/P: LR bolus 1L, Valium 5mg IV, Thiamine and Folate in the ED.     The patient was admitted to medicine for the further management.     CEU Course:  The patient was stable during his CEU stay. He was seen by Psych, addiction and CATCH team. We started high dose thiamine protocol and he is on the CIWA protocol. His mental status fluctuates between slow but adequate and episodes of delirium. Hepatology is following the patient.       #Alcohol withdrawal  #Diffuse Hepatic Steatosis  #Lactic Acidosis - Resolved  - HO alcohol abuse and withdrawals.  - CT abdomen and pelvis with diffuse hepatic steatosis   - Per son Ishu; the patient's functional capacity has been declining, getting more depressed with suicide attempts.  - On high dose thiamine, will continue 500mg IV q8 for 3 days then 250mg IV for 5 days.  - Continue CIWA monitoring, Ativan taper and PRN  - Addiction team consult note reviewed.  - Psychiatry consult note remains pending.    #Fall injury  - HO frequent falls  - Likely due to alcohol intoxication   - CT Head No Cont: Stable scattered chronic lacunar infarcts.  - Trauma work up Negative. No need for any acute intervention.    #HTN  - BP has been within normal limits.  - Flowsheet reviewed.  - Not on home meds.    #H/O Palpitations  - Continue with Metoprolol tartrate 75mg BID    #Hyperthyroidism  - 3/24: TSH 0.52, fT4 0.7, T3 84  - Continue with Methimazole 2.5mg OD    #COPD  - on RA   - Duonebs PRN, Symbicort BID

## 2024-09-24 NOTE — BH CONSULTATION LIAISON ASSESSMENT NOTE - NSBHATTESTCOMMENTATTENDFT_PSY_A_CORE
I saw and evaluated the patient today 09-24-24 with resident during key/critical portions of the visit.  Nursing/primary team/consultant records reviewed. I agree with the resident's note including past psych history, home medications, social history, allergies, surgical history, family history, and review of system. I have reviewed relevant vitals, laboratory values, imaging studies, and microbiology.  I agree with the trainee's findings and assessment and plan as documented in the trainee's note.     - Above resident's note was edited by me     - agree with rest of A/P as per detailed resident note, unless noted below.     MSE  Limited grooming and hygiene, appearance consistent with chronological age; right eye partially closed. Alert and oriented to self only. Behavior is disinhibited and confused. No involuntary movements. Eye contact is poor. Speech is slow, soft with articulation errors. Mood is neutral with congruent affect. Flow of thought is tangential. Content of thought is concrete. Denies thoughts of harm to self or others. Denies auditory or visual hallucinations. Poor insight/judgement

## 2024-09-24 NOTE — BH CONSULTATION LIAISON ASSESSMENT NOTE - NSBHCHARTREVIEWVS_PSY_A_CORE FT
Vital Signs Last 24 Hrs  T(C): 36.4 (24 Sep 2024 11:24), Max: 36.6 (23 Sep 2024 19:31)  T(F): 97.6 (24 Sep 2024 11:24), Max: 97.9 (23 Sep 2024 19:31)  HR: 64 (24 Sep 2024 11:24) (59 - 82)  BP: 127/72 (24 Sep 2024 11:24) (114/79 - 136/86)  BP(mean): 93 (24 Sep 2024 11:24) (93 - 106)  RR: 20 (24 Sep 2024 11:24) (18 - 20)  SpO2: 100% (24 Sep 2024 11:24) (97% - 100%)    Parameters below as of 24 Sep 2024 11:24  Patient On (Oxygen Delivery Method): room air

## 2024-09-24 NOTE — BH CONSULTATION LIAISON ASSESSMENT NOTE - NSBHSAALC_PSY_A_CORE FT
see HPI patient has longstanding history of substance use, daily drinking, history of intubation in March 2024 due to alcohol intoxication.

## 2024-09-24 NOTE — BH CONSULTATION LIAISON ASSESSMENT NOTE - SUMMARY
78 yo Male with past medical history of alcohol abuse, hypertension, COPD, hyperthyroidism, anxiety, insomnia who presented to the ED for the evaluation of frequent falls and alcohol intoxication. Psychiatry consulted for reported depression and SI. 80 yo Male with past medical history of alcohol abuse, hypertension, COPD, hyperthyroidism, anxiety, insomnia who presented to the ED for the evaluation of frequent falls and alcohol intoxication. Psychiatry consulted for reported depression and SI.    On assessment today, patient is oriented only to person, despite calm and cooperative. He denies currently experiencing any mood symptoms, denies SI/HI. Patient has limited insight of his situation, shows limited understanding of most questions posed, and does not think he needs any mental health resources. Coordinated with Addiction CL Team who saw the patient earlier, and discussed with patient's son who confirmed that patient's depression and suicidality usually occurred while intoxicated. His presentation is more consistent with substance-induced mood disorder, likely complicated by Wernicke Encephalopathy given his signs of neurocognitive defects. Will recommend follow with Addiction and CATCH team to coordinate aftercare.    Recommendations:  - no indication for IPP recommendation at this time  - no need for psychiatric 1:1, may need nursing 1:1 to monitor withdrawal  - coordinate care with Addiction and CATCH for substance use rehab aftercare   80 yo Male with past medical history of alcohol abuse, hypertension, COPD, hyperthyroidism, anxiety, insomnia who presented to the ED for the evaluation of frequent falls and alcohol intoxication. Psychiatry consulted for reported depression and SI.    On assessment today, patient is oriented only to person, despite calm and cooperative. He denies currently experiencing any mood symptoms, denies SI/HI. Patient has limited insight of his situation, shows limited understanding of most questions posed, and does not think he needs any mental health resources. Coordinated with Addiction CL Team who saw the patient earlier, and discussed with patient's son who confirmed that patient's depression and suicidality usually occurred while intoxicated. His presentation is more consistent with substance-induced mood disorder, likely complicated by Wernicke Encephalopathy given his signs of neurocognitive defects. Will recommend follow with Addiction and CATCH team to coordinate aftercare.    Recommendations:  - no indication for IPP admission at this time  - no need for psychiatric 1:1, may need nursing 1:1 to monitor withdrawal  - coordinate care with Neurology for Wernicke Encephalopathy workup and management  - coordinate care with Addiction and CATCH for substance rehab aftercare  - will recommend outpatient followup with [facility] at [address] [place] [address], [id number] (Phone: [phone])    Psych CL will sign off, please re-consult if other questions arise.     78 yo Male with past medical history of alcohol abuse, hypertension, COPD, hyperthyroidism, anxiety, insomnia who presented to the ED for the evaluation of frequent falls and alcohol intoxication. Psychiatry consulted for reported depression and SI.    On assessment today, patient is oriented only to person, despite calm and cooperative. He denies currently experiencing any mood symptoms, denies SI/HI. Patient has limited insight of his situation, shows limited understanding of most questions posed, and does not think he needs any mental health resources. Coordinated with Addiction CL Team who saw the patient earlier, and discussed with patient's son who confirmed that patient's depression and suicidality usually occurred while intoxicated. His presentation is more consistent with substance-induced mood disorder, likely complicated by Wernicke Encephalopathy given his signs of neurocognitive defects. Will recommend follow with Addiction and CATCH team to coordinate aftercare.    Recommendations:  - no indication for IPP admission at this time  - no need for psychiatric 1:1, may need nursing 1:1 to monitor withdrawal  - Appreciate recommendations from Neurology for Wernicke Encephalopathy workup and management  - Appreciate Addiction and CATCH for substance rehab aftercare  - will recommend outpatient followup with [facility] at [address] [place] [address], [id number] (Phone: [phone])    Psych CL will sign off, please re-consult if other questions arise.

## 2024-09-24 NOTE — BH CONSULTATION LIAISON ASSESSMENT NOTE - RISK ASSESSMENT
risk factors: alcohol use, not in care.  protective factors: no past SA, no past IPP, family support.

## 2024-09-24 NOTE — PROGRESS NOTE ADULT - SUBJECTIVE AND OBJECTIVE BOX
Over Night Events: events noted, on RA, addiction hepato noted, afebrile    PHYSICAL EXAM    ICU Vital Signs Last 24 Hrs  T(C): 36.1 (24 Sep 2024 04:17), Max: 36.6 (23 Sep 2024 07:52)  T(F): 97 (24 Sep 2024 04:17), Max: 97.9 (23 Sep 2024 07:52)  HR: 69 (24 Sep 2024 04:17) (65 - 82)  BP: 126/76 (24 Sep 2024 04:17) (114/79 - 136/86)  BP(mean): 98 (23 Sep 2024 19:31) (92 - 106)  RR: 20 (24 Sep 2024 04:17) (18 - 20)  SpO2: 100% (24 Sep 2024 04:17) (97% - 100%)    O2 Parameters below as of 23 Sep 2024 11:46  Patient On (Oxygen Delivery Method): room air            General: ill looking  icteric sclera  Lungs: Bilateral BS  Cardiovascular: Regular   Abdomen: Soft, Positive BS  Extremities: No clubbing   Neurological: Non focal       09-23-24 @ 07:01  -  09-24-24 @ 07:00  --------------------------------------------------------  IN:    IV PiggyBack: 200 mL  Total IN: 200 mL    OUT:    Voided (mL): 150 mL  Total OUT: 150 mL    Total NET: 50 mL          LABS:                          14.2   6.57  )-----------( 160      ( 23 Sep 2024 06:24 )             41.2                                               09-23    140  |  105  |  9[L]  ----------------------------<  82  4.5   |  24  |  0.7    Ca    8.2[L]      23 Sep 2024 06:24  Phos  3.3     09-23  Mg     1.5     09-23    TPro  5.5[L]  /  Alb  3.3[L]  /  TBili  4.3[H]  /  DBili  x   /  AST  216[H]  /  ALT  99[H]  /  AlkPhos  374[H]  09-23                                             Urinalysis Basic - ( 23 Sep 2024 06:24 )    Color: x / Appearance: x / SG: x / pH: x  Gluc: 82 mg/dL / Ketone: x  / Bili: x / Urobili: x   Blood: x / Protein: x / Nitrite: x   Leuk Esterase: x / RBC: x / WBC x   Sq Epi: x / Non Sq Epi: x / Bacteria: x                                                  LIVER FUNCTIONS - ( 23 Sep 2024 06:24 )  Alb: 3.3 g/dL / Pro: 5.5 g/dL / ALK PHOS: 374 U/L / ALT: 99 U/L / AST: 216 U/L / GGT: x                                                                                                                                       MEDICATIONS  (STANDING):  chlorhexidine 2% Cloths 1 Application(s) Topical daily  enoxaparin Injectable 40 milliGRAM(s) SubCutaneous every 24 hours  fluticasone propionate/ salmeterol 100-50 MICROgram(s) Diskus 1 Dose(s) Inhalation two times a day  folic acid 1 milliGRAM(s) Oral daily  lactated ringers. 1000 milliLiter(s) (100 mL/Hr) IV Continuous <Continuous>  LORazepam   Injectable 1 milliGRAM(s) IV Push every 12 hours  LORazepam   Injectable   IV Push   LORazepam   Injectable 1 milliGRAM(s) IV Push every 4 hours  methimazole 2.5 milliGRAM(s) Oral daily  metoprolol tartrate 75 milliGRAM(s) Oral two times a day  multivitamin 1 Tablet(s) Oral daily  pantoprazole    Tablet 40 milliGRAM(s) Oral before breakfast  polyethylene glycol 3350 17 Gram(s) Oral two times a day  senna 2 Tablet(s) Oral at bedtime  thiamine IVPB 500 milliGRAM(s) IV Intermittent every 8 hours    MEDICATIONS  (PRN):  albuterol/ipratropium for Nebulization 3 milliLiter(s) Nebulizer every 6 hours PRN Shortness of Breath and/or Wheezing  aluminum hydroxide/magnesium hydroxide/simethicone Suspension 30 milliLiter(s) Oral every 4 hours PRN Dyspepsia  LORazepam   Injectable 2 milliGRAM(s) IV Push every 1 hour PRN Symptom-triggered: each CIWA -Ar score 8 or GREATER  melatonin 3 milliGRAM(s) Oral at bedtime PRN Insomnia

## 2024-09-24 NOTE — PROGRESS NOTE ADULT - SUBJECTIVE AND OBJECTIVE BOX
MATT MACDONALD  63y Male    CHIEF COMPLAINT:    Patient is a 63y old  Male who presents with a chief complaint of alcohol withdrawal (24 Sep 2024 07:01)    INTERVAL HPI/OVERNIGHT EVENTS:    Patient seen and examined. No acute events overnight. Mental status somewhat better, CIWAs 3 today    ROS: All other systems are negative.    Vital Signs:    T(F): 97.6 (24 @ 11:24), Max: 97.9 (24 @ 19:31)  HR: 64 (24 @ 11:24) (59 - 82)  BP: 127/72 (24 @ 11:24) (114/79 - 136/86)  RR: 20 (24 @ 11:24) (18 - 20)  SpO2: 100% (24 @ 11:24) (97% - 100%)    23 Sep 2024 07:01  -  24 Sep 2024 07:00  --------------------------------------------------------  IN: 200 mL / OUT: 150 mL / NET: 50 mL    Daily Weight in k.4 (24 Sep 2024 04:17)    PHYSICAL EXAM:    GENERAL:  NAD  SKIN: No rashes or lesions  HEENT: Atraumatic. Normocephalic. PERRL. Moist membranes.  NECK: Supple, No JVD. No lymphadenopathy.  PULMONARY: CTA B/L. No wheezing. No rales  CVS: Normal S1, S2. Rate and Rhythm are regular. No murmurs.  ABDOMEN/GI: Soft, Nontender, Nondistended; BS present  MSK:  No edema B/L LE.  NEUROLOGIC:  No motor or sensory deficit.  PSYCH: Alert & oriented x 3, normal affect    Consultant(s) Notes Reviewed:  [x ] YES  [ ] NO  Care Discussed with Consultants/Other Providers [ x] YES  [ ] NO    LABS:                        14.5   7.33  )-----------( 142      ( 24 Sep 2024 06:43 )             40.8         141  |  105  |  10  ----------------------------<  90  4.0   |  21  |  0.6[L]    Ca    8.3[L]      24 Sep 2024 06:43  Phos  3.2       Mg     1.9         TPro  5.6[L]  /  Alb  3.3[L]  /  TBili  3.6[H]  /  DBili  x   /  AST  182[H]  /  ALT  92[H]  /  AlkPhos  365[H]      PT/INR - ( 24 Sep 2024 06:43 )   PT: 11.40 sec;   INR: 1.00 ratio                   RADIOLOGY & ADDITIONAL TESTS:      Imaging or report Personally Reviewed:  [x] YES  [ ] NO  EKG reviewed: [x] YES  [ ] NO    Medications:  Standing  chlorhexidine 2% Cloths 1 Application(s) Topical daily  enoxaparin Injectable 40 milliGRAM(s) SubCutaneous every 24 hours  fluticasone propionate/ salmeterol 100-50 MICROgram(s) Diskus 1 Dose(s) Inhalation two times a day  folic acid 1 milliGRAM(s) Oral daily  lactated ringers. 1000 milliLiter(s) IV Continuous <Continuous>  LORazepam   Injectable 1 milliGRAM(s) IV Push every 12 hours  LORazepam   Injectable   IV Push   methimazole 2.5 milliGRAM(s) Oral daily  metoprolol tartrate 75 milliGRAM(s) Oral two times a day  multivitamin 1 Tablet(s) Oral daily  pantoprazole    Tablet 40 milliGRAM(s) Oral before breakfast  polyethylene glycol 3350 17 Gram(s) Oral two times a day  senna 2 Tablet(s) Oral at bedtime  thiamine IVPB 500 milliGRAM(s) IV Intermittent every 8 hours    PRN Meds  albuterol/ipratropium for Nebulization 3 milliLiter(s) Nebulizer every 6 hours PRN  aluminum hydroxide/magnesium hydroxide/simethicone Suspension 30 milliLiter(s) Oral every 4 hours PRN  LORazepam   Injectable 2 milliGRAM(s) IV Push every 1 hour PRN  melatonin 3 milliGRAM(s) Oral at bedtime PRN             MATT MACDONALD  63y Male    CHIEF COMPLAINT:    Patient is a 63y old  Male who presents with a chief complaint of alcohol withdrawal (24 Sep 2024 07:01)    INTERVAL HPI/OVERNIGHT EVENTS:    Patient seen and examined. No acute events overnight. Mental status somewhat better, CIWAs 3 today    ROS: All other systems are negative.    Vital Signs:    T(F): 97.6 (24 @ 11:24), Max: 97.9 (24 @ 19:31)  HR: 64 (24 @ 11:24) (59 - 82)  BP: 127/72 (24 @ 11:24) (114/79 - 136/86)  RR: 20 (24 @ 11:24) (18 - 20)  SpO2: 100% (24 @ 11:24) (97% - 100%)    23 Sep 2024 07:01  -  24 Sep 2024 07:00  --------------------------------------------------------  IN: 200 mL / OUT: 150 mL / NET: 50 mL    Daily Weight in k.4 (24 Sep 2024 04:17)    PHYSICAL EXAM:    GENERAL:  NAD  SKIN: No rashes or lesions  HEENT: Atraumatic. Normocephalic.    NECK: Supple, No JVD.    PULMONARY: CTA B/L. No wheezing. No rales  CVS: Normal S1, S2. Rate and Rhythm are regular   ABDOMEN/GI: Soft, Nontender, Nondistended   MSK:  No clubbing or cyanosis   NEUROLOGIC:  follow commands   PSYCH: Alert & oriented x 2    Consultant(s) Notes Reviewed:  [x ] YES  [ ] NO  Care Discussed with Consultants/Other Providers [ x] YES  [ ] NO    LABS:                        14.5   7.33  )-----------( 142      ( 24 Sep 2024 06:43 )             40.8     141  |  105  |  10  ----------------------------<  90  4.0   |  21  |  0.6[L]    Ca    8.3[L]      24 Sep 2024 06:43  Phos  3.2     -  Mg     1.9     -24    TPro  5.6[L]  /  Alb  3.3[L]  /  TBili  3.6[H]  /  DBili  x   /  AST  182[H]  /  ALT  92[H]  /  AlkPhos  365[H]  -    PT/INR - ( 24 Sep 2024 06:43 )   PT: 11.40 sec;   INR: 1.00 ratio      RADIOLOGY & ADDITIONAL TESTS:  Imaging or report Personally Reviewed:  [x] YES  [ ] NO  EKG reviewed: [x] YES  [ ] NO    Medications:  Standing  chlorhexidine 2% Cloths 1 Application(s) Topical daily  enoxaparin Injectable 40 milliGRAM(s) SubCutaneous every 24 hours  fluticasone propionate/ salmeterol 100-50 MICROgram(s) Diskus 1 Dose(s) Inhalation two times a day  folic acid 1 milliGRAM(s) Oral daily  lactated ringers. 1000 milliLiter(s) IV Continuous <Continuous>  LORazepam   Injectable 1 milliGRAM(s) IV Push every 12 hours  LORazepam   Injectable   IV Push   methimazole 2.5 milliGRAM(s) Oral daily  metoprolol tartrate 75 milliGRAM(s) Oral two times a day  multivitamin 1 Tablet(s) Oral daily  pantoprazole    Tablet 40 milliGRAM(s) Oral before breakfast  polyethylene glycol 3350 17 Gram(s) Oral two times a day  senna 2 Tablet(s) Oral at bedtime  thiamine IVPB 500 milliGRAM(s) IV Intermittent every 8 hours    PRN Meds  albuterol/ipratropium for Nebulization 3 milliLiter(s) Nebulizer every 6 hours PRN  aluminum hydroxide/magnesium hydroxide/simethicone Suspension 30 milliLiter(s) Oral every 4 hours PRN  LORazepam   Injectable 2 milliGRAM(s) IV Push every 1 hour PRN  melatonin 3 milliGRAM(s) Oral at bedtime PRN

## 2024-09-24 NOTE — BH CONSULTATION LIAISON ASSESSMENT NOTE - NSBHCONSULTFOLLOWAFTERCARE_PSY_A_CORE FT
substance rehab, please coordinate with CATCH team Please refer to [facility] at [address] [place] [address], [place], [place], [address] (Phone: [phone])  Please coordinate aftercare with CATCH team.

## 2024-09-24 NOTE — PROGRESS NOTE ADULT - ASSESSMENT
The patient is a 63-year-old male with past medical history of alcohol abuse, hypertension, COPD, hyperthyroidism, anxiety, insomnia who presented to the ED for the evaluation of frequent falls and alcohol intoxication.     #Alcohol withdrawal  #Diffuse Hepatic Steatosis  #Lactic Acidosis - Resolved  - HO alcohol abuse and withdrawals.  - CT abdomen and pelvis with diffuse hepatic steatosis   - Per son Anthony; the patient's functional capacity has been declining, getting more depressed with suicide attempts.  - On high dose thiamine, will continue 500mg IV q8 for 3 days then 250mg IV for 5 days.  - Continue CIWA monitoring, Ativan taper and PRN  - Addiction team consult note reviewed.  - Psychiatry consult note remains pending.    #Fall injury  - HO frequent falls  - Likely due to alcohol intoxication   - CT Head No Cont: Stable scattered chronic lacunar infarcts.  - Trauma work up Negative. No need for any acute intervention.    #HTN  - BP has been within normal limits.  - Flowsheet reviewed.  - Not on home meds.    #H/O Palpitations  - Continue with Metoprolol tartrate 75mg BID    #Hyperthyroidism  -3/24: TSH 0.52, fT4 0.7, T3 84  -F/U TFT  -Continue with Methimazole 2.5mg OD    #COPD  - on RA   - Duonebs PRN, Symbicort BID      Pending: Psych eval.

## 2024-09-24 NOTE — BH CONSULTATION LIAISON ASSESSMENT NOTE - NSICDXBHSECONDARYDX_PSY_ALL_CORE
Alcohol dependence with withdrawal, unspecified   F10.239  Tobacco use disorder   F17.200  Wernicke's encephalopathy   E51.2  Hepatic steatosis   K76.0

## 2024-09-24 NOTE — PROGRESS NOTE ADULT - ASSESSMENT
Assessment and Plan  He is a 63-year-old male patient with history of alcohol use disorder, alcoholic intoxication requiring intubation in March 2024, hypertension, COPD, hypothyroidism, anxiety, and insomnia who was brought to the ED on September 20 for evaluation of multiple falls, confusion, and tremulousness, found to have elevated serum alcohol levels and liver enzymes.  We are consulted for concern of alcoholic hepatitis.      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  * Presentation: Brought for evaluation of frequent falls in the last week; associated with confusion of 1 day duration; tremulousness; restlessness; has been feeling down for few months (suicidal ideation per son); denies pain or nausea; 1 episode of nonbilious vomiting last week; no unintentional weight loss; review of systems positive for constipation with last bowel movement being a week ago  * Vitals: Blood pressure 146/87 mmHg; current heart rate 68 bpm (had sinus tachycardia before); no fever  * Labs: WBC 6.36, hemoglobin 14.4, platelet 208 on September 21, sodium 138, potassium 4-2, BUN 7, creatinine 0.6 on September 21  * LFTs: 3.6/49/49/174 on September 20-> 4.3/451/322/139 on September 21 -> 4.8/415/235/115 on 09/22-> 4.3/374/216/99 on 09/23 (versus 1.1/159/51/42 on March 14 during recent admission)  * No lipase  * INR 0.96 on September 20; lactate 6.3 and 5.3 on September 20-> 2.5 on September 21  * Serum alcohol 251 in September 20  * Acute hepatitis panel unremarkable in September 22  * No previous ultrasound of the abdomen  * Previous CT abdomen with oral contrast on March 5, 2024 for constipation revealed hepatic steatosis with circumferential thickening along the distal descending colon to the rectum with colonic dilation up to 7 cm from the cecum to the splenic flexure, no transition point with LBO, right inguinal hernia with loops of bowels, prominent small bowel loops suggestive of ileus  * Current CT abdomen with IV contrast on September 20: Hepatic steatosis, unremarkable pancreas or spleen, moderate large right inguinal hernia with nonobstructing small bowel loops  * RUQ US 09/22 poor exam; hepatic steatosis; no ascites  * Refused previous colonoscopy; no prior EGD  * No family history of GI cancers or liver disease    RECOMMENDATIONS  - Trend LFTs and INR  - In the setting of suspected alcoholic hepatitis: MDF score was calculated (-5.4): Hold off steroids for now  - Continue CIWA protocol: Continue IV Ativan 2 mg every 1 hour as needed and Ativan taper as ordered  - Continue multivitamins and folate supplementation daily; switched p.o. thiamine 100 mg daily to IV thiamine 500 mg every 8 hours on 09/22 in the setting of confusion (would continue for 72 hours) then switch to PO 100mg QD on 09/25  - Check RENÉ, AMA, anti-Smooth Muscle Ab type 1, Anti liver-kidney microsomal Ab, Anti-soluble liver Ag, immunoglobulin panel, ceruloplasmin, A1AT phenotype  - Trend electrolytes and replete as needed  - Avoid hepatotoxic agents: Avoid NSAIDs  - Counseled son about alcohol cessation.  Consider rehab.  Catch and SBIRT teams on board  - Recommend psych evaluation in the setting of low mood/suicidal ideation both of which have been exacerbating alcohol intake in the last few months  - Monitor bowel movements and avoid constipation: Agree with lactulose 20 g every 6 hours, MiraLAX 17 g twice daily, and senna 2 tabs at bedtime in the setting of last bowel movement being last week  - Continue p.o. Protonix 40 mg daily for GI prophylaxis  - Follow up with our [facility] located at [address] [place] [address], [address]. Telephone No: [phone]      History of colonic distention/thickening and ileus in March 2024  Moderate to large right inguinal hernia with nonobstructive bowel  * Previous CT abdomen with oral contrast on March 5, 2024 for constipation revealed hepatic steatosis with circumferential thickening along the distal descending colon to the rectum with colonic dilation up to 7 cm from the cecum to the splenic flexure, no transition point with LBO, right inguinal hernia with loops of bowels, prominent small bowel loops suggestive of ileus  * Current CT abdomen with IV contrast on September 20: Hepatic steatosis, unremarkable pancreas or spleen, moderate large right inguinal hernia with nonobstructing small bowel loops  * Refused previous colonoscopy; no prior EGD  * No family history of GI cancers or liver disease    RECOMMENDATIONS  - Monitor bowel movements and avoid constipation as above  - Serial abdominal exam  - In case of worsening distention or constipation, recommend checking KUB  - Would ideally benefit from colonoscopy  - Discussed the above recommendation with the son at bedside.  Per son, the patient has been refusing to go for colonoscopy for years  - If becomes agreeable, would recommend outpatient follow-up at [facility] located at [address] [place] [address]. Phone Number: [phone]        Thank you for your consult.  - Please note that plan was communicated with medical team.   - Please reach GI on [phone] during weekdays till 5pm.  - Please call the GI service line after 5pm on Weekdays and anytime on Weekends: [phone].      Lux Abdalla MD  PGY - 5 Gastroenterology Fellow   [facility] negative normal/normal affect/alert and oriented x3/normal behavior

## 2024-09-24 NOTE — PROGRESS NOTE ADULT - SUBJECTIVE AND OBJECTIVE BOX
Hepatology Follow Up Note      Location[facility]  Patient Name: MATT MACDONALD  Age: 63y  Gender: Male      Chief Complaint  Patient is a 63y old Male who presents with a chief complaint of alcohol withdrawal (24 Sep 2024 07:01)  Primary diagnosis of Alcoholic liver damage        Reason for Consult  Alcoholic Hepatitis      Progress Note  This morning patient was seen and examined at bedside.    Today is hospital day 4d.  Patient seems more awake but still not oriented to place or time.  Patient denies abdominal pain or nausea or vomiting.   Last bowel movement was soft and brown on 09/22.      Vital Signs in the last 24 hours   Vitals Summary T(C): 36.4 (09-24-24 @ 11:24), Max: 36.6 (09-23-24 @ 19:31)  HR: 64 (09-24-24 @ 11:24) (59 - 82)  BP: 127/72 (09-24-24 @ 11:24) (114/79 - 136/86)  RR: 20 (09-24-24 @ 11:24) (18 - 20)  SpO2: 100% (09-24-24 @ 11:24) (97% - 100%)  Vent Data   Intake/ Output   09-23-24 @ 07:01  -  09-24-24 @ 07:00  --------------------------------------------------------  IN: 200 mL / OUT: 150 mL / NET: 50 mL        Physical Exam  * General Appearance: less confused; not oriented to place or year; not in acute distress  * Eyes: mild icterus  * Lungs: Good bilateral air entry, normal breath sounds (Clear to auscultation bilaterally, no audible wheezes, crackles, or rhonchi)  * Heart: Regular Rate and Rhythm, normal S1 and S2, no audible murmur, rub, or gallop  * Abdomen: Symmetric, non-distended, soft, non-tender, bowel sounds active all four quadrants, no masses  * Extremities: no lower extremity pitting edema bilaterally; no asterexis; + tremors improved 09/24        Investigations   Laboratory Workup      - CBC:                        14.5   7.33  )-----------( 142      ( 24 Sep 2024 06:43 )             40.8       - Hgb Trend:  14.5  09-24-24 @ 06:43  14.2  09-23-24 @ 06:24  15.0  09-22-24 @ 05:59          - Chemistry:  09-24    141  |  105  |  10  ----------------------------<  90  4.0   |  21  |  0.6[L]    Ca    8.3[L]      24 Sep 2024 06:43  Phos  3.2     09-24  Mg     1.9     09-24    TPro  5.6[L]  /  Alb  3.3[L]  /  TBili  3.6[H]  /  DBili  x   /  AST  182[H]  /  ALT  92[H]  /  AlkPhos  365[H]  09-24    Liver panel trend:  TBili 3.6   /      /   ALT 92   /   AlkP 365   /   Tptn 5.6   /   Alb 3.3    /   DBili --      09-24  TBili 4.3   /      /   ALT 99   /   AlkP 374   /   Tptn 5.5   /   Alb 3.3    /   DBili --      09-23  TBili 4.8   /      /      /   AlkP 415   /   Tptn 5.7   /   Alb 3.4    /   DBili --      09-22  TBili 4.3   /      /      /   AlkP 451   /   Tptn 5.8   /   Alb 3.2    /   DBili --      09-21  TBili 3.6   /      /      /   AlkP 494   /   Tptn 6.4   /   Alb 3.8    /   DBili --      09-20      - Coagulation Studies:  PT/INR - ( 24 Sep 2024 06:43 )   PT: 11.40 sec;   INR: 1.00 ratio             - ABG:      - Cardiac Markers:        Microbiological Workup  Urinalysis Basic - ( 24 Sep 2024 06:43 )    Color: x / Appearance: x / SG: x / pH: x  Gluc: 90 mg/dL / Ketone: x  / Bili: x / Urobili: x   Blood: x / Protein: x / Nitrite: x   Leuk Esterase: x / RBC: x / WBC x   Sq Epi: x / Non Sq Epi: x / Bacteria: x          Radiological Workup    US Abdomen Upper Quadrant Right:   ACC: [id number] EXAM:  US ABDOMEN RT UPR QUADRANT   ORDERED BY: ELIGIO JOHNSON     PROCEDURE DATE:  09/22/2024          INTERPRETATION:  CLINICAL INFORMATION: Cirrhosis    COMPARISON: CT dated 9/20/2024    TECHNIQUE: Sonography of the right upper quadrant.    FINDINGS:  Liver: Liver poorly evaluated by ultrasound. Hepatic steatosis as seen on   CT.  Bile ducts: Normal caliber. Common bile duct measures 6 mm.  Gallbladder: Gallbladder sludge. Negative sonographic Barlow's sign.  Pancreas: Poorly visualized.  Right kidney: 11.0 cm. No hydronephrosis. 1.4 cm cyst.  Ascites: None.      IMPRESSION:  Liver poorly evaluated by ultrasound. Hepatic steatosis as seen on CT.    --- End of Report ---            ZARA QUINTANILLA MD; Attending Radiologist  This document has been electronically signed. Sep 23 2024  9:08AM (09-22-24 @ 21:02)          Current Medications  Standing Medications  chlorhexidine 2% Cloths 1 Application(s) Topical daily  enoxaparin Injectable 40 milliGRAM(s) SubCutaneous every 24 hours  fluticasone propionate/ salmeterol 100-50 MICROgram(s) Diskus 1 Dose(s) Inhalation two times a day  folic acid 1 milliGRAM(s) Oral daily  lactated ringers. 1000 milliLiter(s) (100 mL/Hr) IV Continuous <Continuous>  LORazepam   Injectable 1 milliGRAM(s) IV Push every 12 hours  LORazepam   Injectable   IV Push   methimazole 2.5 milliGRAM(s) Oral daily  metoprolol tartrate 75 milliGRAM(s) Oral two times a day  multivitamin 1 Tablet(s) Oral daily  pantoprazole    Tablet 40 milliGRAM(s) Oral before breakfast  polyethylene glycol 3350 17 Gram(s) Oral two times a day  senna 2 Tablet(s) Oral at bedtime  thiamine IVPB 500 milliGRAM(s) IV Intermittent every 8 hours    PRN Medications  albuterol/ipratropium for Nebulization 3 milliLiter(s) Nebulizer every 6 hours PRN Shortness of Breath and/or Wheezing  aluminum hydroxide/magnesium hydroxide/simethicone Suspension 30 milliLiter(s) Oral every 4 hours PRN Dyspepsia  LORazepam   Injectable 2 milliGRAM(s) IV Push every 1 hour PRN Symptom-triggered: each CIWA -Ar score 8 or GREATER  melatonin 3 milliGRAM(s) Oral at bedtime PRN Insomnia    Singles Doses Administered  (ADM OVERRIDE) 2 each &lt;see task&gt; GiveOnce  (ADM OVERRIDE) 2 each &lt;see task&gt; GiveOnce  (ADM OVERRIDE) 2 each &lt;see task&gt; GiveOnce  (ADM OVERRIDE) 2 each &lt;see task&gt; GiveOnce  (ADM OVERRIDE) 2 each &lt;see task&gt; GiveOnce  (ADM OVERRIDE) 2 each &lt;see task&gt; GiveOnce  (ADM OVERRIDE) 2 each &lt;see task&gt; GiveOnce  (ADM OVERRIDE) 1 each &lt;see task&gt; GiveOnce  (ADM OVERRIDE) 1 each &lt;see task&gt; GiveOnce  diazepam  Injectable 5 milliGRAM(s) IV Push Once  folic acid Injectable 1 milliGRAM(s) IV Push Once  lactated ringers Bolus 1000 milliLiter(s) IV Bolus once  lactulose Syrup 20 Gram(s) Oral every 6 hours  LORazepam   Injectable 3 milliGRAM(s) IV Push every 4 hours  LORazepam   Injectable 1 milliGRAM(s) IV Push every 4 hours  LORazepam   Injectable 4 milliGRAM(s) IV Push every 4 hours  LORazepam   Injectable 2 milliGRAM(s) IV Push every 4 hours  magnesium sulfate  IVPB 2 Gram(s) IV Intermittent once  magnesium sulfate  IVPB 2 Gram(s) IV Intermittent once  thiamine 100 milliGRAM(s) Oral Once

## 2024-09-24 NOTE — BH CONSULTATION LIAISON ASSESSMENT NOTE - HPI (INCLUDE ILLNESS QUALITY, SEVERITY, DURATION, TIMING, CONTEXT, MODIFYING FACTORS, ASSOCIATED SIGNS AND SYMPTOMS)
78 yo Male with past medical history of alcohol abuse, hypertension, COPD, hyperthyroidism, anxiety, insomnia who presented to the ED for the evaluation of frequent falls and alcohol intoxication. Psychiatry consulted for reported depression and SI.    On approach today, patient is calm, euthymic, aox1. He thinks he's in "[place]" and this year is "1931," and he is not sure why he is in the hospital. He denies feeling depressed, denies having any mood disturbances, denies SI/HI/AVH.    Collateral obtained from son ([phone]): son confirmed that patient has made suicidal statements and expressed depression when he was drunk. Son agreed that patient is in need of substance rehab but stated that patient has consistently been refusing all levels of outpatient care. Patient has never made suicidal attempt, but would appear agitated or depressed when intoxicated. Son denies patient having any acute life stress currently. Son denies knowledge of patient being on any psychiatric medications in the past. Son has concern that when patient is discharged from the hospital, he won't be able to take care of himself because he lives at home alone.

## 2024-09-25 LAB
ALBUMIN SERPL ELPH-MCNC: 3.3 G/DL — LOW (ref 3.5–5.2)
ALP SERPL-CCNC: 336 U/L — HIGH (ref 30–115)
ALT FLD-CCNC: 83 U/L — HIGH (ref 0–41)
ANION GAP SERPL CALC-SCNC: 16 MMOL/L — HIGH (ref 7–14)
AST SERPL-CCNC: 145 U/L — HIGH (ref 0–41)
BASOPHILS # BLD AUTO: 0.03 K/UL — SIGNIFICANT CHANGE UP (ref 0–0.2)
BASOPHILS NFR BLD AUTO: 0.4 % — SIGNIFICANT CHANGE UP (ref 0–1)
BILIRUB SERPL-MCNC: 3.4 MG/DL — HIGH (ref 0.2–1.2)
BUN SERPL-MCNC: 10 MG/DL — SIGNIFICANT CHANGE UP (ref 10–20)
CALCIUM SERPL-MCNC: 8.4 MG/DL — SIGNIFICANT CHANGE UP (ref 8.4–10.5)
CHLORIDE SERPL-SCNC: 108 MMOL/L — SIGNIFICANT CHANGE UP (ref 98–110)
CO2 SERPL-SCNC: 19 MMOL/L — SIGNIFICANT CHANGE UP (ref 17–32)
CREAT SERPL-MCNC: 0.7 MG/DL — SIGNIFICANT CHANGE UP (ref 0.7–1.5)
EGFR: 104 ML/MIN/1.73M2 — SIGNIFICANT CHANGE UP
EOSINOPHIL # BLD AUTO: 0.17 K/UL — SIGNIFICANT CHANGE UP (ref 0–0.7)
EOSINOPHIL NFR BLD AUTO: 2.1 % — SIGNIFICANT CHANGE UP (ref 0–8)
GLUCOSE SERPL-MCNC: 79 MG/DL — SIGNIFICANT CHANGE UP (ref 70–99)
HCT VFR BLD CALC: 39.1 % — LOW (ref 42–52)
HGB BLD-MCNC: 13.9 G/DL — LOW (ref 14–18)
IMM GRANULOCYTES NFR BLD AUTO: 0.6 % — HIGH (ref 0.1–0.3)
LYMPHOCYTES # BLD AUTO: 2.06 K/UL — SIGNIFICANT CHANGE UP (ref 1.2–3.4)
LYMPHOCYTES # BLD AUTO: 25.8 % — SIGNIFICANT CHANGE UP (ref 20.5–51.1)
MAGNESIUM SERPL-MCNC: 1.9 MG/DL — SIGNIFICANT CHANGE UP (ref 1.8–2.4)
MCHC RBC-ENTMCNC: 35.5 G/DL — SIGNIFICANT CHANGE UP (ref 32–37)
MCHC RBC-ENTMCNC: 36.4 PG — HIGH (ref 27–31)
MCV RBC AUTO: 102.4 FL — HIGH (ref 80–94)
MONOCYTES # BLD AUTO: 0.65 K/UL — HIGH (ref 0.1–0.6)
MONOCYTES NFR BLD AUTO: 8.1 % — SIGNIFICANT CHANGE UP (ref 1.7–9.3)
NEUTROPHILS # BLD AUTO: 5.02 K/UL — SIGNIFICANT CHANGE UP (ref 1.4–6.5)
NEUTROPHILS NFR BLD AUTO: 63 % — SIGNIFICANT CHANGE UP (ref 42.2–75.2)
NRBC # BLD: 0 /100 WBCS — SIGNIFICANT CHANGE UP (ref 0–0)
PLATELET # BLD AUTO: 160 K/UL — SIGNIFICANT CHANGE UP (ref 130–400)
PMV BLD: 12.4 FL — HIGH (ref 7.4–10.4)
POTASSIUM SERPL-MCNC: 3.9 MMOL/L — SIGNIFICANT CHANGE UP (ref 3.5–5)
POTASSIUM SERPL-SCNC: 3.9 MMOL/L — SIGNIFICANT CHANGE UP (ref 3.5–5)
PROT SERPL-MCNC: 5.5 G/DL — LOW (ref 6–8)
RBC # BLD: 3.82 M/UL — LOW (ref 4.7–6.1)
RBC # FLD: 14 % — SIGNIFICANT CHANGE UP (ref 11.5–14.5)
SODIUM SERPL-SCNC: 143 MMOL/L — SIGNIFICANT CHANGE UP (ref 135–146)
WBC # BLD: 7.98 K/UL — SIGNIFICANT CHANGE UP (ref 4.8–10.8)
WBC # FLD AUTO: 7.98 K/UL — SIGNIFICANT CHANGE UP (ref 4.8–10.8)

## 2024-09-25 PROCEDURE — 99232 SBSQ HOSP IP/OBS MODERATE 35: CPT

## 2024-09-25 RX ORDER — ESCITALOPRAM OXALATE 10 MG
10 TABLET ORAL DAILY
Refills: 0 | Status: DISCONTINUED | OUTPATIENT
Start: 2024-09-25 | End: 2024-09-30

## 2024-09-25 RX ORDER — NALTREXONE HYDROCHLORIDE 50 MG/1
50 TABLET, FILM COATED ORAL DAILY
Refills: 0 | Status: DISCONTINUED | OUTPATIENT
Start: 2024-09-25 | End: 2024-09-30

## 2024-09-25 RX ADMIN — THIAMINE HYDROCHLORIDE 105 MILLIGRAM(S): 100 INJECTION, SOLUTION INTRAMUSCULAR; INTRAVENOUS at 05:44

## 2024-09-25 RX ADMIN — Medication 2 TABLET(S): at 21:58

## 2024-09-25 RX ADMIN — THIAMINE HYDROCHLORIDE 105 MILLIGRAM(S): 100 INJECTION, SOLUTION INTRAMUSCULAR; INTRAVENOUS at 23:32

## 2024-09-25 RX ADMIN — PANTOPRAZOLE SODIUM 40 MILLIGRAM(S): 40 TABLET, DELAYED RELEASE ORAL at 05:44

## 2024-09-25 RX ADMIN — FOLIC ACID 1 MILLIGRAM(S): 1 TABLET ORAL at 13:51

## 2024-09-25 RX ADMIN — Medication 75 MILLIGRAM(S): at 06:35

## 2024-09-25 RX ADMIN — Medication 17 GRAM(S): at 18:26

## 2024-09-25 RX ADMIN — Medication 1 MILLIGRAM(S): at 05:43

## 2024-09-25 RX ADMIN — CHLORHEXIDINE GLUCONATE ORAL RINSE 1 APPLICATION(S): 1.2 SOLUTION DENTAL at 13:51

## 2024-09-25 RX ADMIN — ENOXAPARIN SODIUM 40 MILLIGRAM(S): 150 INJECTION SUBCUTANEOUS at 13:51

## 2024-09-25 RX ADMIN — Medication 17 GRAM(S): at 05:44

## 2024-09-25 RX ADMIN — THIAMINE HYDROCHLORIDE 105 MILLIGRAM(S): 100 INJECTION, SOLUTION INTRAMUSCULAR; INTRAVENOUS at 18:09

## 2024-09-25 RX ADMIN — MULTI VITAMIN/MINERAL SUPPLEMENT WITH ASCORBIC ACID, NIACIN, PYRIDOXINE, PANTOTHENIC ACID, FOLIC ACID, RIBOFLAVIN, THIAMIN, BIOTIN, COBALAMIN AND ZINC. 1 TABLET(S): 60; 20; 12.5; 10; 10; 1.7; 1.5; 1; .3; .006 TABLET, COATED ORAL at 13:51

## 2024-09-25 RX ADMIN — Medication 1 MILLIGRAM(S): at 18:12

## 2024-09-25 RX ADMIN — Medication 75 MILLIGRAM(S): at 18:11

## 2024-09-25 NOTE — PROGRESS NOTE ADULT - ASSESSMENT
The patient is a 63-year-old male with past medical history of alcohol abuse, hypertension, COPD, hyperthyroidism, anxiety, insomnia who presented to the ED for the evaluation of frequent falls and alcohol intoxication.     # Alcohol intoxication with impending  withdrawal    >> still on CIWA protocol.   #Diffuse Hepatic Steatosis  # Suspected Thiamine /Folate deficiency with Macrocytosis  #Lactic Acidosis - Resolved  - HO alcohol abuse and withdrawals.  - Per son Anthony; the patient's functional capacity has been declining, getting more depressed with suicide attempts.  - On high dose thiamine, will continue 500mg IV q8 for 3 days then 250mg IV for 5 days.  - Continue CIWA monitoring, Ativan taper and PRN    - Addiction team RECS:  CATCH team to assist with substance use aftercare options and will have ongoing discussions regarding medications for AUD such as naltrexone when patient's mental status improves. Patient has previously refused addiction aftercare services. Vivitrol is injectable extended release naltrexone given as a monthly injection every 4 weeks. Will consider administering injection day of discharge as liver function improves for reducing alcohol cravings and binge consumption. Daniel Cruz agreeable to plan. Will have ongoing discussions with patient regarding need for supportive measures at home to assist with sobriety as patient endorses and has demonstrated poor decision making.   concern for underlying mood disorder as well, will evaluate for depression tomorrow if patient engages in discussion and consider medications to assist.    - Psychiatry RECS  No psychiatric contraindications to discharge  Please refer to [facility] at [address] [place] [address], [place], [place], [address] (Phone: [phone])  Please coordinate aftercare with CATCH team.      #Fall injury  - HO frequent falls  - Likely due to alcohol intoxication   - CT Head No Cont: Stable scattered chronic lacunar infarcts.  - Trauma work up Negative. No need for any acute intervention.    #HTN  - BP has been within normal limits.  - Flowsheet reviewed.  - Not on home meds.    #H/O Palpitations  - Continue with Metoprolol tartrate 75mg BID    #Hyperthyroidism  -3/24: TSH 0.52, fT4 0.7, T3 84  -F/U TFT  -Continue with Methimazole 2.5mg OD    #COPD  - on RA   - Duonebs PRN, Symbicort BID    Pending: f/u PT   Dispo: Home vs STR

## 2024-09-25 NOTE — PROGRESS NOTE ADULT - SUBJECTIVE AND OBJECTIVE BOX
HPI:  63 years old male history of alcohol abuse, hypertension, COPD, hyperthyroidism, anxiety, insomnia presented top the ED for the evaluation of frequent falls and alcohol intoxication. As per the son at bedside, the patient has been drinking about 1 bottle(750ml) whiskey daily for months(last drink 9/19/24 PM), was admitted and intubated for etoh intoxication in March, 2024, has been drinking since being discharged. Pt has fallen multiple times in the last week. On my eval, Pt is tremulous and restless.  Patient also reports he does want to stop drinking and try to cut down his alcohol use. Also endorses constipation, last BM about a week back. Denies drug use. Otherwise denies fever, chills, recent illness, coughing, chest pain, abdominal pain, diarrhea or urinary symptoms.     CEU Course:  The patient was stable during his CEU stay. He was seen by Psych, addiction and CATCH team. We started high dose thiamine protocol and he is on the CIWA protocol. His mental status fluctuates between slow but adequate and episodes of delirium. Hepatology is following the patient.       SUBJECTIVE/OVERNIGHT EVENTS  Today is hospital day 5d. This morning patient was seen and examined at bedside, resting comfortably in bed. No acute or major events overnight.    MEDICATIONS  STANDING MEDICATIONS  chlorhexidine 2% Cloths 1 Application(s) Topical daily  enoxaparin Injectable 40 milliGRAM(s) SubCutaneous every 24 hours  fluticasone propionate/ salmeterol 100-50 MICROgram(s) Diskus 1 Dose(s) Inhalation two times a day  folic acid 1 milliGRAM(s) Oral daily  lactated ringers. 1000 milliLiter(s) IV Continuous <Continuous>  LORazepam   Injectable 1 milliGRAM(s) IV Push every 12 hours  LORazepam   Injectable   IV Push   methimazole 2.5 milliGRAM(s) Oral daily  metoprolol tartrate 75 milliGRAM(s) Oral two times a day  multivitamin 1 Tablet(s) Oral daily  pantoprazole    Tablet 40 milliGRAM(s) Oral before breakfast  polyethylene glycol 3350 17 Gram(s) Oral two times a day  senna 2 Tablet(s) Oral at bedtime  thiamine IVPB 500 milliGRAM(s) IV Intermittent every 8 hours    PRN MEDICATIONS  albuterol/ipratropium for Nebulization 3 milliLiter(s) Nebulizer every 6 hours PRN  aluminum hydroxide/magnesium hydroxide/simethicone Suspension 30 milliLiter(s) Oral every 4 hours PRN  LORazepam   Injectable 2 milliGRAM(s) IV Push every 4 hours PRN  melatonin 3 milliGRAM(s) Oral at bedtime PRN    VITALS  T(F): 98.1 (09-25-24 @ 05:19), Max: 98.1 (09-25-24 @ 05:19)  HR: 75 (09-25-24 @ 06:38) (64 - 89)  BP: 122/86 (09-25-24 @ 06:38) (117/78 - 136/92)  RR: 19 (09-25-24 @ 05:19) (18 - 20)  SpO2: 97% (09-25-24 @ 05:19) (97% - 100%)    PHYSICAL EXAM    GENERAL: NAD, lying in bed comfortably  HEAD:  Atraumatic, normocephalic  EYES: EOMI, PERRL  NECK: Supple, trachea midline, no JVD  HEART: Regular rate and rhythm  LUNGS: Unlabored respirations.  Clear to auscultation bilaterally, no crackles, wheezing, or rhonchi  ABDOMEN: Soft, nontender, nondistended, +BS  EXTREMITIES: 2+ peripheral pulses bilaterally. No clubbing, cyanosis, or edema  NERVOUS SYSTEM:  A&Ox3, moving all extremities, no focal deficits         LABS:                          13.9   7.98  )-----------( 160      ( 25 Sep 2024 05:39 )             39.1     09-25    143  |  108  |  10  ----------------------------<  79  3.9   |  19  |  0.7    Ca    8.4      25 Sep 2024 05:39  Phos  3.2     09-24  Mg     1.9     09-25    TPro  5.5[L]  /  Alb  3.3[L]  /  TBili  3.4[H]  /  DBili  x   /  AST  145[H]  /  ALT  83[H]  /  AlkPhos  336[H]  09-25    LIVER FUNCTIONS - ( 25 Sep 2024 05:39 )  Alb: 3.3 g/dL / Pro: 5.5 g/dL / ALK PHOS: 336 U/L / ALT: 83 U/L / AST: 145 U/L / GGT: x           PT/INR - ( 24 Sep 2024 06:43 )   PT: 11.40 sec;   INR: 1.00 ratio           Urinalysis Basic - ( 25 Sep 2024 05:39 )    Color: x / Appearance: x / SG: x / pH: x  Gluc: 79 mg/dL / Ketone: x  / Bili: x / Urobili: x   Blood: x / Protein: x / Nitrite: x   Leuk Esterase: x / RBC: x / WBC x   Sq Epi: x / Non Sq Epi: x / Bacteria: x      IMAGING     CT ABDOMEN AND PELVIS IC   IMPRESSION:    1. No definite evidence of acute traumatic injury within the chest,   abdomen or pelvis.  2. Diffuse hepatic steatosis.    US ABDOMEN RT UPR QUADRANT   IMPRESSION:    Liver poorly evaluated by ultrasound. Hepatic steatosis as seen on CT.

## 2024-09-25 NOTE — PROGRESS NOTE ADULT - ASSESSMENT
The patient is a 63-year-old male with past medical history of alcohol abuse, hypertension, COPD, hyperthyroidism, anxiety, insomnia who presented to the ED for the evaluation of frequent falls and alcohol intoxication.     #Alcohol withdrawal  #Diffuse Hepatic Steatosis  #Lactic Acidosis - Resolved  - HO alcohol abuse and withdrawals.  - Per son Anthony; the patient's functional capacity has been declining, getting more depressed with suicide attempts.  - On high dose thiamine, will continue 500mg IV q8 for 3 days then 250mg IV for 5 days.  - Continue CIWA monitoring, Ativan taper and PRN    - Addiction team RECS:  CATCH team to assist with substance use aftercare options and will have ongoing discussions regarding medications for AUD such as naltrexone when patient's mental status improves. Patient has previously refused addiction aftercare services. Vivitrol is injectable extended release naltrexone given as a monthly injection every 4 weeks. Will consider administering injection day of discharge as liver function improves for reducing alcohol cravings and binge consumption. Daniel Cruz agreeable to plan. Will have ongoing discussions with patient regarding need for supportive measures at home to assist with sobriety as patient endorses and has demonstrated poor decision making.   concern for underlying mood disorder as well, will evaluate for depression tomorrow if patient engages in discussion and consider medications to assist.    - Psychiatry RECS  No psychiatric contraindications to discharge  Please refer to [facility] at [address] [place] [address], [place], [address] (Phone: [phone])  Please coordinate aftercare with CATCH team.      #Fall injury  - HO frequent falls  - Likely due to alcohol intoxication   - CT Head No Cont: Stable scattered chronic lacunar infarcts.  - Trauma work up Negative. No need for any acute intervention.    #HTN  - BP has been within normal limits.  - Flowsheet reviewed.  - Not on home meds.    #H/O Palpitations  - Continue with Metoprolol tartrate 75mg BID    #Hyperthyroidism  -3/24: TSH 0.52, fT4 0.7, T3 84  -F/U TFT  -Continue with Methimazole 2.5mg OD    #COPD  - on RA   - Duonebs PRN, Symbicort BID    Pending: f/u addiction team recs

## 2024-09-25 NOTE — PROGRESS NOTE ADULT - ASSESSMENT
Assessment and Plan  He is a 63-year-old male patient with history of alcohol use disorder, alcoholic intoxication requiring intubation in March 2024, hypertension, COPD, hypothyroidism, anxiety, and insomnia who was brought to the ED on September 20 for evaluation of multiple falls, confusion, and tremulousness, found to have elevated serum alcohol levels and liver enzymes.  We are consulted for concern of alcoholic hepatitis.      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  * Presentation: Brought for evaluation of frequent falls in the last week; associated with confusion of 1 day duration; tremulousness; restlessness; has been feeling down for few months (suicidal ideation per son); denies pain or nausea; 1 episode of nonbilious vomiting last week; no unintentional weight loss; review of systems positive for constipation with last bowel movement being a week ago  * Vitals: Blood pressure 146/87 mmHg; current heart rate 68 bpm (had sinus tachycardia before); no fever  * Labs: WBC 6.36, hemoglobin 14.4, platelet 208 on September 21, sodium 138, potassium 4-2, BUN 7, creatinine 0.6 on September 21  * LFTs: 3.6/49/49/174 on September 20-> 4.3/451/322/139 on September 21 -> 4.8/415/235/115 on 09/22-> 4.3/374/216/99 on 09/23 (versus 1.1/159/51/42 on March 14 during recent admission)  * No lipase  * INR 0.96 on September 20; lactate 6.3 and 5.3 on September 20-> 2.5 on September 21  * Serum alcohol 251 in September 20  * Acute hepatitis panel unremarkable in September 22  * No previous ultrasound of the abdomen  * Previous CT abdomen with oral contrast on March 5, 2024 for constipation revealed hepatic steatosis with circumferential thickening along the distal descending colon to the rectum with colonic dilation up to 7 cm from the cecum to the splenic flexure, no transition point with LBO, right inguinal hernia with loops of bowels, prominent small bowel loops suggestive of ileus  * Current CT abdomen with IV contrast on September 20: Hepatic steatosis, unremarkable pancreas or spleen, moderate large right inguinal hernia with nonobstructing small bowel loops  * RUQ US 09/22 poor exam; hepatic steatosis; no ascites  * Refused previous colonoscopy; no prior EGD  * No family history of GI cancers or liver disease    RECOMMENDATIONS  - Trend LFTs and INR  - In the setting of suspected alcoholic hepatitis: MDF score was calculated (-5.4): Hold off steroids for now  - Continue CIWA protocol: Continue IV Ativan 2 mg every 1 hour as needed and Ativan taper as ordered  - Continue multivitamins and folate supplementation daily; switch to p.o. thiamine 100 mg daily (has been on IV thiamine 500 mg every 8 hours since 09/22)  - Check RENÉ, AMA, anti-Smooth Muscle Ab type 1, Anti liver-kidney microsomal Ab, Anti-soluble liver Ag, immunoglobulin panel, ceruloplasmin, A1AT phenotype  - Trend electrolytes and replete as needed  - Avoid hepatotoxic agents: Avoid NSAIDs  - Counseled son about alcohol cessation.  Consider rehab.  Catch and SBIRT teams on board  - Recommend psych evaluation in the setting of low mood/suicidal ideation both of which have been exacerbating alcohol intake in the last few months  - Monitor bowel movements and avoid constipation: Agree with lactulose 20 g every 6 hours, MiraLAX 17 g twice daily, and senna 2 tabs at bedtime in the setting of last bowel movement being last week  - Continue p.o. Protonix 40 mg daily for GI prophylaxis  - Follow up with our [facility] located at [address] [place] [address], [facility]. Telephone No: [phone]      History of colonic distention/thickening and ileus in March 2024  Moderate to large right inguinal hernia with nonobstructive bowel  * Previous CT abdomen with oral contrast on March 5, 2024 for constipation revealed hepatic steatosis with circumferential thickening along the distal descending colon to the rectum with colonic dilation up to 7 cm from the cecum to the splenic flexure, no transition point with LBO, right inguinal hernia with loops of bowels, prominent small bowel loops suggestive of ileus  * Current CT abdomen with IV contrast on September 20: Hepatic steatosis, unremarkable pancreas or spleen, moderate large right inguinal hernia with nonobstructing small bowel loops  * Refused previous colonoscopy; no prior EGD  * No family history of GI cancers or liver disease    RECOMMENDATIONS  - Monitor bowel movements and avoid constipation as above  - Serial abdominal exam  - In case of worsening distention or constipation, recommend checking KUB  - Would ideally benefit from colonoscopy  - Discussed the above recommendation with the son at bedside.  Per son, the patient has been refusing to go for colonoscopy for years  - If becomes agreeable, would recommend outpatient follow-up at the [facility] located at [address] [place] [address]. Phone Number: [phone]        Thank you for your consult.  - Please note that plan was communicated with medical team.   - Please reach GI on [phone] during weekdays till 5pm.  - Please call the GI service line after 5pm on Weekdays and anytime on Weekends: [phone].      Lux Abdalla MD  PGY - 5 Gastroenterology Fellow   [facility] Assessment and Plan  He is a 63-year-old male patient with history of alcohol use disorder, alcoholic intoxication requiring intubation in March 2024, hypertension, COPD, hypothyroidism, anxiety, and insomnia who was brought to the ED on September 20 for evaluation of multiple falls, confusion, and tremulousness, found to have elevated serum alcohol levels and liver enzymes.  We are consulted for concern of alcoholic hepatitis.      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  * Presentation: Brought for evaluation of frequent falls in the last week; associated with confusion of 1 day duration; tremulousness; restlessness; has been feeling down for few months (suicidal ideation per son); denies pain or nausea; 1 episode of nonbilious vomiting last week; no unintentional weight loss; review of systems positive for constipation with last bowel movement being a week ago  * Vitals: Blood pressure 146/87 mmHg; current heart rate 68 bpm (had sinus tachycardia before); no fever  * Labs: WBC 6.36, hemoglobin 14.4, platelet 208 on September 21, sodium 138, potassium 4-2, BUN 7, creatinine 0.6 on September 21  * LFTs: 3.6/49/49/174 on September 20-> 4.3/451/322/139 on September 21 -> 4.8/415/235/115 on 09/22-> 4.3/374/216/99 on 09/23 (versus 1.1/159/51/42 on March 14 during recent admission)  * No lipase  * INR 0.96 on September 20; lactate 6.3 and 5.3 on September 20-> 2.5 on September 21  * Serum alcohol 251 in September 20  * Acute hepatitis panel unremarkable in September 22  * No previous ultrasound of the abdomen  * Previous CT abdomen with oral contrast on March 5, 2024 for constipation revealed hepatic steatosis with circumferential thickening along the distal descending colon to the rectum with colonic dilation up to 7 cm from the cecum to the splenic flexure, no transition point with LBO, right inguinal hernia with loops of bowels, prominent small bowel loops suggestive of ileus  * Current CT abdomen with IV contrast on September 20: Hepatic steatosis, unremarkable pancreas or spleen, moderate large right inguinal hernia with nonobstructing small bowel loops  * RUQ US 09/22 poor exam; hepatic steatosis; no ascites  * Refused previous colonoscopy; no prior EGD  * No family history of GI cancers or liver disease    RECOMMENDATIONS  - Trend LFTs and INR  - In the setting of suspected alcoholic hepatitis: MDF score was calculated (-5.4): Hold off steroids for now  - Continue CIWA protocol: Continue IV Ativan 2 mg every 1 hour as needed and Ativan taper as ordered  - Continue multivitamins and folate supplementation daily; switch to IV thiamine 250 mg QD 09/25 (was on 500 mg every 8 hours 09/22-09/25)  - Check RENÉ, AMA, anti-Smooth Muscle Ab type 1, Anti liver-kidney microsomal Ab, Anti-soluble liver Ag, immunoglobulin panel, ceruloplasmin, A1AT phenotype  - Trend electrolytes and replete as needed  - Avoid hepatotoxic agents: Avoid NSAIDs  - Counseled son about alcohol cessation.  Consider rehab.  Catch and SBIRT teams on board  - Recommend psych evaluation in the setting of low mood/suicidal ideation both of which have been exacerbating alcohol intake in the last few months  - Monitor bowel movements and avoid constipation: Agree with lactulose 20 g every 6 hours, MiraLAX 17 g twice daily, and senna 2 tabs at bedtime in the setting of last bowel movement being last week  - Continue p.o. Protonix 40 mg daily for GI prophylaxis  - Follow up with our [facility] located at [address] [place] [address], [facility]. Telephone No: [phone]      History of colonic distention/thickening and ileus in March 2024  Moderate to large right inguinal hernia with nonobstructive bowel  * Previous CT abdomen with oral contrast on March 5, 2024 for constipation revealed hepatic steatosis with circumferential thickening along the distal descending colon to the rectum with colonic dilation up to 7 cm from the cecum to the splenic flexure, no transition point with LBO, right inguinal hernia with loops of bowels, prominent small bowel loops suggestive of ileus  * Current CT abdomen with IV contrast on September 20: Hepatic steatosis, unremarkable pancreas or spleen, moderate large right inguinal hernia with nonobstructing small bowel loops  * Refused previous colonoscopy; no prior EGD  * No family history of GI cancers or liver disease    RECOMMENDATIONS  - Monitor bowel movements and avoid constipation as above  - Serial abdominal exam  - In case of worsening distention or constipation, recommend checking KUB  - Would ideally benefit from colonoscopy  - Discussed the above recommendation with the son at bedside.  Per son, the patient has been refusing to go for colonoscopy for years  - If becomes agreeable, would recommend outpatient follow-up at the [facility] located at [address] [place] [address]. Phone Number: [phone]        Thank you for your consult.  - Please note that plan was communicated with medical team.   - Please reach GI on [phone] during weekdays till 5pm.  - Please call the GI service line after 5pm on Weekdays and anytime on Weekends: [phone].      Lux bAdalla MD  PGY - 5 Gastroenterology Fellow   [facility]

## 2024-09-25 NOTE — PROGRESS NOTE ADULT - SUBJECTIVE AND OBJECTIVE BOX
Hepatology Follow Up Note      Location: [facility] 2A 012 A [facility]  Patient Name: MATT MACDONALD  Age: 63y  Gender: Male      Chief Complaint  Patient is a 63y old Male who presents with a chief complaint of alcohol withdrawal (24 Sep 2024 07:01)  Primary diagnosis of Alcoholic liver damage        Reason for Consult  Alcoholic Hepatitis      Progress Note  This morning patient was seen and examined at bedside.    Today is hospital day 5d.  Patient seems more awake but still not oriented to place or time.  Patient denies abdominal pain or nausea or vomiting.   Last bowel movement was soft and brown on 09/22.      Vital Signs in the last 24 hours   Vitals Summary T(C): 36.4 (09-24-24 @ 11:24), Max: 36.6 (09-23-24 @ 19:31)  HR: 64 (09-24-24 @ 11:24) (59 - 82)  BP: 127/72 (09-24-24 @ 11:24) (114/79 - 136/86)  RR: 20 (09-24-24 @ 11:24) (18 - 20)  SpO2: 100% (09-24-24 @ 11:24) (97% - 100%)  Vent Data   Intake/ Output   09-23-24 @ 07:01  -  09-24-24 @ 07:00  --------------------------------------------------------  IN: 200 mL / OUT: 150 mL / NET: 50 mL        Physical Exam  * General Appearance: less confused; not oriented to place or year; not in acute distress  * Eyes: mild icterus  * Lungs: Good bilateral air entry, normal breath sounds (Clear to auscultation bilaterally, no audible wheezes, crackles, or rhonchi)  * Heart: Regular Rate and Rhythm, normal S1 and S2, no audible murmur, rub, or gallop  * Abdomen: Symmetric, non-distended, soft, non-tender, bowel sounds active all four quadrants, no masses  * Extremities: no lower extremity pitting edema bilaterally; no asterexis; + tremors improved 09/24      Investigations   Laboratory Workup      - CBC:                        14.5   7.33  )-----------( 142      ( 24 Sep 2024 06:43 )             40.8       - Hgb Trend:  14.5  09-24-24 @ 06:43  14.2  09-23-24 @ 06:24  15.0  09-22-24 @ 05:59          - Chemistry:  09-24    141  |  105  |  10  ----------------------------<  90  4.0   |  21  |  0.6[L]    Ca    8.3[L]      24 Sep 2024 06:43  Phos  3.2     09-24  Mg     1.9     09-24    TPro  5.6[L]  /  Alb  3.3[L]  /  TBili  3.6[H]  /  DBili  x   /  AST  182[H]  /  ALT  92[H]  /  AlkPhos  365[H]  09-24    Liver panel trend:  TBili 3.6   /      /   ALT 92   /   AlkP 365   /   Tptn 5.6   /   Alb 3.3    /   DBili --      09-24  TBili 4.3   /      /   ALT 99   /   AlkP 374   /   Tptn 5.5   /   Alb 3.3    /   DBili --      09-23  TBili 4.8   /      /      /   AlkP 415   /   Tptn 5.7   /   Alb 3.4    /   DBili --      09-22  TBili 4.3   /      /      /   AlkP 451   /   Tptn 5.8   /   Alb 3.2    /   DBili --      09-21  TBili 3.6   /      /      /   AlkP 494   /   Tptn 6.4   /   Alb 3.8    /   DBili --      09-20      - Coagulation Studies:  PT/INR - ( 24 Sep 2024 06:43 )   PT: 11.40 sec;   INR: 1.00 ratio             - ABG:      - Cardiac Markers:        Microbiological Workup  Urinalysis Basic - ( 24 Sep 2024 06:43 )    Color: x / Appearance: x / SG: x / pH: x  Gluc: 90 mg/dL / Ketone: x  / Bili: x / Urobili: x   Blood: x / Protein: x / Nitrite: x   Leuk Esterase: x / RBC: x / WBC x   Sq Epi: x / Non Sq Epi: x / Bacteria: x          Radiological Workup    US Abdomen Upper Quadrant Right:   ACC: [id number] EXAM:  US ABDOMEN RT UPR QUADRANT   ORDERED BY: ELIGIO JOHNSON     PROCEDURE DATE:  09/22/2024          INTERPRETATION:  CLINICAL INFORMATION: Cirrhosis    COMPARISON: CT dated 9/20/2024    TECHNIQUE: Sonography of the right upper quadrant.    FINDINGS:  Liver: Liver poorly evaluated by ultrasound. Hepatic steatosis as seen on   CT.  Bile ducts: Normal caliber. Common bile duct measures 6 mm.  Gallbladder: Gallbladder sludge. Negative sonographic Barlow's sign.  Pancreas: Poorly visualized.  Right kidney: 11.0 cm. No hydronephrosis. 1.4 cm cyst.  Ascites: None.      IMPRESSION:  Liver poorly evaluated by ultrasound. Hepatic steatosis as seen on CT.    --- End of Report ---            ZARA QUINTANILLA MD; Attending Radiologist  This document has been electronically signed. Sep 23 2024  9:08AM (09-22-24 @ 21:02)          Current Medications  Standing Medications  chlorhexidine 2% Cloths 1 Application(s) Topical daily  enoxaparin Injectable 40 milliGRAM(s) SubCutaneous every 24 hours  fluticasone propionate/ salmeterol 100-50 MICROgram(s) Diskus 1 Dose(s) Inhalation two times a day  folic acid 1 milliGRAM(s) Oral daily  lactated ringers. 1000 milliLiter(s) (100 mL/Hr) IV Continuous <Continuous>  LORazepam   Injectable 1 milliGRAM(s) IV Push every 12 hours  LORazepam   Injectable   IV Push   methimazole 2.5 milliGRAM(s) Oral daily  metoprolol tartrate 75 milliGRAM(s) Oral two times a day  multivitamin 1 Tablet(s) Oral daily  pantoprazole    Tablet 40 milliGRAM(s) Oral before breakfast  polyethylene glycol 3350 17 Gram(s) Oral two times a day  senna 2 Tablet(s) Oral at bedtime  thiamine IVPB 500 milliGRAM(s) IV Intermittent every 8 hours    PRN Medications  albuterol/ipratropium for Nebulization 3 milliLiter(s) Nebulizer every 6 hours PRN Shortness of Breath and/or Wheezing  aluminum hydroxide/magnesium hydroxide/simethicone Suspension 30 milliLiter(s) Oral every 4 hours PRN Dyspepsia  LORazepam   Injectable 2 milliGRAM(s) IV Push every 1 hour PRN Symptom-triggered: each CIWA -Ar score 8 or GREATER  melatonin 3 milliGRAM(s) Oral at bedtime PRN Insomnia    Singles Doses Administered  (ADM OVERRIDE) 2 each &lt;see task&gt; GiveOnce  (ADM OVERRIDE) 2 each &lt;see task&gt; GiveOnce  (ADM OVERRIDE) 2 each &lt;see task&gt; GiveOnce  (ADM OVERRIDE) 2 each &lt;see task&gt; GiveOnce  (ADM OVERRIDE) 2 each &lt;see task&gt; GiveOnce  (ADM OVERRIDE) 2 each &lt;see task&gt; GiveOnce  (ADM OVERRIDE) 2 each &lt;see task&gt; GiveOnce  (ADM OVERRIDE) 1 each &lt;see task&gt; GiveOnce  (ADM OVERRIDE) 1 each &lt;see task&gt; GiveOnce  diazepam  Injectable 5 milliGRAM(s) IV Push Once  folic acid Injectable 1 milliGRAM(s) IV Push Once  lactated ringers Bolus 1000 milliLiter(s) IV Bolus once  lactulose Syrup 20 Gram(s) Oral every 6 hours  LORazepam   Injectable 3 milliGRAM(s) IV Push every 4 hours  LORazepam   Injectable 1 milliGRAM(s) IV Push every 4 hours  LORazepam   Injectable 4 milliGRAM(s) IV Push every 4 hours  LORazepam   Injectable 2 milliGRAM(s) IV Push every 4 hours  magnesium sulfate  IVPB 2 Gram(s) IV Intermittent once  magnesium sulfate  IVPB 2 Gram(s) IV Intermittent once  thiamine 100 milliGRAM(s) Oral Once

## 2024-09-25 NOTE — PROGRESS NOTE ADULT - SUBJECTIVE AND OBJECTIVE BOX
MATT MACDONALD  63y  Male      Patient is a 63y old  Male who presents with a chief complaint of AUD.  INTERVAL HPI/OVERNIGHT EVENTS:      ******************************* REVIEW OF SYSTEMS:**********************************************  Feeling better.   All other review of systems negative    *********************** VITALS ******************************************    T(F): 98.4 (09-25-24 @ 12:10)  HR: 71 (09-25-24 @ 12:10) (71 - 78)  BP: 107/73 (09-25-24 @ 12:10) (107/73 - 131/89)  RR: 17 (09-25-24 @ 12:10) (17 - 20)  SpO2: 100% (09-25-24 @ 12:10) (97% - 100%)    09-24-24 @ 07:01  -  09-25-24 @ 07:00  --------------------------------------------------------  IN: 0 mL / OUT: 100 mL / NET: -100 mL            09-24-24 @ 07:01  -  09-25-24 @ 07:00  --------------------------------------------------------  IN: 0 mL / OUT: 100 mL / NET: -100 mL        ******************************** PHYSICAL EXAM:**************************************************  GENERAL: NAD    PSYCH: no agitation, baseline mentation  HEENT:     NERVOUS SYSTEM:  Alert & Oriented X3,     PULMONARY: DIAMOND, CTA    CARDIOVASCULAR: S1S2 RRR    GI: Soft, NT, ND; BS present.    EXTREMITIES:  2+ Peripheral Pulses, No clubbing, cyanosis, or edema    LYMPH: No lymphadenopathy noted    SKIN: No rashes or lesions      **************************** LABS *******************************************************                          13.9   7.98  )-----------( 160      ( 25 Sep 2024 05:39 )             39.1     09-25    143  |  108  |  10  ----------------------------<  79  3.9   |  19  |  0.7    Ca    8.4      25 Sep 2024 05:39  Phos  3.2     09-24  Mg     1.9     09-25    TPro  5.5[L]  /  Alb  3.3[L]  /  TBili  3.4[H]  /  DBili  x   /  AST  145[H]  /  ALT  83[H]  /  AlkPhos  336[H]  09-25      Urinalysis Basic - ( 25 Sep 2024 05:39 )    Color: x / Appearance: x / SG: x / pH: x  Gluc: 79 mg/dL / Ketone: x  / Bili: x / Urobili: x   Blood: x / Protein: x / Nitrite: x   Leuk Esterase: x / RBC: x / WBC x   Sq Epi: x / Non Sq Epi: x / Bacteria: x      PT/INR - ( 24 Sep 2024 06:43 )   PT: 11.40 sec;   INR: 1.00 ratio           Lactate Trend  09-21 @ 08:28 Lactate:2.5         CAPILLARY BLOOD GLUCOSE              **************************Active Medications *******************************************  No Known Allergies      albuterol/ipratropium for Nebulization 3 milliLiter(s) Nebulizer every 6 hours PRN  aluminum hydroxide/magnesium hydroxide/simethicone Suspension 30 milliLiter(s) Oral every 4 hours PRN  chlorhexidine 2% Cloths 1 Application(s) Topical daily  enoxaparin Injectable 40 milliGRAM(s) SubCutaneous every 24 hours  escitalopram 10 milliGRAM(s) Oral daily  fluticasone propionate/ salmeterol 100-50 MICROgram(s) Diskus 1 Dose(s) Inhalation two times a day  folic acid 1 milliGRAM(s) Oral daily  lactated ringers. 1000 milliLiter(s) IV Continuous <Continuous>  LORazepam   Injectable   IV Push   LORazepam   Injectable 2 milliGRAM(s) IV Push every 4 hours PRN  LORazepam   Injectable 1 milliGRAM(s) IV Push every 12 hours  melatonin 3 milliGRAM(s) Oral at bedtime PRN  methimazole 2.5 milliGRAM(s) Oral daily  metoprolol tartrate 75 milliGRAM(s) Oral two times a day  multivitamin 1 Tablet(s) Oral daily  naltrexone 50 milliGRAM(s) Oral daily  pantoprazole    Tablet 40 milliGRAM(s) Oral before breakfast  polyethylene glycol 3350 17 Gram(s) Oral two times a day  senna 2 Tablet(s) Oral at bedtime  thiamine IVPB 500 milliGRAM(s) IV Intermittent every 8 hours      ***************************************************  RADIOLOGY & ADDITIONAL TESTS:    Imaging Personally Reviewed:  [ ] YES  [ ] NO    HEALTH ISSUES - PROBLEM Dx:  Alcohol dependence with withdrawal, unspecified    Tobacco use disorder    Wernicke's encephalopathy    Hepatic steatosis

## 2024-09-26 LAB
ALBUMIN SERPL ELPH-MCNC: 3.2 G/DL — LOW (ref 3.5–5.2)
ALP SERPL-CCNC: 343 U/L — HIGH (ref 30–115)
ALT FLD-CCNC: 73 U/L — HIGH (ref 0–41)
ANION GAP SERPL CALC-SCNC: 12 MMOL/L — SIGNIFICANT CHANGE UP (ref 7–14)
AST SERPL-CCNC: 123 U/L — HIGH (ref 0–41)
BASOPHILS # BLD AUTO: 0.03 K/UL — SIGNIFICANT CHANGE UP (ref 0–0.2)
BASOPHILS NFR BLD AUTO: 0.4 % — SIGNIFICANT CHANGE UP (ref 0–1)
BILIRUB SERPL-MCNC: 3.1 MG/DL — HIGH (ref 0.2–1.2)
BUN SERPL-MCNC: 9 MG/DL — LOW (ref 10–20)
CALCIUM SERPL-MCNC: 8.9 MG/DL — SIGNIFICANT CHANGE UP (ref 8.4–10.5)
CHLORIDE SERPL-SCNC: 109 MMOL/L — SIGNIFICANT CHANGE UP (ref 98–110)
CO2 SERPL-SCNC: 21 MMOL/L — SIGNIFICANT CHANGE UP (ref 17–32)
CREAT SERPL-MCNC: 0.7 MG/DL — SIGNIFICANT CHANGE UP (ref 0.7–1.5)
EGFR: 104 ML/MIN/1.73M2 — SIGNIFICANT CHANGE UP
EOSINOPHIL # BLD AUTO: 0.11 K/UL — SIGNIFICANT CHANGE UP (ref 0–0.7)
EOSINOPHIL NFR BLD AUTO: 1.6 % — SIGNIFICANT CHANGE UP (ref 0–8)
GLUCOSE SERPL-MCNC: 96 MG/DL — SIGNIFICANT CHANGE UP (ref 70–99)
HCT VFR BLD CALC: 39.8 % — LOW (ref 42–52)
HGB BLD-MCNC: 14.1 G/DL — SIGNIFICANT CHANGE UP (ref 14–18)
IMM GRANULOCYTES NFR BLD AUTO: 0.9 % — HIGH (ref 0.1–0.3)
LYMPHOCYTES # BLD AUTO: 1.57 K/UL — SIGNIFICANT CHANGE UP (ref 1.2–3.4)
LYMPHOCYTES # BLD AUTO: 22.9 % — SIGNIFICANT CHANGE UP (ref 20.5–51.1)
MCHC RBC-ENTMCNC: 35.4 G/DL — SIGNIFICANT CHANGE UP (ref 32–37)
MCHC RBC-ENTMCNC: 36.6 PG — HIGH (ref 27–31)
MCV RBC AUTO: 103.4 FL — HIGH (ref 80–94)
MONOCYTES # BLD AUTO: 0.69 K/UL — HIGH (ref 0.1–0.6)
MONOCYTES NFR BLD AUTO: 10.1 % — HIGH (ref 1.7–9.3)
NEUTROPHILS # BLD AUTO: 4.39 K/UL — SIGNIFICANT CHANGE UP (ref 1.4–6.5)
NEUTROPHILS NFR BLD AUTO: 64.1 % — SIGNIFICANT CHANGE UP (ref 42.2–75.2)
NRBC # BLD: 0 /100 WBCS — SIGNIFICANT CHANGE UP (ref 0–0)
PLATELET # BLD AUTO: 166 K/UL — SIGNIFICANT CHANGE UP (ref 130–400)
PMV BLD: 12.2 FL — HIGH (ref 7.4–10.4)
POTASSIUM SERPL-MCNC: 3.9 MMOL/L — SIGNIFICANT CHANGE UP (ref 3.5–5)
POTASSIUM SERPL-SCNC: 3.9 MMOL/L — SIGNIFICANT CHANGE UP (ref 3.5–5)
PROT SERPL-MCNC: 5.7 G/DL — LOW (ref 6–8)
RBC # BLD: 3.85 M/UL — LOW (ref 4.7–6.1)
RBC # FLD: 13.9 % — SIGNIFICANT CHANGE UP (ref 11.5–14.5)
SODIUM SERPL-SCNC: 142 MMOL/L — SIGNIFICANT CHANGE UP (ref 135–146)
WBC # BLD: 6.85 K/UL — SIGNIFICANT CHANGE UP (ref 4.8–10.8)
WBC # FLD AUTO: 6.85 K/UL — SIGNIFICANT CHANGE UP (ref 4.8–10.8)

## 2024-09-26 PROCEDURE — 99232 SBSQ HOSP IP/OBS MODERATE 35: CPT

## 2024-09-26 PROCEDURE — 99231 SBSQ HOSP IP/OBS SF/LOW 25: CPT

## 2024-09-26 RX ORDER — THIAMINE HYDROCHLORIDE 100 MG/ML
100 INJECTION, SOLUTION INTRAMUSCULAR; INTRAVENOUS DAILY
Refills: 0 | Status: DISCONTINUED | OUTPATIENT
Start: 2024-09-26 | End: 2024-09-28

## 2024-09-26 RX ADMIN — NALTREXONE HYDROCHLORIDE 50 MILLIGRAM(S): 50 TABLET, FILM COATED ORAL at 11:16

## 2024-09-26 RX ADMIN — Medication 0.5 MILLIGRAM(S): at 17:09

## 2024-09-26 RX ADMIN — Medication 75 MILLIGRAM(S): at 06:05

## 2024-09-26 RX ADMIN — FOLIC ACID 1 MILLIGRAM(S): 1 TABLET ORAL at 11:17

## 2024-09-26 RX ADMIN — PANTOPRAZOLE SODIUM 40 MILLIGRAM(S): 40 TABLET, DELAYED RELEASE ORAL at 06:05

## 2024-09-26 RX ADMIN — Medication 2 TABLET(S): at 21:41

## 2024-09-26 RX ADMIN — THIAMINE HYDROCHLORIDE 100 MILLIGRAM(S): 100 INJECTION, SOLUTION INTRAMUSCULAR; INTRAVENOUS at 11:17

## 2024-09-26 RX ADMIN — Medication 2 MILLIGRAM(S): at 12:44

## 2024-09-26 RX ADMIN — Medication 0.5 MILLIGRAM(S): at 06:09

## 2024-09-26 RX ADMIN — Medication 17 GRAM(S): at 06:10

## 2024-09-26 RX ADMIN — ENOXAPARIN SODIUM 40 MILLIGRAM(S): 150 INJECTION SUBCUTANEOUS at 11:17

## 2024-09-26 RX ADMIN — THIAMINE HYDROCHLORIDE 105 MILLIGRAM(S): 100 INJECTION, SOLUTION INTRAMUSCULAR; INTRAVENOUS at 06:06

## 2024-09-26 RX ADMIN — MULTI VITAMIN/MINERAL SUPPLEMENT WITH ASCORBIC ACID, NIACIN, PYRIDOXINE, PANTOTHENIC ACID, FOLIC ACID, RIBOFLAVIN, THIAMIN, BIOTIN, COBALAMIN AND ZINC. 1 TABLET(S): 60; 20; 12.5; 10; 10; 1.7; 1.5; 1; .3; .006 TABLET, COATED ORAL at 11:16

## 2024-09-26 RX ADMIN — CHLORHEXIDINE GLUCONATE ORAL RINSE 1 APPLICATION(S): 1.2 SOLUTION DENTAL at 11:17

## 2024-09-26 RX ADMIN — Medication 10 MILLIGRAM(S): at 11:15

## 2024-09-26 NOTE — PROGRESS NOTE ADULT - ASSESSMENT
Assessment and Plan  He is a 63-year-old male patient with history of alcohol use disorder, alcoholic intoxication requiring intubation in March 2024, hypertension, COPD, hypothyroidism, anxiety, and insomnia who was brought to the ED on September 20 for evaluation of multiple falls, confusion, and tremulousness, found to have elevated serum alcohol levels and liver enzymes.  We are consulted for concern of alcoholic hepatitis.      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  * Presentation: Brought for evaluation of frequent falls in the last week; associated with confusion of 1 day duration; tremulousness; restlessness; has been feeling down for few months (suicidal ideation per son); denies pain or nausea; 1 episode of nonbilious vomiting last week; no unintentional weight loss; review of systems positive for constipation with last bowel movement being a week ago  * Vitals: Blood pressure 146/87 mmHg; current heart rate 68 bpm (had sinus tachycardia before); no fever  * Labs: WBC 6.36, hemoglobin 14.4, platelet 208 on September 21, sodium 138, potassium 4-2, BUN 7, creatinine 0.6 on September 21  * LFTs: 3.6/49/49/174 on September 20-> 4.3/451/322/139 on September 21 -> 4.8/415/235/115 on 09/22-> 4.3/374/216/99 on 09/23 (versus 1.1/159/51/42 on March 14 during recent admission)  * No lipase  * INR 0.96 on September 20; lactate 6.3 and 5.3 on September 20-> 2.5 on September 21  * Serum alcohol 251 in September 20  * Acute hepatitis panel unremarkable in September 22  * No previous ultrasound of the abdomen  * Previous CT abdomen with oral contrast on March 5, 2024 for constipation revealed hepatic steatosis with circumferential thickening along the distal descending colon to the rectum with colonic dilation up to 7 cm from the cecum to the splenic flexure, no transition point with LBO, right inguinal hernia with loops of bowels, prominent small bowel loops suggestive of ileus  * Current CT abdomen with IV contrast on September 20: Hepatic steatosis, unremarkable pancreas or spleen, moderate large right inguinal hernia with nonobstructing small bowel loops  * RUQ US 09/22 poor exam; hepatic steatosis; no ascites  * Refused previous colonoscopy; no prior EGD  * No family history of GI cancers or liver disease    RECOMMENDATIONS  - Trend LFTs and INR  - In the setting of suspected alcoholic hepatitis: MDF score was calculated (-5.4): Hold off steroids for now  - Continue CIWA protocol: Continue IV Ativan 2 mg every 1 hour as needed and Ativan taper as ordered  - Continue multivitamins and folate supplementation daily; switched to IV thiamine 250 mg QD 09/26 (was on 500 mg every 8 hours 09/22-09/25)  - Follow up CLD workup (09/26): RENÉ, AMA, anti-Smooth Muscle Ab type 1, Anti liver-kidney microsomal Ab, Anti-soluble liver Ag, immunoglobulin panel, ceruloplasmin, A1AT phenotype  - Trend electrolytes and replete as needed  - Avoid hepatotoxic agents: Avoid NSAIDs  - Counseled son about alcohol cessation.  Consider rehab.  Catch and SBIRT teams on board  - Recommend psych evaluation in the setting of low mood/suicidal ideation both of which have been exacerbating alcohol intake in the last few months  - Monitor bowel movements and avoid constipation: Agree with lactulose 20 g every 6 hours, MiraLAX 17 g twice daily, and senna 2 tabs at bedtime in the setting of last bowel movement being last week  - Continue p.o. Protonix 40 mg daily for GI prophylaxis  - Follow up with our [facility] located at [address] [place] [address], [id number]. Telephone No: [phone]      History of colonic distention/thickening and ileus in March 2024  Moderate to large right inguinal hernia with nonobstructive bowel  * Previous CT abdomen with oral contrast on March 5, 2024 for constipation revealed hepatic steatosis with circumferential thickening along the distal descending colon to the rectum with colonic dilation up to 7 cm from the cecum to the splenic flexure, no transition point with LBO, right inguinal hernia with loops of bowels, prominent small bowel loops suggestive of ileus  * Current CT abdomen with IV contrast on September 20: Hepatic steatosis, unremarkable pancreas or spleen, moderate large right inguinal hernia with nonobstructing small bowel loops  * Refused previous colonoscopy; no prior EGD  * No family history of GI cancers or liver disease    RECOMMENDATIONS  - Monitor bowel movements and avoid constipation as above  - Serial abdominal exam  - In case of worsening distention or constipation, recommend checking KUB  - Would ideally benefit from colonoscopy  - Discussed the above recommendation with the son at bedside.  Per son, the patient has been refusing to go for colonoscopy for years  - If becomes agreeable, would recommend outpatient follow-up at [facility] located at [address] [place] [address]. Phone Number: [phone]        Thank you for your consult.  - Please note that plan was communicated with medical team.   - Please reach GI on [phone] during weekdays till 5pm.  - Please call the GI service line after 5pm on Weekdays and anytime on Weekends: [phone].      Lux Abdalla MD  PGY - 5 Gastroenterology Fellow   [facility]

## 2024-09-26 NOTE — PROGRESS NOTE ADULT - SUBJECTIVE AND OBJECTIVE BOX
Hepatology Follow Up Note      [facility]  Patient Name: MATT MACDONALD  Age: 63y  Gender: Male      Chief Complaint  Patient is a 63y old Male who presents with a chief complaint of alcohol withdrawal (24 Sep 2024 07:01)  Primary diagnosis of Alcoholic liver damage        Reason for Consult  Alcoholic Hepatitis      Progress Note  This morning patient was seen and examined at bedside.    Today is hospital day 6d.  Patient seems more awake but still not oriented to place or time.  Patient denies abdominal pain or nausea or vomiting.   Last bowel movement was soft and brown on 09/22.      Vital Signs in the last 24 hours   Vital Signs Last 24 Hrs  T(C): 36.1 (26 Sep 2024 07:21), Max: 37.1 (25 Sep 2024 20:30)  T(F): 97 (26 Sep 2024 07:21), Max: 98.8 (25 Sep 2024 20:30)  HR: 74 (26 Sep 2024 07:21) (70 - 76)  BP: 108/75 (26 Sep 2024 07:21) (100/70 - 122/84)  BP(mean): --  RR: 18 (26 Sep 2024 07:21) (18 - 18)  SpO2: 97% (26 Sep 2024 06:00) (97% - 98%)    Parameters below as of 26 Sep 2024 06:00  Patient On (Oxygen Delivery Method): room air        Physical Exam  * General Appearance: less confused; not oriented to place or year; not in acute distress  * Eyes: mild icterus  * Lungs: Good bilateral air entry, normal breath sounds (Clear to auscultation bilaterally, no audible wheezes, crackles, or rhonchi)  * Heart: Regular Rate and Rhythm, normal S1 and S2, no audible murmur, rub, or gallop  * Abdomen: Symmetric, non-distended, soft, non-tender, bowel sounds active all four quadrants, no masses  * Extremities: no lower extremity pitting edema bilaterally; no asterexis; + tremors improved 09/24      Investigations                          14.1   6.85  )-----------( 166      ( 26 Sep 2024 07:23 )             39.8     09-26    142  |  109  |  9[L]  ----------------------------<  96  3.9   |  21  |  0.7    Ca    8.9      26 Sep 2024 07:23  Mg     1.9     09-25    TPro  5.7[L]  /  Alb  3.2[L]  /  TBili  3.1[H]  /  DBili  x   /  AST  123[H]  /  ALT  73[H]  /  AlkPhos  343[H]  09-26        LIVER FUNCTIONS - ( 26 Sep 2024 07:23 )  Alb: 3.2 g/dL / Pro: 5.7 g/dL / ALK PHOS: 343 U/L / ALT: 73 U/L / AST: 123 U/L / GGT: x               Urinalysis Basic - ( 26 Sep 2024 07:23 )    Color: x / Appearance: x / SG: x / pH: x  Gluc: 96 mg/dL / Ketone: x  / Bili: x / Urobili: x   Blood: x / Protein: x / Nitrite: x   Leuk Esterase: x / RBC: x / WBC x   Sq Epi: x / Non Sq Epi: x / Bacteria: x          Current Medications  Standing Medications  chlorhexidine 2% Cloths 1 Application(s) Topical daily  enoxaparin Injectable 40 milliGRAM(s) SubCutaneous every 24 hours  fluticasone propionate/ salmeterol 100-50 MICROgram(s) Diskus 1 Dose(s) Inhalation two times a day  folic acid 1 milliGRAM(s) Oral daily  lactated ringers. 1000 milliLiter(s) (100 mL/Hr) IV Continuous <Continuous>  LORazepam   Injectable 1 milliGRAM(s) IV Push every 12 hours  LORazepam   Injectable   IV Push   methimazole 2.5 milliGRAM(s) Oral daily  metoprolol tartrate 75 milliGRAM(s) Oral two times a day  multivitamin 1 Tablet(s) Oral daily  pantoprazole    Tablet 40 milliGRAM(s) Oral before breakfast  polyethylene glycol 3350 17 Gram(s) Oral two times a day  senna 2 Tablet(s) Oral at bedtime  thiamine IVPB 500 milliGRAM(s) IV Intermittent every 8 hours    PRN Medications  albuterol/ipratropium for Nebulization 3 milliLiter(s) Nebulizer every 6 hours PRN Shortness of Breath and/or Wheezing  aluminum hydroxide/magnesium hydroxide/simethicone Suspension 30 milliLiter(s) Oral every 4 hours PRN Dyspepsia  LORazepam   Injectable 2 milliGRAM(s) IV Push every 1 hour PRN Symptom-triggered: each CIWA -Ar score 8 or GREATER  melatonin 3 milliGRAM(s) Oral at bedtime PRN Insomnia    Singles Doses Administered  (ADM OVERRIDE) 2 each &lt;see task&gt; GiveOnce  (ADM OVERRIDE) 2 each &lt;see task&gt; GiveOnce  (ADM OVERRIDE) 2 each &lt;see task&gt; GiveOnce  (ADM OVERRIDE) 2 each &lt;see task&gt; GiveOnce  (ADM OVERRIDE) 2 each &lt;see task&gt; GiveOnce  (ADM OVERRIDE) 2 each &lt;see task&gt; GiveOnce  (ADM OVERRIDE) 2 each &lt;see task&gt; GiveOnce  (ADM OVERRIDE) 1 each &lt;see task&gt; GiveOnce  (ADM OVERRIDE) 1 each &lt;see task&gt; GiveOnce  diazepam  Injectable 5 milliGRAM(s) IV Push Once  folic acid Injectable 1 milliGRAM(s) IV Push Once  lactated ringers Bolus 1000 milliLiter(s) IV Bolus once  lactulose Syrup 20 Gram(s) Oral every 6 hours  LORazepam   Injectable 3 milliGRAM(s) IV Push every 4 hours  LORazepam   Injectable 1 milliGRAM(s) IV Push every 4 hours  LORazepam   Injectable 4 milliGRAM(s) IV Push every 4 hours  LORazepam   Injectable 2 milliGRAM(s) IV Push every 4 hours  magnesium sulfate  IVPB 2 Gram(s) IV Intermittent once  magnesium sulfate  IVPB 2 Gram(s) IV Intermittent once  thiamine 100 milliGRAM(s) Oral Once

## 2024-09-26 NOTE — PROGRESS NOTE ADULT - SUBJECTIVE AND OBJECTIVE BOX
SUBJECTIVE:    Patient is a 63y old Male who presents with a chief complaint of AUD (26 Sep 2024 13:26)    Currently admitted to medicine with the primary diagnosis of Alcoholic liver disease       Today is hospital day 6d.     PAST MEDICAL & SURGICAL HISTORY  HTN (hypertension)    Alcohol abuse    Hyperthyroidism      ALLERGIES:  No Known Allergies    MEDICATIONS:  STANDING MEDICATIONS  chlorhexidine 2% Cloths 1 Application(s) Topical daily  enoxaparin Injectable 40 milliGRAM(s) SubCutaneous every 24 hours  escitalopram 10 milliGRAM(s) Oral daily  fluticasone propionate/ salmeterol 100-50 MICROgram(s) Diskus 1 Dose(s) Inhalation two times a day  folic acid 1 milliGRAM(s) Oral daily  lactated ringers. 1000 milliLiter(s) IV Continuous <Continuous>  methimazole 2.5 milliGRAM(s) Oral daily  metoprolol tartrate 75 milliGRAM(s) Oral two times a day  multivitamin 1 Tablet(s) Oral daily  naltrexone 50 milliGRAM(s) Oral daily  pantoprazole    Tablet 40 milliGRAM(s) Oral before breakfast  polyethylene glycol 3350 17 Gram(s) Oral two times a day  senna 2 Tablet(s) Oral at bedtime  thiamine Injectable 100 milliGRAM(s) IV Push daily    PRN MEDICATIONS  albuterol/ipratropium for Nebulization 3 milliLiter(s) Nebulizer every 6 hours PRN  aluminum hydroxide/magnesium hydroxide/simethicone Suspension 30 milliLiter(s) Oral every 4 hours PRN  LORazepam   Injectable 2 milliGRAM(s) IV Push every 4 hours PRN  melatonin 3 milliGRAM(s) Oral at bedtime PRN    VITALS:   T(F): 97.6  HR: 73  BP: 110/69  RR: 18  SpO2: 97%    LABS:                        14.1   6.85  )-----------( 166      ( 26 Sep 2024 07:23 )             39.8     09-26    142  |  109  |  9[L]  ----------------------------<  96  3.9   |  21  |  0.7    Ca    8.9      26 Sep 2024 07:23  Mg     1.9     09-25    TPro  5.7[L]  /  Alb  3.2[L]  /  TBili  3.1[H]  /  DBili  x   /  AST  123[H]  /  ALT  73[H]  /  AlkPhos  343[H]  09-26      Urinalysis Basic - ( 26 Sep 2024 07:23 )    Color: x / Appearance: x / SG: x / pH: x  Gluc: 96 mg/dL / Ketone: x  / Bili: x / Urobili: x   Blood: x / Protein: x / Nitrite: x   Leuk Esterase: x / RBC: x / WBC x   Sq Epi: x / Non Sq Epi: x / Bacteria: x                RADIOLOGY:    PHYSICAL EXAM:  GEN: No acute distress  LUNGS: Clear to auscultation bilaterally   HEART: S1/S2 present. RRR.   ABD/ GI: Soft, non-tender, non-distended. Bowel sounds present  EXT: NC/NC/NE/2+PP/NEWTON  NEURO: [id number]

## 2024-09-26 NOTE — PROGRESS NOTE ADULT - SUBJECTIVE AND OBJECTIVE BOX
SUBJECTIVE/OVERNIGHT EVENTS  Today is hospital day 6d. This morning patient was seen and examined at bedside, resting comfortably in bed. No acute or major events overnight.    MEDICATIONS  STANDING MEDICATIONS  chlorhexidine 2% Cloths 1 Application(s) Topical daily  enoxaparin Injectable 40 milliGRAM(s) SubCutaneous every 24 hours  escitalopram 10 milliGRAM(s) Oral daily  fluticasone propionate/ salmeterol 100-50 MICROgram(s) Diskus 1 Dose(s) Inhalation two times a day  folic acid 1 milliGRAM(s) Oral daily  lactated ringers. 1000 milliLiter(s) IV Continuous <Continuous>  LORazepam   Injectable   IV Push   LORazepam   Injectable 0.5 milliGRAM(s) IV Push every 12 hours  methimazole 2.5 milliGRAM(s) Oral daily  metoprolol tartrate 75 milliGRAM(s) Oral two times a day  multivitamin 1 Tablet(s) Oral daily  naltrexone 50 milliGRAM(s) Oral daily  pantoprazole    Tablet 40 milliGRAM(s) Oral before breakfast  polyethylene glycol 3350 17 Gram(s) Oral two times a day  senna 2 Tablet(s) Oral at bedtime  thiamine Injectable 100 milliGRAM(s) IV Push daily    PRN MEDICATIONS  albuterol/ipratropium for Nebulization 3 milliLiter(s) Nebulizer every 6 hours PRN  aluminum hydroxide/magnesium hydroxide/simethicone Suspension 30 milliLiter(s) Oral every 4 hours PRN  LORazepam   Injectable 2 milliGRAM(s) IV Push every 4 hours PRN  melatonin 3 milliGRAM(s) Oral at bedtime PRN    VITALS  T(F): 97 (09-26-24 @ 07:21), Max: 98.8 (09-25-24 @ 20:30)  HR: 74 (09-26-24 @ 07:21) (70 - 76)  BP: 108/75 (09-26-24 @ 07:21) (100/70 - 122/84)  RR: 18 (09-26-24 @ 07:21) (17 - 18)  SpO2: 97% (09-26-24 @ 06:00) (97% - 100%)    PHYSICAL EXAM  GENERAL  (x  ) NAD, lying in bed comfortably     (  ) obtunded     (  ) lethargic     (  ) somnolent    HEAD  (  ) Atraumatic     (  ) hematoma     (  ) laceration (specify location:       )   - R eye closed    NECK  (  ) Supple     (  ) neck stiffness     (  ) nuchal rigidity     (  )  no JVD     (  ) JVD present ( -- cm)    HEART  Rate -->  (x  ) normal rate    (  ) bradycardic    (  ) tachycardic  Rhythm -->  ( x ) regular    (  ) regularly irregular    (  ) irregularly irregular  Murmurs -->  (  ) normal s1/s2    (  ) systolic murmur    (  ) diastolic murmur    (  ) continuous murmur     (  ) S3 present    (  ) S4 present    LUNGS  ( x )Unlabored respirations     (  ) tachypnea  (  x) B/L air entry     (  ) decreased breath sounds in:  (location     )    (  ) no adventitious sound     (  ) crackles     (  ) wheezing      (  ) rhonchi      (specify location:       )  (  ) chest wall tenderness (specify location:       )    ABDOMEN  ( x ) Soft     (  ) tense   |   (  ) nondistended     (  ) distended   |   (  ) +BS     (  ) hypoactive bowel sounds     (  ) hyperactive bowel sounds  ( x ) nontender     (  ) RUQ tenderness     (  ) RLQ tenderness     (  ) LLQ tenderness     (  ) epigastric tenderness     (  ) diffuse tenderness  (  ) Splenomegaly      (  ) Hepatomegaly      (  ) Jaundice     (  ) ecchymosis   - strap over abdomen     EXTREMITIES  ( x ) Normal     (  ) Rash     (  ) ecchymosis     (  ) varicose veins      (  ) pitting edema     (  ) non-pitting edema   (  ) ulceration     (  ) gangrene:     (location:     )    NERVOUS SYSTEM  (x  ) A&Ox3     (  ) confused     (  ) lethargic  CN II-XII:     (  ) Intact     (  ) focal deficits  (Specify:     )   Upper extremities:     (  ) strength X/5     (  ) focal deficit (specify:    )  Lower extremities:     (  ) strength  X/5    (  ) focal deficit (specify:    )        LABS             14.1   6.85  )-----------( 166      ( 09-26-24 @ 07:23 )             39.8     142  |  109  |  9   -------------------------<  96   09-26-24 @ 07:23  3.9  |  21  |  0.7    Ca      8.9     09-26-24 @ 07:23  Mg     1.9     09-25-24 @ 05:39    TPro  5.7  /  Alb  3.2  /  TBili  3.1  /  DBili  x   /  AST  123  /  ALT  73  /  AlkPhos  343  /  GGT  x     09-26-24 @ 07:23        Urinalysis Basic - ( 26 Sep 2024 07:23 )    Color: x / Appearance: x / SG: x / pH: x  Gluc: 96 mg/dL / Ketone: x  / Bili: x / Urobili: x   Blood: x / Protein: x / Nitrite: x   Leuk Esterase: x / RBC: x / WBC x   Sq Epi: x / Non Sq Epi: x / Bacteria: x          IMAGING SUBJECTIVE/OVERNIGHT EVENTS  Today is hospital day 6d. This morning patient was seen and examined at bedside, resting comfortably in bed. No acute or major events overnight.    MEDICATIONS  STANDING MEDICATIONS  chlorhexidine 2% Cloths 1 Application(s) Topical daily  enoxaparin Injectable 40 milliGRAM(s) SubCutaneous every 24 hours  escitalopram 10 milliGRAM(s) Oral daily  fluticasone propionate/ salmeterol 100-50 MICROgram(s) Diskus 1 Dose(s) Inhalation two times a day  folic acid 1 milliGRAM(s) Oral daily  lactated ringers. 1000 milliLiter(s) IV Continuous <Continuous>  LORazepam   Injectable   IV Push   LORazepam   Injectable 0.5 milliGRAM(s) IV Push every 12 hours  methimazole 2.5 milliGRAM(s) Oral daily  metoprolol tartrate 75 milliGRAM(s) Oral two times a day  multivitamin 1 Tablet(s) Oral daily  naltrexone 50 milliGRAM(s) Oral daily  pantoprazole    Tablet 40 milliGRAM(s) Oral before breakfast  polyethylene glycol 3350 17 Gram(s) Oral two times a day  senna 2 Tablet(s) Oral at bedtime  thiamine Injectable 100 milliGRAM(s) IV Push daily    PRN MEDICATIONS  albuterol/ipratropium for Nebulization 3 milliLiter(s) Nebulizer every 6 hours PRN  aluminum hydroxide/magnesium hydroxide/simethicone Suspension 30 milliLiter(s) Oral every 4 hours PRN  LORazepam   Injectable 2 milliGRAM(s) IV Push every 4 hours PRN  melatonin 3 milliGRAM(s) Oral at bedtime PRN    VITALS  T(F): 97 (09-26-24 @ 07:21), Max: 98.8 (09-25-24 @ 20:30)  HR: 74 (09-26-24 @ 07:21) (70 - 76)  BP: 108/75 (09-26-24 @ 07:21) (100/70 - 122/84)  RR: 18 (09-26-24 @ 07:21) (17 - 18)  SpO2: 97% (09-26-24 @ 06:00) (97% - 100%)    PHYSICAL EXAM  GENERAL  (x  ) NAD, lying in bed comfortably     (  ) obtunded     (  ) lethargic     (  ) somnolent    HEAD  (  ) Atraumatic     (  ) hematoma     (  ) laceration (specify location:       )   - R eye closed    NECK  (  ) Supple     (  ) neck stiffness     (  ) nuchal rigidity     (  )  no JVD     (  ) JVD present ( -- cm)    HEART  Rate -->  (x  ) normal rate    (  ) bradycardic    (  ) tachycardic  Rhythm -->  ( x ) regular    (  ) regularly irregular    (  ) irregularly irregular  Murmurs -->  (  ) normal s1/s2    (  ) systolic murmur    (  ) diastolic murmur    (  ) continuous murmur     (  ) S3 present    (  ) S4 present    LUNGS  ( x )Unlabored respirations     (  ) tachypnea  (  x) B/L air entry     (  ) decreased breath sounds in:  (location     )    (  ) no adventitious sound     (  ) crackles     (  ) wheezing      (  ) rhonchi      (specify location:       )  (  ) chest wall tenderness (specify location:       )    ABDOMEN  ( x ) Soft     (  ) tense   |   (  ) nondistended     (  ) distended   |   (  ) +BS     (  ) hypoactive bowel sounds     (  ) hyperactive bowel sounds  ( x ) nontender     (  ) RUQ tenderness     (  ) RLQ tenderness     (  ) LLQ tenderness     (  ) epigastric tenderness     (  ) diffuse tenderness  (  ) Splenomegaly      (  ) Hepatomegaly      (  ) Jaundice     (  ) ecchymosis   - strap over abdomen     EXTREMITIES  ( x ) Normal     (  ) Rash     (  ) ecchymosis     (  ) varicose veins      (  ) pitting edema     (  ) non-pitting edema   (  ) ulceration     (  ) gangrene:     (location:     )    NERVOUS SYSTEM  (  ) A&Ox3     ( x ) confused     (  ) lethargic  CN II-XII:     (  ) Intact     (  ) focal deficits  (Specify:     )   Upper extremities:     (  ) strength X/5     (  ) focal deficit (specify:    )  Lower extremities:     (  ) strength  X/5    (  ) focal deficit (specify:    )        LABS             14.1   6.85  )-----------( 166      ( 09-26-24 @ 07:23 )             39.8     142  |  109  |  9   -------------------------<  96   09-26-24 @ 07:23  3.9  |  21  |  0.7    Ca      8.9     09-26-24 @ 07:23  Mg     1.9     09-25-24 @ 05:39    TPro  5.7  /  Alb  3.2  /  TBili  3.1  /  DBili  x   /  AST  123  /  ALT  73  /  AlkPhos  343  /  GGT  x     09-26-24 @ 07:23        Urinalysis Basic - ( 26 Sep 2024 07:23 )    Color: x / Appearance: x / SG: x / pH: x  Gluc: 96 mg/dL / Ketone: x  / Bili: x / Urobili: x   Blood: x / Protein: x / Nitrite: x   Leuk Esterase: x / RBC: x / WBC x   Sq Epi: x / Non Sq Epi: x / Bacteria: x          IMAGING

## 2024-09-26 NOTE — PROGRESS NOTE ADULT - ASSESSMENT
The patient is a 63-year-old male with past medical history of alcohol abuse, hypertension, COPD, hyperthyroidism, anxiety, insomnia who presented to the ED for the evaluation of frequent falls and alcohol intoxication.     # Alcohol intoxication with impending  withdrawal  #Diffuse Hepatic Steatosis  # Suspected Thiamine /Folate deficiency with Macrocytosis  - HO alcohol abuse and withdrawals.  - Per son Anthony; the patient's functional capacity has been declining, getting more depressed with suicide attempts.  -IV thiamine 250 daily per hep  - Continue CIWA monitoring, Ativan taper and PRN  - f/u RENÉ, AMA, anti-Smooth Muscle Ab type 1, Anti liver-kidney microsomal Ab, Anti-soluble liver Ag, immunoglobulin panel, ceruloplasmin, A1AT phenotype per hepatology    - Addiction team RECS:  CATCH team to assist with substance use aftercare options and will have ongoing discussions regarding medications for AUD such as naltrexone when patient's mental status improves. Patient has previously refused addiction aftercare services. Vivitrol is injectable extended release naltrexone given as a monthly injection every 4 weeks. Will consider administering injection day of discharge as liver function improves for reducing alcohol cravings and binge consumption. Daniel Cruz agreeable to plan. Will have ongoing discussions with patient regarding need for supportive measures at home to assist with sobriety as patient endorses and has demonstrated poor decision making.   concern for underlying mood disorder as well, will evaluate for depression tomorrow if patient engages in discussion and consider medications to assist.    - Psychiatry RECS  No psychiatric contraindications to discharge  Please refer to [facility] at [address] [place] [address], [place], [place], [address] (Phone: [phone])  Please coordinate aftercare with CATCH team.      #Fall injury  - HO frequent falls  - Likely due to alcohol intoxication   - CT Head No Cont: Stable scattered chronic lacunar infarcts.  - Trauma work up Negative. No need for any acute intervention.    #HTN  - BP has been within normal limits.    #H/O Palpitations  - Continue with Metoprolol tartrate 75mg BID    #Hyperthyroidism  -3/24: TSH 0.52, fT4 0.7, T3 84  -F/U TFT  -Continue with Methimazole 2.5mg OD    #COPD  - on RA   - Duonebs PRN, Symbicort BID    #dispo  -f/u PT for possible STR placement

## 2024-09-26 NOTE — PROGRESS NOTE ADULT - ASSESSMENT
63-year-old male with past medical history of alcohol abuse, hypertension, COPD, hyperthyroidism, anxiety, insomnia who presented to the ED for the evaluation of frequent falls and alcohol intoxication.     # Alcohol intoxication with  withdrawal    >> still on CIWA protocol.   #Diffuse Hepatic Steatosis  # Suspected Thiamine /Folate deficiency with Macrocytosis  #Lactic Acidosis - Resolved  - HO alcohol abuse and withdrawals.  - Per son Anthony; the patient's functional capacity has been declining, getting more depressed with suicide attempts.  - On high dose thiamine, will continue 500mg IV q8 for 3 days then 250mg IV for 5 days.  - Continue CIWA monitoring, Ativan taper and PRN    - Addiction team RECS:  CATCH team to assist with substance use aftercare options and will have ongoing discussions regarding medications for AUD such as naltrexone when patient's mental status improves. Patient has previously refused addiction aftercare services. Vivitrol is injectable extended release naltrexone given as a monthly injection every 4 weeks. Will consider administering injection day of discharge as liver function improves for reducing alcohol cravings and binge consumption. Daniel Cruz agreeable to plan. Will have ongoing discussions with patient regarding need for supportive measures at home to assist with sobriety as patient endorses and has demonstrated poor decision making.   concern for underlying mood disorder as well, will evaluate for depression tomorrow if patient engages in discussion and consider medications to assist.    - Psychiatry RECS  No psychiatric contraindications to discharge  Please refer to [facility] at [address] [place] [address], [place], [address] (Phone: [phone])  Please coordinate aftercare with CATCH team.      #Fall injury  - HO frequent falls  - Likely due to alcohol intoxication   - CT Head No Cont: Stable scattered chronic lacunar infarcts.  - Trauma work up Negative. No need for any acute intervention.    #HTN  - BP has been within normal limits.  - Flowsheet reviewed.  - Not on home meds.    #H/O Palpitations  - Continue with Metoprolol tartrate 75mg BID    #Hyperthyroidism  -3/24: TSH 0.52, fT4 0.7, T3 84  -F/U TFT  -Continue with Methimazole 2.5mg OD    #COPD  - on RA   - Duonebs PRN, Symbicort BID    Pending: f/u PT   Dispo: STR discussed with addiction and daughter in law today

## 2024-09-27 LAB
ALBUMIN SERPL ELPH-MCNC: 3.3 G/DL — LOW (ref 3.5–5.2)
ALP SERPL-CCNC: 304 U/L — HIGH (ref 30–115)
ALT FLD-CCNC: 72 U/L — HIGH (ref 0–41)
ANA TITR SER: NEGATIVE — SIGNIFICANT CHANGE UP
ANION GAP SERPL CALC-SCNC: 14 MMOL/L — SIGNIFICANT CHANGE UP (ref 7–14)
AST SERPL-CCNC: 111 U/L — HIGH (ref 0–41)
BASOPHILS # BLD AUTO: 0.02 K/UL — SIGNIFICANT CHANGE UP (ref 0–0.2)
BASOPHILS NFR BLD AUTO: 0.3 % — SIGNIFICANT CHANGE UP (ref 0–1)
BILIRUB SERPL-MCNC: 3.4 MG/DL — HIGH (ref 0.2–1.2)
BUN SERPL-MCNC: 9 MG/DL — LOW (ref 10–20)
CALCIUM SERPL-MCNC: 8.8 MG/DL — SIGNIFICANT CHANGE UP (ref 8.4–10.4)
CERULOPLASMIN SERPL-MCNC: 35 MG/DL — HIGH (ref 15–30)
CHLORIDE SERPL-SCNC: 107 MMOL/L — SIGNIFICANT CHANGE UP (ref 98–110)
CO2 SERPL-SCNC: 21 MMOL/L — SIGNIFICANT CHANGE UP (ref 17–32)
CREAT SERPL-MCNC: 0.6 MG/DL — LOW (ref 0.7–1.5)
EGFR: 108 ML/MIN/1.73M2 — SIGNIFICANT CHANGE UP
EOSINOPHIL # BLD AUTO: 0.18 K/UL — SIGNIFICANT CHANGE UP (ref 0–0.7)
EOSINOPHIL NFR BLD AUTO: 2.8 % — SIGNIFICANT CHANGE UP (ref 0–8)
GLUCOSE SERPL-MCNC: 87 MG/DL — SIGNIFICANT CHANGE UP (ref 70–99)
HCT VFR BLD CALC: 40.7 % — LOW (ref 42–52)
HGB BLD-MCNC: 14.2 G/DL — SIGNIFICANT CHANGE UP (ref 14–18)
IGA FLD-MCNC: 253 MG/DL — SIGNIFICANT CHANGE UP (ref 84–499)
IGG FLD-MCNC: 1005 MG/DL — SIGNIFICANT CHANGE UP (ref 610–1660)
IGM SERPL-MCNC: 103 MG/DL — SIGNIFICANT CHANGE UP (ref 35–242)
IMM GRANULOCYTES NFR BLD AUTO: 0.9 % — HIGH (ref 0.1–0.3)
KAPPA LC SER QL IFE: 2.74 MG/DL — HIGH (ref 0.33–1.94)
KAPPA/LAMBDA FREE LIGHT CHAIN RATIO, SERUM: 0.85 RATIO — SIGNIFICANT CHANGE UP (ref 0.26–1.65)
LAMBDA LC SER QL IFE: 3.21 MG/DL — HIGH (ref 0.57–2.63)
LYMPHOCYTES # BLD AUTO: 1.45 K/UL — SIGNIFICANT CHANGE UP (ref 1.2–3.4)
LYMPHOCYTES # BLD AUTO: 22.3 % — SIGNIFICANT CHANGE UP (ref 20.5–51.1)
MAGNESIUM SERPL-MCNC: 1.8 MG/DL — SIGNIFICANT CHANGE UP (ref 1.8–2.4)
MCHC RBC-ENTMCNC: 34.9 G/DL — SIGNIFICANT CHANGE UP (ref 32–37)
MCHC RBC-ENTMCNC: 35.9 PG — HIGH (ref 27–31)
MCV RBC AUTO: 102.8 FL — HIGH (ref 80–94)
MONOCYTES # BLD AUTO: 0.84 K/UL — HIGH (ref 0.1–0.6)
MONOCYTES NFR BLD AUTO: 12.9 % — HIGH (ref 1.7–9.3)
NEUTROPHILS # BLD AUTO: 3.95 K/UL — SIGNIFICANT CHANGE UP (ref 1.4–6.5)
NEUTROPHILS NFR BLD AUTO: 60.8 % — SIGNIFICANT CHANGE UP (ref 42.2–75.2)
NRBC # BLD: 0 /100 WBCS — SIGNIFICANT CHANGE UP (ref 0–0)
PHOSPHATE SERPL-MCNC: 4.3 MG/DL — SIGNIFICANT CHANGE UP (ref 2.1–4.9)
PLATELET # BLD AUTO: 155 K/UL — SIGNIFICANT CHANGE UP (ref 130–400)
PMV BLD: 12 FL — HIGH (ref 7.4–10.4)
POTASSIUM SERPL-MCNC: 3.8 MMOL/L — SIGNIFICANT CHANGE UP (ref 3.5–5)
POTASSIUM SERPL-SCNC: 3.8 MMOL/L — SIGNIFICANT CHANGE UP (ref 3.5–5)
PROT SERPL-MCNC: 5.7 G/DL — LOW (ref 6–8)
RBC # BLD: 3.96 M/UL — LOW (ref 4.7–6.1)
RBC # FLD: 13.9 % — SIGNIFICANT CHANGE UP (ref 11.5–14.5)
SODIUM SERPL-SCNC: 142 MMOL/L — SIGNIFICANT CHANGE UP (ref 135–146)
WBC # BLD: 6.5 K/UL — SIGNIFICANT CHANGE UP (ref 4.8–10.8)
WBC # FLD AUTO: 6.5 K/UL — SIGNIFICANT CHANGE UP (ref 4.8–10.8)

## 2024-09-27 PROCEDURE — 99231 SBSQ HOSP IP/OBS SF/LOW 25: CPT

## 2024-09-27 PROCEDURE — 99232 SBSQ HOSP IP/OBS MODERATE 35: CPT

## 2024-09-27 RX ADMIN — Medication 10 MILLIGRAM(S): at 11:26

## 2024-09-27 RX ADMIN — CHLORHEXIDINE GLUCONATE ORAL RINSE 1 APPLICATION(S): 1.2 SOLUTION DENTAL at 11:26

## 2024-09-27 RX ADMIN — ENOXAPARIN SODIUM 40 MILLIGRAM(S): 150 INJECTION SUBCUTANEOUS at 11:28

## 2024-09-27 RX ADMIN — FOLIC ACID 1 MILLIGRAM(S): 1 TABLET ORAL at 11:26

## 2024-09-27 RX ADMIN — MULTI VITAMIN/MINERAL SUPPLEMENT WITH ASCORBIC ACID, NIACIN, PYRIDOXINE, PANTOTHENIC ACID, FOLIC ACID, RIBOFLAVIN, THIAMIN, BIOTIN, COBALAMIN AND ZINC. 1 TABLET(S): 60; 20; 12.5; 10; 10; 1.7; 1.5; 1; .3; .006 TABLET, COATED ORAL at 11:26

## 2024-09-27 RX ADMIN — Medication 75 MILLIGRAM(S): at 17:22

## 2024-09-27 RX ADMIN — NALTREXONE HYDROCHLORIDE 50 MILLIGRAM(S): 50 TABLET, FILM COATED ORAL at 11:27

## 2024-09-27 RX ADMIN — Medication 17 GRAM(S): at 17:24

## 2024-09-27 RX ADMIN — THIAMINE HYDROCHLORIDE 100 MILLIGRAM(S): 100 INJECTION, SOLUTION INTRAMUSCULAR; INTRAVENOUS at 11:30

## 2024-09-27 NOTE — PROGRESS NOTE ADULT - ASSESSMENT
Assessment and Plan  He is a 63-year-old male patient with history of alcohol use disorder, alcoholic intoxication requiring intubation in March 2024, hypertension, COPD, hypothyroidism, anxiety, and insomnia who was brought to the ED on September 20 for evaluation of multiple falls, confusion, and tremulousness, found to have elevated serum alcohol levels and liver enzymes.  We are consulted for concern of alcoholic hepatitis.      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  * Presentation: Brought for evaluation of frequent falls in the last week; associated with confusion of 1 day duration; tremulousness; restlessness; has been feeling down for few months (suicidal ideation per son); denies pain or nausea; 1 episode of nonbilious vomiting last week; no unintentional weight loss; review of systems positive for constipation with last bowel movement being a week ago  * Vitals: Blood pressure 146/87 mmHg; current heart rate 68 bpm (had sinus tachycardia before); no fever  * Labs: WBC 6.36, hemoglobin 14.4, platelet 208 on September 21, sodium 138, potassium 4-2, BUN 7, creatinine 0.6 on September 21  * LFTs: 3.6/49/49/174 on September 20-> 4.3/451/322/139 on September 21 -> 4.8/415/235/115 on 09/22-> 4.3/374/216/99 on 09/23 (versus 1.1/159/51/42 on March 14 during recent admission)  * No lipase  * INR 0.96 on September 20; lactate 6.3 and 5.3 on September 20-> 2.5 on September 21  * Serum alcohol 251 in September 20  * Acute hepatitis panel unremarkable in September 22  * No previous ultrasound of the abdomen  * Previous CT abdomen with oral contrast on March 5, 2024 for constipation revealed hepatic steatosis with circumferential thickening along the distal descending colon to the rectum with colonic dilation up to 7 cm from the cecum to the splenic flexure, no transition point with LBO, right inguinal hernia with loops of bowels, prominent small bowel loops suggestive of ileus  * Current CT abdomen with IV contrast on September 20: Hepatic steatosis, unremarkable pancreas or spleen, moderate large right inguinal hernia with nonobstructing small bowel loops  * RUQ US 09/22 poor exam; hepatic steatosis; no ascites  * Refused previous colonoscopy; no prior EGD  * No family history of GI cancers or liver disease    RECOMMENDATIONS  - Trend LFTs and INR  - In the setting of suspected alcoholic hepatitis: MDF score was calculated (-5.4): Hold off steroids for now  - Continue CIWA protocol: Continue IV Ativan 2 mg every 1 hour as needed and Ativan taper as ordered  - Continue multivitamins and folate supplementation daily; recommend switching to IV thiamine 250 mg QD 09/26 (was on 500 mg every 8 hours 09/22-09/25 then switched to IV 100mg QD by team on 09/26)  - Follow up CLD workup (09/26): RENÉ, AMA, anti-Smooth Muscle Ab type 1, Anti liver-kidney microsomal Ab, Anti-soluble liver Ag, immunoglobulin panel, and A1AT phenotype. Ceruloplasmin noted 35.  - Trend electrolytes and replete as needed  - Avoid hepatotoxic agents: Avoid NSAIDs  - Counseled son about alcohol cessation.  Recommend rehab. Catch and SBIRT teams on board  - Monitor bowel movements and avoid constipation: Agree with MiraLAX 17 g twice daily and senna 2 tabs at bedtime in the setting of  hx of constipation  - Continue p.o. Protonix 40 mg daily for GI prophylaxis  - Follow up with our [facility] located at [address] [place] [address], [address]. Telephone No: [phone]      History of colonic distention/thickening and ileus in March 2024  Moderate to large right inguinal hernia with nonobstructive bowel  * Previous CT abdomen with oral contrast on March 5, 2024 for constipation revealed hepatic steatosis with circumferential thickening along the distal descending colon to the rectum with colonic dilation up to 7 cm from the cecum to the splenic flexure, no transition point with LBO, right inguinal hernia with loops of bowels, prominent small bowel loops suggestive of ileus  * Current CT abdomen with IV contrast on September 20: Hepatic steatosis, unremarkable pancreas or spleen, moderate large right inguinal hernia with nonobstructing small bowel loops  * Refused previous colonoscopy; no prior EGD  * No family history of GI cancers or liver disease    RECOMMENDATIONS  - Monitor bowel movements and avoid constipation as above  - Serial abdominal exam  - In case of worsening distention or constipation, recommend checking KUB  - Would ideally benefit from colonoscopy  - Discussed the above recommendation with the son at bedside.  Per son, the patient has been refusing to go for colonoscopy for years  - If becomes agreeable, would recommend outpatient follow-up at [facility] at [address] [place] [address]. Phone Number: [phone]        Thank you for your consult.  - Please note that plan was communicated with medical team.   - Please reach GI on [phone] during weekdays till 5pm.  - Please call the GI service line after 5pm on Weekdays and anytime on Weekends: [phone].      Lux Abdalla MD  PGY - 5 Gastroenterology Fellow   [facility]

## 2024-09-27 NOTE — PROGRESS NOTE ADULT - ASSESSMENT
63-year-old male with past medical history of alcohol abuse, hypertension, COPD, hyperthyroidism, anxiety, insomnia who presented to the ED for the evaluation of frequent falls and alcohol intoxication.     # Alcohol intoxication with  withdrawal    >>  CIWA protocol completed  #Diffuse Hepatic Steatosis  # Suspected Thiamine /Folate deficiency with Macrocytosis  #Lactic Acidosis - Resolved  - HO alcohol abuse and withdrawals.  - Per son Anthony; the patient's functional capacity has been declining, getting more depressed with suicide attempts.  - On high dose thiamine, will continue 500mg IV q8 for 3 days then 250mg IV for 5 days.  - Continue CIWA monitoring, Ativan taper and PRN    - Addiction team RECS:  CATCH team to assist with substance use aftercare options and will have ongoing discussions regarding medications for AUD such as naltrexone when patient's mental status improves. Patient has previously refused addiction aftercare services. Vivitrol is injectable extended release naltrexone given as a monthly injection every 4 weeks. Will consider administering injection day of discharge as liver function improves for reducing alcohol cravings and binge consumption. Daniel Cruz agreeable to plan. Will have ongoing discussions with patient regarding need for supportive measures at home to assist with sobriety as patient endorses and has demonstrated poor decision making.   concern for underlying mood disorder as well, will evaluate for depression tomorrow if patient engages in discussion and consider medications to assist.    - Psychiatry RECS  No psychiatric contraindications to discharge  Please refer to [facility] at [address] [place] [address], [place], [place], [address] (Phone: [phone])  Please coordinate aftercare with CATCH team.      #Fall injury  - HO frequent falls  - Likely due to alcohol intoxication   - CT Head No Cont: Stable scattered chronic lacunar infarcts.  - Trauma work up Negative. No need for any acute intervention.    #HTN  - BP has been within normal limits.  - Not on home meds.    #H/O Palpitations  - Continue with Metoprolol tartrate 75mg BID    #Hyperthyroidism  -3/24: TSH 0.52, fT4 0.7, T3 84  -F/U TFT  -Continue with Methimazole 2.5mg OD    #COPD  - on RA   - Duonebs PRN, Symbicort BID    Pending: f/u PT   Dispo: STR auth pending

## 2024-09-27 NOTE — PROGRESS NOTE ADULT - ASSESSMENT
63-year-old male with past medical history of alcohol abuse, hypertension, COPD, hyperthyroidism, anxiety, insomnia who presented to the ED for the evaluation of frequent falls and alcohol intoxication.     # Alcohol intoxication with impending  withdrawal  #Diffuse Hepatic Steatosis  # Suspected Thiamine /Folate deficiency with Macrocytosis  - HO alcohol abuse and withdrawals.  - Per son Anthony; the patient's functional capacity has been declining, getting more depressed with suicide attempts.  -IV thiamine 250 daily per hep  -  CIWA monitoring  - s/p ativan taper  - f/u RENÉ, AMA, anti-Smooth Muscle Ab type 1, Anti liver-kidney microsomal Ab, Anti-soluble liver Ag, immunoglobulin panel, ceruloplasmin, A1AT phenotype per hepatology  -- ceruloplasmin - 35    - Addiction team RECS:  CATCH team to assist with substance use aftercare options and will have ongoing discussions regarding medications for AUD such as naltrexone when patient's mental status improves. Patient has previously refused addiction aftercare services. Vivitrol is injectable extended release naltrexone given as a monthly injection every 4 weeks. Will consider administering injection day of discharge as liver function improves for reducing alcohol cravings and binge consumption. Daniel Cruz agreeable to plan. Will have ongoing discussions with patient regarding need for supportive measures at home to assist with sobriety as patient endorses and has demonstrated poor decision making.   concern for underlying mood disorder as well, will evaluate for depression tomorrow if patient engages in discussion and consider medications to assist.    - Psychiatry RECS  No psychiatric contraindications to discharge  Please refer to [facility] at [address] [place] [address], [place], [address] (Phone: [phone])  Please coordinate aftercare with CATCH team.      #Fall injury  - HO frequent falls  - Likely due to alcohol intoxication   - CT Head No Cont: Stable scattered chronic lacunar infarcts.  - Trauma work up Negative. No need for any acute intervention.    #HTN  - BP has been within normal limits.    #H/O Palpitations  - Continue with Metoprolol tartrate 75mg BID    #Hyperthyroidism  -3/24: TSH 0.52, fT4 0.7, T3 84  -F/U TFT  -Continue with Methimazole 2.5mg OD    #COPD  - on RA   - Duonebs PRN, Symbicort BID    #dispo  -f/u PT for possible STR placement

## 2024-09-27 NOTE — PROGRESS NOTE ADULT - SUBJECTIVE AND OBJECTIVE BOX
SUBJECTIVE:    Patient is a 63y old Male who presents with a chief complaint of alc withdrawl (27 Sep 2024 10:12)    Currently admitted to medicine with the primary diagnosis of Alcoholic liver disease       Today is hospital day 7d.     PAST MEDICAL & SURGICAL HISTORY  HTN (hypertension)    Alcohol abuse    Hyperthyroidism      ALLERGIES:  No Known Allergies    MEDICATIONS:  STANDING MEDICATIONS  chlorhexidine 2% Cloths 1 Application(s) Topical daily  enoxaparin Injectable 40 milliGRAM(s) SubCutaneous every 24 hours  escitalopram 10 milliGRAM(s) Oral daily  fluticasone propionate/ salmeterol 100-50 MICROgram(s) Diskus 1 Dose(s) Inhalation two times a day  folic acid 1 milliGRAM(s) Oral daily  lactated ringers. 1000 milliLiter(s) IV Continuous <Continuous>  methimazole 2.5 milliGRAM(s) Oral daily  metoprolol tartrate 75 milliGRAM(s) Oral two times a day  multivitamin 1 Tablet(s) Oral daily  naltrexone 50 milliGRAM(s) Oral daily  pantoprazole    Tablet 40 milliGRAM(s) Oral before breakfast  polyethylene glycol 3350 17 Gram(s) Oral two times a day  senna 2 Tablet(s) Oral at bedtime  thiamine Injectable 100 milliGRAM(s) IV Push daily    PRN MEDICATIONS  albuterol/ipratropium for Nebulization 3 milliLiter(s) Nebulizer every 6 hours PRN  aluminum hydroxide/magnesium hydroxide/simethicone Suspension 30 milliLiter(s) Oral every 4 hours PRN  LORazepam   Injectable 2 milliGRAM(s) IV Push every 4 hours PRN  melatonin 3 milliGRAM(s) Oral at bedtime PRN    VITALS:   T(F): 97.2  HR: 97  BP: 126/83  RR: 20  SpO2: 97%    LABS:                        14.2   6.50  )-----------( 155      ( 27 Sep 2024 07:14 )             40.7     09-27    142  |  107  |  9[L]  ----------------------------<  87  3.8   |  21  |  0.6[L]    Ca    8.8      27 Sep 2024 07:14  Phos  4.3     09-27  Mg     1.8     09-27    TPro  5.7[L]  /  Alb  3.3[L]  /  TBili  3.4[H]  /  DBili  x   /  AST  111[H]  /  ALT  72[H]  /  AlkPhos  304[H]  09-27      Urinalysis Basic - ( 27 Sep 2024 07:14 )    Color: x / Appearance: x / SG: x / pH: x  Gluc: 87 mg/dL / Ketone: x  / Bili: x / Urobili: x   Blood: x / Protein: x / Nitrite: x   Leuk Esterase: x / RBC: x / WBC x   Sq Epi: x / Non Sq Epi: x / Bacteria: x                RADIOLOGY:    PHYSICAL EXAM:  GEN: No acute distress  LUNGS: Clear to auscultation bilaterally   HEART: S1/S2 present. RRR.   ABD/ GI: Soft, non-tender, non-distended. Bowel sounds present  EXT: NC/NC/NE/2+PP/NEWTON  NEURO: AAOX3

## 2024-09-27 NOTE — PROGRESS NOTE ADULT - SUBJECTIVE AND OBJECTIVE BOX
Hepatology Follow Up Note      Location: [facility] 2A 012 A [facility]  Patient Name: MATT MACDONALD  Age: 63y  Gender: Male      Chief Complaint  Patient is a 63y old Male who presents with a chief complaint of alcohol withdrawal (24 Sep 2024 07:01)  Primary diagnosis of Alcoholic liver damage        Reason for Consult  Alcoholic Hepatitis      Progress Note  This morning patient was seen and examined at bedside.    Today is hospital day 6d.  Patient seems more awake but still not oriented to place or time.  Patient denies abdominal pain or nausea or vomiting.   Last bowel movement was soft and brown on 09/24.      Vital Signs in the last 24 hours   Vital Signs Last 24 Hrs  T(C): 36.8 (27 Sep 2024 07:56), Max: 37.4 (27 Sep 2024 00:19)  T(F): 98.3 (27 Sep 2024 07:56), Max: 99.3 (27 Sep 2024 00:19)  HR: 89 (27 Sep 2024 07:56) (73 - 91)  BP: 115/76 (27 Sep 2024 07:56) (110/69 - 125/84)  BP(mean): --  RR: 18 (27 Sep 2024 07:56) (17 - 18)  SpO2: --        Physical Exam  * General Appearance: less confused; not oriented to place or year; not in acute distress  * Eyes: mild icterus  * Lungs: Good bilateral air entry, normal breath sounds (Clear to auscultation bilaterally, no audible wheezes, crackles, or rhonchi)  * Heart: Regular Rate and Rhythm, normal S1 and S2, no audible murmur, rub, or gallop  * Abdomen: Symmetric, non-distended, soft, non-tender, bowel sounds active all four quadrants, no masses  * Extremities: no lower extremity pitting edema bilaterally; no asterexis; + tremors improved 09/24      Investigations                          14.2   6.50  )-----------( 155      ( 27 Sep 2024 07:14 )             40.7     09-27    142  |  107  |  9[L]  ----------------------------<  87  3.8   |  21  |  0.6[L]    Ca    8.8      27 Sep 2024 07:14  Phos  4.3     09-27  Mg     1.8     09-27    TPro  5.7[L]  /  Alb  3.3[L]  /  TBili  3.4[H]  /  DBili  x   /  AST  111[H]  /  ALT  72[H]  /  AlkPhos  304[H]  09-27        LIVER FUNCTIONS - ( 27 Sep 2024 07:14 )  Alb: 3.3 g/dL / Pro: 5.7 g/dL / ALK PHOS: 304 U/L / ALT: 72 U/L / AST: 111 U/L / GGT: x               Urinalysis Basic - ( 27 Sep 2024 07:14 )    Color: x / Appearance: x / SG: x / pH: x  Gluc: 87 mg/dL / Ketone: x  / Bili: x / Urobili: x   Blood: x / Protein: x / Nitrite: x   Leuk Esterase: x / RBC: x / WBC x   Sq Epi: x / Non Sq Epi: x / Bacteria: x      Current Medications  Standing Medications  chlorhexidine 2% Cloths 1 Application(s) Topical daily  enoxaparin Injectable 40 milliGRAM(s) SubCutaneous every 24 hours  fluticasone propionate/ salmeterol 100-50 MICROgram(s) Diskus 1 Dose(s) Inhalation two times a day  folic acid 1 milliGRAM(s) Oral daily  lactated ringers. 1000 milliLiter(s) (100 mL/Hr) IV Continuous <Continuous>  LORazepam   Injectable 1 milliGRAM(s) IV Push every 12 hours  LORazepam   Injectable   IV Push   methimazole 2.5 milliGRAM(s) Oral daily  metoprolol tartrate 75 milliGRAM(s) Oral two times a day  multivitamin 1 Tablet(s) Oral daily  pantoprazole    Tablet 40 milliGRAM(s) Oral before breakfast  polyethylene glycol 3350 17 Gram(s) Oral two times a day  senna 2 Tablet(s) Oral at bedtime  thiamine IVPB 500 milliGRAM(s) IV Intermittent every 8 hours    PRN Medications  albuterol/ipratropium for Nebulization 3 milliLiter(s) Nebulizer every 6 hours PRN Shortness of Breath and/or Wheezing  aluminum hydroxide/magnesium hydroxide/simethicone Suspension 30 milliLiter(s) Oral every 4 hours PRN Dyspepsia  LORazepam   Injectable 2 milliGRAM(s) IV Push every 1 hour PRN Symptom-triggered: each CIWA -Ar score 8 or GREATER  melatonin 3 milliGRAM(s) Oral at bedtime PRN Insomnia    Singles Doses Administered  (ADM OVERRIDE) 2 each &lt;see task&gt; GiveOnce  (ADM OVERRIDE) 2 each &lt;see task&gt; GiveOnce  (ADM OVERRIDE) 2 each &lt;see task&gt; GiveOnce  (ADM OVERRIDE) 2 each &lt;see task&gt; GiveOnce  (ADM OVERRIDE) 2 each &lt;see task&gt; GiveOnce  (ADM OVERRIDE) 2 each &lt;see task&gt; GiveOnce  (ADM OVERRIDE) 2 each &lt;see task&gt; GiveOnce  (ADM OVERRIDE) 1 each &lt;see task&gt; GiveOnce  (ADM OVERRIDE) 1 each &lt;see task&gt; GiveOnce  diazepam  Injectable 5 milliGRAM(s) IV Push Once  folic acid Injectable 1 milliGRAM(s) IV Push Once  lactated ringers Bolus 1000 milliLiter(s) IV Bolus once  lactulose Syrup 20 Gram(s) Oral every 6 hours  LORazepam   Injectable 3 milliGRAM(s) IV Push every 4 hours  LORazepam   Injectable 1 milliGRAM(s) IV Push every 4 hours  LORazepam   Injectable 4 milliGRAM(s) IV Push every 4 hours  LORazepam   Injectable 2 milliGRAM(s) IV Push every 4 hours  magnesium sulfate  IVPB 2 Gram(s) IV Intermittent once  magnesium sulfate  IVPB 2 Gram(s) IV Intermittent once  thiamine 100 milliGRAM(s) Oral Once

## 2024-09-27 NOTE — PROGRESS NOTE ADULT - SUBJECTIVE AND OBJECTIVE BOX
SUBJECTIVE/OVERNIGHT EVENTS  Today is hospital day 7d. This morning patient was seen and examined at bedside, resting comfortably in bed. No acute or major events overnight.      MEDICATIONS  STANDING MEDICATIONS  chlorhexidine 2% Cloths 1 Application(s) Topical daily  enoxaparin Injectable 40 milliGRAM(s) SubCutaneous every 24 hours  escitalopram 10 milliGRAM(s) Oral daily  fluticasone propionate/ salmeterol 100-50 MICROgram(s) Diskus 1 Dose(s) Inhalation two times a day  folic acid 1 milliGRAM(s) Oral daily  lactated ringers. 1000 milliLiter(s) IV Continuous <Continuous>  methimazole 2.5 milliGRAM(s) Oral daily  metoprolol tartrate 75 milliGRAM(s) Oral two times a day  multivitamin 1 Tablet(s) Oral daily  naltrexone 50 milliGRAM(s) Oral daily  pantoprazole    Tablet 40 milliGRAM(s) Oral before breakfast  polyethylene glycol 3350 17 Gram(s) Oral two times a day  senna 2 Tablet(s) Oral at bedtime  thiamine Injectable 100 milliGRAM(s) IV Push daily    PRN MEDICATIONS  albuterol/ipratropium for Nebulization 3 milliLiter(s) Nebulizer every 6 hours PRN  aluminum hydroxide/magnesium hydroxide/simethicone Suspension 30 milliLiter(s) Oral every 4 hours PRN  LORazepam   Injectable 2 milliGRAM(s) IV Push every 4 hours PRN  melatonin 3 milliGRAM(s) Oral at bedtime PRN    VITALS  T(F): 98.3 (09-27-24 @ 07:56), Max: 99.3 (09-27-24 @ 00:19)  HR: 89 (09-27-24 @ 07:56) (73 - 91)  BP: 115/76 (09-27-24 @ 07:56) (110/69 - 125/84)  RR: 18 (09-27-24 @ 07:56) (17 - 18)  SpO2: --    PHYSICAL EXAM  GENERAL  (  x) NAD, lying in bed comfortably     (  ) obtunded     (  ) lethargic     (  ) somnolent    HEAD  (  ) Atraumatic     (  ) hematoma     (  ) laceration (specify location:       )     NECK  (  ) Supple     (  ) neck stiffness     (  ) nuchal rigidity     (  )  no JVD     (  ) JVD present ( -- cm)    HEART  Rate -->  (x  ) normal rate    (  ) bradycardic    (  ) tachycardic  Rhythm -->  (x  ) regular    (  ) regularly irregular    (  ) irregularly irregular  Murmurs -->  (  ) normal s1/s2    (  ) systolic murmur    (  ) diastolic murmur    (  ) continuous murmur     (  ) S3 present    (  ) S4 present    LUNGS  ( x )Unlabored respirations     (  ) tachypnea  ( x ) B/L air entry     (  ) decreased breath sounds in:  (location     )    (  ) no adventitious sound     (  ) crackles     (  ) wheezing      (  ) rhonchi      (specify location:       )  (  ) chest wall tenderness (specify location:       )    ABDOMEN  ( x ) Soft     (  ) tense   |   (  ) nondistended     (  ) distended   |   (  ) +BS     (  ) hypoactive bowel sounds     (  ) hyperactive bowel sounds  ( x ) nontender     (  ) RUQ tenderness     (  ) RLQ tenderness     (  ) LLQ tenderness     (  ) epigastric tenderness     (  ) diffuse tenderness  (  ) Splenomegaly      (  ) Hepatomegaly      (  ) Jaundice     (  ) ecchymosis     EXTREMITIES  ( x ) Normal     (  ) Rash     (  ) ecchymosis     (  ) varicose veins      (  ) pitting edema     (  ) non-pitting edema   (  ) ulceration     (  ) gangrene:     (location:     )  - mild tremor on extension of arms   NERVOUS SYSTEM  (x  ) A&Ox2    (  ) confused     (  ) lethargic  CN II-XII:     (  ) Intact     (  ) focal deficits  (Specify:     )   Upper extremities:     (  ) strength X/5     (  ) focal deficit (specify:    )  Lower extremities:     (  ) strength  X/5    (  ) focal deficit (specify:    )    SKIN  (  ) No rashes or lesions     (  ) maculopapular rash     (  ) pustules     (  ) vesicles     (  ) ulcer     (  ) ecchymosis     (specify location:     )    (  ) Indwelling Maria Catheter   Date insterted:    Reason (  ) Critical illness     (  ) urinary retention    (  ) Accurate Ins/Outs Monitoring     (  ) CMO patient    (  ) Central Line  Date inserted:  Location: (  ) Right IJ   (  ) Left IJ   (  ) Right Fem   (  ) Left Fem    (  ) SPC  (  ) pigtail  (  ) PEG tube  (  ) colostomy  (  ) jejunostomy  (  ) U-Dall    LABS             14.2   6.50  )-----------( 155      ( 09-27-24 @ 07:14 )             40.7     142  |  107  |  9   -------------------------<  87   09-27-24 @ 07:14  3.8  |  21  |  0.6    Ca      8.8     09-27-24 @ 07:14  Phos   4.3     09-27-24 @ 07:14  Mg     1.8     09-27-24 @ 07:14    TPro  5.7  /  Alb  3.3  /  TBili  3.4  /  DBili  x   /  AST  111  /  ALT  72  /  AlkPhos  304  /  GGT  x     09-27-24 @ 07:14        Urinalysis Basic - ( 27 Sep 2024 07:14 )    Color: x / Appearance: x / SG: x / pH: x  Gluc: 87 mg/dL / Ketone: x  / Bili: x / Urobili: x   Blood: x / Protein: x / Nitrite: x   Leuk Esterase: x / RBC: x / WBC x   Sq Epi: x / Non Sq Epi: x / Bacteria: x          IMAGING

## 2024-09-27 NOTE — PROGRESS NOTE ADULT - ATTENDING COMMENTS
63 y o [place] M with hx of AUD hyperthyroidism admitted with falls confusion and tremors seen for acute alcoholic hepatitis.    No complaints.  Confused but oriented in place and person  Icteric  Abdomen soft non tender  Ext no edema    Acute alcoholic hepatitis   Likely has cirrhosis - subtle irregular liver margin and inferior notching  No evidence of portal HTN on imaging and platelet count > 150  Alcohol withdrawal with encephalopathy  Macrocytosis     Continue high dose IV thiamine today and switch to oral  Low MDF so no steroids  Autoimmune testing  Monitor liver tests and INR  Fibroscan as OP .
63 y o [place] M with hx of AUD hyperthyroidism admitted with falls confusion and tremors seen for acute alcoholic hepatitis.    No complaints.  Confused oriented in person only but could identify president and do count of 7 x 5  Icteric  Abdomen soft non tender  Ext no edema  Mild tremors    Acute alcoholic hepatitis   Likely has cirrhosis - subtle irregular liver margin and inferior notching  No evidence of portal HTN on imaging and platelet count > 150  Alcohol withdrawal with encephalopathy  Macrocytosis     Continue IV thiamine   Low MDF so no steroids  Autoimmune testing  Monitor liver tests and INR  Fibroscan as OP .
63 y o Bhutanese M with hx of AUD hyperthyroidism admitted with falls confusion and tremors seen for acute alcoholic hepatitis.    Confused  Icteric  Abdomen soft non tender  Ext no edema    Acute alcoholic hepatitis   Likely has cirrhosis - subtle irregular liver margin and inferior notching  No evidence of portal HTN on imaging and platelet count > 150  Macrocytosis     High dose IV thiamine  Low MDF so no steroids  Autoimmune testing  Monitor liver tests and INR  Fibroscan as OP
63 y o Lebanese M with hx of AUD hyperthyroidism admitted with falls confusion and tremors seen for acute alcoholic hepatitis.    No complaints.  Confused but oriented in place and person  Icteric  Abdomen soft non tender  Ext no edema  Mild tremors    Acute alcoholic hepatitis   Likely has cirrhosis - subtle irregular liver margin and inferior notching  No evidence of portal HTN on imaging and platelet count > 150  Alcohol withdrawal with encephalopathy  Macrocytosis     Continue IV thiamine   Low MDF so no steroids  Autoimmune testing  Monitor liver tests and INR  Fibroscan as OP .
63 y o [place] M with hx of AUD hyperthyroidism admitted with falls confusion and tremors seen for acute alcoholic hepatitis.    No complaints.  Oriented in place person year and month  Icteric  Abdomen soft non tender  Ext no edema  No tremors    Acute alcoholic hepatitis   Likely has cirrhosis - subtle irregular liver margin and inferior notching  No evidence of portal HTN on imaging and platelet count > 150  Encephalopathy ? WK ?DT - improved  Macrocytosis     Low MDF so no steroids  Monitor liver tests and INR  Fibroscan as OP .
Mr. Nelsno was seen and examined at bedside today, this patient has severe alcohol abuse history and presented intoxicated with rising LFTs concerning for possible alcoholic hepatitis.  Patient is stable on treatment dose thiamine and benzodiazepine taper.  Given his history of severe alcohol withdrawal, will maintain in SDU for now, however may be stable for transfer to general medical floor in the next 1 to 2 days.  I agree with the fellow note, with the exceptions listed in my attestation above.  The remainder of impression and plan per fellow note.
64yo male with etoh hepatitis, no evidence of underlying advanced liver disease/cirrhosis. Low MDF. Continue supportive measures, ciwa protocol. Follow up ultrasound and hepatitis panel. Recommend outpt follow up for colonoscopy when improved.

## 2024-09-28 LAB
ALBUMIN SERPL ELPH-MCNC: 3.3 G/DL — LOW (ref 3.5–5.2)
ALP SERPL-CCNC: 303 U/L — HIGH (ref 30–115)
ALT FLD-CCNC: 70 U/L — HIGH (ref 0–41)
ANION GAP SERPL CALC-SCNC: 11 MMOL/L — SIGNIFICANT CHANGE UP (ref 7–14)
AST SERPL-CCNC: 98 U/L — HIGH (ref 0–41)
BASOPHILS # BLD AUTO: 0.05 K/UL — SIGNIFICANT CHANGE UP (ref 0–0.2)
BASOPHILS NFR BLD AUTO: 0.8 % — SIGNIFICANT CHANGE UP (ref 0–1)
BILIRUB SERPL-MCNC: 3.1 MG/DL — HIGH (ref 0.2–1.2)
BUN SERPL-MCNC: 8 MG/DL — LOW (ref 10–20)
CALCIUM SERPL-MCNC: 8.8 MG/DL — SIGNIFICANT CHANGE UP (ref 8.4–10.5)
CHLORIDE SERPL-SCNC: 105 MMOL/L — SIGNIFICANT CHANGE UP (ref 98–110)
CO2 SERPL-SCNC: 22 MMOL/L — SIGNIFICANT CHANGE UP (ref 17–32)
CREAT SERPL-MCNC: 0.6 MG/DL — LOW (ref 0.7–1.5)
EGFR: 108 ML/MIN/1.73M2 — SIGNIFICANT CHANGE UP
EOSINOPHIL # BLD AUTO: 0.23 K/UL — SIGNIFICANT CHANGE UP (ref 0–0.7)
EOSINOPHIL NFR BLD AUTO: 3.6 % — SIGNIFICANT CHANGE UP (ref 0–8)
GLUCOSE SERPL-MCNC: 118 MG/DL — HIGH (ref 70–99)
HCT VFR BLD CALC: 38.3 % — LOW (ref 42–52)
HGB BLD-MCNC: 13.8 G/DL — LOW (ref 14–18)
IMM GRANULOCYTES NFR BLD AUTO: 1.3 % — HIGH (ref 0.1–0.3)
LKM AB SER-ACNC: <20.1 UNITS — SIGNIFICANT CHANGE UP (ref 0–20)
LYMPHOCYTES # BLD AUTO: 1.73 K/UL — SIGNIFICANT CHANGE UP (ref 1.2–3.4)
LYMPHOCYTES # BLD AUTO: 27.3 % — SIGNIFICANT CHANGE UP (ref 20.5–51.1)
MCHC RBC-ENTMCNC: 36 G/DL — SIGNIFICANT CHANGE UP (ref 32–37)
MCHC RBC-ENTMCNC: 37.2 PG — HIGH (ref 27–31)
MCV RBC AUTO: 103.2 FL — HIGH (ref 80–94)
MITOCHONDRIA AB SER-ACNC: SIGNIFICANT CHANGE UP
MONOCYTES # BLD AUTO: 0.86 K/UL — HIGH (ref 0.1–0.6)
MONOCYTES NFR BLD AUTO: 13.6 % — HIGH (ref 1.7–9.3)
NEUTROPHILS # BLD AUTO: 3.39 K/UL — SIGNIFICANT CHANGE UP (ref 1.4–6.5)
NEUTROPHILS NFR BLD AUTO: 53.4 % — SIGNIFICANT CHANGE UP (ref 42.2–75.2)
NRBC # BLD: 0 /100 WBCS — SIGNIFICANT CHANGE UP (ref 0–0)
PLATELET # BLD AUTO: 198 K/UL — SIGNIFICANT CHANGE UP (ref 130–400)
PMV BLD: 11.8 FL — HIGH (ref 7.4–10.4)
POTASSIUM SERPL-MCNC: 4.1 MMOL/L — SIGNIFICANT CHANGE UP (ref 3.5–5)
POTASSIUM SERPL-SCNC: 4.1 MMOL/L — SIGNIFICANT CHANGE UP (ref 3.5–5)
PROT SERPL-MCNC: 5.6 G/DL — LOW (ref 6–8)
RBC # BLD: 3.71 M/UL — LOW (ref 4.7–6.1)
RBC # FLD: 13.7 % — SIGNIFICANT CHANGE UP (ref 11.5–14.5)
SMOOTH MUSCLE AB SER-ACNC: SIGNIFICANT CHANGE UP
SODIUM SERPL-SCNC: 138 MMOL/L — SIGNIFICANT CHANGE UP (ref 135–146)
WBC # BLD: 6.34 K/UL — SIGNIFICANT CHANGE UP (ref 4.8–10.8)
WBC # FLD AUTO: 6.34 K/UL — SIGNIFICANT CHANGE UP (ref 4.8–10.8)

## 2024-09-28 PROCEDURE — 99232 SBSQ HOSP IP/OBS MODERATE 35: CPT

## 2024-09-28 RX ORDER — THIAMINE HYDROCHLORIDE 100 MG/ML
500 INJECTION, SOLUTION INTRAMUSCULAR; INTRAVENOUS EVERY 8 HOURS
Refills: 0 | Status: COMPLETED | OUTPATIENT
Start: 2024-09-28 | End: 2024-09-30

## 2024-09-28 RX ADMIN — NALTREXONE HYDROCHLORIDE 50 MILLIGRAM(S): 50 TABLET, FILM COATED ORAL at 11:40

## 2024-09-28 RX ADMIN — FOLIC ACID 1 MILLIGRAM(S): 1 TABLET ORAL at 11:42

## 2024-09-28 RX ADMIN — PANTOPRAZOLE SODIUM 40 MILLIGRAM(S): 40 TABLET, DELAYED RELEASE ORAL at 06:20

## 2024-09-28 RX ADMIN — Medication 2 TABLET(S): at 21:20

## 2024-09-28 RX ADMIN — Medication 17 GRAM(S): at 06:20

## 2024-09-28 RX ADMIN — THIAMINE HYDROCHLORIDE 105 MILLIGRAM(S): 100 INJECTION, SOLUTION INTRAMUSCULAR; INTRAVENOUS at 21:36

## 2024-09-28 RX ADMIN — THIAMINE HYDROCHLORIDE 100 MILLIGRAM(S): 100 INJECTION, SOLUTION INTRAMUSCULAR; INTRAVENOUS at 11:41

## 2024-09-28 RX ADMIN — ENOXAPARIN SODIUM 40 MILLIGRAM(S): 150 INJECTION SUBCUTANEOUS at 09:12

## 2024-09-28 RX ADMIN — CHLORHEXIDINE GLUCONATE ORAL RINSE 1 APPLICATION(S): 1.2 SOLUTION DENTAL at 11:40

## 2024-09-28 RX ADMIN — Medication 75 MILLIGRAM(S): at 06:20

## 2024-09-28 RX ADMIN — Medication 75 MILLIGRAM(S): at 17:12

## 2024-09-28 RX ADMIN — MULTI VITAMIN/MINERAL SUPPLEMENT WITH ASCORBIC ACID, NIACIN, PYRIDOXINE, PANTOTHENIC ACID, FOLIC ACID, RIBOFLAVIN, THIAMIN, BIOTIN, COBALAMIN AND ZINC. 1 TABLET(S): 60; 20; 12.5; 10; 10; 1.7; 1.5; 1; .3; .006 TABLET, COATED ORAL at 11:40

## 2024-09-28 RX ADMIN — Medication 10 MILLIGRAM(S): at 11:43

## 2024-09-28 NOTE — PROGRESS NOTE ADULT - ASSESSMENT
63-year-old male with past medical history of alcohol abuse, hypertension, COPD, hyperthyroidism, anxiety, insomnia who presented to the ED for the evaluation of frequent falls and alcohol intoxication.     # Alcohol intoxication with  withdrawal    >>  CIWA protocol completed  #Diffuse Hepatic Steatosis  # Suspected Thiamine /Folate deficiency with Macrocytosis  #Lactic Acidosis - Resolved  - HO alcohol abuse and withdrawals.  - Per son Anthony; the patient's functional capacity has been declining, getting more depressed with suicide attempts.  - On high dose thiamine, will continue 500mg IV q8 for 3 days then 250mg IV for 5 days.  - Continue CIWA monitoring, Ativan taper and PRN    - Addiction team RECS:  CATCH team to assist with substance use aftercare options and will have ongoing discussions regarding medications for AUD such as naltrexone when patient's mental status improves. Patient has previously refused addiction aftercare services. Vivitrol is injectable extended release naltrexone given as a monthly injection every 4 weeks. Will consider administering injection day of discharge as liver function improves for reducing alcohol cravings and binge consumption. Daniel Cruz agreeable to plan. Will have ongoing discussions with patient regarding need for supportive measures at home to assist with sobriety as patient endorses and has demonstrated poor decision making.   concern for underlying mood disorder as well, will evaluate for depression tomorrow if patient engages in discussion and consider medications to assist.    - Psychiatry RECS  No psychiatric contraindications to discharge  Please refer to [facility] at [address] [place] [address], [place], [place], [address] (Phone: [phone])  Please coordinate aftercare with CATCH team.      #Fall injury  - HO frequent falls  - Likely due to alcohol intoxication   - CT Head No Cont: Stable scattered chronic lacunar infarcts.  - Trauma work up Negative. No need for any acute intervention.    #H/O Palpitations  - Continue with Metoprolol tartrate 75mg BID    #Hyperthyroidism  -3/24: TSH 0.52, fT4 0.7, T3 84  -F/U TFT  -Continue with Methimazole 2.5mg OD    #COPD  - on RA   - Duonebs PRN, Symbicort BID    Pending: f/u PT   Dispo: STR auth pending

## 2024-09-28 NOTE — PROGRESS NOTE ADULT - SUBJECTIVE AND OBJECTIVE BOX
SUBJECTIVE:    Patient is a 63y old Male who presents with a chief complaint of AUD (27 Sep 2024 17:22)    Currently admitted to medicine with the primary diagnosis of Alcoholic liver disease       Today is hospital day 8d.     PAST MEDICAL & SURGICAL HISTORY  HTN (hypertension)    Alcohol abuse    Hyperthyroidism      ALLERGIES:  No Known Allergies    MEDICATIONS:  STANDING MEDICATIONS  chlorhexidine 2% Cloths 1 Application(s) Topical daily  enoxaparin Injectable 40 milliGRAM(s) SubCutaneous every 24 hours  escitalopram 10 milliGRAM(s) Oral daily  fluticasone propionate/ salmeterol 100-50 MICROgram(s) Diskus 1 Dose(s) Inhalation two times a day  folic acid 1 milliGRAM(s) Oral daily  lactated ringers. 1000 milliLiter(s) IV Continuous <Continuous>  methimazole 2.5 milliGRAM(s) Oral daily  metoprolol tartrate 75 milliGRAM(s) Oral two times a day  multivitamin 1 Tablet(s) Oral daily  naltrexone 50 milliGRAM(s) Oral daily  pantoprazole    Tablet 40 milliGRAM(s) Oral before breakfast  polyethylene glycol 3350 17 Gram(s) Oral two times a day  senna 2 Tablet(s) Oral at bedtime  thiamine Injectable 100 milliGRAM(s) IV Push daily    PRN MEDICATIONS  albuterol/ipratropium for Nebulization 3 milliLiter(s) Nebulizer every 6 hours PRN  aluminum hydroxide/magnesium hydroxide/simethicone Suspension 30 milliLiter(s) Oral every 4 hours PRN  LORazepam   Injectable 2 milliGRAM(s) IV Push every 4 hours PRN  melatonin 3 milliGRAM(s) Oral at bedtime PRN    VITALS:   T(F): 98  HR: 72  BP: 109/73  RR: 19  SpO2: 100%    LABS:                        13.8   6.34  )-----------( 198      ( 28 Sep 2024 07:10 )             38.3     09-28    138  |  105  |  8[L]  ----------------------------<  118[H]  4.1   |  22  |  0.6[L]    Ca    8.8      28 Sep 2024 07:10  Phos  4.3     09-27  Mg     1.8     09-27    TPro  5.6[L]  /  Alb  3.3[L]  /  TBili  3.1[H]  /  DBili  x   /  AST  98[H]  /  ALT  70[H]  /  AlkPhos  303[H]  09-28      Urinalysis Basic - ( 28 Sep 2024 07:10 )    Color: x / Appearance: x / SG: x / pH: x  Gluc: 118 mg/dL / Ketone: x  / Bili: x / Urobili: x   Blood: x / Protein: x / Nitrite: x   Leuk Esterase: x / RBC: x / WBC x   Sq Epi: x / Non Sq Epi: x / Bacteria: x                RADIOLOGY:    PHYSICAL EXAM:  GEN: No acute distress  LUNGS: Clear to auscultation bilaterally   HEART: S1/S2 present. RRR.   ABD/ GI: Soft, non-tender, non-distended. Bowel sounds present  EXT: NC/NC/NE/2+PP/NEWTON  NEURO: AAOX3

## 2024-09-29 LAB
ALBUMIN SERPL ELPH-MCNC: 3.4 G/DL — LOW (ref 3.5–5.2)
ALP SERPL-CCNC: 271 U/L — HIGH (ref 30–115)
ALT FLD-CCNC: 67 U/L — HIGH (ref 0–41)
ANION GAP SERPL CALC-SCNC: 11 MMOL/L — SIGNIFICANT CHANGE UP (ref 7–14)
AST SERPL-CCNC: 91 U/L — HIGH (ref 0–41)
BASOPHILS # BLD AUTO: 0.05 K/UL — SIGNIFICANT CHANGE UP (ref 0–0.2)
BASOPHILS NFR BLD AUTO: 0.8 % — SIGNIFICANT CHANGE UP (ref 0–1)
BILIRUB SERPL-MCNC: 2.8 MG/DL — HIGH (ref 0.2–1.2)
BUN SERPL-MCNC: 6 MG/DL — LOW (ref 10–20)
CALCIUM SERPL-MCNC: 9 MG/DL — SIGNIFICANT CHANGE UP (ref 8.4–10.5)
CHLORIDE SERPL-SCNC: 103 MMOL/L — SIGNIFICANT CHANGE UP (ref 98–110)
CO2 SERPL-SCNC: 21 MMOL/L — SIGNIFICANT CHANGE UP (ref 17–32)
CREAT SERPL-MCNC: 0.6 MG/DL — LOW (ref 0.7–1.5)
EGFR: 108 ML/MIN/1.73M2 — SIGNIFICANT CHANGE UP
EOSINOPHIL # BLD AUTO: 0.21 K/UL — SIGNIFICANT CHANGE UP (ref 0–0.7)
EOSINOPHIL NFR BLD AUTO: 3.4 % — SIGNIFICANT CHANGE UP (ref 0–8)
GLUCOSE SERPL-MCNC: 109 MG/DL — HIGH (ref 70–99)
HCT VFR BLD CALC: 39.8 % — LOW (ref 42–52)
HGB BLD-MCNC: 13.9 G/DL — LOW (ref 14–18)
IMM GRANULOCYTES NFR BLD AUTO: 0.6 % — HIGH (ref 0.1–0.3)
LYMPHOCYTES # BLD AUTO: 1.68 K/UL — SIGNIFICANT CHANGE UP (ref 1.2–3.4)
LYMPHOCYTES # BLD AUTO: 26.8 % — SIGNIFICANT CHANGE UP (ref 20.5–51.1)
MCHC RBC-ENTMCNC: 34.9 G/DL — SIGNIFICANT CHANGE UP (ref 32–37)
MCHC RBC-ENTMCNC: 35.4 PG — HIGH (ref 27–31)
MCV RBC AUTO: 101.3 FL — HIGH (ref 80–94)
MONOCYTES # BLD AUTO: 0.88 K/UL — HIGH (ref 0.1–0.6)
MONOCYTES NFR BLD AUTO: 14.1 % — HIGH (ref 1.7–9.3)
NEUTROPHILS # BLD AUTO: 3.4 K/UL — SIGNIFICANT CHANGE UP (ref 1.4–6.5)
NEUTROPHILS NFR BLD AUTO: 54.3 % — SIGNIFICANT CHANGE UP (ref 42.2–75.2)
NRBC # BLD: 0 /100 WBCS — SIGNIFICANT CHANGE UP (ref 0–0)
PLATELET # BLD AUTO: 242 K/UL — SIGNIFICANT CHANGE UP (ref 130–400)
PMV BLD: 11.5 FL — HIGH (ref 7.4–10.4)
POTASSIUM SERPL-MCNC: 4 MMOL/L — SIGNIFICANT CHANGE UP (ref 3.5–5)
POTASSIUM SERPL-SCNC: 4 MMOL/L — SIGNIFICANT CHANGE UP (ref 3.5–5)
PROT SERPL-MCNC: 5.8 G/DL — LOW (ref 6–8)
RBC # BLD: 3.93 M/UL — LOW (ref 4.7–6.1)
RBC # FLD: 13.2 % — SIGNIFICANT CHANGE UP (ref 11.5–14.5)
SODIUM SERPL-SCNC: 135 MMOL/L — SIGNIFICANT CHANGE UP (ref 135–146)
WBC # BLD: 6.26 K/UL — SIGNIFICANT CHANGE UP (ref 4.8–10.8)
WBC # FLD AUTO: 6.26 K/UL — SIGNIFICANT CHANGE UP (ref 4.8–10.8)

## 2024-09-29 PROCEDURE — 99231 SBSQ HOSP IP/OBS SF/LOW 25: CPT

## 2024-09-29 RX ADMIN — THIAMINE HYDROCHLORIDE 105 MILLIGRAM(S): 100 INJECTION, SOLUTION INTRAMUSCULAR; INTRAVENOUS at 05:03

## 2024-09-29 RX ADMIN — Medication 17 GRAM(S): at 17:13

## 2024-09-29 RX ADMIN — Medication 2 TABLET(S): at 21:11

## 2024-09-29 RX ADMIN — PANTOPRAZOLE SODIUM 40 MILLIGRAM(S): 40 TABLET, DELAYED RELEASE ORAL at 05:02

## 2024-09-29 RX ADMIN — CHLORHEXIDINE GLUCONATE ORAL RINSE 1 APPLICATION(S): 1.2 SOLUTION DENTAL at 11:06

## 2024-09-29 RX ADMIN — NALTREXONE HYDROCHLORIDE 50 MILLIGRAM(S): 50 TABLET, FILM COATED ORAL at 11:07

## 2024-09-29 RX ADMIN — Medication 10 MILLIGRAM(S): at 11:06

## 2024-09-29 RX ADMIN — THIAMINE HYDROCHLORIDE 105 MILLIGRAM(S): 100 INJECTION, SOLUTION INTRAMUSCULAR; INTRAVENOUS at 21:10

## 2024-09-29 RX ADMIN — Medication 75 MILLIGRAM(S): at 17:12

## 2024-09-29 RX ADMIN — Medication 17 GRAM(S): at 05:02

## 2024-09-29 RX ADMIN — MULTI VITAMIN/MINERAL SUPPLEMENT WITH ASCORBIC ACID, NIACIN, PYRIDOXINE, PANTOTHENIC ACID, FOLIC ACID, RIBOFLAVIN, THIAMIN, BIOTIN, COBALAMIN AND ZINC. 1 TABLET(S): 60; 20; 12.5; 10; 10; 1.7; 1.5; 1; .3; .006 TABLET, COATED ORAL at 11:05

## 2024-09-29 RX ADMIN — FOLIC ACID 1 MILLIGRAM(S): 1 TABLET ORAL at 11:05

## 2024-09-29 RX ADMIN — THIAMINE HYDROCHLORIDE 105 MILLIGRAM(S): 100 INJECTION, SOLUTION INTRAMUSCULAR; INTRAVENOUS at 13:22

## 2024-09-29 RX ADMIN — Medication 75 MILLIGRAM(S): at 05:02

## 2024-09-29 RX ADMIN — ENOXAPARIN SODIUM 40 MILLIGRAM(S): 150 INJECTION SUBCUTANEOUS at 11:04

## 2024-09-29 NOTE — PROGRESS NOTE ADULT - ASSESSMENT
63-year-old male with past medical history of alcohol abuse, hypertension, COPD, hyperthyroidism, anxiety, insomnia who presented to the ED for the evaluation of frequent falls and alcohol intoxication.     # Alcohol intoxication with  withdrawal    >>  CIWA protocol completed  #Diffuse Hepatic Steatosis  # Suspected Thiamine /Folate deficiency with Macrocytosis  #Lactic Acidosis - Resolved  - HO alcohol abuse and withdrawals.  - Per son Anthony; the patient's functional capacity has been declining, getting more depressed with suicide attempts.  - On high dose thiamine, will continue 500mg IV q8 for 3 days then 250mg IV for 5 days.  - Continue CIWA monitoring, Ativan taper and PRN    - Addiction team RECS:  CATCH team to assist with substance use aftercare options and will have ongoing discussions regarding medications for AUD such as naltrexone when patient's mental status improves. Patient has previously refused addiction aftercare services. Vivitrol is injectable extended release naltrexone given as a monthly injection every 4 weeks. Will consider administering injection day of discharge as liver function improves for reducing alcohol cravings and binge consumption. Daniel Cruz agreeable to plan. Will have ongoing discussions with patient regarding need for supportive measures at home to assist with sobriety as patient endorses and has demonstrated poor decision making.   concern for underlying mood disorder as well, will evaluate for depression tomorrow if patient engages in discussion and consider medications to assist.    - Psychiatry RECS  No psychiatric contraindications to discharge  Please refer to [facility] at [address] [place] [address], [place], [place], [address] (Phone: [phone])  Please coordinate aftercare with CATCH team.      #Fall injury  - HO frequent falls  - Likely due to alcohol intoxication   - CT Head No Cont: Stable scattered chronic lacunar infarcts.  - Trauma work up Negative. No need for any acute intervention.    #H/O Palpitations  - Continue with Metoprolol tartrate 75mg BID    #Hyperthyroidism  -3/24: TSH 0.52, fT4 0.7, T3 84  -F/U TFT  -Continue with Methimazole 2.5mg OD    #COPD  - on RA   - Duonebs PRN, Symbicort BID    Pending: f/u PT   Dispo: STR auth pending

## 2024-09-29 NOTE — PROGRESS NOTE ADULT - ASSESSMENT
63-year-old male with past medical history of alcohol abuse, hypertension, COPD, hyperthyroidism, anxiety, insomnia who presented to the ED for the evaluation of frequent falls and alcohol intoxication.     # Alcohol intoxication with impending  withdrawal  #Diffuse Hepatic Steatosis  # Suspected Thiamine /Folate deficiency with Macrocytosis  - HO alcohol abuse and withdrawals.  - Per son Anthony; the patient's functional capacity has been declining, getting more depressed with suicide attempts.  -IV thiamine per hep  -  CIWA monitoring  - s/p ativan taper  - RENÉ, AMA, anti-Smooth Muscle Ab type 1, Anti liver-kidney microsomal Ab, Anti-soluble liver Ag, immunoglobulin panel, ceruloplasmin, A1AT phenotype per hepatology ordered      - Addiction team RECS:  CATCH team to assist with substance use aftercare options and will have ongoing discussions regarding medications for AUD such as naltrexone when patient's mental status improves. Patient has previously refused addiction aftercare services. Vivitrol is injectable extended release naltrexone given as a monthly injection every 4 weeks. Will consider administering injection day of discharge as liver function improves for reducing alcohol cravings and binge consumption. Daniel Cruz agreeable to plan. Will have ongoing discussions with patient regarding need for supportive measures at home to assist with sobriety as patient endorses and has demonstrated poor decision making.   concern for underlying mood disorder as well, will evaluate for depression tomorrow if patient engages in discussion and consider medications to assist.    - Psychiatry RECS  No psychiatric contraindications to discharge  Please refer to [facility] at [address] [place] [address], [place], [place], [address] (Phone: [phone])  Please coordinate aftercare with CATCH team.      #Fall injury  - HO frequent falls  - Likely due to alcohol intoxication   - CT Head No Cont: Stable scattered chronic lacunar infarcts.  - Trauma work up Negative. No need for any acute intervention.    #HTN  - BP has been within normal limits.    #H/O Palpitations  - Continue with Metoprolol tartrate 75mg BID    #Hyperthyroidism  -3/24: TSH 0.52, fT4 0.7, T3 84  -F/U TFT  -Continue with Methimazole 2.5mg OD    #COPD  - on RA   - Duonebs PRN, Symbicort BID    #dispo  -STR placement

## 2024-09-29 NOTE — PROGRESS NOTE ADULT - SUBJECTIVE AND OBJECTIVE BOX
SUBJECTIVE:    Patient is a 63y old Male who presents with a chief complaint of frequent falls (29 Sep 2024 08:19)    Currently admitted to medicine with the primary diagnosis of Alcoholic liver disease       Today is hospital day 9d.     PAST MEDICAL & SURGICAL HISTORY  HTN (hypertension)    Alcohol abuse    Hyperthyroidism      ALLERGIES:  No Known Allergies    MEDICATIONS:  STANDING MEDICATIONS  chlorhexidine 2% Cloths 1 Application(s) Topical daily  enoxaparin Injectable 40 milliGRAM(s) SubCutaneous every 24 hours  escitalopram 10 milliGRAM(s) Oral daily  fluticasone propionate/ salmeterol 100-50 MICROgram(s) Diskus 1 Dose(s) Inhalation two times a day  folic acid 1 milliGRAM(s) Oral daily  lactated ringers. 1000 milliLiter(s) IV Continuous <Continuous>  methimazole 2.5 milliGRAM(s) Oral daily  metoprolol tartrate 75 milliGRAM(s) Oral two times a day  multivitamin 1 Tablet(s) Oral daily  naltrexone 50 milliGRAM(s) Oral daily  pantoprazole    Tablet 40 milliGRAM(s) Oral before breakfast  polyethylene glycol 3350 17 Gram(s) Oral two times a day  senna 2 Tablet(s) Oral at bedtime  thiamine IVPB 500 milliGRAM(s) IV Intermittent every 8 hours    PRN MEDICATIONS  albuterol/ipratropium for Nebulization 3 milliLiter(s) Nebulizer every 6 hours PRN  aluminum hydroxide/magnesium hydroxide/simethicone Suspension 30 milliLiter(s) Oral every 4 hours PRN  LORazepam   Injectable 2 milliGRAM(s) IV Push every 4 hours PRN  melatonin 3 milliGRAM(s) Oral at bedtime PRN    VITALS:   T(F): 97.8  HR: 85  BP: 120/77  RR: 18  SpO2: 96%    LABS:                        13.9   6.26  )-----------( 242      ( 29 Sep 2024 07:07 )             39.8     09-29    135  |  103  |  6[L]  ----------------------------<  109[H]  4.0   |  21  |  0.6[L]    Ca    9.0      29 Sep 2024 07:07    TPro  5.8[L]  /  Alb  3.4[L]  /  TBili  2.8[H]  /  DBili  x   /  AST  91[H]  /  ALT  67[H]  /  AlkPhos  271[H]  09-29      Urinalysis Basic - ( 29 Sep 2024 07:07 )    Color: x / Appearance: x / SG: x / pH: x  Gluc: 109 mg/dL / Ketone: x  / Bili: x / Urobili: x   Blood: x / Protein: x / Nitrite: x   Leuk Esterase: x / RBC: x / WBC x   Sq Epi: x / Non Sq Epi: x / Bacteria: x                RADIOLOGY:    PHYSICAL EXAM:  GEN: No acute distress  LUNGS: Clear to auscultation bilaterally   HEART: S1/S2 present. RRR.   ABD/ GI: Soft, non-tender, non-distended. Bowel sounds present  EXT: NC/NC/NE/2+PP/NEWTON  NEURO: AAOX3

## 2024-09-29 NOTE — PROGRESS NOTE ADULT - SUBJECTIVE AND OBJECTIVE BOX
SUBJECTIVE/OVERNIGHT EVENTS  Today is hospital day 9d. This morning patient was seen and examined at bedside, resting comfortably in bed. No acute or major events overnight.      MEDICATIONS  STANDING MEDICATIONS  chlorhexidine 2% Cloths 1 Application(s) Topical daily  enoxaparin Injectable 40 milliGRAM(s) SubCutaneous every 24 hours  escitalopram 10 milliGRAM(s) Oral daily  fluticasone propionate/ salmeterol 100-50 MICROgram(s) Diskus 1 Dose(s) Inhalation two times a day  folic acid 1 milliGRAM(s) Oral daily  lactated ringers. 1000 milliLiter(s) IV Continuous <Continuous>  methimazole 2.5 milliGRAM(s) Oral daily  metoprolol tartrate 75 milliGRAM(s) Oral two times a day  multivitamin 1 Tablet(s) Oral daily  naltrexone 50 milliGRAM(s) Oral daily  pantoprazole    Tablet 40 milliGRAM(s) Oral before breakfast  polyethylene glycol 3350 17 Gram(s) Oral two times a day  senna 2 Tablet(s) Oral at bedtime  thiamine IVPB 500 milliGRAM(s) IV Intermittent every 8 hours    PRN MEDICATIONS  albuterol/ipratropium for Nebulization 3 milliLiter(s) Nebulizer every 6 hours PRN  aluminum hydroxide/magnesium hydroxide/simethicone Suspension 30 milliLiter(s) Oral every 4 hours PRN  LORazepam   Injectable 2 milliGRAM(s) IV Push every 4 hours PRN  melatonin 3 milliGRAM(s) Oral at bedtime PRN    VITALS  T(F): 97.7 (09-29-24 @ 00:11), Max: 97.9 (09-28-24 @ 15:00)  HR: 72 (09-29-24 @ 04:56) (69 - 78)  BP: 119/81 (09-29-24 @ 04:56) (119/81 - 142/83)  RR: 18 (09-29-24 @ 00:11) (18 - 18)  SpO2: 98% (09-29-24 @ 00:11) (98% - 98%)    PHYSICAL EXAM  GENERAL  ( x ) NAD, lying in bed comfortably     (  ) obtunded     (  ) lethargic     (  ) somnolent    HEAD  (  ) Atraumatic     (  ) hematoma     (  ) laceration (specify location:       )     NECK  (  ) Supple     (  ) neck stiffness     (  ) nuchal rigidity     (  )  no JVD     (  ) JVD present ( -- cm)    HEART  Rate -->  ( x ) normal rate    (  ) bradycardic    (  ) tachycardic  Rhythm -->  ( x ) regular    (  ) regularly irregular    (  ) irregularly irregular  Murmurs -->  (  ) normal s1/s2    (  ) systolic murmur    (  ) diastolic murmur    (  ) continuous murmur     (  ) S3 present    (  ) S4 present    LUNGS  (x  )Unlabored respirations     (  ) tachypnea  ( x ) B/L air entry     (  ) decreased breath sounds in:  (location     )    (  ) no adventitious sound     (  ) crackles     (  ) wheezing      (  ) rhonchi      (specify location:       )  (  ) chest wall tenderness (specify location:       )    ABDOMEN  ( x ) Soft     (  ) tense   |   (  ) nondistended     (  ) distended   |   (  ) +BS     (  ) hypoactive bowel sounds     (  ) hyperactive bowel sounds  (x  ) nontender     (  ) RUQ tenderness     (  ) RLQ tenderness     (  ) LLQ tenderness     (  ) epigastric tenderness     (  ) diffuse tenderness  (  ) Splenomegaly      (  ) Hepatomegaly      (  ) Jaundice     (  ) ecchymosis     EXTREMITIES  (x  ) Normal     (  ) Rash     (  ) ecchymosis     (  ) varicose veins      (  ) pitting edema     (  ) non-pitting edema   (  ) ulceration     (  ) gangrene:     (location:     )    NERVOUS SYSTEM  ( x ) A&Ox2     (  ) confused     (  ) lethargic  CN II-XII:     (  ) Intact     (  ) focal deficits  (Specify:     )   Upper extremities:     (  ) strength X/5     (  ) focal deficit (specify:    )  Lower extremities:     (  ) strength  X/5    (  ) focal deficit (specify:    )    SKIN  (  ) No rashes or lesions     (  ) maculopapular rash     (  ) pustules     (  ) vesicles     (  ) ulcer     (  ) ecchymosis     (specify location:     )    (  ) Indwelling Maria Catheter   Date insterted:    Reason (  ) Critical illness     (  ) urinary retention    (  ) Accurate Ins/Outs Monitoring     (  ) CMO patient    (  ) Central Line  Date inserted:  Location: (  ) Right IJ   (  ) Left IJ   (  ) Right Fem   (  ) Left Fem    (  ) SPC  (  ) pigtail  (  ) PEG tube  (  ) colostomy  (  ) jejunostomy  (  ) U-Dall    LABS             13.9   6.26  )-----------( 242      ( 09-29-24 @ 07:07 )             39.8     138  |  105  |  8   -------------------------<  118   09-28-24 @ 07:10  4.1  |  22  |  0.6    Ca      8.8     09-28-24 @ 07:10    TPro  5.6  /  Alb  3.3  /  TBili  3.1  /  DBili  x   /  AST  98  /  ALT  70  /  AlkPhos  303  /  GGT  x     09-28-24 @ 07:10        Urinalysis Basic - ( 28 Sep 2024 07:10 )    Color: x / Appearance: x / SG: x / pH: x  Gluc: 118 mg/dL / Ketone: x  / Bili: x / Urobili: x   Blood: x / Protein: x / Nitrite: x   Leuk Esterase: x / RBC: x / WBC x   Sq Epi: x / Non Sq Epi: x / Bacteria: x          IMAGING

## 2024-09-30 ENCOUNTER — TRANSCRIPTION ENCOUNTER (OUTPATIENT)
Age: 64
End: 2024-09-30

## 2024-09-30 VITALS
OXYGEN SATURATION: 99 % | HEART RATE: 56 BPM | DIASTOLIC BLOOD PRESSURE: 79 MMHG | SYSTOLIC BLOOD PRESSURE: 121 MMHG | TEMPERATURE: 98 F | RESPIRATION RATE: 18 BRPM

## 2024-09-30 LAB
ALBUMIN SERPL ELPH-MCNC: 3.6 G/DL — SIGNIFICANT CHANGE UP (ref 3.5–5.2)
ALP SERPL-CCNC: 270 U/L — HIGH (ref 30–115)
ALT FLD-CCNC: 65 U/L — HIGH (ref 0–41)
ANION GAP SERPL CALC-SCNC: 11 MMOL/L — SIGNIFICANT CHANGE UP (ref 7–14)
AST SERPL-CCNC: 78 U/L — HIGH (ref 0–41)
BASOPHILS # BLD AUTO: 0.03 K/UL — SIGNIFICANT CHANGE UP (ref 0–0.2)
BASOPHILS NFR BLD AUTO: 0.5 % — SIGNIFICANT CHANGE UP (ref 0–1)
BILIRUB SERPL-MCNC: 2.4 MG/DL — HIGH (ref 0.2–1.2)
BUN SERPL-MCNC: 6 MG/DL — LOW (ref 10–20)
CALCIUM SERPL-MCNC: 9 MG/DL — SIGNIFICANT CHANGE UP (ref 8.4–10.5)
CHLORIDE SERPL-SCNC: 106 MMOL/L — SIGNIFICANT CHANGE UP (ref 98–110)
CO2 SERPL-SCNC: 22 MMOL/L — SIGNIFICANT CHANGE UP (ref 17–32)
CREAT SERPL-MCNC: 0.6 MG/DL — LOW (ref 0.7–1.5)
EGFR: 108 ML/MIN/1.73M2 — SIGNIFICANT CHANGE UP
EOSINOPHIL # BLD AUTO: 0.17 K/UL — SIGNIFICANT CHANGE UP (ref 0–0.7)
EOSINOPHIL NFR BLD AUTO: 2.8 % — SIGNIFICANT CHANGE UP (ref 0–8)
GLUCOSE SERPL-MCNC: 112 MG/DL — HIGH (ref 70–99)
HCT VFR BLD CALC: 39.1 % — LOW (ref 42–52)
HGB BLD-MCNC: 14.4 G/DL — SIGNIFICANT CHANGE UP (ref 14–18)
IMM GRANULOCYTES NFR BLD AUTO: 0.8 % — HIGH (ref 0.1–0.3)
LYMPHOCYTES # BLD AUTO: 1.76 K/UL — SIGNIFICANT CHANGE UP (ref 1.2–3.4)
LYMPHOCYTES # BLD AUTO: 28.8 % — SIGNIFICANT CHANGE UP (ref 20.5–51.1)
MCHC RBC-ENTMCNC: 36.8 G/DL — SIGNIFICANT CHANGE UP (ref 32–37)
MCHC RBC-ENTMCNC: 37.7 PG — HIGH (ref 27–31)
MCV RBC AUTO: 102.4 FL — HIGH (ref 80–94)
MONOCYTES # BLD AUTO: 0.71 K/UL — HIGH (ref 0.1–0.6)
MONOCYTES NFR BLD AUTO: 11.6 % — HIGH (ref 1.7–9.3)
NEUTROPHILS # BLD AUTO: 3.39 K/UL — SIGNIFICANT CHANGE UP (ref 1.4–6.5)
NEUTROPHILS NFR BLD AUTO: 55.5 % — SIGNIFICANT CHANGE UP (ref 42.2–75.2)
NRBC # BLD: 0 /100 WBCS — SIGNIFICANT CHANGE UP (ref 0–0)
PLATELET # BLD AUTO: 237 K/UL — SIGNIFICANT CHANGE UP (ref 130–400)
PMV BLD: 10.9 FL — HIGH (ref 7.4–10.4)
POTASSIUM SERPL-MCNC: 4.1 MMOL/L — SIGNIFICANT CHANGE UP (ref 3.5–5)
POTASSIUM SERPL-SCNC: 4.1 MMOL/L — SIGNIFICANT CHANGE UP (ref 3.5–5)
PROT SERPL-MCNC: 6.1 G/DL — SIGNIFICANT CHANGE UP (ref 6–8)
RBC # BLD: 3.82 M/UL — LOW (ref 4.7–6.1)
RBC # FLD: 13.2 % — SIGNIFICANT CHANGE UP (ref 11.5–14.5)
SODIUM SERPL-SCNC: 139 MMOL/L — SIGNIFICANT CHANGE UP (ref 135–146)
WBC # BLD: 6.11 K/UL — SIGNIFICANT CHANGE UP (ref 4.8–10.8)
WBC # FLD AUTO: 6.11 K/UL — SIGNIFICANT CHANGE UP (ref 4.8–10.8)

## 2024-09-30 PROCEDURE — 99231 SBSQ HOSP IP/OBS SF/LOW 25: CPT

## 2024-09-30 PROCEDURE — 99239 HOSP IP/OBS DSCHRG MGMT >30: CPT

## 2024-09-30 RX ORDER — SENNOSIDES 8.6 MG
2 TABLET ORAL
Qty: 0 | Refills: 0 | DISCHARGE
Start: 2024-09-30

## 2024-09-30 RX ORDER — PANTOPRAZOLE SODIUM 40 MG/1
1 TABLET, DELAYED RELEASE ORAL
Qty: 30 | Refills: 0
Start: 2024-09-30 | End: 2024-10-29

## 2024-09-30 RX ORDER — NALTREXONE HYDROCHLORIDE 50 MG/1
1 TABLET, FILM COATED ORAL
Qty: 30 | Refills: 0
Start: 2024-09-30 | End: 2024-10-29

## 2024-09-30 RX ORDER — FLUTICASONE PROPION/SALMETEROL 100-50 MCG
1 BLISTER, WITH INHALATION DEVICE INHALATION
Qty: 1 | Refills: 0
Start: 2024-09-30 | End: 2024-10-29

## 2024-09-30 RX ORDER — THIAMINE HYDROCHLORIDE 100 MG/ML
1 INJECTION, SOLUTION INTRAMUSCULAR; INTRAVENOUS
Qty: 30 | Refills: 0
Start: 2024-09-30 | End: 2024-10-29

## 2024-09-30 RX ORDER — ESCITALOPRAM OXALATE 10 MG
1 TABLET ORAL
Qty: 30 | Refills: 0
Start: 2024-09-30 | End: 2024-10-29

## 2024-09-30 RX ORDER — NALTREXONE HYDROCHLORIDE 50 MG/1
380 TABLET, FILM COATED ORAL ONCE
Refills: 0 | Status: DISCONTINUED | OUTPATIENT
Start: 2024-09-30 | End: 2024-09-30

## 2024-09-30 RX ADMIN — CHLORHEXIDINE GLUCONATE ORAL RINSE 1 APPLICATION(S): 1.2 SOLUTION DENTAL at 11:29

## 2024-09-30 RX ADMIN — Medication 10 MILLIGRAM(S): at 11:02

## 2024-09-30 RX ADMIN — FOLIC ACID 1 MILLIGRAM(S): 1 TABLET ORAL at 11:02

## 2024-09-30 RX ADMIN — NALTREXONE HYDROCHLORIDE 50 MILLIGRAM(S): 50 TABLET, FILM COATED ORAL at 11:03

## 2024-09-30 RX ADMIN — PANTOPRAZOLE SODIUM 40 MILLIGRAM(S): 40 TABLET, DELAYED RELEASE ORAL at 05:43

## 2024-09-30 RX ADMIN — THIAMINE HYDROCHLORIDE 105 MILLIGRAM(S): 100 INJECTION, SOLUTION INTRAMUSCULAR; INTRAVENOUS at 13:06

## 2024-09-30 RX ADMIN — Medication 75 MILLIGRAM(S): at 05:43

## 2024-09-30 RX ADMIN — THIAMINE HYDROCHLORIDE 105 MILLIGRAM(S): 100 INJECTION, SOLUTION INTRAMUSCULAR; INTRAVENOUS at 05:43

## 2024-09-30 RX ADMIN — MULTI VITAMIN/MINERAL SUPPLEMENT WITH ASCORBIC ACID, NIACIN, PYRIDOXINE, PANTOTHENIC ACID, FOLIC ACID, RIBOFLAVIN, THIAMIN, BIOTIN, COBALAMIN AND ZINC. 1 TABLET(S): 60; 20; 12.5; 10; 10; 1.7; 1.5; 1; .3; .006 TABLET, COATED ORAL at 11:02

## 2024-09-30 RX ADMIN — ENOXAPARIN SODIUM 40 MILLIGRAM(S): 150 INJECTION SUBCUTANEOUS at 09:02

## 2024-09-30 NOTE — DISCHARGE NOTE PROVIDER - NSDCFUSCHEDAPPT_GEN_ALL_CORE_FT
Divya Forrester  [facility] PreAdmits  Scheduled Appointment: 10/04/2024    [facility] Physician [facility]  PSYCHIATRY [facility]  Scheduled Appointment: 10/04/2024

## 2024-09-30 NOTE — DISCHARGE NOTE NURSING/CASE MANAGEMENT/SOCIAL WORK - NSDCPEFALRISK_GEN_ALL_CORE
For information on Fall & Injury Prevention, visit: [email or web address] OR  [email or web address] OR  https://www.cdc.gov/steadi/patient.html

## 2024-09-30 NOTE — DISCHARGE NOTE PROVIDER - CARE PROVIDER_API CALL
Giovana Crespo  Internal Medicine  [address] [place] [address]  Phone: [phone]  Fax: [phone]  Follow Up Time: 2 weeks    Norman García  Internal Medicine  [address] [place] [address]  Phone: [phone]  Fax: [phone]  Follow Up Time: 1 month

## 2024-09-30 NOTE — DISCHARGE NOTE PROVIDER - NSDCCPCAREPLAN_GEN_ALL_CORE_FT
PRINCIPAL DISCHARGE DIAGNOSIS  Diagnosis: Alcoholic liver disease  Assessment and Plan of Treatment: Alcoholic liver disease  Alcohol intoxication occurs when the amount of alcohol that a person has consumed impairs his or her ability to mentally and physically function. Chronic alcohol consumption can also lead to a variety of health issues including neurological disease, stomach disease, heart disease, liver disease, etc. Do not drive after drinking alcohol. Drinking enough alcohol to end up in an Emergency Room suggests you may have an alcohol abuse problem. Seek help at a drug addiction center.  SEEK IMMEDIATE MEDICAL CARE IF YOU HAVE ANY OF THE FOLLOWING SYMPTOMS: seizures, vomiting blood, blood in your stool, lightheadedness/dizziness, or becoming shaky to tremulous when you stop drinking.  Frequent Falls:   Fall prevention includes ways to make your home and other areas safer. It also includes ways you can move more carefully to prevent a fall. Health conditions that cause changes in your blood pressure, vision, or muscle strength and coordination may increase your risk for falls. Medicines may also increase your risk for falls if they make you dizzy, weak, or sleepy.  Seek Medical Attention If:  You have fallen and are unconscious.  fallen and cannot move part of your body.  You have fallen and have pain or a headache.  Fall prevention tips:  Stand or sit up slowly.  Use assistive devices as directed.   You may need to have grab bars put in your bathroom near the toilet or in the shower.  Wear shoes that fit well and have soles that . Wear shoes both inside and outside. Do not wear shoes with high heels.  Wear a personal alarm that can call 911 in an emergency.   Manage your medical conditions. Keep all appointments with your healthcare providers. Visit your eye doctor as directed.      SECONDARY DISCHARGE DIAGNOSES  Diagnosis: Elevated bilirubin  Assessment and Plan of Treatment:     Diagnosis: Alcohol withdrawal  Assessment and Plan of Treatment:     Diagnosis: Falls  Assessment and Plan of Treatment:

## 2024-09-30 NOTE — CONSULT NOTE ADULT - ASSESSMENT
62 yo domiciled M with PMHx of hypertension, hx of chronic lacunar infarcts, COPD, hyperthyroidism, anxiety, insomnia and PPhx of alcohol use disorder with hx of DTs presenting for alcohol withdrawal being admitted to [facility]. Addiction medicine consulted for alcohol withdrawal with severe history.     #Alcohol withdrawal:  #Hx of DTs:  - given clinical history and presentation, patient was started on Ativan 4 mg fixed dose taper over the course of 6 days on admission. Patient with clinical improvement in CIWA scoring on that protocol, would make no changes to that for now.   - hold benzodiazepines for sedation/respiratory depression   - keep Mg>2, K>4   - concern for wernicke's encephalopathy with mental status changes, and deconditioning on assessment. Would recommend high dose thiamine below, continue folate and multivitamin   - please continue all PRN medications for supportive management of withdrawal including:  - zofran q6 hr prn nausea   - hydroxyzine 25 mg q6h prn anxiety ( hold for sedation)   - tylenol 650 mg q6h prn mild/moderate pain  - trazodone 25 mg nightly prn for insomnia  - CATCH team to assist with substance use aftercare options and will have ongoing discussions regarding medications for AUD such as naltrexone when patient's mental status improves. Today's assessment indicated cognitive processing delays given chronic history as well on benzodiazepine use in the hospital for withdrawals.     #Concern for Wernicke Korsakoff Syndrome   - clinical concern for Wernicke's encephalopathy. Would recommend IV thiamine 500 mg q 8h for 3 days, followed by 250 mg IV daily up to additional 5 days per [employer] recommendations. Can also opt to switch to oral after 3 days of IV repletion.   - will continue to assess for mental status improvements daily and screen for cooccurring anxiety and depression once withdrawals improve given family concern for cooccurring mood and substance use.   - keep Mg >2, K>4     #Tobacco use disorder:  - continue nicotine 21 mg patch daily, offer combination NRT with nicotine gum/lozenge 2 or 4 mg q1-2 hours prn cravings in addition to long acting patch. Smoking cessation counseling encouraged    #Alcohol induced Hepatic Steatosis:  - RUQ and CT A&P with hepatic steatosis  - counseling for alcohol cessation as above with CATCH   - would recommend testing for additional hepatology testing including Hepatitis B surface antigen (HBsAg), hepatitis B surface antibody (anti-HBs), total hepatitis B core antibody (anti-HBc), Hep A IGG and Hep C Antibody  - patient should be monitored by PCP or GI specialist annually for noninvasive liver monitoring given that up to 20% of patients with steatosis develop fibrosis.  - if following up imaging concerning for cirrhosis, patient should be monitored with noninvasive imaging and AFP every 6 months for HCC screening     Thank you for this consult. Rest of care per primary. Addiction medicine will continue to follow. Please reach out with any questions or concerns via TEAMS.   
IMPRESSION:    Likely Alcoholic hepatitis   ETOH withdrawal   Possible wernicke's encephalopathy   ETOH abuse  Frequent falls SP trauma work up  HO COPD  HO hyperthyroidism     PLAN:    CNS: CIWA monitoring. High dose thiamine. Folate. CTH noted.     HEENT: Oral care    PULMONARY:  HOB @ 45 degrees. On room air.     CARDIOVASCULAR: Check TTE. Avoid overload.     GI: GI prophylaxis. Check coags STAT. Bilirubin and LFTs elevated. Likely alcoholic hepatitis, may need steroids. Hepatology consult. ETOH level.     RENAL:  Follow up lytes.  Correct as needed    INFECTIOUS DISEASE: Monitor VS off abx. Procal.     HEMATOLOGICAL:  DVT prophylaxis. STAT coags. Trend CBC and coags.     ENDOCRINE:  Follow up FS.  Insulin protocol if needed. Check TSH     MUSCULOSKELETAL: Bed rest for now     SDU monitoring for now      
62 yo domiciled M with PMHx of hypertension, hx of chronic lacunar infarcts, COPD, hyperthyroidism, anxiety, insomnia and PPhx of alcohol use disorder with hx of DTs presenting for alcohol withdrawal being admitted to [facility]. Addiction medicine consulted for alcohol withdrawal with severe history.     #Alcohol withdrawal:  #Hx of DTs:  - Patient completed Ativan taper, can discontinue CIWA protocol after 6 day ativan taper given clinical improvements and minimal risk for worsening symptoms at this stage of hospital course.   - hold benzodiazepines for sedation/respiratory depression   - keep Mg>2, K>4   - concern for Wernicke's encephalopathy with mental status changes, and deconditioning on assessment. Would recommend high dose thiamine below, continue folate and multivitamin   - please continue all PRN medications for supportive management of withdrawal including:  - zofran q6 hr prn nausea   - hydroxyzine 25 mg q6h prn anxiety ( hold for sedation)   - tylenol 650 mg q6h prn mild/moderate pain  - trazodone 25 mg nightly prn for insomnia  - CATCH team to assist with substance use aftercare options for outpatient programs near patient's home, currently [facility] vs [facility] clinic across the street. CATCH counselor to discussion options with patient and son Anthony after patient gets out of SNF.   - MAT for AUD plan for naltrexone 50 mg daily to be given with food, started 9/25 as recommended. Patient tolerating medication well.   - Vivitrol is injectable extended release naltrexone given as a monthly injection every 4 weeks. Will consider administering injection on day of discharge as liver function improves for reducing alcohol cravings and binge consumption. Daniel Cruz agreeable to plan.  - Patient unfortunately does not qualify for community health homes program because he does not have Medicaid, has another [facility] plan.     #Depression vs Substance induced mood disorder:  - give history obtained by patient and daniel Cruz, concern for ongoing mood disorder vs mood disorder. Patient denies SI/HI and is amenable to medication to help his mood.  Continue escitalopram 10 mg daily today. Patient will benefit from ongoing CBT with weekly counseling.   - recommend daily mood screenings   - CATCH counselor will also discuss an adult day program for patient with his son Anthony which can keep patient socialized and mentally stimulated for goals of mood improvement.     #Concern for Wernicke Korsakoff Syndrome   - clinical concern for Wernicke's encephalopathy. Would recommend IV thiamine 500 mg q 8h for 3 days, followed by 250 mg IV daily up to additional 5 days per [employer] recommendations. Can also opt to switch to oral after 3 days of IV repletion.   - patient pending SNF placement   - prescribe oral thiamine 100 mg daily for discharge as well for wernicke's   - keep Mg >2, K>4     #Tobacco use disorder:  - continue nicotine 21 mg patch daily, offer combination NRT with nicotine gum/lozenge 2 or 4 mg q1-2 hours prn cravings in addition to long acting patch. Smoking cessation counseling encouraged    #Alcohol induced Hepatic Steatosis:  - RUQ and CT A&P with hepatic steatosis  - counseling for alcohol cessation as above with CATCH   - would recommend testing for additional hepatology testing including Hepatitis B surface antigen (HBsAg), hepatitis B surface antibody (anti-HBs), total hepatitis B core antibody (anti-HBc), Hep A IGG and Hep C Antibody  - patient should be monitored by PCP or GI specialist annually for noninvasive liver monitoring given that up to 20% of patients with steatosis develop fibrosis.   - if following up imaging concerning for cirrhosis, patient should be monitored with noninvasive imaging and AFP every 6 months for HCC screening     Thank you for this consult. Rest of care per primary. Addiction medicine will continue to follow. Please reach out with any questions or concerns via TEAMS.   
Assessment and Plan  He is a 63-year-old male patient with history of alcohol use disorder, alcoholic intoxication requiring intubation in March 2024, hypertension, COPD, hypothyroidism, anxiety, and insomnia who was brought to the ED on September 20 for evaluation of multiple falls, confusion, and tremulousness, found to have elevated serum alcohol levels and liver enzymes.  We are consulted for concern of alcoholic hepatitis.      Elevated LFTs: Mixed Pattern  Alcoholic hepatitis with an MDF-5.4  Severe hepatic steatosis with no evidence of cirrhosis or portal hypertension  Metabolic Encephalopathy: Likely Alcohol Withdrawal  * Social history: Has been drinking alcohol for more than 20 years; recently admitted in March for alcohol intoxication requiring intubation; continue to drink after discharge; has been drinking 1 bottle (750 mL) of whiskey every day for the last few months; last drink was September 20 a.m.  * Presentation: Brought for evaluation of frequent falls in the last week; associated with confusion of 1 day duration; tremulousness; restlessness; has been feeling down for few months (suicidal ideation per son); denies pain or nausea; 1 episode of nonbilious vomiting last week; no unintentional weight loss; review of systems positive for constipation with last bowel movement being a week ago  * Vitals: Blood pressure 146/87 mmHg; current heart rate 68 bpm (had sinus tachycardia before); no fever  * Labs: WBC 6.36, hemoglobin 14.4, platelet 208 on September 21, sodium 138, potassium 4-2, BUN 7, creatinine 0.6 on September 21  * LFTs: 3.6/49/49/174 on September 20-> 4.3/451/322/139 on September 21 (versus 1.1/159/51/42 on March 14 during recent admission)  * No lipase  * INR 0.96 on September 20; lactate 6.3 and 5.3 on September 20-> 2.5 on September 21  * Serum alcohol 251 in September 20  * Previous hep C serology unremarkable in February 2024  * No previous ultrasound of the abdomen  * Previous CT abdomen with oral contrast on March 5, 2024 for constipation revealed hepatic steatosis with circumferential thickening along the distal descending colon to the rectum with colonic dilation up to 7 cm from the cecum to the splenic flexure, no transition point with LBO, right inguinal hernia with loops of bowels, prominent small bowel loops suggestive of ileus  * Current CT abdomen with IV contrast on September 20: Hepatic steatosis, unremarkable pancreas or spleen, moderate large right inguinal hernia with nonobstructing small bowel loops  * Refused previous colonoscopy; no prior EGD  * No family history of GI cancers or liver disease    RECOMMENDATIONS  - Trend LFTs and INR  - In the setting of suspected alcoholic hepatitis: MDF score was calculated (-5.4): Hold off steroids for now  - Follow-up right upper quadrant ultrasound ordered on September 21  - Follow-up acute hepatitis panel and ordered for September 22  - Trend electrolytes and replete as needed  - Avoid hepatotoxic agents: Avoid NSAIDs  - Counseled son about alcohol cessation.  Recommend inpatient rehab.  Catch and SBIRT teams on board  - Recommend psych evaluation in the setting of low mood/suicidal ideation both of which have been exacerbating alcohol intake in the last few months  - Continue CIWA protocol: Continue IV Ativan 2 mg every 1 hour as needed and Ativan taper as ordered  - Continue multivitamins and folate supplementation daily; with switch p.o. thiamine 100 mg daily to IV thiamine 500 mg every 8 hours in the setting of confusion per son  - Monitor bowel movements and avoid constipation: Agree with lactulose 20 g every 6 hours, MiraLAX 17 g twice daily, and senna 2 tabs at bedtime in the setting of last bowel movement being last week  - Recommend starting p.o. Protonix 40 mg daily for GI prophylaxis  - Follow up with our [facility] located at [address] [place] [address], [facility]. Telephone No: [phone]      History of colonic distention/thickening and ileus in March 2024  Moderate to large right inguinal hernia with nonobstructive bowel  * Previous CT abdomen with oral contrast on March 5, 2024 for constipation revealed hepatic steatosis with circumferential thickening along the distal descending colon to the rectum with colonic dilation up to 7 cm from the cecum to the splenic flexure, no transition point with LBO, right inguinal hernia with loops of bowels, prominent small bowel loops suggestive of ileus  * Current CT abdomen with IV contrast on September 20: Hepatic steatosis, unremarkable pancreas or spleen, moderate large right inguinal hernia with nonobstructing small bowel loops  * Refused previous colonoscopy; no prior EGD  * No family history of GI cancers or liver disease    RECOMMENDATIONS  - Monitor bowel movements and avoid constipation as above  - Serial abdominal exam  - In case of worsening distention or constipation, recommend checking KUB  - Would ideally benefit from colonoscopy  - Discussed the above recommendation with the son at bedside.  Per son, the patient has been refusing to go for colonoscopy for years  - If becomes agreeable, would recommend outpatient follow-up at [facility] located at [address] [place] [address]. Phone Number: [phone]        Thank you for your consult.  - Please note that plan was communicated with medical team.   - Please reach GI on [phone] during weekdays till 5pm.  - Please call the GI service line after 5pm on Weekdays and anytime on Weekends: [phone].      Lux Abdalla MD  PGY - 5 Gastroenterology Fellow   [facility]    
64 yo domiciled M with PMHx of hypertension, hx of chronic lacunar infarcts, COPD, hyperthyroidism, anxiety, insomnia and PPhx of alcohol use disorder with hx of DTs presenting for alcohol withdrawal being admitted to [facility]. Addiction medicine consulted for alcohol withdrawal with severe history.     #Alcohol withdrawal:  #Hx of DTs:  - Patient started on 4 mg Ativan protocol on admission by primary team, patient continuing to make small clinical improvements in cognition with controlled withdrawal symptoms. Would recommend completion of 6 day taper as ordered.   - hold benzodiazepines for sedation/respiratory depression   - keep Mg>2, K>4   - concern for Wernicke's encephalopathy with mental status changes, and deconditioning on assessment. Would recommend high dose thiamine below, continue folate and multivitamin   - please continue all PRN medications for supportive management of withdrawal including:  - zofran q6 hr prn nausea   - hydroxyzine 25 mg q6h prn anxiety ( hold for sedation)   - tylenol 650 mg q6h prn mild/moderate pain  - trazodone 25 mg nightly prn for insomnia  - CATCH team to assist with substance use aftercare options for outpatient programs near patient's home, currently reviewing [facility] vs [facility] clinic across the street. CATCH counselor to discussion options with patient and enmanuel Cruz. - MAT for AUD plan for naltrexone 50 mg daily to be given with food to start today 9/25   - Vivitrol is injectable extended release naltrexone given as a monthly injection every 4 weeks. Will consider administering injection day of discharge as liver function improves for reducing alcohol cravings and binge consumption. Enmanuel Cruz agreeable to plan.  - Pepito Anna from [facility] community support program to speak with patient today as well.     #Depression vs Substance induced mood disorder:  - give history obtained by patient and enmanuel Cruz, concern for ongoing mood disorder vs mood disorder. Patient denies SI/HI and is amenable to medication to help his mood. Will start escitalopram 10 mg daily today. Patient will benefit from ongoing CBT with weekly counseling.   - recommend daily mood screenings     #Concern for Wernicke Korsakoff Syndrome   - clinical concern for Wernicke's encephalopathy. Would recommend IV thiamine 500 mg q 8h for 3 days, followed by 250 mg IV daily up to additional 5 days per [employer] recommendations. Can also opt to switch to oral after 3 days of IV repletion.   - recommend primary team inquire with social work or case management about supportive options at home for ADLs and IADLS with home health aide services. Please obtain PT/OT eval   - keep Mg >2, K>4     #Tobacco use disorder:  - continue nicotine 21 mg patch daily, offer combination NRT with nicotine gum/lozenge 2 or 4 mg q1-2 hours prn cravings in addition to long acting patch. Smoking cessation counseling encouraged    #Alcohol induced Hepatic Steatosis:  - RUQ and CT A&P with hepatic steatosis  - counseling for alcohol cessation as above with CATCH   - would recommend testing for additional hepatology testing including Hepatitis B surface antigen (HBsAg), hepatitis B surface antibody (anti-HBs), total hepatitis B core antibody (anti-HBc), Hep A IGG and Hep C Antibody  - patient should be monitored by PCP or GI specialist annually for noninvasive liver monitoring given that up to 20% of patients with steatosis develop fibrosis.  - if following up imaging concerning for cirrhosis, patient should be monitored with noninvasive imaging and AFP every 6 months for HCC screening     Thank you for this consult. Rest of care per primary. Addiction medicine will continue to follow. Please reach out with any questions or concerns via TEAMS.   
64 yo domiciled M with PMHx of hypertension, hx of chronic lacunar infarcts, COPD, hyperthyroidism, anxiety, insomnia and PPhx of alcohol use disorder with hx of DTs presenting for alcohol withdrawal being admitted to [facility]. Addiction medicine consulted for alcohol withdrawal with severe history.     #Alcohol withdrawal:  #Hx of DTs:  - given clinical history and presentation, patient was started on Ativan 4 mg fixed dose taper over the course of 6 days on admission. Patient with clinical improvement in CIWA scoring on that protocol, would make no changes to protocol.   - hold benzodiazepines for sedation/respiratory depression   - keep Mg>2, K>4   - concern for Wernicke's encephalopathy with mental status changes, and deconditioning on assessment. Would recommend high dose thiamine below, continue folate and multivitamin   - please continue all PRN medications for supportive management of withdrawal including:  - zofran q6 hr prn nausea   - hydroxyzine 25 mg q6h prn anxiety ( hold for sedation)   - tylenol 650 mg q6h prn mild/moderate pain  - trazodone 25 mg nightly prn for insomnia  - CATCH team to assist with substance use aftercare options and will have ongoing discussions regarding medications for AUD such as naltrexone when patient's mental status improves. Patient has previously refused addiction aftercare services. Vivitrol is injectable extended release naltrexone given as a monthly injection every 4 weeks. Will consider administering injection day of discharge as liver function improves for reducing alcohol cravings and binge consumption. Daniel Cruz agreeable to plan. Will have ongoing discussions with patient regarding need for supportive measures at home to assist with sobriety as patient endorses and has demonstrated poor decision making.   - concern for underlying mood disorder as well, will evaluate for depression tomorrow if patient engages in discussion and consider medications to assist.     #Concern for Wernicke Korsakoff Syndrome   - clinical concern for Wernicke's encephalopathy. Would recommend IV thiamine 500 mg q 8h for 3 days, followed by 250 mg IV daily up to additional 5 days per [employer] recommendations. Can also opt to switch to oral after 3 days of IV repletion.   - will continue to assess for mental status improvements daily and screen for cooccurring anxiety and depression once withdrawals improve given family concern for cooccurring mood and substance use.   - keep Mg >2, K>4     #Tobacco use disorder:  - continue nicotine 21 mg patch daily, offer combination NRT with nicotine gum/lozenge 2 or 4 mg q1-2 hours prn cravings in addition to long acting patch. Smoking cessation counseling encouraged    #Alcohol induced Hepatic Steatosis:  - RUQ and CT A&P with hepatic steatosis  - counseling for alcohol cessation as above with CATCH   - would recommend testing for additional hepatology testing including Hepatitis B surface antigen (HBsAg), hepatitis B surface antibody (anti-HBs), total hepatitis B core antibody (anti-HBc), Hep A IGG and Hep C Antibody  - patient should be monitored by PCP or GI specialist annually for noninvasive liver monitoring given that up to 20% of patients with steatosis develop fibrosis.  - if following up imaging concerning for cirrhosis, patient should be monitored with noninvasive imaging and AFP every 6 months for HCC screening     Thank you for this consult. Rest of care per primary. Addiction medicine will continue to follow. Please reach out with any questions or concerns via TEAMS.   
62 yo domiciled M with PMHx of hypertension, hx of chronic lacunar infarcts, COPD, hyperthyroidism, anxiety, insomnia and PPhx of alcohol use disorder with hx of DTs presenting for alcohol withdrawal being admitted to [facility]. Addiction medicine consulted for alcohol withdrawal with severe history.     #Alcohol withdrawal:  #Hx of DTs:  - Patient completed Ativan taper, can discontinue CIWA protocol after 6 day ativan taper given clinical improvements and minimal risk for worsening symptoms at this stage of hospital course.   - hold benzodiazepines for sedation/respiratory depression   - keep Mg>2, K>4   - concern for Wernicke's encephalopathy with mental status changes, and deconditioning on assessment. Would recommend high dose thiamine below, continue folate and multivitamin   - please continue all PRN medications for supportive management of withdrawal including:  - zofran q6 hr prn nausea   - hydroxyzine 25 mg q6h prn anxiety ( hold for sedation)   - tylenol 650 mg q6h prn mild/moderate pain  - trazodone 25 mg nightly prn for insomnia  - GERARDO team scheduled patient intake appt at [facility]    - MAT for AUD plan for naltrexone 50 mg daily to be given with food, started 9/25 as recommended. Patient tolerating medication well and will be discharged with medication upon discharged and continued at [facility].   - Patient unfortunately does not qualify for community health [facility] program because he does not have Medicaid, has another [facility] plan.     #Depression vs Substance induced mood disorder:  - give history obtained by patient and son Anthony, concern for ongoing mood disorder vs mood disorder. Patient denies SI/HI and is amenable to medication to help his mood.  Continue escitalopram 10 mg daily today. Patient will benefit from ongoing CBT with weekly counseling at outpatient program.   - CATCH counselor will also discuss an adult day program for patient with his son Anthony which can keep patient socialized and mentally stimulated for goals of mood improvement.     #Concern for Wernicke Korsakoff Syndrome   - clinical concern for Wernicke's encephalopathy. Would recommend IV thiamine 500 mg q 8h for 3 days, followed by 250 mg IV daily up to additional 5 days per [employer] recommendations. Can also opt to switch to oral after 3 days of IV repletion.   - prescribe oral thiamine 100 mg daily for discharge as well for wernicke's   - keep Mg >2, K>4     #Tobacco use disorder:  - continue nicotine 21 mg patch daily, offer combination NRT with nicotine gum/lozenge 2 or 4 mg q1-2 hours prn cravings in addition to long acting patch. Smoking cessation counseling encouraged    #Alcohol induced Hepatic Steatosis:  - RUQ and CT A&P with hepatic steatosis  - counseling for alcohol cessation as above with CATCH   - would recommend testing for additional hepatology testing including Hepatitis B surface antigen (HBsAg), hepatitis B surface antibody (anti-HBs), total hepatitis B core antibody (anti-HBc), Hep A IGG and Hep C Antibody  - patient should be monitored by PCP or GI specialist annually for noninvasive liver monitoring given that up to 20% of patients with steatosis develop fibrosis.   - if following up imaging concerning for cirrhosis, patient should be monitored with noninvasive imaging and AFP every 6 months for HCC screening     Thank you for this consult. Rest of care per primary. Addiction medicine will continue to follow. Please reach out with any questions or concerns via TEAMS.   
62 yo domiciled M with PMHx of hypertension, hx of chronic lacunar infarcts, COPD, hyperthyroidism, anxiety, insomnia and PPhx of alcohol use disorder with hx of DTs presenting for alcohol withdrawal being admitted to [facility]. Addiction medicine consulted for alcohol withdrawal with severe history.     #Alcohol withdrawal:  #Hx of DTs:  - Patient started on 4 mg Ativan protocol on admission by primary team, patient continuing to make small clinical improvements in cognition with controlled withdrawal symptoms. Would recommend completion of 6 day taper as ordered.   - hold benzodiazepines for sedation/respiratory depression   - keep Mg>2, K>4   - concern for Wernicke's encephalopathy with mental status changes, and deconditioning on assessment. Would recommend high dose thiamine below, continue folate and multivitamin   - please continue all PRN medications for supportive management of withdrawal including:  - zofran q6 hr prn nausea   - hydroxyzine 25 mg q6h prn anxiety ( hold for sedation)   - tylenol 650 mg q6h prn mild/moderate pain  - trazodone 25 mg nightly prn for insomnia  - [facility] team to assist with substance use aftercare options for outpatient programs near patient's home, currently reviewing [facility] vs [facility] clinic across the street. [facility] counselor to discussion options with patient and son Anthony.   - MAT for AUD plan for naltrexone 50 mg daily to be given with food, started 9/25 as recommended. Patient tolerating medication well.   - Vivitrol is injectable extended release naltrexone given as a monthly injection every 4 weeks. Will consider administering injection day of discharge as liver function improves for reducing alcohol cravings and binge consumption. Daniel Cruz agreeable to plan.  - Patient unfortunately does not qualify for community health [facility] program because he does not have Medicaid, has another [facility] plan.     #Depression vs Substance induced mood disorder:  - give history obtained by patient and daniel Cruz, concern for ongoing mood disorder vs mood disorder. Patient denies SI/HI and is amenable to medication to help his mood.  Continue escitalopram 10 mg daily today. Patient will benefit from ongoing CBT with weekly counseling.   - recommend daily mood screenings   - [facility] counselor will also discuss an adult day program for patient with his son Anthony which can keep patient socialized and mentally stimulated for goals of mood improvement.     #Concern for Wernicke Korsakoff Syndrome   - clinical concern for Wernicke's encephalopathy. Would recommend IV thiamine 500 mg q 8h for 3 days, followed by 250 mg IV daily up to additional 5 days per [employer] recommendations. Can also opt to switch to oral after 3 days of IV repletion.   - will need PT eval for instability and possible SNF.   - keep Mg >2, K>4     #Tobacco use disorder:  - continue nicotine 21 mg patch daily, offer combination NRT with nicotine gum/lozenge 2 or 4 mg q1-2 hours prn cravings in addition to long acting patch. Smoking cessation counseling encouraged    #Alcohol induced Hepatic Steatosis:  - RUQ and CT A&P with hepatic steatosis  - counseling for alcohol cessation as above with [facility]   - would recommend testing for additional hepatology testing including Hepatitis B surface antigen (HBsAg), hepatitis B surface antibody (anti-HBs), total hepatitis B core antibody (anti-HBc), Hep A IGG and Hep C Antibody  - patient should be monitored by PCP or GI specialist annually for noninvasive liver monitoring given that up to 20% of patients with steatosis develop fibrosis.  - if following up imaging concerning for cirrhosis, patient should be monitored with noninvasive imaging and AFP every 6 months for HCC screening     Thank you for this consult. Rest of care per primary. Addiction medicine will continue to follow. Please reach out with any questions or concerns via TEAMS.

## 2024-09-30 NOTE — CONSULT NOTE ADULT - CONSULT REASON
Alcoholic hepatitis
Alcoholic Hepatitis
alcohol withdrawal

## 2024-09-30 NOTE — DISCHARGE NOTE NURSING/CASE MANAGEMENT/SOCIAL WORK - PATIENT PORTAL LINK FT
right side shoulder/neck pain x3 days You can access the FollowMyHealth Patient Portal offered by [facility] by registering at the following website: [email or web address]. By joining [facility]’s FollowMyHealth portal, you will also be able to view your health information using other applications (apps) compatible with our system.

## 2024-09-30 NOTE — CONSULT NOTE ADULT - CONSULT REQUESTED DATE/TIME
20-Sep-2024 16:32
23-Sep-2024 12:08
26-Sep-2024 13:27
30-Sep-2024 17:50
21-Sep-2024 12:35
23-Sep-2024 12:08
25-Sep-2024 12:43
26-Sep-2024 13:27

## 2024-09-30 NOTE — CONSULT NOTE ADULT - SUBJECTIVE AND OBJECTIVE BOX
Hepatology Initial Consult Note      Location: [facility] ED Hold 011 A ([facility] ED Hold)  Patient Name: MATT MACDONALD  Age: 63y  Gender: Male      Chief Complaint  Patient is a 63y old Male who presents with a chief complaint of   Primary diagnosis of Alcoholic liver damage      Reason for Consult  Elevated LFTs  Alcoholic hepatitis with an MDF-5.4  Severe hepatic steatosis with no evidence of cirrhosis or portal hypertension  Metabolic Encephalopathy: Likely Alcohol Withdrawal      History of Present Illness  63-year-old male patient with history of alcohol use disorder, alcoholic intoxication requiring intubation in March 2024, hypertension, COPD, hypothyroidism, anxiety, and insomnia who was brought to the ED on September 20 for evaluation of multiple falls, confusion, and tremulousness, found to have elevated serum alcohol levels and liver enzymes.  We are consulted for concern of alcoholic hepatitis.  Summary:  * Social history: Has been drinking alcohol for more than 20 years; recently admitted in March for alcohol intoxication requiring intubation; continue to drink after discharge; has been drinking 1 bottle (750 mL) of whiskey every day for the last few months; last drink was September 20 a.m.  * Presentation: Brought for evaluation of frequent falls in the last week; associated with confusion of 1 day duration; tremulousness; restlessness; has been feeling down for few months (suicidal ideation per son); denies pain or nausea; 1 episode of nonbilious vomiting last week; no unintentional weight loss; review of systems positive for constipation with last bowel movement being a week ago  * Vitals: Blood pressure 146/87 mmHg; current heart rate 68 bpm (had sinus tachycardia before); no fever  * Labs: WBC 6.36, hemoglobin 14.4, platelet 208 on September 21, sodium 138, potassium 4-2, BUN 7, creatinine 0.6 on September 21  * LFTs: 3.6/49/49/174 on September 20-> 4.3/451/322/139 on September 21 (versus 1.1/159/51/42 on March 14 during recent admission)  * No lipase  * INR 0.96 on September 20; lactate 6.3 and 5.3 on September 20-> 2.5 on September 21  * Serum alcohol 251 in September 20  * Previous hep C serology unremarkable in February 2024  * No previous ultrasound of the abdomen  * Previous CT abdomen with oral contrast on March 5, 2024 for constipation revealed hepatic steatosis with circumferential thickening along the distal descending colon to the rectum with colonic dilation up to 7 cm from the cecum to the splenic flexure, no transition point with LBO, right inguinal hernia with loops of bowels, prominent small bowel loops suggestive of ileus  * Current CT abdomen with IV contrast on September 20: Hepatic steatosis, unremarkable pancreas or spleen, moderate large right inguinal hernia with nonobstructing small bowel loops  * Refused previous colonoscopy; no prior EGD  * No family history of GI cancers or liver disease        Prior EGD:  no prior      Prior Colonoscopy:  no prior      Past Medical and Surgical History:  HTN (hypertension)  Alcohol abuse  Hyperthyroidism        Home Medications:  metoprolol tartrate 75 mg oral tablet: 1 tab(s) orally 2 times a day (20 Sep 2024 17:32)      Social History:  Tobacco: active  Alcohol: as below  Drugs: son denies      Allergies:  No Known Allergies      Family History:  as below      Vital Signs in the last 24 hours   Vitals Summary T(C): 36.9 (09-21-24 @ 08:08), Max: 36.9 (09-21-24 @ 06:07)  HR: 68 (09-21-24 @ 08:08) (68 - 112)  BP: 146/87 (09-21-24 @ 08:08) (127/89 - 160/75)  RR: 18 (09-21-24 @ 08:08) (18 - 18)  SpO2: 100% (09-21-24 @ 08:08) (97% - 100%)  Vent Data   Intake/ Output   Measurements       Physical Exam  * General Appearance: confused post ativan dose  * Eyes: mild icterus  * Lungs: Good bilateral air entry, normal breath sounds (Clear to auscultation bilaterally, no audible wheezes, crackles, or rhonchi)  * Heart: Regular Rate and Rhythm, normal S1 and S2, no audible murmur, rub, or gallop  * Abdomen: Symmetric, non-distended, soft, non-tender, bowel sounds active all four quadrants, no masses  * Extremities: no lower extremity pitting edema bilaterally; s/p ativan (not cooperative with asterexis exam)      Investigations   Laboratory Workup      - CBC:                        14.4   6.36  )-----------( 208      ( 21 Sep 2024 08:28 )             40.4       - Hgb Trend:  14.4  09-21-24 @ 08:28  15.7  09-20-24 @ 12:25        - Chemistry:  09-21    138  |  101  |  7[L]  ----------------------------<  136[H]  4.2   |  26  |  0.6[L]    Ca    8.4      21 Sep 2024 08:28  Phos  2.7     09-21  Mg     1.3     09-21    TPro  5.8[L]  /  Alb  3.2[L]  /  TBili  4.3[H]  /  DBili  x   /  AST  322[H]  /  ALT  139[H]  /  AlkPhos  451[H]  09-21    Liver panel trend:  TBili 4.3   /      /      /   AlkP 451   /   Tptn 5.8   /   Alb 3.2    /   DBili --      09-21  TBili 3.6   /      /      /   AlkP 494   /   Tptn 6.4   /   Alb 3.8    /   DBili --      09-20      - Coagulation Studies:  PT/INR - ( 20 Sep 2024 17:41 )   PT: 10.90 sec;   INR: 0.96 ratio         PTT - ( 20 Sep 2024 17:41 )  PTT:31.3 sec    - ABG:      - Cardiac Markers:        Microbiological Workup  Urinalysis Basic - ( 21 Sep 2024 08:28 )    Color: x / Appearance: x / SG: x / pH: x  Gluc: 136 mg/dL / Ketone: x  / Bili: x / Urobili: x   Blood: x / Protein: x / Nitrite: x   Leuk Esterase: x / RBC: x / WBC x   Sq Epi: x / Non Sq Epi: x / Bacteria: x          Radiological Workup  CT Abdomen and Pelvis w/ IV Cont:   ACC: [id number] EXAM:  CT ABDOMEN AND PELVIS IC   ORDERED BY: TEVIN MORE     ACC: [id number] EXAM:  CT CHEST IC   ORDERED BY: TEVIN MORE     PROCEDURE DATE:  09/20/2024          INTERPRETATION:  CLINICAL INFORMATION: Fall.    COMPARISON: Abdominopelvic CT dated March 5, 2024; CT chest dated March 1, 2024.    CONTRAST/COMPLICATIONS:  IV Contrast: Omnipaque 350 (accession [id number]), IV contrast documented   in unlinked concurrent exam (accession [id number])  100 cc administered   0   cc discarded  Oral Contrast: NONE  Complications: None reported at time of study completion    PROCEDURE:  CT of the Chest, Abdomen and Pelvis was performed.  Sagittal and coronal reformats were performed.    FINDINGS:  CHEST:  LUNGS AND LARGE AIRWAYS: Incidentalnote is made of an azygos lobe, which   is a normal variant. Mild bibasilar atelectasis. No acute pulmonary   consolidation. Central tracheobronchial tree is grossly patent.  PLEURA: No pleural effusion.  VESSELS: Atherosclerotic calcification of thethoracic aorta and coronary   arteries.  HEART: Heart size is normal. No pericardial effusion.  MEDIASTINUM AND GEOVANNA: No enlarged thoracic lymph nodes.  CHEST WALL AND LOWER NECK: Inferior thyroid gland is homogeneous and   symmetric.    ABDOMEN AND PELVIS:  LIVER: Diffuse hepatic steatosis.  BILE DUCTS: Normal caliber.  GALLBLADDER: Within normal limits.  SPLEEN: Within normal limits.  PANCREAS: Within normal limits.  ADRENALS: Within normal limits.  KIDNEYS/URETERS: A 1.3 cm right renal upper pole cyst is noted. No   hydronephrosis.    BLADDER: Within normal limits.  REPRODUCTIVE ORGANS: Few coarse prostatic calcifications.    BOWEL: No evidence of bowel obstruction. Appendix is within normal limits.  PERITONEUM/RETROPERITONEUM: Within normal limits.  VESSELS: Within normal limits.  LYMPH NODES: No lymphadenopathy.  ABDOMINAL WALL: Moderate to large fat-containing right inguinal hernia,   with protrusion of a short segment of nonobstructed bowel. A 1.8 cm   nodular density along the left flank may represent a sebaceous cyst   (5/340.)  BONES: No acute osseous abnormality. Degenerative changes of the spine.      IMPRESSION:  1. No definite evidence of acute traumatic injury within the chest,   abdomen or pelvis.  2. Diffuse hepatic steatosis.      --- End of Report ---            PRISCILA CABRAL MD; Attending Radiologist  This document has been electronically signed. Sep 20 2024  1:37PM (09-20-24 @ 12:42)            Current Medications  Standing Medications  enoxaparin Injectable 40 milliGRAM(s) SubCutaneous every 24 hours  fluticasone propionate/ salmeterol 100-50 MICROgram(s) Diskus 1 Dose(s) Inhalation two times a day  folic acid 1 milliGRAM(s) Oral daily  lactated ringers. 1000 milliLiter(s) (100 mL/Hr) IV Continuous <Continuous>  lactulose Syrup 20 Gram(s) Oral every 6 hours  LORazepam   Injectable 3 milliGRAM(s) IV Push every 4 hours  LORazepam   Injectable   IV Push   methimazole 2.5 milliGRAM(s) Oral daily  metoprolol tartrate 75 milliGRAM(s) Oral two times a day  multivitamin 1 Tablet(s) Oral daily  pantoprazole    Tablet 40 milliGRAM(s) Oral before breakfast  polyethylene glycol 3350 17 Gram(s) Oral two times a day  senna 2 Tablet(s) Oral at bedtime  thiamine 100 milliGRAM(s) Oral daily    PRN Medications  albuterol/ipratropium for Nebulization 3 milliLiter(s) Nebulizer every 6 hours PRN Shortness of Breath and/or Wheezing  aluminum hydroxide/magnesium hydroxide/simethicone Suspension 30 milliLiter(s) Oral every 4 hours PRN Dyspepsia  LORazepam   Injectable 2 milliGRAM(s) IV Push every 1 hour PRN Symptom-triggered: each CIWA -Ar score 8 or GREATER  melatonin 3 milliGRAM(s) Oral at bedtime PRN Insomnia    Singles Doses Administered  (ADM OVERRIDE) 2 each &lt;see task&gt; GiveOnce  (ADM OVERRIDE) 2 each &lt;see task&gt; GiveOnce  (ADM OVERRIDE) 2 each &lt;see task&gt; GiveOnce  (ADM OVERRIDE) 2 each &lt;see task&gt; GiveOnce  (ADM OVERRIDE) 2 each &lt;see task&gt; GiveOnce  diazepam  Injectable 5 milliGRAM(s) IV Push Once  folic acid Injectable 1 milliGRAM(s) IV Push Once  lactated ringers Bolus 1000 milliLiter(s) IV Bolus once  LORazepam   Injectable 4 milliGRAM(s) IV Push every 4 hours  thiamine 100 milliGRAM(s) Oral Once      
  Pt interviewed, examined and EMR chart reviewed.    This AM, patient was seen at bedside sitting upright in bed in no acute distress. Patient denies HA, N/V, diaphoresis, tactile disturbances, AH/ VH, anxiety, SI or HI. CIWA 0. Patient states that his family has decided they want to bring him home to get stronger with home assistance. Patient gave permission to call his son Anthony regarding the plan. Anthony corroborated plan for home dc with CM working on home assistance/PT for patient. Patient agreeable to outpatient CDOP follow up and outpatient continuation of naltrexone for MAT for AUD and depression treatment. Patient tolerating medications well without SE.     REVIEW OF SYSTEMS: per HPI     Vital Signs Last 24 Hrs  T(C): 36.6 (30 Sep 2024 08:14), Max: 36.6 (30 Sep 2024 08:14)  T(F): 97.9 (30 Sep 2024 08:14), Max: 97.9 (30 Sep 2024 08:14)  HR: 56 (30 Sep 2024 08:14) (56 - 68)  BP: 121/79 (30 Sep 2024 08:14) (121/79 - 128/71)  BP(mean): --  RR: 18 (30 Sep 2024 08:14) (18 - 18)  SpO2: 99% (30 Sep 2024 08:14) (99% - 99%)    Parameters below as of 30 Sep 2024 08:14  Patient On (Oxygen Delivery Method): room air    MEDICATIONS  (STANDING):  chlorhexidine 2% Cloths 1 Application(s) Topical daily  enoxaparin Injectable 40 milliGRAM(s) SubCutaneous every 24 hours  escitalopram 10 milliGRAM(s) Oral daily  fluticasone propionate/ salmeterol 100-50 MICROgram(s) Diskus 1 Dose(s) Inhalation two times a day  folic acid 1 milliGRAM(s) Oral daily  lactated ringers. 1000 milliLiter(s) (100 mL/Hr) IV Continuous <Continuous>  methimazole 2.5 milliGRAM(s) Oral daily  metoprolol tartrate 75 milliGRAM(s) Oral two times a day  multivitamin 1 Tablet(s) Oral daily  naltrexone 50 milliGRAM(s) Oral daily  pantoprazole    Tablet 40 milliGRAM(s) Oral before breakfast  polyethylene glycol 3350 17 Gram(s) Oral two times a day  senna 2 Tablet(s) Oral at bedtime    MEDICATIONS  (PRN):  albuterol/ipratropium for Nebulization 3 milliLiter(s) Nebulizer every 6 hours PRN Shortness of Breath and/or Wheezing  aluminum hydroxide/magnesium hydroxide/simethicone Suspension 30 milliLiter(s) Oral every 4 hours PRN Dyspepsia  LORazepam   Injectable 2 milliGRAM(s) IV Push every 4 hours PRN Agitation  melatonin 3 milliGRAM(s) Oral at bedtime PRN Insomnia      MEDICATIONS  (PRN):  albuterol/ipratropium for Nebulization 3 milliLiter(s) Nebulizer every 6 hours PRN Shortness of Breath and/or Wheezing  aluminum hydroxide/magnesium hydroxide/simethicone Suspension 30 milliLiter(s) Oral every 4 hours PRN Dyspepsia  Vital Signs Last 24 Hrs  T(C): 36.2 (27 Sep 2024 15:38), Max: 37.4 (27 Sep 2024 00:19)  T(F): 97.2 (27 Sep 2024 15:38), Max: 99.3 (27 Sep 2024 00:19)  HR: 97 (27 Sep 2024 15:38) (89 - 97)  BP: 126/83 (27 Sep 2024 15:38) (115/76 - 126/83)  BP(mean): --  RR: 20 (27 Sep 2024 15:38) (17 - 20)  SpO2: 97% (27 Sep 2024 15:38) (97% - 97%)    Parameters below as of 27 Sep 2024 15:38  Patient On (Oxygen Delivery Method): room air    LORazepam   Injectable 2 milliGRAM(s) IV Push every 4 hours PRN Agitation  melatonin 3 milliGRAM(s) Oral at bedtime PRN Insomnia        MENTAL STATUS EXAM:  Appearance: calm, in hospital attire   Appearance in relation to age: looks stated age      Hygiene/Grooming: fair grooming   Attitude toward examiner: cooperative  Alertness: awake, intermittent alertness   Orientation: oriented to person, place and time   Posture: lying in hospital bed  Gait: not evaluated  Behavior and psychomotor activity: normal   Mood: "okay"  Affect: constricted range and reactivity      Speech: normal rate and tone   Perceptions: denies hallucinations  Thought process: linear  Thought content: no delusions or particular preoccupation, denies SI/HI  Associations: no loosening of associations   Impulse Control: limited  Insight: partial insight into condition  Judgment: improving     LABS:  Phosphorus (09.27.24 @ 07:14)   Phosphorus: 4.3 mg/dL  Comprehensive Metabolic Panel (09.27.24 @ 07:14)   Sodium: 142 mmol/L  Potassium: 3.8 mmol/L  Chloride: 107 mmol/L  Carbon Dioxide: 21 mmol/L  Anion Gap: 14 mmol/L  Blood Urea Nitrogen: 9 mg/dL  Creatinine: 0.6 mg/dL  Glucose: 87 mg/dL  Calcium: 8.8 mg/dL  Protein Total: 5.7 g/dL  Albumin: 3.3 g/dL  Bilirubin Total: 3.4 mg/dL  Alkaline Phosphatase: 304 U/L  Aspartate Aminotransferase (AST/SGOT): 111 U/L  Alanine Aminotransferase (ALT/SGPT): 72 U/L  eGFR: 108: The estimated glomerular filtration rate (eGFR) calculation is based on   the 2021 CKD-EPI creatinine equation, which is validated in male and   female population 18 years of age and older (N Engl J Med 2021;   385:0455-0349). mL/min/1.73m2  Complete Blood Count + Automated Diff (09.27.24 @ 07:14)   WBC Count: 6.50 K/uL  RBC Count: 3.96 M/uL  Hemoglobin: 14.2 g/dL  Hematocrit: 40.7 %  Mean Cell Volume: 102.8 fL  Mean Cell Hemoglobin: 35.9 pg  Mean Cell Hemoglobin Conc: 34.9 g/dL  Red Cell Distrib Width: 13.9 %  Platelet Count - Automated: 155 K/uL  MPV: 12.0 fL  Auto Neutrophil #: 3.95 K/uL  Auto Lymphocyte #: 1.45 K/uL  Auto Monocyte #: 0.84 K/uL  Auto Eosinophil #: 0.18 K/uL  Auto Basophil #: 0.02 K/uL  Auto Neutrophil %: 60.8: Differential percentages must be correlated with absolute numbers for   clinical significance. %  Auto Lymphocyte %: 22.3 %  Auto Monocyte %: 12.9 %  Auto Eosinophil %: 2.8 %  Auto Basophil %: 0.3 %  Auto Immature Granulocyte %: 0.9: (Includes meta, myelo and promyelocytes). Mild elevations in immature   granulocytes may be seen with many inflammatory processes and pregnancy;   clinical correlation suggested. %  Nucleated RBC: 0 /100 WBCs    RADIOLOGY & ADDITIONAL STUDIES:  < from: US Abdomen Upper Quadrant Right (09.22.24 @ 21:02) >  IMPRESSION:  Liver poorly evaluated by ultrasound. Hepatic steatosis as seen on CT.    < end of copied text >  < from: CT Abdomen and Pelvis w/ IV Cont (09.20.24 @ 12:42) >    IMPRESSION:  1. No definite evidence of acute traumatic injury within the chest,   abdomen or pelvis.  2. Diffuse hepatic steatosis.    < end of copied text >  
  Pt interviewed, examined and EMR chart reviewed.    This AM, patient was seen at bedside, awake in no acute distress. Patient AAOx2 to person and place, but not to time, improvement from yesterday. Patient admits to mild b/l hand tremors, but denies HA, N/V, diaphoresis, tactile disturbances, AH/ VH, anxiety, SI or HI. CIWA 1 for tremors.     Patient states, "that's it, when I go home, no more alcohol". When writer probed and asked about consequences of his drinking, steps he plans to take to ensure sobriety,           REVIEW OF SYSTEMS:per HPI     MEDICATIONS  (STANDING):  chlorhexidine 2% Cloths 1 Application(s) Topical daily  enoxaparin Injectable 40 milliGRAM(s) SubCutaneous every 24 hours  fluticasone propionate/ salmeterol 100-50 MICROgram(s) Diskus 1 Dose(s) Inhalation two times a day  folic acid 1 milliGRAM(s) Oral daily  lactated ringers. 1000 milliLiter(s) (100 mL/Hr) IV Continuous <Continuous>  LORazepam   Injectable 1 milliGRAM(s) IV Push every 4 hours  LORazepam   Injectable   IV Push   methimazole 2.5 milliGRAM(s) Oral daily  metoprolol tartrate 75 milliGRAM(s) Oral two times a day  multivitamin 1 Tablet(s) Oral daily  pantoprazole    Tablet 40 milliGRAM(s) Oral before breakfast  polyethylene glycol 3350 17 Gram(s) Oral two times a day  senna 2 Tablet(s) Oral at bedtime  thiamine 100 milliGRAM(s) Oral daily    MEDICATIONS  (PRN):  albuterol/ipratropium for Nebulization 3 milliLiter(s) Nebulizer every 6 hours PRN Shortness of Breath and/or Wheezing  aluminum hydroxide/magnesium hydroxide/simethicone Suspension 30 milliLiter(s) Oral every 4 hours PRN Dyspepsia  LORazepam   Injectable 2 milliGRAM(s) IV Push every 1 hour PRN Symptom-triggered: each CIWA -Ar score 8 or GREATER  melatonin 3 milliGRAM(s) Oral at bedtime PRN Insomnia      Vital Signs Last 24 Hrs  T(C): 36.5 (23 Sep 2024 11:46), Max: 36.8 (23 Sep 2024 00:00)  T(F): 97.7 (23 Sep 2024 11:46), Max: 98.2 (23 Sep 2024 00:00)  HR: 73 (23 Sep 2024 11:46) (65 - 73)  BP: 125/86 (23 Sep 2024 11:46) (114/80 - 125/86)  BP(mean): 102 (23 Sep 2024 11:46) (92 - 102)  RR: 20 (23 Sep 2024 11:46) (18 - 20)  SpO2: 98% (23 Sep 2024 11:46) (96% - 99%)    Parameters below as of 23 Sep 2024 11:46  Patient On (Oxygen Delivery Method): room air      MENTAL STATUS EXAM:  Appearance: calm, in hospital attire   Appearance in relation to age: looks stated age      Hygiene/Grooming: fair grooming   Attitude toward examiner: cooperative  Alertness: awake, intermittent alertness   Orientation: oriented to person and place only   Posture: lying in hospital bed  Gait: not evaluated  Behavior and psychomotor activity: moderate psychomotor retardation   Mood: "not good"   Affect: constricted range and reactivity      Speech: speech incoherent at times, speaking in short phrases  Perceptions: denies hallucinations  Thought process: linear  Thought content: no delusions or particular preoccupation, denies SI/HI  Associations: no loosening of associations   Impulse Control: limited  Insight: poor  Judgment: poor     LABS:                        14.2   6.57  )-----------( 160      ( 23 Sep 2024 06:24 )             41.2     09-23    140  |  105  |  9[L]  ----------------------------<  82  4.5   |  24  |  0.7    Ca    8.2[L]      23 Sep 2024 06:24  Phos  3.3     09-23  Mg     1.5     09-23    TPro  5.5[L]  /  Alb  3.3[L]  /  TBili  4.3[H]  /  DBili  x   /  AST  216[H]  /  ALT  99[H]  /  AlkPhos  374[H]  09-23    PT/INR - ( 22 Sep 2024 05:59 )   PT: 11.90 sec;   INR: 1.04 ratio           Urinalysis Basic - ( 23 Sep 2024 06:24 )    Color: x / Appearance: x / SG: x / pH: x  Gluc: 82 mg/dL / Ketone: x  / Bili: x / Urobili: x   Blood: x / Protein: x / Nitrite: x   Leuk Esterase: x / RBC: x / WBC x   Sq Epi: x / Non Sq Epi: x / Bacteria: x      Drug Screen Urine:  Alcohol Level  Alcohol, Blood: 251 mg/dL (09-20-24 @ 17:41)        RADIOLOGY & ADDITIONAL STUDIES:  
  Pt interviewed, examined and EMR chart reviewed.    This AM, patient was seen at bedside, curled up in bed slightly somnolent but arousable to verbal stimuli. Patient AAOx3 today to person and place and now knows Sept 2024 as the date. Patient denies HA, N/V, diaphoresis, tactile disturbances, AH/ VH, anxiety, SI or HI. CIWA 1 for tremors felt, not observed.     REVIEW OF SYSTEMS: per HPI     MEDICATIONS  (STANDING):  chlorhexidine 2% Cloths 1 Application(s) Topical daily  enoxaparin Injectable 40 milliGRAM(s) SubCutaneous every 24 hours  escitalopram 10 milliGRAM(s) Oral daily  fluticasone propionate/ salmeterol 100-50 MICROgram(s) Diskus 1 Dose(s) Inhalation two times a day  folic acid 1 milliGRAM(s) Oral daily  lactated ringers. 1000 milliLiter(s) (100 mL/Hr) IV Continuous <Continuous>  LORazepam   Injectable 0.5 milliGRAM(s) IV Push every 12 hours  LORazepam   Injectable   IV Push   methimazole 2.5 milliGRAM(s) Oral daily  metoprolol tartrate 75 milliGRAM(s) Oral two times a day  multivitamin 1 Tablet(s) Oral daily  naltrexone 50 milliGRAM(s) Oral daily  pantoprazole    Tablet 40 milliGRAM(s) Oral before breakfast  polyethylene glycol 3350 17 Gram(s) Oral two times a day  senna 2 Tablet(s) Oral at bedtime  thiamine Injectable 100 milliGRAM(s) IV Push daily    MEDICATIONS  (PRN):  albuterol/ipratropium for Nebulization 3 milliLiter(s) Nebulizer every 6 hours PRN Shortness of Breath and/or Wheezing  aluminum hydroxide/magnesium hydroxide/simethicone Suspension 30 milliLiter(s) Oral every 4 hours PRN Dyspepsia  LORazepam   Injectable 2 milliGRAM(s) IV Push every 4 hours PRN Agitation  melatonin 3 milliGRAM(s) Oral at bedtime PRN Insomnia    Vital Signs Last 24 Hrs  T(C): 36.1 (26 Sep 2024 07:21), Max: 37.1 (25 Sep 2024 20:30)  T(F): 97 (26 Sep 2024 07:21), Max: 98.8 (25 Sep 2024 20:30)  HR: 74 (26 Sep 2024 07:21) (70 - 76)  BP: 108/75 (26 Sep 2024 07:21) (100/70 - 122/84)  BP(mean): --  RR: 18 (26 Sep 2024 07:21) (18 - 18)  SpO2: 97% (26 Sep 2024 06:00) (97% - 98%)    Parameters below as of 26 Sep 2024 06:00  Patient On (Oxygen Delivery Method): room air    MENTAL STATUS EXAM:  Appearance: calm, in hospital attire   Appearance in relation to age: looks stated age      Hygiene/Grooming: fair grooming   Attitude toward examiner: cooperative  Alertness: awake, intermittent alertness   Orientation: oriented to person and place only   Posture: lying in hospital bed  Gait: not evaluated  Behavior and psychomotor activity: moderate psychomotor retardation   Mood: "okay"   Affect: constricted range and reactivity      Speech: speech incoherent at times, speaking in short phrases  Perceptions: denies hallucinations  Thought process: linear  Thought content: no delusions or particular preoccupation, denies SI/HI  Associations: no loosening of associations   Impulse Control: limited  Insight: poor  Judgment: poor     LABS:  Complete Blood Count + Automated Diff (09.26.24 @ 07:23)   WBC Count: 6.85 K/uL  RBC Count: 3.85 M/uL  Hemoglobin: 14.1 g/dL  Hematocrit: 39.8 %  Mean Cell Volume: 103.4 fL  Mean Cell Hemoglobin: 36.6 pg  Mean Cell Hemoglobin Conc: 35.4 g/dL  Red Cell Distrib Width: 13.9 %  Platelet Count - Automated: 166 K/uL  MPV: 12.2 fL  Auto Neutrophil #: 4.39 K/uL  Auto Lymphocyte #: 1.57 K/uL  Auto Monocyte #: 0.69 K/uL  Auto Eosinophil #: 0.11 K/uL  Auto Basophil #: 0.03 K/uL  Auto Neutrophil %: 64.1: Differential percentages must be correlated with absolute numbers for   clinical significance. %  Auto Lymphocyte %: 22.9 %  Auto Monocyte %: 10.1 %  Auto Eosinophil %: 1.6 %  Auto Basophil %: 0.4 %  Auto Immature Granulocyte %: 0.9: (Includes meta, myelo and promyelocytes). Mild elevations in immature   granulocytes may be seen with many inflammatory processes and pregnancy;   clinical correlation suggested. %  Nucleated RBC: 0 /100 WBCs  Magnesium (09.25.24 @ 05:39)   Magnesium: 1.9 mg/dL  Comprehensive Metabolic Panel (09.25.24 @ 05:39)   Sodium: 143 mmol/L  Potassium: 3.9 mmol/L  Chloride: 108 mmol/L  Carbon Dioxide: 19 mmol/L  Anion Gap: 16 mmol/L  Blood Urea Nitrogen: 10 mg/dL  Creatinine: 0.7 mg/dL  Glucose: 79 mg/dL  Calcium: 8.4 mg/dL  Protein Total: 5.5 g/dL  Albumin: 3.3 g/dL  Bilirubin Total: 3.4 mg/dL  Alkaline Phosphatase: 336 U/L  Aspartate Aminotransferase (AST/SGOT): 145 U/L  Alanine Aminotransferase (ALT/SGPT): 83 U/L  eGFR: 104: The estimated glomerular filtration rate (eGFR) calculation is based on   the 2021 CKD-EPI creatinine equation, which is validated in male and   female population 18 years of age and older (N Engl J Med 2021;   [id number]). mL/min/1.73m2             Drug Screen Urine:  Alcohol Level  Alcohol, Blood: 251 mg/dL (09-20-24 @ 17:41)        RADIOLOGY & ADDITIONAL STUDIES:  
Patient is a 63y old  Male who presents with a chief complaint of     HPI:  63 years old male history of alcohol abuse, hypertension, COPD, hyperthyroidism, anxiety, insomnia presented top the ED for the evaluation of frequent falls and alcohol intoxication. As per the son at bedside, the patient has been drinking about 1 liter whiskey daily for months, was admitted and intubated for etoh intoxication in March, 2024, has been drinking since being discharged. Pt has fallen multiple times in the last week. On my eval, Pt is tremulous and restless. Last drink was earlier this evening.  Patient also reports he does want to stop drinking and try to cut down his alcohol use. Denies drug use. Otherwise denies fever, chills, recent illness, coughing, chest pain, abdominal pain, diarrhea or urinary symptoms.     #ED Vitals:  T(C): 37   HR: 106   BP: 130/80 mmhg  RR: 17   SpO2: 98%    #Labs: MCV 98, AG 17, T Bili 3.6, , , , Mag 1.6, VBG: Lactate 6.3    #Imaging:   Trauma work up Negative    CT Abdomen and Pelvis w/ IV Cont : Diffuse hepatic steatosis.    CT Head No Cont: Stable scattered chronic lacunar infarcts.    #S/P: LR bolus 1L, Valium 5mg IV, Thiamine and Folate in the ED.     The patient is being admitted to medicine for the further management.      (20 Sep 2024 15:30)      PAST MEDICAL & SURGICAL HISTORY:  HTN (hypertension)      Alcohol abuse      Hyperthyroidism          SOCIAL HX:   Smoking                         ETOH                            Other    FAMILY HISTORY:  :  No known cardiovacular family hisotry     Review Of Systems:     All ROS are negative except per HPI       Allergies    No Known Allergies    Intolerances          PHYSICAL EXAM    ICU Vital Signs Last 24 Hrs  T(C): 37 (20 Sep 2024 11:45), Max: 37 (20 Sep 2024 11:45)  T(F): 98.6 (20 Sep 2024 11:45), Max: 98.6 (20 Sep 2024 11:45)  HR: 106 (20 Sep 2024 11:45) (106 - 106)  BP: 130/80 (20 Sep 2024 11:45) (130/80 - 130/80)  BP(mean): --  ABP: --  ABP(mean): --  RR: 17 (20 Sep 2024 11:45) (17 - 17)  SpO2: 98% (20 Sep 2024 11:45) (98% - 98%)    O2 Parameters below as of 20 Sep 2024 11:45  Patient On (Oxygen Delivery Method): room air            CONSTITUTIONAL:  in NAD    ENT:   Airway patent,   Mouth with normal mucosa.   No thrush    CARDIAC:   Normal rate,   Regular rhythm.    No edema    RESPIRATORY:   No wheezing  Bilateral BS   Not tachypneic,  No use of accessory muscles    GASTROINTESTINAL:  Abdomen soft,   Non-tender,   No guarding,     NEUROLOGICAL:   Alert   Follows commands  Confused     SKIN:   jaundice  icteric sclera      HEME LYMPH: .  No cervical  lymphadenopathy.  No inguinal lymphadenopathy              LABS:                          15.7   6.35  )-----------( 292      ( 20 Sep 2024 12:25 )             44.4                                               09-20    141  |  99  |  5[L]  ----------------------------<  118[H]  4.2   |  25  |  0.7    Ca    8.4      20 Sep 2024 12:25  Phos  4.0     09-20  Mg     1.6     09-20    TPro  6.4  /  Alb  3.8  /  TBili  3.6[H]  /  DBili  x   /  AST  439[H]  /  ALT  174[H]  /  AlkPhos  494[H]  09-20                                             Urinalysis Basic - ( 20 Sep 2024 12:25 )    Color: x / Appearance: x / SG: x / pH: x  Gluc: 118 mg/dL / Ketone: x  / Bili: x / Urobili: x   Blood: x / Protein: x / Nitrite: x   Leuk Esterase: x / RBC: x / WBC x   Sq Epi: x / Non Sq Epi: x / Bacteria: x                                                  LIVER FUNCTIONS - ( 20 Sep 2024 12:25 )  Alb: 3.8 g/dL / Pro: 6.4 g/dL / ALK PHOS: 494 U/L / ALT: 174 U/L / AST: 439 U/L / GGT: x                                                                                                                                       X-Rays reviewed                                                                                     ECHO    MEDICATIONS  (STANDING):  enoxaparin Injectable 40 milliGRAM(s) SubCutaneous every 24 hours  folic acid 1 milliGRAM(s) Oral daily  lactated ringers. 1000 milliLiter(s) (100 mL/Hr) IV Continuous <Continuous>  LORazepam   Injectable 4 milliGRAM(s) IV Push every 4 hours  LORazepam   Injectable   IV Push   multivitamin 1 Tablet(s) Oral daily  pantoprazole    Tablet 40 milliGRAM(s) Oral before breakfast  thiamine 100 milliGRAM(s) Oral daily    MEDICATIONS  (PRN):  albuterol/ipratropium for Nebulization 3 milliLiter(s) Nebulizer every 6 hours PRN Shortness of Breath and/or Wheezing  aluminum hydroxide/magnesium hydroxide/simethicone Suspension 30 milliLiter(s) Oral every 4 hours PRN Dyspepsia  LORazepam   Injectable 2 milliGRAM(s) IV Push every 1 hour PRN Symptom-triggered: each CIWA -Ar score 8 or GREATER  melatonin 3 milliGRAM(s) Oral at bedtime PRN Insomnia        
  Pt interviewed, examined and EMR chart reviewed.    This AM, patient was seen at bedside, sleeping on his side awake with soft bed brace in place. Patient AAOx3 today to person and place and now knows Sept 2024 as the date. Patient states that he is hungry and wants to eat breakfast. Patient denies HA, N/V, diaphoresis, tactile disturbances, AH/ VH, anxiety, SI or HI. CIWA 1 for tremors felt, not observed.     Per primary team, patient is pending SNF placement given ongoing gait instability issues.     REVIEW OF SYSTEMS: per HPI     MEDICATIONS  (STANDING):  chlorhexidine 2% Cloths 1 Application(s) Topical daily  enoxaparin Injectable 40 milliGRAM(s) SubCutaneous every 24 hours  escitalopram 10 milliGRAM(s) Oral daily  fluticasone propionate/ salmeterol 100-50 MICROgram(s) Diskus 1 Dose(s) Inhalation two times a day  folic acid 1 milliGRAM(s) Oral daily  lactated ringers. 1000 milliLiter(s) (100 mL/Hr) IV Continuous <Continuous>  methimazole 2.5 milliGRAM(s) Oral daily  metoprolol tartrate 75 milliGRAM(s) Oral two times a day  multivitamin 1 Tablet(s) Oral daily  naltrexone 50 milliGRAM(s) Oral daily  pantoprazole    Tablet 40 milliGRAM(s) Oral before breakfast  polyethylene glycol 3350 17 Gram(s) Oral two times a day  senna 2 Tablet(s) Oral at bedtime  thiamine Injectable 100 milliGRAM(s) IV Push daily    MEDICATIONS  (PRN):  albuterol/ipratropium for Nebulization 3 milliLiter(s) Nebulizer every 6 hours PRN Shortness of Breath and/or Wheezing  aluminum hydroxide/magnesium hydroxide/simethicone Suspension 30 milliLiter(s) Oral every 4 hours PRN Dyspepsia  Vital Signs Last 24 Hrs  T(C): 36.2 (27 Sep 2024 15:38), Max: 37.4 (27 Sep 2024 00:19)  T(F): 97.2 (27 Sep 2024 15:38), Max: 99.3 (27 Sep 2024 00:19)  HR: 97 (27 Sep 2024 15:38) (89 - 97)  BP: 126/83 (27 Sep 2024 15:38) (115/76 - 126/83)  BP(mean): --  RR: 20 (27 Sep 2024 15:38) (17 - 20)  SpO2: 97% (27 Sep 2024 15:38) (97% - 97%)    Parameters below as of 27 Sep 2024 15:38  Patient On (Oxygen Delivery Method): room air    LORazepam   Injectable 2 milliGRAM(s) IV Push every 4 hours PRN Agitation  melatonin 3 milliGRAM(s) Oral at bedtime PRN Insomnia        MENTAL STATUS EXAM:  Appearance: calm, in hospital attire   Appearance in relation to age: looks stated age      Hygiene/Grooming: fair grooming   Attitude toward examiner: cooperative  Alertness: awake, intermittent alertness   Orientation: oriented to person and place only   Posture: lying in hospital bed  Gait: not evaluated  Behavior and psychomotor activity: moderate psychomotor retardation   Mood: "hungry"   Affect: constricted range and reactivity      Speech: speech incoherent at times, speaking in short phrases  Perceptions: denies hallucinations  Thought process: linear  Thought content: no delusions or particular preoccupation, denies SI/HI  Associations: no loosening of associations   Impulse Control: limited  Insight: poor  Judgment: poor     LABS:  Phosphorus (09.27.24 @ 07:14)   Phosphorus: 4.3 mg/dL  Comprehensive Metabolic Panel (09.27.24 @ 07:14)   Sodium: 142 mmol/L  Potassium: 3.8 mmol/L  Chloride: 107 mmol/L  Carbon Dioxide: 21 mmol/L  Anion Gap: 14 mmol/L  Blood Urea Nitrogen: 9 mg/dL  Creatinine: 0.6 mg/dL  Glucose: 87 mg/dL  Calcium: 8.8 mg/dL  Protein Total: 5.7 g/dL  Albumin: 3.3 g/dL  Bilirubin Total: 3.4 mg/dL  Alkaline Phosphatase: 304 U/L  Aspartate Aminotransferase (AST/SGOT): 111 U/L  Alanine Aminotransferase (ALT/SGPT): 72 U/L  eGFR: 108: The estimated glomerular filtration rate (eGFR) calculation is based on   the 2021 CKD-EPI creatinine equation, which is validated in male and   female population 18 years of age and older (N Engl J Med 2021;   385:1660-6380). mL/min/1.73m2  Complete Blood Count + Automated Diff (09.27.24 @ 07:14)   WBC Count: 6.50 K/uL  RBC Count: 3.96 M/uL  Hemoglobin: 14.2 g/dL  Hematocrit: 40.7 %  Mean Cell Volume: 102.8 fL  Mean Cell Hemoglobin: 35.9 pg  Mean Cell Hemoglobin Conc: 34.9 g/dL  Red Cell Distrib Width: 13.9 %  Platelet Count - Automated: 155 K/uL  MPV: 12.0 fL  Auto Neutrophil #: 3.95 K/uL  Auto Lymphocyte #: 1.45 K/uL  Auto Monocyte #: 0.84 K/uL  Auto Eosinophil #: 0.18 K/uL  Auto Basophil #: 0.02 K/uL  Auto Neutrophil %: 60.8: Differential percentages must be correlated with absolute numbers for   clinical significance. %  Auto Lymphocyte %: 22.3 %  Auto Monocyte %: 12.9 %  Auto Eosinophil %: 2.8 %  Auto Basophil %: 0.3 %  Auto Immature Granulocyte %: 0.9: (Includes meta, myelo and promyelocytes). Mild elevations in immature   granulocytes may be seen with many inflammatory processes and pregnancy;   clinical correlation suggested. %  Nucleated RBC: 0 /100 WBCs    RADIOLOGY & ADDITIONAL STUDIES:  < from: US Abdomen Upper Quadrant Right (09.22.24 @ 21:02) >  IMPRESSION:  Liver poorly evaluated by ultrasound. Hepatic steatosis as seen on CT.    < end of copied text >  < from: CT Abdomen and Pelvis w/ IV Cont (09.20.24 @ 12:42) >    IMPRESSION:  1. No definite evidence of acute traumatic injury within the chest,   abdomen or pelvis.  2. Diffuse hepatic steatosis.    < end of copied text >  
  Pt interviewed, examined and EMR chart reviewed.    64 yo domiciled M with PMHx of hypertension, COPD, hyperthyroidism, anxiety, insomnia and PPhx of alcohol use disorder with hx of DTs presenting for alcohol withdrawal being admitted to CEU. Addiction medicine consulted for alcohol withdrawal with severe history.     Patient was seen this morning sitting in chair in room in CEU with CATCH team counselor and peer advocate at bedside. Patient appearing calm, in no acute distress. Patient AAOx 1 to person only, did not know the location ([facility]/hospital) and did not know month or year. Patient understands that he is here for his alcohol use. Patient states that he has been drinking 1 1/2 bottles of whiskey daily for the past 30 years. States that the longest period of sobriety he has had was 4-5 days. Per chart review, patient has had history of severe withdrawal requiring admissions to the ICU. No documented history of seizures noted. 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 Patient has not been any recovery treatment programs. Patient asked us to speak to his son Anthony Nelson. Verbal consent given to discuss all matters of his care. Patient denies all other substances.     This AM, patient admits to mild b/l hand tremors, but denies HA, N/V, diaphoresis, tactile disturbances, AH/ VH, anxiety, SI or HI. CIWA 2 for tremors.     Writer spoke with son Anthony Nelson who is a certified nurse anesthetist at [employer]. Per son, patient has been drinking for over 30 years with no previous involvement in recovery treatment programs. Per Ishu, patient has been very stubborn and resistant to treatment and denies wanting to follow through with his sobriety. Patient lives at home with his wife, however his wife works fulltime. Per Ishu patient acquires his own alcohol and drinks it alone at home. Ishu is concerned that patient is severely depressed given passive SI endorsed by patient to his family. Son states that 2 months ago, patient was at his baseline level of cognition, able to complete ADLs however family is noting a steady decline in his functional capacity in the setting of worsening alcohol intake. Ishu states that patient has been neglecting his medications and doctors appts when previously he was attending them. Per Ishmichelle, patient has been smoking 1-2 packs of cigarettes daily for the past 40-50 years.     Patient denies increased anxiety, depression, SI/HI, AH/VH.     [facility] ISTOP: no records  Reference #: [id number]     SOCIAL HISTORY:  domiciled, lives at home with wife  unemployed, retired?   insured  2 sons       REVIEW OF SYSTEMS:per HPI     MEDICATIONS  (STANDING):  chlorhexidine 2% Cloths 1 Application(s) Topical daily  enoxaparin Injectable 40 milliGRAM(s) SubCutaneous every 24 hours  fluticasone propionate/ salmeterol 100-50 MICROgram(s) Diskus 1 Dose(s) Inhalation two times a day  folic acid 1 milliGRAM(s) Oral daily  lactated ringers. 1000 milliLiter(s) (100 mL/Hr) IV Continuous <Continuous>  LORazepam   Injectable 1 milliGRAM(s) IV Push every 4 hours  LORazepam   Injectable   IV Push   methimazole 2.5 milliGRAM(s) Oral daily  metoprolol tartrate 75 milliGRAM(s) Oral two times a day  multivitamin 1 Tablet(s) Oral daily  pantoprazole    Tablet 40 milliGRAM(s) Oral before breakfast  polyethylene glycol 3350 17 Gram(s) Oral two times a day  senna 2 Tablet(s) Oral at bedtime  thiamine 100 milliGRAM(s) Oral daily    MEDICATIONS  (PRN):  albuterol/ipratropium for Nebulization 3 milliLiter(s) Nebulizer every 6 hours PRN Shortness of Breath and/or Wheezing  aluminum hydroxide/magnesium hydroxide/simethicone Suspension 30 milliLiter(s) Oral every 4 hours PRN Dyspepsia  LORazepam   Injectable 2 milliGRAM(s) IV Push every 1 hour PRN Symptom-triggered: each CIWA -Ar score 8 or GREATER  melatonin 3 milliGRAM(s) Oral at bedtime PRN Insomnia      Vital Signs Last 24 Hrs  T(C): 36.5 (23 Sep 2024 11:46), Max: 36.8 (23 Sep 2024 00:00)  T(F): 97.7 (23 Sep 2024 11:46), Max: 98.2 (23 Sep 2024 00:00)  HR: 73 (23 Sep 2024 11:46) (65 - 73)  BP: 125/86 (23 Sep 2024 11:46) (114/80 - 125/86)  BP(mean): 102 (23 Sep 2024 11:46) (92 - 102)  RR: 20 (23 Sep 2024 11:46) (18 - 20)  SpO2: 98% (23 Sep 2024 11:46) (96% - 99%)    Parameters below as of 23 Sep 2024 11:46  Patient On (Oxygen Delivery Method): room air        PHYSICAL EXAM:    Constitutional: NAD  HEENT: PERRLA, patient unable to follow eye tracking commands, cannot assess for nystagmus   Neck: supple  Respiratory: no increased work of breathing  Cardiovascular: S1 and S2, RRR  Gastrointestinal: soft, NT/ND  Extremities: No peripheral edema  Neurological: A/O x 1 to person only, no focal deficits, b/l UE tremors     MENTAL STATUS EXAM:  Appearance: calm, in hospital attire   Appearance in relation to age: looks stated age      Hygiene/Grooming: fair grooming   Attitude toward examiner: cooperative  Alertness: awake, intermittent alertness   Orientation: oriented to person   Posture: sitting in hospital chair   Gait: not evaluated  Behavior and psychomotor activity: moderate psychomotor retardation   Mood: "not good"   Affect: constricted range and reactivity      Speech: speech incoherent at times, speaking in short phrases  Perceptions: denies hallucinations  Thought process: linear  Thought content: no delusions or particular preoccupation, denies SI/HI  Associations: no loosening of associations   Impulse Control: limited  Insight: poor  Judgment: poor     LABS:                        14.2   6.57  )-----------( 160      ( 23 Sep 2024 06:24 )             41.2     09-23    140  |  105  |  9[L]  ----------------------------<  82  4.5   |  24  |  0.7    Ca    8.2[L]      23 Sep 2024 06:24  Phos  3.3     09-23  Mg     1.5     09-23    TPro  5.5[L]  /  Alb  3.3[L]  /  TBili  4.3[H]  /  DBili  x   /  AST  216[H]  /  ALT  99[H]  /  AlkPhos  374[H]  09-23    PT/INR - ( 22 Sep 2024 05:59 )   PT: 11.90 sec;   INR: 1.04 ratio           Urinalysis Basic - ( 23 Sep 2024 06:24 )    Color: x / Appearance: x / SG: x / pH: x  Gluc: 82 mg/dL / Ketone: x  / Bili: x / Urobili: x   Blood: x / Protein: x / Nitrite: x   Leuk Esterase: x / RBC: x / WBC x   Sq Epi: x / Non Sq Epi: x / Bacteria: x      Drug Screen Urine:  Alcohol Level  Alcohol, Blood: 251 mg/dL (09-20-24 @ 17:41)        RADIOLOGY & ADDITIONAL STUDIES:  
  Pt interviewed, examined and EMR chart reviewed.    This AM, patient was seen at bedside, awake in no acute distress. Patient AAOx2 to person and place, but not to time.. Patient admits to mild b/l hand tremors, and depression but denies HA, N/V, diaphoresis, tactile disturbances, AH/ VH, anxiety, SI or HI. CIWA 1 for tremors.     Writer engaged patient in brief discussion regarding his drinking habits and mood assessment. Patient states that after his kids  off, he has been more depressed and thinks about a lot of stressors from the past. Patient admits to issues at home, with children and grandchildren and with his partner with his alcohol use, he states that he has lost his appetite at times, has failed to engage in social duties and is no longer working because of it. Patient has goals of working parttime and socializing more. Writer asked patient is he is amenable to medications and weekly appts with counseling and support and patient states that as long as it doesn't interfere with his ability to drop off his grandson at school, he is open. Writer asked if patient is interested in medication to help him cut down alcohol and assist with his sobriety goals, patient responded that he is interested.    Patient asked writer to call Anthony on the phone in the room. Writer called son Anthony. Writer updated Anthony on plans to start antidepressant and MAT for AUD treatment with naltrexone for alcohol cravings. Informed son that Vivitrol is on formulary here at the hospital and patient can be ordered to receive it the morning of discharge as well and is recommended to have weekly counseling and monthly addiction visits at a substance use treatment program to continue to receive monthly Vivitrol injections and depression treatment. Patient's son amenable to plan. Anthony also requesting if primary team can have SW/CM look into home health aide for help at home. Writer messaged primary resident regarding request for home health aide given ongoing cognitive and physical deterioration impeding ADLS and IADLS.    Writer also reached out to [employer] representative Chad Carson to get patient set up with an outpatient community  who can help patient has his family coordinate appts, transportation to appts and other community needs for his health. Yaakov states that he will stop by today to speak with patient.       REVIEW OF SYSTEMS:per HPI     MEDICATIONS  (STANDING):  chlorhexidine 2% Cloths 1 Application(s) Topical daily  enoxaparin Injectable 40 milliGRAM(s) SubCutaneous every 24 hours  fluticasone propionate/ salmeterol 100-50 MICROgram(s) Diskus 1 Dose(s) Inhalation two times a day  folic acid 1 milliGRAM(s) Oral daily  lactated ringers. 1000 milliLiter(s) (100 mL/Hr) IV Continuous <Continuous>  LORazepam   Injectable 1 milliGRAM(s) IV Push every 12 hours  LORazepam   Injectable   IV Push   methimazole 2.5 milliGRAM(s) Oral daily  metoprolol tartrate 75 milliGRAM(s) Oral two times a day  multivitamin 1 Tablet(s) Oral daily  pantoprazole    Tablet 40 milliGRAM(s) Oral before breakfast  polyethylene glycol 3350 17 Gram(s) Oral two times a day  senna 2 Tablet(s) Oral at bedtime  thiamine IVPB 500 milliGRAM(s) IV Intermittent every 8 hours    MEDICATIONS  (PRN):  albuterol/ipratropium for Nebulization 3 milliLiter(s) Nebulizer every 6 hours PRN Shortness of Breath and/or Wheezing  aluminum hydroxide/magnesium hydroxide/simethicone Suspension 30 milliLiter(s) Oral every 4 hours PRN Dyspepsia  LORazepam   Injectable 2 milliGRAM(s) IV Push every 4 hours PRN Agitation  melatonin 3 milliGRAM(s) Oral at bedtime PRN Insomnia    Vital Signs Last 24 Hrs  T(C): 36.9 (25 Sep 2024 12:10), Max: 36.9 (25 Sep 2024 12:10)  T(F): 98.4 (25 Sep 2024 12:10), Max: 98.4 (25 Sep 2024 12:10)  HR: 71 (25 Sep 2024 12:10) (71 - 89)  BP: 107/73 (25 Sep 2024 12:10) (107/73 - 136/92)  BP(mean): --  RR: 17 (25 Sep 2024 12:10) (17 - 20)  SpO2: 100% (25 Sep 2024 12:10) (97% - 100%)    Parameters below as of 25 Sep 2024 05:19  Patient On (Oxygen Delivery Method): room air        MENTAL STATUS EXAM:  Appearance: calm, in hospital attire   Appearance in relation to age: looks stated age      Hygiene/Grooming: fair grooming   Attitude toward examiner: cooperative  Alertness: awake, intermittent alertness   Orientation: oriented to person and place only   Posture: lying in hospital bed  Gait: not evaluated  Behavior and psychomotor activity: moderate psychomotor retardation   Mood: tearful  Affect: constricted range and reactivity      Speech: speech incoherent at times, speaking in short phrases  Perceptions: denies hallucinations  Thought process: linear  Thought content: no delusions or particular preoccupation, denies SI/HI  Associations: no loosening of associations   Impulse Control: limited  Insight: poor  Judgment: poor     LABS:              Comprehensive Metabolic Panel (09.25.24 @ 05:39)   Sodium: 143 mmol/L  Potassium: 3.9 mmol/L  Chloride: 108 mmol/L  Carbon Dioxide: 19 mmol/L  Anion Gap: 16 mmol/L  Blood Urea Nitrogen: 10 mg/dL  Creatinine: 0.7 mg/dL  Glucose: 79 mg/dL  Calcium: 8.4 mg/dL  Protein Total: 5.5 g/dL  Albumin: 3.3 g/dL  Bilirubin Total: 3.4 mg/dL  Alkaline Phosphatase: 336 U/L  Aspartate Aminotransferase (AST/SGOT): 145 U/L  Alanine Aminotransferase (ALT/SGPT): 83 U/L  eGFR: 104: The estimated glomerular filtration rate (eGFR) calculation is based on   the 2021 CKD-EPI creatinine equation, which is validated in male and   female population 18 years of age and older (N Engl J Med 2021;   [id number]:[id number]). mL/min/1.73m2  Complete Blood Count + Automated Diff (09.25.24 @ 05:39)   WBC Count: 7.98 K/uL  RBC Count: 3.82 M/uL  Hemoglobin: 13.9 g/dL  Hematocrit: 39.1 %  Mean Cell Volume: 102.4 fL  Mean Cell Hemoglobin: 36.4 pg  Mean Cell Hemoglobin Conc: 35.5 g/dL  Red Cell Distrib Width: 14.0 %  Platelet Count - Automated: 160 K/uL  MPV: 12.4 fL  Auto Neutrophil #: 5.02 K/uL  Auto Lymphocyte #: 2.06 K/uL  Auto Monocyte #: 0.65 K/uL  Auto Eosinophil #: 0.17 K/uL  Auto Basophil #: 0.03 K/uL  Auto Neutrophil %: 63.0: Differential percentages must be correlated with absolute numbers for   clinical significance. %  Auto Lymphocyte %: 25.8 %  Auto Monocyte %: 8.1 %  Auto Eosinophil %: 2.1 %  Auto Basophil %: 0.4 %  Auto Immature Granulocyte %: 0.6: (Includes meta, myelo and promyelocytes). Mild elevations in immature   granulocytes may be seen with many inflammatory processes and pregnancy;   clinical correlation suggested. %  Nucleated RBC: 0 /100 WBCs       Urinalysis Basic - ( 23 Sep 2024 06:24 )    Color: x / Appearance: x / SG: x / pH: x  Gluc: 82 mg/dL / Ketone: x  / Bili: x / Urobili: x   Blood: x / Protein: x / Nitrite: x   Leuk Esterase: x / RBC: x / WBC x   Sq Epi: x / Non Sq Epi: x / Bacteria: x      Drug Screen Urine:  Alcohol Level  Alcohol, Blood: 251 mg/dL (09-20-24 @ 17:41)        RADIOLOGY & ADDITIONAL STUDIES:

## 2024-09-30 NOTE — DISCHARGE NOTE NURSING/CASE MANAGEMENT/SOCIAL WORK - FLU SEASON?
Goal Outcome Evaluation:  Plan of Care Reviewed With: patient        Progress: no change  Outcome Summary: NPO - ambulates independently - cardiology to see today - rested well through night - no BP/sticks on left arm due to omplanted glucometer   Yes...

## 2024-09-30 NOTE — CONSULT NOTE ADULT - PROBLEM SELECTOR PROBLEM 3
Wernicke's encephalopathy

## 2024-09-30 NOTE — DISCHARGE NOTE PROVIDER - PROVIDER TOKENS
PROVIDER:[TOKEN:[[id number]],FOLLOWUP:[2 weeks]],PROVIDER:[TOKEN:[[id number]],FOLLOWUP:[1 month]]

## 2024-09-30 NOTE — CONSULT NOTE ADULT - PROVIDER SPECIALTY LIST ADULT
Hepatology
Addiction Medicine
Addiction Medicine
Critical Care
Addiction Medicine

## 2024-09-30 NOTE — DISCHARGE NOTE PROVIDER - NSDCMRMEDTOKEN_GEN_ALL_CORE_FT
Advair Diskus 100 mcg-50 mcg inhalation powder: 1 puff(s) inhaled once a day  ascorbic acid 500 mg oral tablet: 1 tab(s) orally once a day  escitalopram 10 mg oral tablet: 1 tab(s) orally once a day  folic acid 1 mg oral tablet: 1 tab(s) orally once a day  methIMAzole 5 mg oral tablet: 0.5 tab(s) orally once a day  metoprolol tartrate 75 mg oral tablet: 1 tab(s) orally 2 times a day  Multiple Vitamins oral tablet: 1 tab(s) orally once a day  naltrexone 50 mg oral tablet: 1 tab(s) orally once a day  Nicoderm C-Q 7 mg/24 hr transdermal film, extended release: 1 patch transdermal once a day  pantoprazole 40 mg oral delayed release tablet: 1 tab(s) orally once a day (before a meal)  polyethylene glycol 3350 oral powder for reconstitution: 17 gram(s) orally 2 times a day  senna leaf extract oral tablet: 2 tab(s) orally once a day (at bedtime)  thiamine 100 mg oral tablet: 1 tab(s) orally once a day

## 2024-09-30 NOTE — CONSULT NOTE ADULT - PROBLEM SELECTOR PROBLEM 4
Hepatic steatosis

## 2024-09-30 NOTE — CONSULT NOTE ADULT - PROBLEM SELECTOR PROBLEM 1
Alcohol dependence with withdrawal, unspecified
Home
Alcohol dependence with withdrawal, unspecified

## 2024-09-30 NOTE — PROGRESS NOTE ADULT - ASSESSMENT
63-year-old male with past medical history of alcohol abuse, hypertension, COPD, hyperthyroidism, anxiety, insomnia who presented to the ED for the evaluation of frequent falls and alcohol intoxication.     # Alcohol intoxication with  withdrawal    >>  CIWA protocol completed  #Diffuse Hepatic Steatosis  # Suspected Thiamine /Folate deficiency with Macrocytosis  #Lactic Acidosis - Resolved  - HO alcohol abuse and withdrawals.  - Per son Anthony; the patient's functional capacity has been declining, getting more depressed with suicide attempts.  - On high dose thiamine, will continue 500mg IV q8 for 3 days then 250mg IV for 5 days.  - patient more steady after thiamine iv and will get natrexone IM as outpatient. natrexone inpatient is very expensive.  -   - Psychiatry RECS  No psychiatric contraindications to discharge  Please refer to [facility] at [address] [place] [address], [place], [address] (Phone: [phone])  Please coordinate aftercare with CATCH team.      #Fall injury  - HO frequent falls  - Likely due to alcohol intoxication   - CT Head No Cont: Stable scattered chronic lacunar infarcts.  - Trauma work up Negative. No need for any acute intervention.    #H/O Palpitations  - Continue with Metoprolol tartrate 75mg BID    #Hyperthyroidism  -3/24: TSH 0.52, fT4 0.7, T3 84  -F/U TFT  -Continue with Methimazole 2.5mg OD    #COPD  - on RA   - Duonebs PRN, Symbicort BID    DC home today--spent more than 30mins-- son took him home as per patient request

## 2024-09-30 NOTE — PROGRESS NOTE ADULT - PROVIDER SPECIALTY LIST ADULT
Critical Care
Hepatology
Hospitalist
Internal Medicine
Critical Care
Hepatology
Hepatology
Hospitalist
Internal Medicine
Hepatology
Hospitalist
Internal Medicine

## 2024-09-30 NOTE — DISCHARGE NOTE PROVIDER - HOSPITAL COURSE
ED COURSE:  63 years old male history of alcohol abuse, hypertension, COPD, hyperthyroidism, anxiety, insomnia presented top the ED for the evaluation of frequent falls and alcohol intoxication. As per the son at bedside, the patient has been drinking about 1 bottle(750ml) whiskey daily for months(last drink 9/19/24 PM), was admitted and intubated for etoh intoxication in March, 2024, has been drinking since being discharged. Pt has fallen multiple times in the last week. On my eval, Pt is tremulous and restless.  Patient also reports he does want to stop drinking and try to cut down his alcohol use. Also endorses constipation, last BM about a week back. Denies drug use. Otherwise denies fever, chills, recent illness, coughing, chest pain, abdominal pain, diarrhea or urinary symptoms.     #ED Vitals:  T(C): 37   HR: 106   BP: 130/80 mmhg  RR: 17   SpO2: 98%    #Labs: MCV 98, AG 17, T Bili 3.6, , , , Mag 1.6, VBG: Lactate 6.3    #Imaging:   Trauma work up Negative    CT Abdomen and Pelvis w/ IV Cont : Diffuse hepatic steatosis.    CT Head No Cont: Stable scattered chronic lacunar infarcts.    #S/P: LR bolus 1L, Valium 5mg IV, Thiamine and Folate in the ED.     The patient is being admitted to medicine for the further management.     Hospital Course:    # Alcohol intoxication with  withdrawal    >>  CIWA completed taper    #Diffuse Hepatic Steatosis  # Suspected Thiamine /Folate deficiency with Macrocytosis  #Lactic Acidosis - Resolved  - HO alcohol abuse and withdrawals.  - Per son Ishu; the patient's functional capacity has been declining, getting more depressed with suicide attempts.  - On high dose thiamine, completed 500mg IV q8 for 3 days then 250mg IV for 5 days (last day 09/30/24)  - Continue CIWA monitoring, no longer requiring Ativan taper and PRN  - Per addiction Vivitrol x1 09/30 prior to DC    - Addiction team RECS:  CATCH team to assist with substance use aftercare options and will have ongoing discussions regarding medications for AUD such as naltrexone when patient's mental status improves. Patient has previously refused addiction aftercare services. Vivitrol is injectable extended release naltrexone given as a monthly injection every 4 weeks. Will consider administering injection day of discharge as liver function improves for reducing alcohol cravings and binge consumption. Son Anthony agreeable to plan. Will have ongoing discussions with patient regarding need for supportive measures at home to assist with sobriety as patient endorses and has demonstrated poor decision making.   concern for underlying mood disorder as well, will evaluate for depression tomorrow if patient engages in discussion and consider medications to assist.    - Psychiatry RECS  No psychiatric contraindications to discharge  Please refer to [facility] at [address] [facility], [place], [address] (Phone: [phone])  Will need CATCH to coordinate aftercare      #Fall injury  - HO frequent falls  - Likely due to alcohol intoxication   - CT Head No Cont: Stable scattered chronic lacunar infarcts.  - Trauma work up Negative. No need for any acute intervention.    #H/O Palpitations  - Continue with Metoprolol tartrate 75mg BID    #Hyperthyroidism  -3/24: TSH 0.52, fT4 0.7, T3 84  -Continue with Methimazole 2.5mg OD    #COPD  - on RA   - Duonebs PRN, Symbicort BID    Pt medically stable for DC home with [facility]

## 2024-09-30 NOTE — DISCHARGE NOTE PROVIDER - CARE PROVIDERS DIRECT ADDRESSES
[email or web address]

## 2024-09-30 NOTE — PROGRESS NOTE ADULT - SUBJECTIVE AND OBJECTIVE BOX
SUBJECTIVE:    Patient is a 63y old Male who presents with a chief complaint of frequent falls (29 Sep 2024 08:19)    Currently admitted to medicine with the primary diagnosis of Alcoholic liver disease       Today is hospital day 10d.     PAST MEDICAL & SURGICAL HISTORY  HTN (hypertension)    Alcohol abuse    Hyperthyroidism      ALLERGIES:  No Known Allergies    MEDICATIONS:  STANDING MEDICATIONS  chlorhexidine 2% Cloths 1 Application(s) Topical daily  enoxaparin Injectable 40 milliGRAM(s) SubCutaneous every 24 hours  escitalopram 10 milliGRAM(s) Oral daily  fluticasone propionate/ salmeterol 100-50 MICROgram(s) Diskus 1 Dose(s) Inhalation two times a day  folic acid 1 milliGRAM(s) Oral daily  lactated ringers. 1000 milliLiter(s) IV Continuous <Continuous>  methimazole 2.5 milliGRAM(s) Oral daily  metoprolol tartrate 75 milliGRAM(s) Oral two times a day  multivitamin 1 Tablet(s) Oral daily  naltrexone 50 milliGRAM(s) Oral daily  pantoprazole    Tablet 40 milliGRAM(s) Oral before breakfast  polyethylene glycol 3350 17 Gram(s) Oral two times a day  senna 2 Tablet(s) Oral at bedtime    PRN MEDICATIONS  albuterol/ipratropium for Nebulization 3 milliLiter(s) Nebulizer every 6 hours PRN  aluminum hydroxide/magnesium hydroxide/simethicone Suspension 30 milliLiter(s) Oral every 4 hours PRN  LORazepam   Injectable 2 milliGRAM(s) IV Push every 4 hours PRN  melatonin 3 milliGRAM(s) Oral at bedtime PRN    VITALS:   T(F): 97.9  HR: 56  BP: 121/79  RR: 18  SpO2: 99%    LABS:                        14.4   6.11  )-----------( 237      ( 30 Sep 2024 06:38 )             39.1     09-30    139  |  106  |  6[L]  ----------------------------<  112[H]  4.1   |  22  |  0.6[L]    Ca    9.0      30 Sep 2024 06:38    TPro  6.1  /  Alb  3.6  /  TBili  2.4[H]  /  DBili  x   /  AST  78[H]  /  ALT  65[H]  /  AlkPhos  270[H]  09-30      Urinalysis Basic - ( 30 Sep 2024 06:38 )    Color: x / Appearance: x / SG: x / pH: x  Gluc: 112 mg/dL / Ketone: x  / Bili: x / Urobili: x   Blood: x / Protein: x / Nitrite: x   Leuk Esterase: x / RBC: x / WBC x   Sq Epi: x / Non Sq Epi: x / Bacteria: x                RADIOLOGY:    PHYSICAL EXAM:  GEN: No acute distress  LUNGS: Clear to auscultation bilaterally   HEART: S1/S2 present. RRR.   ABD/ GI: Soft, non-tender, non-distended. Bowel sounds present  EXT: NC/NC/NE/2+PP/NEWTON  NEURO: AAOX3

## 2024-10-01 LAB — SOLUBLE LIVER IGG SER IA-ACNC: 1.1 — SIGNIFICANT CHANGE UP (ref 0–20)

## 2024-10-08 DIAGNOSIS — G47.00 INSOMNIA, UNSPECIFIED: ICD-10-CM

## 2024-10-08 DIAGNOSIS — F10.229 ALCOHOL DEPENDENCE WITH INTOXICATION, UNSPECIFIED: ICD-10-CM

## 2024-10-08 DIAGNOSIS — F10.239 ALCOHOL DEPENDENCE WITH WITHDRAWAL, UNSPECIFIED: ICD-10-CM

## 2024-10-08 DIAGNOSIS — K76.0 FATTY (CHANGE OF) LIVER, NOT ELSEWHERE CLASSIFIED: ICD-10-CM

## 2024-10-08 DIAGNOSIS — I10 ESSENTIAL (PRIMARY) HYPERTENSION: ICD-10-CM

## 2024-10-08 DIAGNOSIS — J44.9 CHRONIC OBSTRUCTIVE PULMONARY DISEASE, UNSPECIFIED: ICD-10-CM

## 2024-10-08 DIAGNOSIS — K70.30 ALCOHOLIC CIRRHOSIS OF LIVER WITHOUT ASCITES: ICD-10-CM

## 2024-10-08 DIAGNOSIS — E87.20 ACIDOSIS, UNSPECIFIED: ICD-10-CM

## 2024-10-08 DIAGNOSIS — E83.42 HYPOMAGNESEMIA: ICD-10-CM

## 2024-10-08 DIAGNOSIS — K40.90 UNILATERAL INGUINAL HERNIA, WITHOUT OBSTRUCTION OR GANGRENE, NOT SPECIFIED AS RECURRENT: ICD-10-CM
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
## 2024-10-08 DIAGNOSIS — K59.00 CONSTIPATION, UNSPECIFIED: ICD-10-CM

## 2024-10-08 DIAGNOSIS — D75.89 OTHER SPECIFIED DISEASES OF BLOOD AND BLOOD-FORMING ORGANS: ICD-10-CM

## 2024-10-08 DIAGNOSIS — K70.10 ALCOHOLIC HEPATITIS WITHOUT ASCITES: ICD-10-CM

## 2024-10-08 DIAGNOSIS — E05.90 THYROTOXICOSIS, UNSPECIFIED WITHOUT THYROTOXIC CRISIS OR STORM: ICD-10-CM

## 2024-10-08 DIAGNOSIS — R74.01 ELEVATION OF LEVELS OF LIVER TRANSAMINASE LEVELS: ICD-10-CM

## 2024-10-08 DIAGNOSIS — E51.2 WERNICKE'S ENCEPHALOPATHY: ICD-10-CM

## 2024-10-08 DIAGNOSIS — E53.8 DEFICIENCY OF OTHER SPECIFIED B GROUP VITAMINS: ICD-10-CM

## 2024-10-08 DIAGNOSIS — G93.41 METABOLIC ENCEPHALOPATHY: ICD-10-CM

## 2024-10-08 DIAGNOSIS — R29.6 REPEATED FALLS: ICD-10-CM

## 2024-10-08 DIAGNOSIS — F41.9 ANXIETY DISORDER, UNSPECIFIED: ICD-10-CM

## 2024-10-14 ENCOUNTER — APPOINTMENT (OUTPATIENT)
Dept: PSYCHIATRY | Facility: CLINIC | Age: 64
End: 2024-10-14

## 2025-01-24 NOTE — ED ADULT NURSE NOTE - CAS TRG GENERAL AIRWAY, MLM
,
Airway patent, Nasal mucosa clear. Mouth with normal mucosa. Throat has no vesicles, no oropharyngeal exudates and uvula is midline.
Patent

## 2025-02-19 NOTE — PHYSICAL THERAPY INITIAL EVALUATION ADULT - LEVEL OF INDEPENDENCE: GAIT, REHAB EVAL
[de-identified] : Mr. Raul Montes is 63 years old male with elevated Kappa chain here for consultation referred , cardiologist.   Patient with hx of cardiomyopathy , CAD, GERD, HTN, HLD, hypothyroid, JORDY, and prostate cancer [facility]ian therapy at [place]   He was a 9/11    10/2024  H/H 13.6/42.1 MCV 89.9  normal Calcium and Bun/Cr  1/31/2025 Kappa - 27.82 Lambda - 1.46  FLCR - 19.05  urine total protein - 20  urine bence ta - neg   He was tested for amyloidosis which he was reported was negative and states that he will possibly undergo a cardiac MRI.   10/1/2024 Pet PSMA 1. No tracer avid lymphadenopathy or distant disease. 2. Nonspecific mild tracer heterogeneity in the prostate.  FHx: no pertinent hx   SocialHx: Smoker: former smoker: 20 years, stopped 10-15 years ago: 1 pack a week   ETOH - social drinker  Illicit drugs - none  Work: , teaching  Genetics: high risk  Screenings: Colonoscopy: none Cologuard: 2025  Prostate;: 2024   moderate assist (50% patients effort)

## 2025-05-18 ENCOUNTER — EMERGENCY (EMERGENCY)
Facility: HOSPITAL | Age: 65
LOS: 0 days | Discharge: HOME | End: 2025-05-19
Attending: EMERGENCY MEDICINE
Payer: COMMERCIAL

## 2025-05-18 VITALS
DIASTOLIC BLOOD PRESSURE: 110 MMHG | HEART RATE: 101 BPM | OXYGEN SATURATION: 99 % | RESPIRATION RATE: 24 BRPM | SYSTOLIC BLOOD PRESSURE: 162 MMHG

## 2025-05-18 DIAGNOSIS — G47.00 INSOMNIA, UNSPECIFIED: ICD-10-CM

## 2025-05-18 DIAGNOSIS — F10.229 ALCOHOL DEPENDENCE WITH INTOXICATION, UNSPECIFIED: ICD-10-CM

## 2025-05-18 DIAGNOSIS — S02.2XXA FRACTURE OF NASAL BONES, INITIAL ENCOUNTER FOR CLOSED FRACTURE: ICD-10-CM

## 2025-05-18 DIAGNOSIS — I10 ESSENTIAL (PRIMARY) HYPERTENSION: ICD-10-CM
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
## 2025-05-18 DIAGNOSIS — E03.9 HYPOTHYROIDISM, UNSPECIFIED: ICD-10-CM

## 2025-05-18 DIAGNOSIS — Z23 ENCOUNTER FOR IMMUNIZATION: ICD-10-CM

## 2025-05-18 DIAGNOSIS — J44.9 CHRONIC OBSTRUCTIVE PULMONARY DISEASE, UNSPECIFIED: ICD-10-CM
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
## 2025-05-18 DIAGNOSIS — S09.90XA UNSPECIFIED INJURY OF HEAD, INITIAL ENCOUNTER: ICD-10-CM

## 2025-05-18 LAB
ALBUMIN SERPL ELPH-MCNC: 3.9 G/DL — SIGNIFICANT CHANGE UP (ref 3.5–5.2)
ALP SERPL-CCNC: 142 U/L — HIGH (ref 30–115)
ALT FLD-CCNC: 31 U/L — SIGNIFICANT CHANGE UP (ref 0–41)
ANION GAP SERPL CALC-SCNC: 22 MMOL/L — HIGH (ref 7–14)
APTT BLD: 34.1 SEC — SIGNIFICANT CHANGE UP (ref 27–39.2)
AST SERPL-CCNC: 82 U/L — HIGH (ref 0–41)
BASOPHILS # BLD AUTO: 0.23 K/UL — HIGH (ref 0–0.2)
BASOPHILS NFR BLD AUTO: 2 % — HIGH (ref 0–1)
BILIRUB SERPL-MCNC: 0.4 MG/DL — SIGNIFICANT CHANGE UP (ref 0.2–1.2)
BUN SERPL-MCNC: 9 MG/DL — LOW (ref 10–20)
CALCIUM SERPL-MCNC: 8.8 MG/DL — SIGNIFICANT CHANGE UP (ref 8.4–10.5)
CHLORIDE SERPL-SCNC: 100 MMOL/L — SIGNIFICANT CHANGE UP (ref 98–110)
CO2 SERPL-SCNC: 18 MMOL/L — SIGNIFICANT CHANGE UP (ref 17–32)
CREAT SERPL-MCNC: 0.9 MG/DL — SIGNIFICANT CHANGE UP (ref 0.7–1.5)
EGFR: 95 ML/MIN/1.73M2 — SIGNIFICANT CHANGE UP
EGFR: 95 ML/MIN/1.73M2 — SIGNIFICANT CHANGE UP
EOSINOPHIL # BLD AUTO: 0.36 K/UL — SIGNIFICANT CHANGE UP (ref 0–0.7)
EOSINOPHIL NFR BLD AUTO: 3.1 % — SIGNIFICANT CHANGE UP (ref 0–8)
ETHANOL SERPL-MCNC: 402 MG/DL — HIGH
GIANT PLATELETS BLD QL SMEAR: PRESENT — SIGNIFICANT CHANGE UP
GLUCOSE SERPL-MCNC: 123 MG/DL — HIGH (ref 70–99)
HCT VFR BLD CALC: 47.7 % — SIGNIFICANT CHANGE UP (ref 42–52)
HGB BLD-MCNC: 17.2 G/DL — SIGNIFICANT CHANGE UP (ref 14–18)
INR BLD: 0.91 RATIO — SIGNIFICANT CHANGE UP (ref 0.65–1.3)
LACTATE SERPL-SCNC: 5.5 MMOL/L — CRITICAL HIGH (ref 0.7–2)
LIDOCAIN IGE QN: 42 U/L — SIGNIFICANT CHANGE UP (ref 7–60)
LYMPHOCYTES # BLD AUTO: 2.47 K/UL — SIGNIFICANT CHANGE UP (ref 1.2–3.4)
LYMPHOCYTES # BLD AUTO: 21.4 % — SIGNIFICANT CHANGE UP (ref 20.5–51.1)
MAGNESIUM SERPL-MCNC: 2 MG/DL — SIGNIFICANT CHANGE UP (ref 1.8–2.4)
MANUAL SMEAR VERIFICATION: SIGNIFICANT CHANGE UP
MCHC RBC-ENTMCNC: 33.8 PG — HIGH (ref 27–31)
MCHC RBC-ENTMCNC: 36.1 G/DL — SIGNIFICANT CHANGE UP (ref 32–37)
MCV RBC AUTO: 93.7 FL — SIGNIFICANT CHANGE UP (ref 80–94)
MONOCYTES # BLD AUTO: 1.18 K/UL — HIGH (ref 0.1–0.6)
MONOCYTES NFR BLD AUTO: 10.2 % — HIGH (ref 1.7–9.3)
NEUTROPHILS # BLD AUTO: 5.07 K/UL — SIGNIFICANT CHANGE UP (ref 1.4–6.5)
NEUTROPHILS NFR BLD AUTO: 43.9 % — SIGNIFICANT CHANGE UP (ref 42.2–75.2)
PLAT MORPH BLD: NORMAL — SIGNIFICANT CHANGE UP
PLATELET # BLD AUTO: 258 K/UL — SIGNIFICANT CHANGE UP (ref 130–400)
PMV BLD: 9.2 FL — SIGNIFICANT CHANGE UP (ref 7.4–10.4)
POLYCHROMASIA BLD QL SMEAR: SLIGHT — SIGNIFICANT CHANGE UP
POTASSIUM SERPL-MCNC: 4.5 MMOL/L — SIGNIFICANT CHANGE UP (ref 3.5–5)
POTASSIUM SERPL-SCNC: 4.5 MMOL/L — SIGNIFICANT CHANGE UP (ref 3.5–5)
PROT SERPL-MCNC: 7 G/DL — SIGNIFICANT CHANGE UP (ref 6–8)
PROTHROM AB SERPL-ACNC: 10.7 SEC — SIGNIFICANT CHANGE UP (ref 9.95–12.87)
RBC # BLD: 5.09 M/UL — SIGNIFICANT CHANGE UP (ref 4.7–6.1)
RBC # FLD: 12.5 % — SIGNIFICANT CHANGE UP (ref 11.5–14.5)
RBC BLD AUTO: ABNORMAL
SMUDGE CELLS # BLD: PRESENT — SIGNIFICANT CHANGE UP
SODIUM SERPL-SCNC: 140 MMOL/L — SIGNIFICANT CHANGE UP (ref 135–146)
VARIANT LYMPHS # BLD: 19.4 % — HIGH (ref 0–5)
VARIANT LYMPHS NFR BLD MANUAL: 19.4 % — HIGH (ref 0–5)
WBC # BLD: 11.56 K/UL — HIGH (ref 4.8–10.8)
WBC # FLD AUTO: 11.56 K/UL — HIGH (ref 4.8–10.8)

## 2025-05-18 PROCEDURE — 90715 TDAP VACCINE 7 YRS/> IM: CPT

## 2025-05-18 PROCEDURE — 74177 CT ABD & PELVIS W/CONTRAST: CPT | Mod: 26

## 2025-05-18 PROCEDURE — 90471 IMMUNIZATION ADMIN: CPT

## 2025-05-18 PROCEDURE — 71260 CT THORAX DX C+: CPT

## 2025-05-18 PROCEDURE — 70450 CT HEAD/BRAIN W/O DYE: CPT | Mod: 26

## 2025-05-18 PROCEDURE — 80307 DRUG TEST PRSMV CHEM ANLYZR: CPT

## 2025-05-18 PROCEDURE — 71045 X-RAY EXAM CHEST 1 VIEW: CPT | Mod: 26

## 2025-05-18 PROCEDURE — 96374 THER/PROPH/DIAG INJ IV PUSH: CPT | Mod: XU

## 2025-05-18 PROCEDURE — 71260 CT THORAX DX C+: CPT | Mod: 26

## 2025-05-18 PROCEDURE — 82962 GLUCOSE BLOOD TEST: CPT

## 2025-05-18 PROCEDURE — 74177 CT ABD & PELVIS W/CONTRAST: CPT

## 2025-05-18 PROCEDURE — 99285 EMERGENCY DEPT VISIT HI MDM: CPT | Mod: 25

## 2025-05-18 PROCEDURE — 99285 EMERGENCY DEPT VISIT HI MDM: CPT

## 2025-05-18 PROCEDURE — 85025 COMPLETE CBC W/AUTO DIFF WBC: CPT

## 2025-05-18 PROCEDURE — 70486 CT MAXILLOFACIAL W/O DYE: CPT

## 2025-05-18 PROCEDURE — 72125 CT NECK SPINE W/O DYE: CPT | Mod: 26

## 2025-05-18 PROCEDURE — 70486 CT MAXILLOFACIAL W/O DYE: CPT | Mod: 26

## 2025-05-18 PROCEDURE — 96375 TX/PRO/DX INJ NEW DRUG ADDON: CPT | Mod: XU

## 2025-05-18 PROCEDURE — 85610 PROTHROMBIN TIME: CPT

## 2025-05-18 PROCEDURE — 36415 COLL VENOUS BLD VENIPUNCTURE: CPT

## 2025-05-18 PROCEDURE — 72125 CT NECK SPINE W/O DYE: CPT

## 2025-05-18 PROCEDURE — 85730 THROMBOPLASTIN TIME PARTIAL: CPT

## 2025-05-18 PROCEDURE — 72170 X-RAY EXAM OF PELVIS: CPT | Mod: 26

## 2025-05-18 PROCEDURE — 83605 ASSAY OF LACTIC ACID: CPT

## 2025-05-18 PROCEDURE — 80053 COMPREHEN METABOLIC PANEL: CPT

## 2025-05-18 PROCEDURE — 72170 X-RAY EXAM OF PELVIS: CPT

## 2025-05-18 PROCEDURE — 70450 CT HEAD/BRAIN W/O DYE: CPT

## 2025-05-18 PROCEDURE — 71045 X-RAY EXAM CHEST 1 VIEW: CPT

## 2025-05-18 PROCEDURE — 83735 ASSAY OF MAGNESIUM: CPT

## 2025-05-18 PROCEDURE — 83690 ASSAY OF LIPASE: CPT

## 2025-05-18 RX ORDER — CLOSTRIDIUM TETANI TOXOID ANTIGEN (FORMALDEHYDE INACTIVATED), CORYNEBACTERIUM DIPHTHERIAE TOXOID ANTIGEN (FORMALDEHYDE INACTIVATED), BORDETELLA PERTUSSIS TOXOID ANTIGEN (GLUTARALDEHYDE INACTIVATED), BORDETELLA PERTUSSIS FILAMENTOUS HEMAGGLUTININ ANTIGEN (FORMALDEHYDE INACTIVATED), BORDETELLA PERTUSSIS PERTACTIN ANTIGEN, AND BORDETELLA PERTUSSIS FIMBRIAE 2/3 ANTIGEN 5; 2; 2.5; 5; 3; 5 [LF]/.5ML; [LF]/.5ML; UG/.5ML; UG/.5ML; UG/.5ML; UG/.5ML
0.5 INJECTION, SUSPENSION INTRAMUSCULAR ONCE
Refills: 0 | Status: COMPLETED | OUTPATIENT
Start: 2025-05-18 | End: 2025-05-18

## 2025-05-18 RX ADMIN — CLOSTRIDIUM TETANI TOXOID ANTIGEN (FORMALDEHYDE INACTIVATED), CORYNEBACTERIUM DIPHTHERIAE TOXOID ANTIGEN (FORMALDEHYDE INACTIVATED), BORDETELLA PERTUSSIS TOXOID ANTIGEN (GLUTARALDEHYDE INACTIVATED), BORDETELLA PERTUSSIS FILAMENTOUS HEMAGGLUTININ ANTIGEN (FORMALDEHYDE INACTIVATED), BORDETELLA PERTUSSIS PERTACTIN ANTIGEN, AND BORDETELLA PERTUSSIS FIMBRIAE 2/3 ANTIGEN 0.5 MILLILITER(S): 5; 2; 2.5; 5; 3; 5 INJECTION, SUSPENSION INTRAMUSCULAR at 22:19

## 2025-05-18 NOTE — ED PROVIDER NOTE - PATIENT PORTAL LINK FT
You can access the FollowMyHealth Patient Portal offered by [facility] by registering at the following website: [email or web address]. By joining [employer]’s FollowMyHealth portal, you will also be able to view your health information using other applications (apps) compatible with our system.

## 2025-05-18 NOTE — ED PROVIDER NOTE - NSFOLLOWUPINSTRUCTIONS_ED_ALL_ED_FT
Our Emergency Department Referral Coordinators will be reaching out to you in the next 24-48 hours from 9:00am to 5:00pm to schedule a follow up appointment. Please expect a phone call from the hospital in that time frame. If you do not receive a call or if you have any questions or concerns, you can reach them at   [phone].    Please follow up with your primary care doctor within 1 week    You were seen in the hospital because you fell.  You injured your nose. CT imaging of your face revealed a nasal bone fracture. Please follow up with ENT as discussed.  CT imaging of your brain did not show any signs of fracture or acute bleeding.     Follow up with ENT in 1 week.    Nasal Fracture    WHAT YOU NEED TO KNOW:    What is a nasal fracture? A nasal fracture is a crack or break in your nose. You may have a break in the upper nose (bridge), the side, or the septum. The septum is in the middle of the nose and divides your nostrils.    What are the signs and symptoms of a nasal fracture?   •Pain and swelling  •Nosebleed  •Deformed nose  •Crackling sound when you touch or move your nose  •Bruising on your nose or under your eyes    How is a nasal fracture treated?   •Medicine may be given to decrease pain or help prevent a bacterial infection. Ask how to take pain medicine safely. Medicine may also be given to decrease nasal swelling and help make breathing easier.  •Wound care may help stop bleeding. If you have a hematoma inside your nose, it will be drained. Healthcare providers may place packing (gauze or other material) inside your nose to soak up blood.  •Closed reduction may be done to put your nasal bones back into the correct position. Local or general anesthesia is used during this procedure. This procedure may be done right away or several days after your injury when the swelling has gone down. Surgery (open reduction) to put your bones back into place may be needed for severe fractures.  •Splints or packing help keep your nose in place for 7 to 10 days after a reduction. Ask your healthcare provider how to care for your wounds, splint, or packing.    How do I care for my nasal fracture at home?   •Apply ice on your nose for 15 to 20 minutes every hour or as directed. Use an ice pack, or put crushed ice in a plastic bag. Cover it with a towel. Ice helps prevent tissue damage and decreases swelling and pain.  •Elevate your head when you lie down. This will help decrease swelling and pain. You may need to see a specialist 3 to 5 days later for tests or more treatment after swelling has gone down.  •Protect your nose to prevent bleeding, bruising, or another fracture. Try not to bump your nose on anything. You may not be able to play sports for up to 6 weeks.    When should I seek immediate care?   •You feel like one or both of your nasal passages are blocked and you have trouble breathing.  •Clear fluid is leaking from your nose.  •You have severe nose pain, even after you take medicine.  •You have double vision or have problems moving your eyes.    When should I call my doctor?   •You have a fever.  •You continue to have nosebleeds.  •You have a headache that gets worse, even after you take pain medicine.  •Your splint or packing is loose.  •You have questions or concerns about your condition or care.      Alcohol Intoxication  Alcohol intoxication occurs when a person no longer thinks clearly or functions well (becomes impaired) after drinking alcohol. Intoxication can occur with even one drink. The level of impairment depends on:    The amount of alcohol the person had.  The person's age, gender, and weight.  How often the person drinks.  Whether the person has other medical conditions, such as diabetes, seizures, or a heart condition.    Alcohol intoxication can range in severity from mild to severe. The condition can be dangerous, especially when caused by drinking large amounts of alcohol in a short period of time (binge drinking) or if the person also took certain prescription medicines or recreational drugs.    What are the signs or symptoms?  Symptoms of mild alcohol intoxication include:    Feeling relaxed or sleepy.  Mild difficulty with:    Coordination.  Speech.  Memory.  Attention.      Symptoms of moderate alcohol intoxication include:    Extreme emotions, such as anger or sadness.  Moderate difficulty with:    Coordination.  Speech.  Memory.  Attention.      Symptoms of severe alcohol intoxication include:    Passing out.  Vomiting.  Confusion.  Slow breathing.  Coma.  Severe difficulty with:    Coordination.  Speech.  Memory.  Attention.      Intoxication, especially in people who are not exposed to alcohol often can progress from mild to severe quickly, and may even cause coma or death.    How is this diagnosed?  This condition may be diagnosed based on:    A medical history.  A physical exam.  A blood test that measures the concentration of alcohol in the blood (blood alcohol content, or ELLIS).  Whether there is a smell of alcohol on the breath.    Your health care provider will ask you how much alcohol you drank and what kind of alcohol you had.    How is this treated?  Usually, treatment is not needed for this condition. Most of the effects of alcohol are temporary and go away as the alcohol naturally leaves the body. Your health care provider may recommend monitoring until the alcohol level starts to drop and it is safe to go home. You may also get fluids through an IV tube to help prevent dehydration. If the intoxication is severe, a breathing machine called a ventilator may be needed to support your breathing.    Follow these instructions at home:  Do not drive after drinking alcohol.  Have someone stay with you while you are intoxicated. You should not be left alone.  Stay hydrated. Drink enough fluid to keep your urine clear or pale yellow.  Avoid caffeine because it can dehydrate you.  ImageTake over-the-counter and prescription medicines only as told by your health care provider.  How is this prevented?  To prevent alcohol intoxication:    Limit alcohol intake to no more than 1 drink a day for nonpregnant women and 2 drinks a day for men. One drink equals 12 oz of beer, 5 oz of wine, or 1½ oz of hard liquor.  Do not drink alcohol on an empty stomach.  Avoid drinking alcohol if:    You are under the legal drinking age.  You are pregnant or may be pregnant.  You are taking medicines that should not be taken with alcohol.  Your drinking causes your medical condition to get worse.  You need to drive or perform activities that require your attention.  You have substance use disorder.      To prevent potentially serious complications of alcohol intoxication, seek immediate medical care if you or someone you know has signs of moderate or severe alcohol intoxication. These include:    Moderate or severe difficulty with:    Coordination.  Speech.  Memory.  Attention.    Passing out.  Confusion.  Vomiting.    Do not leave someone alone if he or she is intoxicated.    Contact a health care provider if:  You do not feel better after a few days.  You are having problems at work, at school, or at home due to drinking.  Get help right away if:  You become shaky when you try to stop drinking.  You shake uncontrollably (have a seizure).  You vomit blood. Blood in vomit may look bright red, or it may look like coffee grounds.  You have blood in your stool. Blood in stool may be bright red, or it may make stool appear black and tarry and make it smell bad.  You become light-headed or you faint.  If you ever feel like you may hurt yourself or others, or have thoughts about taking your own life, get help right away. You can go to your nearest emergency department or call:     Your local emergency services (911 in the [place]   A suicide crisis helpline, such as the National Suicide Prevention Lifeline at 1-727.357.6382. This is open 24 hours a day.     This information is not intended to replace advice given to you by your health care provider. Make sure you discuss any questions you have with your health care provider.

## 2025-05-18 NOTE — ED PROVIDER NOTE - CARE PLAN
1 Principal Discharge DX:	Fall  Secondary Diagnosis:	Alcohol intoxication  Secondary Diagnosis:	Nasal bone fracture

## 2025-05-18 NOTE — ED PROVIDER NOTE - PHYSICAL EXAMINATION
Vital Signs: I have reviewed the initial vital signs.  Constitutional: WDWN in nad.  HEAD: No signs of basilar skull fracture.  Integumentary: No rash. No laceration, ecchymoses or swelling.   EYES: No periorbital swelling/ecchymoses. PERRL, EOM intact. No nystagmus. No subconjunctival hemorrhage.   ENT: MMM. No rhinorrhea/otorrhea. + dried blood b/l nares,  No septal hematoma. No mastoid ecchymoses. No intraoral bleeding, No loose or cracked teeth, no active bleeding. No malocclusion.    NECK: Supple, non-tender, No palpable shelves or step-offs. Full ROM.   BACK: No spinous tenderness. No palpable shelves or step-offs.  Cardiovascular: RRR, radial pulses 2/4 b/l. dp and pt pulses 2/4/ b/l. No pain to palpation to chest wall.  Respiratory: CTA B/L, no wheezing or crackles, no stridor. No pain to palpation to ribs b/l. No crepitus. No subq emphysema.   Gastrointestinal: Abdomen soft, nd, nt. no r/g.  Musculoskeletal: FROM of all extremities. No bony tenderness  Neurologic: GCS 15. CN II-XII intact,. Motor 5/5 and sensation intact throughout upper and lower extremities.

## 2025-05-18 NOTE — ED PROVIDER NOTE - ATTENDING CONTRIBUTION TO CARE
I personally evaluated patient. I agree with the findings and plan with all documentation on chart except as documented  in my note.    64-year-old male past medical history of alcohol abuse and dependence, COPD, hypertension, hypothyroidism, anxiety presents to the emergency department for intoxication status post fall.  Additional history obtained from EMS reports patient went home when family heard a fall in the bathroom and found him on the floor.  Patient was verbally responsive and they deny any loss of consciousness.  Patient drank over a pint of liquor tonight prior to falling.  Patient denies any pain or injuries but was bleeding significantly from the face and was covered in blood upon arrival to the emergency department.  Last tetanus shot is unknown.    On exam, vital signs reviewed.  Patient has positive alcohol on breath.  Patient is awake and alert and answering questions.  Patient has dried blood all over face and forehead coming from nose.  No nasal septal hematoma.  GCS of 15 is patient is alert and oriented.  Primary survey is intact.  Face was cleaned and patient has no active signs of bleeding.  No signs of basilar skull fracture.  Extremities have full range of motion and no significant bony tenderness.  IV placed and labs sent, including magnesium and alcohol.  Will get appropriate imaging and follow-up workup and reassess.  Patient anticipated to have a very high alcohol level.  Will give thiamine.  Will give tetanus.

## 2025-05-18 NOTE — ED PROVIDER NOTE - CLINICAL SUMMARY MEDICAL DECISION MAKING FREE TEXT BOX
Endorsed from Dr. West  63 yo man w/ intoxication  Etoh level > 400  awaiting imaging and pending clinical sobreity for possible d/c Endorsed from Dr. West  65 yo man w/ intoxication  Etoh level > 400  awaiting imaging and pending clinical sobreity for possible d/c    0430: pt awake, alert. aao x 3. stable gait. Will contact family for ride home

## 2025-05-18 NOTE — ED PROVIDER NOTE - PROGRESS NOTE DETAILS
GG: Consulted ENT for CT max/face findings. States that there is currently no intervention for these findings.

## 2025-05-19 LAB — LACTATE SERPL-SCNC: 4.1 MMOL/L — CRITICAL HIGH (ref 0.7–2)

## 2025-05-19 RX ORDER — SODIUM CHLORIDE 9 G/1000ML
2000 INJECTION, SOLUTION INTRAVENOUS ONCE
Refills: 0 | Status: COMPLETED | OUTPATIENT
Start: 2025-05-19 | End: 2025-05-19

## 2025-05-19 RX ORDER — ONDANSETRON HCL/PF 4 MG/2 ML
4 VIAL (ML) INJECTION ONCE
Refills: 0 | Status: COMPLETED | OUTPATIENT
Start: 2025-05-19 | End: 2025-05-19

## 2025-05-19 RX ADMIN — SODIUM CHLORIDE 2000 MILLILITER(S): 9 INJECTION, SOLUTION INTRAVENOUS at 01:17

## 2025-05-19 RX ADMIN — Medication 4 MILLIGRAM(S): at 08:22

## 2025-05-19 RX ADMIN — Medication 105 MILLIGRAM(S): at 01:17

## 2025-05-19 NOTE — ED ADULT NURSE NOTE - NSFALLRISKINTERV_ED_ALL_ED
Assistance OOB with selected safe patient handling equipment if applicable/Assistance with ambulation/Communicate fall risk and risk factors to all staff, patient, and family/Monitor gait and stability/Monitor for mental status changes and reorient to person, place, and time, as needed/Provide visual cue: yellow wristband, yellow gown, etc/Reinforce activity limits and safety measures with patient and family/Toileting schedule using arm’s reach rule for commode and bathroom/Use of alarms - bed, stretcher, chair and/or video monitoring/Call bell, personal items and telephone in reach/Instruct patient to call for assistance before getting out of bed/chair/stretcher/Non-slip footwear applied when patient is off stretcher/[place] to call system/Physically safe environment - no spills, clutter or unnecessary equipment/Purposeful Proactive Rounding/Room/bathroom lighting operational, light cord in reach

## 2025-05-19 NOTE — SBIRT NOTE ADULT - NSSBIRTBRIEFINTDET_GEN_A_CORE
Screening results were reviewed with the patient. Patient was provided educational materials on low-risk guidelines and substance use and health. Motivation and goals were discussed. Patient was not provided with a referral to substance use treatment because declined.

## 2025-05-21 NOTE — CHART NOTE - NSCHARTNOTEFT_GEN_A_CORE
Prefers afternoons. Emailed ENT, CT 5/20. Appt scheduled 06/03/2025 01:15 PM w/ Dr. Evans @ [address] [place] [address] (ENT referral) CT 5/21.

## 2025-06-02 ENCOUNTER — NON-APPOINTMENT (OUTPATIENT)
Age: 65
End: 2025-06-02

## 2025-06-03 ENCOUNTER — APPOINTMENT (OUTPATIENT)
Dept: OTOLARYNGOLOGY | Facility: CLINIC | Age: 65
End: 2025-06-03

## 2025-08-07 ENCOUNTER — EMERGENCY (EMERGENCY)
Facility: HOSPITAL | Age: 65
LOS: 0 days | Discharge: HOME | End: 2025-08-08
Attending: EMERGENCY MEDICINE
Payer: COMMERCIAL

## 2025-08-07 VITALS
TEMPERATURE: 99 F | HEIGHT: 64 IN | WEIGHT: 149.91 LBS | SYSTOLIC BLOOD PRESSURE: 125 MMHG | DIASTOLIC BLOOD PRESSURE: 75 MMHG | OXYGEN SATURATION: 95 % | RESPIRATION RATE: 16 BRPM | HEART RATE: 131 BPM

## 2025-08-07 LAB
ALBUMIN SERPL ELPH-MCNC: 4.3 G/DL — SIGNIFICANT CHANGE UP (ref 3.5–5.2)
ALBUMIN SERPL ELPH-MCNC: 4.4 G/DL — SIGNIFICANT CHANGE UP (ref 3.5–5.2)
ALP SERPL-CCNC: 116 U/L — HIGH (ref 30–115)
ALP SERPL-CCNC: 128 U/L — HIGH (ref 30–115)
ALT FLD-CCNC: 70 U/L — HIGH (ref 0–41)
ALT FLD-CCNC: 78 U/L — HIGH (ref 0–41)
ANION GAP SERPL CALC-SCNC: 22 MMOL/L — HIGH (ref 7–14)
ANION GAP SERPL CALC-SCNC: 22 MMOL/L — HIGH (ref 7–14)
AST SERPL-CCNC: 153 U/L — HIGH (ref 0–41)
AST SERPL-CCNC: 207 U/L — HIGH (ref 0–41)
BASOPHILS # BLD AUTO: 0.08 K/UL — SIGNIFICANT CHANGE UP (ref 0–0.2)
BASOPHILS NFR BLD AUTO: 0.7 % — SIGNIFICANT CHANGE UP (ref 0–1)
BILIRUB SERPL-MCNC: 0.9 MG/DL — SIGNIFICANT CHANGE UP (ref 0.2–1.2)
BILIRUB SERPL-MCNC: 1 MG/DL — SIGNIFICANT CHANGE UP (ref 0.2–1.2)
BUN SERPL-MCNC: 12 MG/DL — SIGNIFICANT CHANGE UP (ref 10–20)
BUN SERPL-MCNC: 14 MG/DL — SIGNIFICANT CHANGE UP (ref 10–20)
CALCIUM SERPL-MCNC: 8.6 MG/DL — SIGNIFICANT CHANGE UP (ref 8.4–10.5)
CALCIUM SERPL-MCNC: 8.6 MG/DL — SIGNIFICANT CHANGE UP (ref 8.4–10.5)
CHLORIDE SERPL-SCNC: 94 MMOL/L — LOW (ref 98–110)
CHLORIDE SERPL-SCNC: 95 MMOL/L — LOW (ref 98–110)
CO2 SERPL-SCNC: 21 MMOL/L — SIGNIFICANT CHANGE UP (ref 17–32)
CO2 SERPL-SCNC: 22 MMOL/L — SIGNIFICANT CHANGE UP (ref 17–32)
CREAT SERPL-MCNC: 0.7 MG/DL — SIGNIFICANT CHANGE UP (ref 0.7–1.5)
CREAT SERPL-MCNC: 0.8 MG/DL — SIGNIFICANT CHANGE UP (ref 0.7–1.5)
EGFR: 103 ML/MIN/1.73M2 — SIGNIFICANT CHANGE UP
EGFR: 103 ML/MIN/1.73M2 — SIGNIFICANT CHANGE UP
EGFR: 99 ML/MIN/1.73M2 — SIGNIFICANT CHANGE UP
EGFR: 99 ML/MIN/1.73M2 — SIGNIFICANT CHANGE UP
EOSINOPHIL # BLD AUTO: 0.06 K/UL — SIGNIFICANT CHANGE UP (ref 0–0.7)
EOSINOPHIL NFR BLD AUTO: 0.5 % — SIGNIFICANT CHANGE UP (ref 0–8)
ETHANOL SERPL-MCNC: 225 MG/DL — HIGH
GLUCOSE SERPL-MCNC: 108 MG/DL — HIGH (ref 70–99)
GLUCOSE SERPL-MCNC: 155 MG/DL — HIGH (ref 70–99)
HCT VFR BLD CALC: 45.7 % — SIGNIFICANT CHANGE UP (ref 42–52)
HGB BLD-MCNC: 16.6 G/DL — SIGNIFICANT CHANGE UP (ref 14–18)
IMM GRANULOCYTES NFR BLD AUTO: 0.7 % — HIGH (ref 0.1–0.3)
LYMPHOCYTES # BLD AUTO: 2.23 K/UL — SIGNIFICANT CHANGE UP (ref 1.2–3.4)
LYMPHOCYTES # BLD AUTO: 20.4 % — LOW (ref 20.5–51.1)
MCHC RBC-ENTMCNC: 33.9 PG — HIGH (ref 27–31)
MCHC RBC-ENTMCNC: 36.3 G/DL — SIGNIFICANT CHANGE UP (ref 32–37)
MCV RBC AUTO: 93.3 FL — SIGNIFICANT CHANGE UP (ref 80–94)
MONOCYTES # BLD AUTO: 0.56 K/UL — SIGNIFICANT CHANGE UP (ref 0.1–0.6)
MONOCYTES NFR BLD AUTO: 5.1 % — SIGNIFICANT CHANGE UP (ref 1.7–9.3)
NEUTROPHILS # BLD AUTO: 7.94 K/UL — HIGH (ref 1.4–6.5)
NEUTROPHILS NFR BLD AUTO: 72.6 % — SIGNIFICANT CHANGE UP (ref 42.2–75.2)
NRBC BLD AUTO-RTO: 0 /100 WBCS — SIGNIFICANT CHANGE UP (ref 0–0)
PLATELET # BLD AUTO: 216 K/UL — SIGNIFICANT CHANGE UP (ref 130–400)
PMV BLD: 10.5 FL — HIGH (ref 7.4–10.4)
POTASSIUM SERPL-MCNC: 3.9 MMOL/L — SIGNIFICANT CHANGE UP (ref 3.5–5)
POTASSIUM SERPL-MCNC: SIGNIFICANT CHANGE UP MMOL/L (ref 3.5–5)
POTASSIUM SERPL-SCNC: 3.9 MMOL/L — SIGNIFICANT CHANGE UP (ref 3.5–5)
POTASSIUM SERPL-SCNC: SIGNIFICANT CHANGE UP MMOL/L (ref 3.5–5)
PROT SERPL-MCNC: 6.9 G/DL — SIGNIFICANT CHANGE UP (ref 6–8)
PROT SERPL-MCNC: 7.6 G/DL — SIGNIFICANT CHANGE UP (ref 6–8)
RBC # BLD: 4.9 M/UL — SIGNIFICANT CHANGE UP (ref 4.7–6.1)
RBC # FLD: 12 % — SIGNIFICANT CHANGE UP (ref 11.5–14.5)
SODIUM SERPL-SCNC: 138 MMOL/L — SIGNIFICANT CHANGE UP (ref 135–146)
SODIUM SERPL-SCNC: 138 MMOL/L — SIGNIFICANT CHANGE UP (ref 135–146)
TROPONIN T, HIGH SENSITIVITY RESULT: 11 NG/L — SIGNIFICANT CHANGE UP (ref 6–21)
TROPONIN T, HIGH SENSITIVITY RESULT: 11 NG/L — SIGNIFICANT CHANGE UP (ref 6–21)
WBC # BLD: 10.95 K/UL — HIGH (ref 4.8–10.8)
WBC # FLD AUTO: 10.95 K/UL — HIGH (ref 4.8–10.8)

## 2025-08-07 PROCEDURE — 80307 DRUG TEST PRSMV CHEM ANLYZR: CPT

## 2025-08-07 PROCEDURE — 71045 X-RAY EXAM CHEST 1 VIEW: CPT | Mod: 26

## 2025-08-07 PROCEDURE — 36415 COLL VENOUS BLD VENIPUNCTURE: CPT

## 2025-08-07 PROCEDURE — 84484 ASSAY OF TROPONIN QUANT: CPT

## 2025-08-07 PROCEDURE — 93010 ELECTROCARDIOGRAM REPORT: CPT

## 2025-08-07 PROCEDURE — 99223 1ST HOSP IP/OBS HIGH 75: CPT

## 2025-08-07 PROCEDURE — 93017 CV STRESS TEST TRACING ONLY: CPT

## 2025-08-07 PROCEDURE — 80053 COMPREHEN METABOLIC PANEL: CPT

## 2025-08-07 PROCEDURE — 94640 AIRWAY INHALATION TREATMENT: CPT

## 2025-08-07 PROCEDURE — 85025 COMPLETE CBC W/AUTO DIFF WBC: CPT

## 2025-08-07 PROCEDURE — 71045 X-RAY EXAM CHEST 1 VIEW: CPT

## 2025-08-07 PROCEDURE — 96375 TX/PRO/DX INJ NEW DRUG ADDON: CPT | Mod: XU

## 2025-08-07 PROCEDURE — 93005 ELECTROCARDIOGRAM TRACING: CPT

## 2025-08-07 PROCEDURE — 78452 HT MUSCLE IMAGE SPECT MULT: CPT

## 2025-08-07 PROCEDURE — G0378: CPT

## 2025-08-07 PROCEDURE — A9500: CPT

## 2025-08-07 PROCEDURE — 96374 THER/PROPH/DIAG INJ IV PUSH: CPT | Mod: XU

## 2025-08-07 PROCEDURE — 99285 EMERGENCY DEPT VISIT HI MDM: CPT | Mod: 25

## 2025-08-07 PROCEDURE — 83690 ASSAY OF LIPASE: CPT

## 2025-08-07 RX ORDER — SODIUM CHLORIDE 9 G/1000ML
1000 INJECTION, SOLUTION INTRAVENOUS ONCE
Refills: 0 | Status: COMPLETED | OUTPATIENT
Start: 2025-08-07 | End: 2025-08-07

## 2025-08-07 RX ORDER — ASPIRIN 325 MG
324 TABLET ORAL ONCE
Refills: 0 | Status: COMPLETED | OUTPATIENT
Start: 2025-08-07 | End: 2025-08-07

## 2025-08-07 RX ORDER — NALTREXONE HYDROCHLORIDE 50 MG/1
1 TABLET, FILM COATED ORAL
Qty: 30 | Refills: 3
Start: 2025-08-07 | End: 2025-12-04

## 2025-08-07 RX ORDER — METOCLOPRAMIDE HCL 10 MG
10 TABLET ORAL ONCE
Refills: 0 | Status: COMPLETED | OUTPATIENT
Start: 2025-08-07 | End: 2025-08-07

## 2025-08-07 RX ORDER — ONDANSETRON HCL/PF 4 MG/2 ML
4 VIAL (ML) INJECTION ONCE
Refills: 0 | Status: COMPLETED | OUTPATIENT
Start: 2025-08-07 | End: 2025-08-07

## 2025-08-07 RX ORDER — ADENOSINE 3 MG/ML
60 INJECTION, SOLUTION INTRAVENOUS ONCE
Refills: 0 | Status: DISCONTINUED | OUTPATIENT
Start: 2025-08-07 | End: 2025-08-08

## 2025-08-07 RX ORDER — DIAZEPAM 5 MG/1
5 TABLET ORAL ONCE
Refills: 0 | Status: DISCONTINUED | OUTPATIENT
Start: 2025-08-07 | End: 2025-08-07

## 2025-08-07 RX ORDER — IPRATROPIUM BROMIDE AND ALBUTEROL SULFATE .5; 2.5 MG/3ML; MG/3ML
3 SOLUTION RESPIRATORY (INHALATION) ONCE
Refills: 0 | Status: COMPLETED | OUTPATIENT
Start: 2025-08-07 | End: 2025-08-07

## 2025-08-07 RX ADMIN — DIAZEPAM 5 MILLIGRAM(S): 5 TABLET ORAL at 20:54

## 2025-08-07 RX ADMIN — Medication 20 MILLIGRAM(S): at 20:00

## 2025-08-07 RX ADMIN — SODIUM CHLORIDE 1000 MILLILITER(S): 9 INJECTION, SOLUTION INTRAVENOUS at 18:44

## 2025-08-07 RX ADMIN — Medication 500000 UNIT(S): at 18:44

## 2025-08-07 RX ADMIN — IPRATROPIUM BROMIDE AND ALBUTEROL SULFATE 3 MILLILITER(S): .5; 2.5 SOLUTION RESPIRATORY (INHALATION) at 18:44

## 2025-08-07 RX ADMIN — Medication 324 MILLIGRAM(S): at 18:44

## 2025-08-07 RX ADMIN — Medication 10 MILLIGRAM(S): at 22:36

## 2025-08-08 VITALS
TEMPERATURE: 98 F | HEART RATE: 90 BPM | OXYGEN SATURATION: 95 % | RESPIRATION RATE: 18 BRPM | DIASTOLIC BLOOD PRESSURE: 71 MMHG | SYSTOLIC BLOOD PRESSURE: 132 MMHG

## 2025-08-08 LAB — LIDOCAIN IGE QN: 43 U/L — SIGNIFICANT CHANGE UP (ref 7–60)

## 2025-08-08 PROCEDURE — 93016 CV STRESS TEST SUPVJ ONLY: CPT

## 2025-08-08 PROCEDURE — 99239 HOSP IP/OBS DSCHRG MGMT >30: CPT

## 2025-08-08 PROCEDURE — 93018 CV STRESS TEST I&R ONLY: CPT

## 2025-08-08 PROCEDURE — 71045 X-RAY EXAM CHEST 1 VIEW: CPT | Mod: 26

## 2025-08-08 PROCEDURE — 93010 ELECTROCARDIOGRAM REPORT: CPT

## 2025-08-08 PROCEDURE — 78452 HT MUSCLE IMAGE SPECT MULT: CPT | Mod: 26

## 2025-08-08 RX ORDER — ONDANSETRON HCL/PF 4 MG/2 ML
4 VIAL (ML) INJECTION ONCE
Refills: 0 | Status: COMPLETED | OUTPATIENT
Start: 2025-08-08 | End: 2025-08-08

## 2025-08-08 RX ORDER — CHLORDIAZEPOXIDE HCL 10 MG
50 CAPSULE ORAL ONCE
Refills: 0 | Status: DISCONTINUED | OUTPATIENT
Start: 2025-08-08 | End: 2025-08-08

## 2025-08-08 RX ORDER — REGADENOSON 0.08 MG/ML
0.4 INJECTION, SOLUTION INTRAVENOUS ONCE
Refills: 0 | Status: DISCONTINUED | OUTPATIENT
Start: 2025-08-08 | End: 2025-08-08

## 2025-08-08 RX ADMIN — Medication 40 MILLIGRAM(S): at 03:50

## 2025-08-08 RX ADMIN — Medication 4 MILLIGRAM(S): at 03:50
